# Patient Record
Sex: FEMALE | Race: BLACK OR AFRICAN AMERICAN | NOT HISPANIC OR LATINO | Employment: OTHER | URBAN - METROPOLITAN AREA
[De-identification: names, ages, dates, MRNs, and addresses within clinical notes are randomized per-mention and may not be internally consistent; named-entity substitution may affect disease eponyms.]

---

## 2017-01-20 ENCOUNTER — OFFICE VISIT (OUTPATIENT)
Dept: INTERNAL MEDICINE | Facility: CLINIC | Age: 72
End: 2017-01-20
Payer: MEDICARE

## 2017-01-20 VITALS
HEIGHT: 62 IN | SYSTOLIC BLOOD PRESSURE: 102 MMHG | WEIGHT: 185.63 LBS | RESPIRATION RATE: 16 BRPM | BODY MASS INDEX: 34.16 KG/M2 | DIASTOLIC BLOOD PRESSURE: 78 MMHG | TEMPERATURE: 96 F

## 2017-01-20 DIAGNOSIS — I83.812 VARICOSE VEINS OF LEFT LOWER EXTREMITY WITH PAIN: Primary | ICD-10-CM

## 2017-01-20 PROBLEM — E66.9 OBESITY, CLASS I, BMI 30-34.9: Status: ACTIVE | Noted: 2017-01-20

## 2017-01-20 PROBLEM — E66.811 OBESITY, CLASS I, BMI 30-34.9: Status: ACTIVE | Noted: 2017-01-20

## 2017-01-20 PROCEDURE — 3074F SYST BP LT 130 MM HG: CPT | Mod: S$GLB,,, | Performed by: PHYSICIAN ASSISTANT

## 2017-01-20 PROCEDURE — 1157F ADVNC CARE PLAN IN RCRD: CPT | Mod: S$GLB,,, | Performed by: PHYSICIAN ASSISTANT

## 2017-01-20 PROCEDURE — 1125F AMNT PAIN NOTED PAIN PRSNT: CPT | Mod: S$GLB,,, | Performed by: PHYSICIAN ASSISTANT

## 2017-01-20 PROCEDURE — 99213 OFFICE O/P EST LOW 20 MIN: CPT | Mod: S$GLB,,, | Performed by: PHYSICIAN ASSISTANT

## 2017-01-20 PROCEDURE — 3078F DIAST BP <80 MM HG: CPT | Mod: S$GLB,,, | Performed by: PHYSICIAN ASSISTANT

## 2017-01-20 PROCEDURE — 1160F RVW MEDS BY RX/DR IN RCRD: CPT | Mod: S$GLB,,, | Performed by: PHYSICIAN ASSISTANT

## 2017-01-20 PROCEDURE — 1159F MED LIST DOCD IN RCRD: CPT | Mod: S$GLB,,, | Performed by: PHYSICIAN ASSISTANT

## 2017-01-20 PROCEDURE — 99999 PR PBB SHADOW E&M-EST. PATIENT-LVL III: CPT | Mod: PBBFAC,,, | Performed by: PHYSICIAN ASSISTANT

## 2017-01-20 NOTE — PROGRESS NOTES
Subjective:       Patient ID: Megan Sams is a 72 y.o.B/ female.    Chief Complaint: Leg Pain (L leg x 6 days )    HPI         She comes in today by herself and has the above complaint.  What prompted her visit today was the fact that last night while she was trying to sleep her left posterior lateral leg began to hurt from the knee down to midcalf.  She does have varicose veins present for many years.  Now she has developed a little increase in the size of them just lateral to the popliteal space on her left leg.  She has not had any increased swelling of the calf or ankle and there has been no heat to touch whenever she touches her leg or ankle.  She has no history of DVT.  She also states that sometimes when walking her left knee tends to want to give way.  It doesn't make any noise with flexion-extension or weightbearing.  She's never had problems with her knees before.  She isn't doing anything physically active the could aggravate her legs or joints.  She has not been doing any kneeling to pray, scrub floors, or play with grandchildren.  There has been no swelling of her knees or ankles.        She did try using Aspercreme in the area where she was developing trouble.  It took about an hour and a half before she attained any relief.  Walking around the floor in the middle of the night did not help relieve the discomfort or make it worse.  She did not try any NSAIDs or Tylenol.      Review of Systems    Otherwise negative concerning the ORTHOPEDIC, MUSCULOSKELETAL, RHEUMATOLOGIC, VASCULAR, system review.    Objective:      Physical Exam    STANDING: She does have bulging varicosities present lateral to the left popliteal space.  It kind of looks like a spider a little bigger than quarter-sized.  It does fly well.  There is no inflammatory skin or muscle response.  KNEES: She does have some grating or crepitance more so on the right knee than she does on the left knee.  All signs are negative concerning  patellar, spring, collateral, drawer, and Ingrid's.  She had no effusion of either knee.  CALF: Squeeze was negative for pain and there is no enlargement of either calf.  There is no increased heat.  No cords are palpable.  The BP cuff to 180 mmHg was negative for pain on either side.  Homans sign was also negative bilaterally.    Assessment:       1. Varicose veins of left lower extremity with pain        Plan:     1.  She should try using an ice pack off and on when she has severe discomfort.  At other times she can use either the Aspercreme or low heat from a heating pad.  I asked her to start using extra strength support pantyhose when she is going to be on her feet more than an hour.  She doesn't have to wear them 24/7.  An info sheet concerning varicose veins was given to the patient.  2.  We discussed whether it was needed to do an ultrasound of her veins or to x-ray her knees which I'm sure have arthritis and probable cartilage thinning.  If she does not get better in the next 2 weeks, she is to come back to possibly get those tests run.

## 2017-01-20 NOTE — PATIENT INSTRUCTIONS
Varicose Veins    Varicose veins are swollen, enlarged veins most often found in the legs. They are usually blue or purple in color and may bulge, twist, and stand out under the skin.  Normally, veins return blood from the body to the heart. The leg veins have one-way valves that prevent blood from flowing backward in the vein. When the valves are weak or damaged, blood backs up in the veins. This may cause some of the veins to swell and bulge and become varicose veins.  Symptoms  Varicose veins may or may not cause symptoms. If symptoms do occur, they can include:  · Legs that feel tired, achy, heavy, or itchy  · Leg muscle cramps  · Skin changes, such as discoloration, dryness, redness, or rash (in more severe cases, you may also have sores on the skin called venous leg ulcers)  Risk Factors  There are a number of factors that increase the risk for varicose veins. These can include:  · Being a woman  · Being older  · Sitting or standing for long periods  · Being overweight  · Being pregnant  · Having a family history of varicose veins  Treatment begins with simple self-help measures (see below). If these dont help, there are many procedures that can be done to shrink or remove varicose veins. Your healthcare provider can tell you more about these options, if needed.  Home care  · Support or compression stockings will likely be prescribed. If so, be sure to wear them as directed. They may help improve blood flow.  · Exercising helps strengthen your leg muscles and improve blood flow. To get the most benefit, choose exercises such as walking, swimming, or cycling. Also try to exercise for at least 30 minutes on most days.  · Raising (elevating) your legs lets gravity help blood flow back to the heart. Sit or lie with your feet above heart level a few times throughout the day, or as directed.  · Avoid long periods of sitting or standing. Change positions often. Also, move your ankles, toes and knees often. This  may also help improve blood flow.  · If you are overweight, talk with your healthcare provider about setting up a weight-loss plan. Maintaining a healthy weight can help reduce the strain on your veins. It may also improve symptoms, such as swelling and aching.  · If you have dryness and itching, ask your provider about special lotions that can be applied to the skin to help improve symptoms.  Follow-up care  Follow up with your healthcare provider, or as directed. If imaging tests were done, youll be told the results and if there are any new findings that affect your care.  When to seek medical advice  Call your healthcare provider right away if any of these occur:  · Sudden, severe leg swelling, pain, or redness  · Symptoms worsen, or they dont improve with self-care  · Bleeding from any affected veins  · Ulcers form on the legs, ankles, or feet  · Fever of 100.4°F (38°C) or higher, or as advised by your provider  © 4820-3131 [employer]. [address] [place] [address], [place] [address]. All rights reserved. This information is not intended as a substitute for professional medical care. Always follow your healthcare professional's instructions.

## 2017-01-20 NOTE — MR AVS SNAPSHOT
[facility] - Internal Medicine  [address] [facility] [place] [address]  Phone: [phone]  Fax: [phone]                  Megan Sams   2017 9:40 AM   Office Visit    Description:  Female : 1945   Provider:  Jasson Kelly PA-C   Department:  [facility] - Internal Medicine           Reason for Visit     Leg Pain           Diagnoses this Visit        Comments    Varicose veins of left lower extremity with pain    -  Primary            To Do List           Goals (5 Years of Data)     None      [facility]     [facility] Care [facility] -  Assistance  Registered nurses in the [facility] provide clinical advisement, health education, appointment booking, and other advisory services.  Call for this free service at [phone].             Medications           Message regarding Medications     Verify the changes and/or additions to your medication regime listed below are the same as discussed with your clinician today.  If any of these changes or additions are incorrect, please notify your healthcare provider.             Verify that the below list of medications is an accurate representation of the medications you are currently taking.  If none reported, the list may be blank. If incorrect, please contact your healthcare provider. Carry this list with you in case of emergency.           Current Medications     acetaminophen (TYLENOL EXTRA STRENGTH) 500 MG tablet Take 500 mg by mouth every 6 (six) hours as needed.    allopurinol (ZYLOPRIM) 100 MG tablet Take 2 tablets (200 mg total) by mouth once daily.    aspirin (ECOTRIN) 81 MG EC tablet Take 81 mg by mouth once daily.    candesartan (ATACAND) 32 MG tablet TAKE 1 TABLET BY MOUTH EVERY DAY    carvedilol (COREG) 3.125 MG tablet Take 1 tablet (3.125 mg total) by mouth 2 (two) times daily with meals.    MULTIVIT WITH CALCIUM,IRON,MIN (MULTIPLE VITAMIN, WOMENS ORAL) Take by mouth once daily.    spironolactone (ALDACTONE) 25 MG  "tablet Take 1 tablet (25 mg total) by mouth once daily.           Clinical Reference Information           Vital Signs - Last Recorded  Most recent update: 1/20/2017  9:39 AM by Alyson Yanes LPN    BP Temp Resp    102/78 (BP Location: Right arm, Patient Position: Sitting, BP Method: Manual) 96.3 °F (35.7 °C) (Tympanic) 16    Ht Wt BMI    5' 2" (1.575 m) 84.2 kg (185 lb 10 oz) 33.95 kg/m2      Blood Pressure          Most Recent Value    BP  102/78      Allergies as of 1/20/2017     Gabapentin    Lanoxin [Digoxin]    Thorazine [Chlorpromazine]    Ultracet [Tramadol-acetaminophen]    Penicillins      Immunizations Administered on Date of Encounter - 1/20/2017     None      [facility] Sign-Up     Activating your [facility] account is as easy as 1-2-3!     1) Visit [email or web address], select Sign Up Now, enter this activation code and your date of birth, then select Next.  [email or web address]  Expires: 3/6/2017 10:22 AM      2) Create a username and password to use when you visit [facility] in the future and select a security question in case you lose your password and select Next.    3) Enter your e-mail address and click Sign Up!    Additional Information  If you have questions, please e-mail [email or web address] or call [phone] to talk to our [facility] staff. Remember, [facility] is NOT to be used for urgent needs. For medical emergencies, dial 911.         Instructions      Varicose Veins    Varicose veins are swollen, enlarged veins most often found in the legs. They are usually blue or purple in color and may bulge, twist, and stand out under the skin.  Normally, veins return blood from the body to the heart. The leg veins have one-way valves that prevent blood from flowing backward in the vein. When the valves are weak or damaged, blood backs up in the veins. This may cause some of the veins to swell and bulge and become varicose veins.  Symptoms  Varicose veins may or may not cause symptoms. If symptoms do occur, " they can include:  · Legs that feel tired, achy, heavy, or itchy  · Leg muscle cramps  · Skin changes, such as discoloration, dryness, redness, or rash (in more severe cases, you may also have sores on the skin called venous leg ulcers)  Risk Factors  There are a number of factors that increase the risk for varicose veins. These can include:  · Being a woman  · Being older  · Sitting or standing for long periods  · Being overweight  · Being pregnant  · Having a family history of varicose veins  Treatment begins with simple self-help measures (see below). If these dont help, there are many procedures that can be done to shrink or remove varicose veins. Your healthcare provider can tell you more about these options, if needed.  Home care  · Support or compression stockings will likely be prescribed. If so, be sure to wear them as directed. They may help improve blood flow.  · Exercising helps strengthen your leg muscles and improve blood flow. To get the most benefit, choose exercises such as walking, swimming, or cycling. Also try to exercise for at least 30 minutes on most days.  · Raising (elevating) your legs lets gravity help blood flow back to the heart. Sit or lie with your feet above heart level a few times throughout the day, or as directed.  · Avoid long periods of sitting or standing. Change positions often. Also, move your ankles, toes and knees often. This may also help improve blood flow.  · If you are overweight, talk with your healthcare provider about setting up a weight-loss plan. Maintaining a healthy weight can help reduce the strain on your veins. It may also improve symptoms, such as swelling and aching.  · If you have dryness and itching, ask your provider about special lotions that can be applied to the skin to help improve symptoms.  Follow-up care  Follow up with your healthcare provider, or as directed. If imaging tests were done, youll be told the results and if there are any new findings  that affect your care.  When to seek medical advice  Call your healthcare provider right away if any of these occur:  · Sudden, severe leg swelling, pain, or redness  · Symptoms worsen, or they dont improve with self-care  · Bleeding from any affected veins  · Ulcers form on the legs, ankles, or feet  · Fever of 100.4°F (38°C) or higher, or as advised by your provider  © 6429-5887 [employer]. [address] [place] [address], [place] [id number]. All rights reserved. This information is not intended as a substitute for professional medical care. Always follow your healthcare professional's instructions.

## 2017-02-14 ENCOUNTER — OFFICE VISIT (OUTPATIENT)
Dept: URGENT CARE | Facility: CLINIC | Age: 72
End: 2017-02-14
Payer: MEDICARE

## 2017-02-14 ENCOUNTER — HOSPITAL ENCOUNTER (EMERGENCY)
Facility: HOSPITAL | Age: 72
Discharge: HOME | End: 2017-02-15
Attending: EMERGENCY MEDICINE
Payer: MEDICARE

## 2017-02-14 VITALS
BODY MASS INDEX: 34.16 KG/M2 | WEIGHT: 185.63 LBS | HEART RATE: 74 BPM | DIASTOLIC BLOOD PRESSURE: 100 MMHG | SYSTOLIC BLOOD PRESSURE: 162 MMHG | OXYGEN SATURATION: 99 % | HEIGHT: 62 IN | TEMPERATURE: 99 F

## 2017-02-14 DIAGNOSIS — R07.9 CHEST PAIN, UNSPECIFIED TYPE: Primary | ICD-10-CM

## 2017-02-14 LAB
ALBUMIN SERPL BCP-MCNC: 3.5 G/DL
ALP SERPL-CCNC: 69 U/L
ALT SERPL W/O P-5'-P-CCNC: 20 U/L
ANION GAP SERPL CALC-SCNC: 14 MMOL/L
AST SERPL-CCNC: 35 U/L
BASOPHILS # BLD AUTO: 0.05 K/UL
BASOPHILS NFR BLD: 0.5 %
BILIRUB SERPL-MCNC: 0.6 MG/DL
BNP SERPL-MCNC: 192 PG/ML
BUN SERPL-MCNC: 20 MG/DL
CALCIUM SERPL-MCNC: 10.4 MG/DL
CHLORIDE SERPL-SCNC: 102 MMOL/L
CO2 SERPL-SCNC: 24 MMOL/L
CREAT SERPL-MCNC: 1.1 MG/DL
DIFFERENTIAL METHOD: ABNORMAL
EOSINOPHIL # BLD AUTO: 0.2 K/UL
EOSINOPHIL NFR BLD: 1.9 %
ERYTHROCYTE [DISTWIDTH] IN BLOOD BY AUTOMATED COUNT: 14.7 %
EST. GFR  (AFRICAN AMERICAN): 57 ML/MIN/1.73 M^2
EST. GFR  (NON AFRICAN AMERICAN): 50 ML/MIN/1.73 M^2
GLUCOSE SERPL-MCNC: 131 MG/DL
HCT VFR BLD AUTO: 40.5 %
HGB BLD-MCNC: 13.6 G/DL
INR PPP: 1
LYMPHOCYTES # BLD AUTO: 3.5 K/UL
LYMPHOCYTES NFR BLD: 33.6 %
MCH RBC QN AUTO: 28.4 PG
MCHC RBC AUTO-ENTMCNC: 33.6 %
MCV RBC AUTO: 85 FL
MONOCYTES # BLD AUTO: 1 K/UL
MONOCYTES NFR BLD: 9.4 %
NEUTROPHILS # BLD AUTO: 5.6 K/UL
NEUTROPHILS NFR BLD: 54.6 %
PLATELET # BLD AUTO: 210 K/UL
PMV BLD AUTO: 11.1 FL
POTASSIUM SERPL-SCNC: 3.7 MMOL/L
PROT SERPL-MCNC: 7.8 G/DL
PROTHROMBIN TIME: 10.5 SEC
RBC # BLD AUTO: 4.79 M/UL
SODIUM SERPL-SCNC: 140 MMOL/L
TROPONIN I SERPL DL<=0.01 NG/ML-MCNC: 0.02 NG/ML
WBC # BLD AUTO: 10.33 K/UL

## 2017-02-14 PROCEDURE — 1125F AMNT PAIN NOTED PAIN PRSNT: CPT | Mod: S$GLB,,, | Performed by: PHYSICIAN ASSISTANT

## 2017-02-14 PROCEDURE — 1159F MED LIST DOCD IN RCRD: CPT | Mod: S$GLB,,, | Performed by: PHYSICIAN ASSISTANT

## 2017-02-14 PROCEDURE — 93005 ELECTROCARDIOGRAM TRACING: CPT | Mod: S$GLB,,, | Performed by: FAMILY MEDICINE

## 2017-02-14 PROCEDURE — 99284 EMERGENCY DEPT VISIT MOD MDM: CPT | Mod: 25

## 2017-02-14 PROCEDURE — 80053 COMPREHEN METABOLIC PANEL: CPT

## 2017-02-14 PROCEDURE — 1160F RVW MEDS BY RX/DR IN RCRD: CPT | Mod: S$GLB,,, | Performed by: PHYSICIAN ASSISTANT

## 2017-02-14 PROCEDURE — 85610 PROTHROMBIN TIME: CPT

## 2017-02-14 PROCEDURE — 99213 OFFICE O/P EST LOW 20 MIN: CPT | Mod: S$GLB,,, | Performed by: PHYSICIAN ASSISTANT

## 2017-02-14 PROCEDURE — 84484 ASSAY OF TROPONIN QUANT: CPT

## 2017-02-14 PROCEDURE — 3077F SYST BP >= 140 MM HG: CPT | Mod: S$GLB,,, | Performed by: PHYSICIAN ASSISTANT

## 2017-02-14 PROCEDURE — 3078F DIAST BP <80 MM HG: CPT | Mod: S$GLB,,, | Performed by: PHYSICIAN ASSISTANT

## 2017-02-14 PROCEDURE — 93010 ELECTROCARDIOGRAM REPORT: CPT | Mod: ,,, | Performed by: INTERNAL MEDICINE

## 2017-02-14 PROCEDURE — 83880 ASSAY OF NATRIURETIC PEPTIDE: CPT

## 2017-02-14 PROCEDURE — 1157F ADVNC CARE PLAN IN RCRD: CPT | Mod: S$GLB,,, | Performed by: PHYSICIAN ASSISTANT

## 2017-02-14 PROCEDURE — 99999 PR PBB SHADOW E&M-EST. PATIENT-LVL III: CPT | Mod: PBBFAC,,, | Performed by: PHYSICIAN ASSISTANT

## 2017-02-14 PROCEDURE — 93010 ELECTROCARDIOGRAM REPORT: CPT | Mod: 76,S$GLB,, | Performed by: INTERNAL MEDICINE

## 2017-02-14 PROCEDURE — 85025 COMPLETE CBC W/AUTO DIFF WBC: CPT

## 2017-02-14 PROCEDURE — 93005 ELECTROCARDIOGRAM TRACING: CPT

## 2017-02-14 RX ORDER — NITROGLYCERIN 0.4 MG/1
0.4 TABLET SUBLINGUAL EVERY 5 MIN PRN
Status: DISCONTINUED | OUTPATIENT
Start: 2017-02-14 | End: 2017-02-15 | Stop reason: HOSPADM

## 2017-02-14 RX ORDER — ASPIRIN 325 MG
325 TABLET ORAL
Status: DISCONTINUED | OUTPATIENT
Start: 2017-02-14 | End: 2017-02-15 | Stop reason: HOSPADM

## 2017-02-14 NOTE — ED AVS SNAPSHOT
[facility]  [address] [facility] [address]               Megan ALAMO Sams   2017 10:10 PM   ED    Description:  Female : 1945   Department:  [facility]           Your Care was Coordinated By:     Provider Role From To    Leno Lowe MD Attending Provider 17 [phone] --      Reason for Visit     Chest Pain           Diagnoses this Visit        Comments    Chest pain, unspecified type    -  Primary       ED Disposition     ED Disposition Condition Comment    Discharge             To Do List           Follow-up Information     Follow up with Javad Bowen MD In 2 days.    Specialty:  Internal Medicine    Contact information:    [address]  [address]  [place] [id number] [phone]          Follow up with [facility].    Specialty:  Emergency Medicine    Why:  As needed, If symptoms worsen    Contact information:    [id number] [place] [id number] [phone]      [facility]     [facility] Care [facility] -  Assistance  Registered nurses in the [facility] provide clinical advisement, health education, appointment booking, and other advisory services.  Call for this free service at [phone].             Medications           Message regarding Medications     Verify the changes and/or additions to your medication regime listed below are the same as discussed with your clinician today.  If any of these changes or additions are incorrect, please notify your healthcare provider.        These medications were administered today        Dose Freq    aspirin tablet 325 mg 325 mg ED 1 Time    Sig: Take 1 tablet (325 mg total) by mouth ED 1 Time.    Class: Normal    Route: Oral    nitroGLYCERIN SL tablet 0.4 mg 0.4 mg Every 5 min PRN    Sig: Place 1 tablet (0.4 mg total) under the tongue every 5 (five) minutes as needed for Chest pain.    Class: Normal     "Route: Sublingual           Verify that the below list of medications is an accurate representation of the medications you are currently taking.  If none reported, the list may be blank. If incorrect, please contact your healthcare provider. Carry this list with you in case of emergency.           Current Medications     acetaminophen (TYLENOL EXTRA STRENGTH) 500 MG tablet Take 500 mg by mouth every 6 (six) hours as needed.    allopurinol (ZYLOPRIM) 100 MG tablet Take 2 tablets (200 mg total) by mouth once daily.    aspirin (ECOTRIN) 81 MG EC tablet Take 81 mg by mouth once daily.    aspirin tablet 325 mg Take 1 tablet (325 mg total) by mouth ED 1 Time.    candesartan (ATACAND) 32 MG tablet TAKE 1 TABLET BY MOUTH EVERY DAY    carvedilol (COREG) 3.125 MG tablet Take 1 tablet (3.125 mg total) by mouth 2 (two) times daily with meals.    MULTIVIT WITH CALCIUM,IRON,MIN (MULTIPLE VITAMIN, WOMENS ORAL) Take by mouth once daily.    nitroGLYCERIN SL tablet 0.4 mg Place 1 tablet (0.4 mg total) under the tongue every 5 (five) minutes as needed for Chest pain.    spironolactone (ALDACTONE) 25 MG tablet Take 1 tablet (25 mg total) by mouth once daily.           Clinical Reference Information           Your Vitals Were     BP Pulse Temp Resp Height Weight    180/84 80 98 °F (36.7 °C) (Oral) 20 5' 2" (1.575 m) 83 kg (183 lb)    SpO2 BMI             99% 33.47 kg/m2         Allergies as of 2/15/2017        Reactions    Gabapentin Other (See Comments)    Lowered heart rate    Lanoxin [Digoxin] Other (See Comments)    Too low heart rate    Thorazine [Chlorpromazine] Other (See Comments)    hallucinations    Ultracet [Tramadol-acetaminophen] Other (See Comments)    Elevated BP    Penicillins Nausea Only, Rash      Immunizations Administered on Date of Encounter - 2/15/2017     None      ED Micro, Lab, POCT     Start Ordered       Status Ordering Provider    02/14/17 2238 02/14/17 2237  CBC auto differential  STAT      Final result     " 02/14/17 2238 02/14/17 2237  Comprehensive metabolic panel  STAT      Final result     02/14/17 2238 02/14/17 2237  Protime-INR  STAT      Final result     02/14/17 2238 02/14/17 2237  Troponin I  Now then every 3 hours     Comments:  PLEASE REVIEW ORDER START TIME BEFORE MARKING SPECIMEN COLLECTED.   Start Status   02/14/17 2238 Final result   02/15/17 0138 Final result       Acknowledged     02/14/17 2238 02/14/17 2237  B-Type natriuretic peptide (BNP)  STAT      Final result       ED Imaging Orders     Start Ordered       Status Ordering Provider    02/14/17 2238 02/14/17 2237  X-Ray Chest AP Portable  1 time imaging      Final result         Discharge Instructions         Uncertain Causes of Chest Pain    Chest pain can happen for a number of reasons. Sometimes the cause can't be determined. If your condition does not seem serious, and your pain does not appear to be coming from your heart, your healthcare provider may recommend watching it closely. Sometimes the signs of a serious problem take more time to appear. Many problems not related to your heart can cause chest pain.These include:  · Musculoskeletal. Costochondritis, an inflammation of the tissues around the ribs that can occur from trauma or overuse injuries  · Respiratory. Pneumonia, pneumothorax, or pneumonitis (inflammation of the lining of the chest and lungs)  · Gastrointestinal. Esophageal reflux, heartburn, or gallbladder disease  · Anxiety and panic disorders  · Nerve compression and neuritis  · Miscellaneous problems such as aortic aneurysm or pulmonary embolism (a blood clot in the lungs)  Home care  After your visit, follow these recommendations:  · Rest today and avoid strenuous activity.  · Take any prescribed medicine as directed.  · Be aware of any recurrent chest pain and notice any changes  Follow-up care  Follow up with your healthcare provider if you do not start to feel better within 24 hours, or as advised.  Call 911  Call 911 if  any of these occur:  · A change in the type of pain: if it feels different, becomes more severe, lasts longer, or begins to spread into your shoulder, arm, neck, jaw or back  · Shortness of breath or increased pain with breathing  · Weakness, dizziness, or fainting  · Rapid heart beat  · Crushing sensation in your chest  When to seek medical advice  Call your healthcare provider right away if any of the following occur:  · Cough with dark colored sputum (phlegm) or blood  · Fever of 100.4ºF (38ºC) or higher, or as directed by your healthcare provider  · Swelling, pain or redness in one leg  · Shortness of breath  Date Last Reviewed: 12/30/2015  © 4678-4071 [employer]. [address] [place] [address], [place] [address]. All rights reserved. This information is not intended as a substitute for professional medical care. Always follow your healthcare professional's instructions.          Your Scheduled Appointments     Feb 15, 2017  5:00 PM CST   EKG with EKG, URGENT CARE CLINIC   [facility] ([facility])    [address] [place] [address]  [address] [facility] [address] [phone]              [facility] Sign-Up     Activating your [facility] account is as easy as 1-2-3!     1) Visit [email or web address], select Sign Up Now, enter this activation code and your date of birth, then select Next.  [email or web address]  Expires: 3/6/2017 10:22 AM      2) Create a username and password to use when you visit [facility] in the future and select a security question in case you lose your password and select Next.    3) Enter your e-mail address and click Sign Up!    Additional Information  If you have questions, please e-mail [email or web address] or call [phone] to talk to our [facility] staff. Remember, [facility] is NOT to be used for urgent needs. For medical emergencies, dial 911.          [facility] complies with applicable Federal civil rights laws and does not discriminate on the basis of race, color, national  origin, age, disability, or sex.        Language Assistance Services     ATTENTION: Language assistance services are available, free of charge. Please call [phone].      ATENCIÓN: Si habmalou odonnell, tiene a arshad disposición servicios gratuitos de asistencia lingüística. Llame al [phone].     CHÚ Ý: N?u b?n nói Ti?ng Vi?t, có các d?ch v? h? tr? ngôn ng? mi?n phí dành cho b?n. G?i s? [phone].

## 2017-02-15 VITALS
OXYGEN SATURATION: 99 % | DIASTOLIC BLOOD PRESSURE: 90 MMHG | BODY MASS INDEX: 33.68 KG/M2 | TEMPERATURE: 99 F | HEIGHT: 62 IN | SYSTOLIC BLOOD PRESSURE: 184 MMHG | HEART RATE: 81 BPM | WEIGHT: 183 LBS | RESPIRATION RATE: 18 BRPM

## 2017-02-15 LAB — TROPONIN I SERPL DL<=0.01 NG/ML-MCNC: 0.02 NG/ML

## 2017-02-15 PROCEDURE — 84484 ASSAY OF TROPONIN QUANT: CPT

## 2017-02-15 NOTE — ED NOTES
Pt resting in bed with eyes closed. Pt remains attached to cardiac monitor. AAO x 3. RR e/u, airway open and patent. Bed in locked and low position, side rails up x 2, call light within reach.

## 2017-02-15 NOTE — ED PROVIDER NOTES
SCRIBE #1 NOTE: I, Todd Jeronimo, am scribing for, and in the presence of, Leno Lowe MD. I have scribed the entire note.      History      Chief Complaint   Patient presents with    Chest Pain     pt reports hx of CHF and started having left sided CP radiating to shoulder around 6pm        Review of patient's allergies indicates:   Allergen Reactions    Gabapentin Other (See Comments)     Lowered heart rate    Lanoxin [digoxin] Other (See Comments)     Too low heart rate    Thorazine [chlorpromazine] Other (See Comments)     hallucinations    Ultracet [tramadol-acetaminophen] Other (See Comments)     Elevated BP    Penicillins Nausea Only and Rash        HPI   HPI    2/14/2017, 10:22 PM   History obtained from the patient      History of Present Illness: Megan Sams is a 72 y.o. female patient, with PMHx of CHF, who presents to the Emergency Department for left-sided chest pain which onset suddenly around 430-5 PM today. Symptoms are constant and moderate in severity. Pt thought the pain was from gas so she took a Tums with no relief. Pt went to urgent care in [facility] and got a GI cocktail with no relief. Pt states the pain radiates into her back and left arm. No mitigating or exacerbating factors reported. No associated sx reported. Patient denies any SOB, leg swelling, palpitations, cough, HA, diaphoresis, fever, chills, n/v/d, and all other sxs at this time. No further complaints or concerns at this time.         Arrival mode: Personal vehicle    PCP: Javad Bowen MD       Past Medical History:  Past Medical History   Diagnosis Date    Arthritis     Blood transfusion     CHF (congestive heart failure)     Chronic kidney disease     Depression     Hypertension        Past Surgical History:  Past Surgical History   Procedure Laterality Date    Hysterectomy           Family History:  Family History   Problem Relation Age of Onset    Early death Mother     Heart disease Mother      Hypertension Mother     Kidney disease Father     Heart disease Brother        Social History:  Social History     Social History Main Topics    Smoking status: Never Smoker    Smokeless tobacco: Never Used    Alcohol use No    Drug use: No    Sexual activity: No       ROS   Review of Systems   Constitutional: Negative for chills, diaphoresis and fever.   HENT: Negative for sore throat.    Respiratory: Negative for cough and shortness of breath.    Cardiovascular: Positive for chest pain. Negative for palpitations and leg swelling.   Gastrointestinal: Negative for diarrhea, nausea and vomiting.   Genitourinary: Negative for dysuria.   Musculoskeletal: Negative for back pain.   Skin: Negative for rash.   Neurological: Negative for weakness and headaches.   Hematological: Does not bruise/bleed easily.       Physical Exam    Initial Vitals   BP Pulse Resp Temp SpO2   02/14/17 2200 02/14/17 2200 02/14/17 2200 02/14/17 2200 02/14/17 2200   190/110 72 18 98 °F (36.7 °C) 96 %      Physical Exam  Nursing Notes and Vital Signs Reviewed.  Constitutional: Patient is in no acute distress. Awake and alert. Well-developed and well-nourished.  Head: Atraumatic. Normocephalic.  Eyes: PERRL. EOM intact. Conjunctivae are not pale. No scleral icterus.  ENT: Mucous membranes are moist. Oropharynx is clear and symmetric.    Neck: Supple. Full ROM. No lymphadenopathy.  Cardiovascular: Regular rate. Regular rhythm. No murmurs, rubs, or gallops. Distal pulses are 2+ and symmetric. No bruit.  Pulmonary/Chest: No respiratory distress. Clear to auscultation bilaterally. No wheezing, rales, or rhonchi. Left anterior chest wall tenderness  Abdominal: Soft and non-distended.  There is no tenderness.  No rebound, guarding, or rigidity. Good bowel sounds.  Genitourinary: No CVA tenderness  Musculoskeletal: Moves all extremities. No obvious deformities. No edema. No calf tenderness.  Skin: Warm and dry.  Neurological:  Alert, awake, and  "appropriate.  Normal speech.  No acute focal neurological deficits are appreciated.  Psychiatric: Normal affect. Good eye contact. Appropriate in content.    ED Course    Procedures  ED Vital Signs:  Vitals:    02/14/17 2200 02/14/17 2216 02/14/17 2302 02/15/17 0102   BP: (!) 190/110  (!) 163/87 (!) 180/84   Pulse: 72 75 79 80   Resp: 18  (!) 22 20   Temp: 98 °F (36.7 °C)      TempSrc: Oral      SpO2: 96%  100% 99%   Weight: 83 kg (183 lb)      Height: 5' 2" (1.575 m)       02/15/17 0228   BP: (!) 184/90   Pulse: 81   Resp: 18   Temp: 98.6 °F (37 °C)   TempSrc: Oral   SpO2: 99%   Weight:    Height:        Abnormal Lab Results:  Labs Reviewed   CBC W/ AUTO DIFFERENTIAL - Abnormal; Notable for the following:        Result Value    RDW 14.7 (*)     All other components within normal limits    Narrative:     PLEASE REVIEW ORDER START TIME BEFORE MARKING SPECIMEN  COLLECTED.   COMPREHENSIVE METABOLIC PANEL - Abnormal; Notable for the following:     Glucose 131 (*)     eGFR if  57 (*)     eGFR if non  50 (*)     All other components within normal limits    Narrative:     PLEASE REVIEW ORDER START TIME BEFORE MARKING SPECIMEN  COLLECTED.   B-TYPE NATRIURETIC PEPTIDE - Abnormal; Notable for the following:      (*)     All other components within normal limits    Narrative:     PLEASE REVIEW ORDER START TIME BEFORE MARKING SPECIMEN  COLLECTED.   PROTIME-INR    Narrative:     PLEASE REVIEW ORDER START TIME BEFORE MARKING SPECIMEN  COLLECTED.   TROPONIN I    Narrative:     PLEASE REVIEW ORDER START TIME BEFORE MARKING SPECIMEN  COLLECTED.   TROPONIN I    Narrative:     PLEASE REVIEW ORDER START TIME BEFORE MARKING SPECIMEN  COLLECTED.        All Lab Results:  Results for orders placed or performed during the hospital encounter of 02/14/17   CBC auto differential   Result Value Ref Range    WBC 10.33 3.90 - 12.70 K/uL    RBC 4.79 4.00 - 5.40 M/uL    Hemoglobin 13.6 12.0 - 16.0 g/dL    " Hematocrit 40.5 37.0 - 48.5 %    MCV 85 82 - 98 fL    MCH 28.4 27.0 - 31.0 pg    MCHC 33.6 32.0 - 36.0 %    RDW 14.7 (H) 11.5 - 14.5 %    Platelets 210 150 - 350 K/uL    MPV 11.1 9.2 - 12.9 fL    Gran # 5.6 1.8 - 7.7 K/uL    Lymph # 3.5 1.0 - 4.8 K/uL    Mono # 1.0 0.3 - 1.0 K/uL    Eos # 0.2 0.0 - 0.5 K/uL    Baso # 0.05 0.00 - 0.20 K/uL    Gran% 54.6 38.0 - 73.0 %    Lymph% 33.6 18.0 - 48.0 %    Mono% 9.4 4.0 - 15.0 %    Eosinophil% 1.9 0.0 - 8.0 %    Basophil% 0.5 0.0 - 1.9 %    Differential Method Automated    Comprehensive metabolic panel   Result Value Ref Range    Sodium 140 136 - 145 mmol/L    Potassium 3.7 3.5 - 5.1 mmol/L    Chloride 102 95 - 110 mmol/L    CO2 24 23 - 29 mmol/L    Glucose 131 (H) 70 - 110 mg/dL    BUN, Bld 20 8 - 23 mg/dL    Creatinine 1.1 0.5 - 1.4 mg/dL    Calcium 10.4 8.7 - 10.5 mg/dL    Total Protein 7.8 6.0 - 8.4 g/dL    Albumin 3.5 3.5 - 5.2 g/dL    Total Bilirubin 0.6 0.1 - 1.0 mg/dL    Alkaline Phosphatase 69 55 - 135 U/L    AST 35 10 - 40 U/L    ALT 20 10 - 44 U/L    Anion Gap 14 8 - 16 mmol/L    eGFR if African American 57 (A) >60 mL/min/1.73 m^2    eGFR if non African American 50 (A) >60 mL/min/1.73 m^2   Protime-INR   Result Value Ref Range    Prothrombin Time 10.5 9.0 - 12.5 sec    INR 1.0 0.8 - 1.2   Troponin I   Result Value Ref Range    Troponin I 0.021 0.000 - 0.026 ng/mL   B-Type natriuretic peptide (BNP)   Result Value Ref Range     (H) 0 - 99 pg/mL   Troponin I   Result Value Ref Range    Troponin I 0.018 0.000 - 0.026 ng/mL         Imaging Results:  Imaging Results         X-Ray Chest AP Portable (Final result) Result time:  02/15/17 00:09:04    Final result by LUIS Umana Sr., MD (02/15/17 00:09:04)    Impression:      Normal study.      Electronically signed by: LUIS UMANA MD  Date:     02/15/17  Time:    00:09     Narrative:    One-view chest x-ray    Clinical History: Chest pain    Finding: Comparison was made to a prior examination performed on  5/23/2015.  The size of the heart is normal. The lungs are clear. There is no pneumothorax.  The costophrenic angles are sharp.             The EKG was ordered, reviewed, and independently interpreted by the ED provider.  Interpretation time: 22:17  Rate: 75 BPM  Rhythm: normal sinus rhythm  Interpretation: Biatrial enlargement. Left ventricular hypertrophy. T wave abnormality. No STEMI.           The Emergency Provider reviewed the vital signs and test results, which are outlined above.    ED Discussion     2:11 AM: Reassessed pt at this time.  Pt states her condition has improved at this time. Discussed with pt all pertinent ED information and results. Discussed pt dx and plan of tx. Gave pt all f/u and return to the ED instructions. All questions and concerns were addressed at this time. Pt expresses understanding of information and instructions, and is comfortable with plan to discharge. Pt is stable for discharge.    I have discussed with patient and/or family/caretaker chest pain precautions, specifically to return for worsening chest pain, shortness of breath, fever, or any concern.  I have low suspicion for cardiopulmonary, vascular, infectious, respiratory, or other emergent medical condition based on my evaluation in the ED.    Pre-hypertension/Hypertension: The pt has been informed that they may have pre-hypertension or hypertension based on a blood pressure reading in the ED. I recommend that the pt call the PCP listed on their discharge instructions or a physician of their choice this week to arrange f/u for further evaluation of possible pre-hypertension or hypertension.         ED Medication(s):  Medications - No data to display    Discharge Medication List as of 2/15/2017  2:07 AM          Follow-up Information     Follow up with Javad Bowen MD In 2 days.    Specialty:  Internal Medicine    Contact information:    [address] [facility]  [place] [address] [phone]           Follow up with [facility].    Specialty:  Emergency Medicine    Why:  As needed, If symptoms worsen    Contact information:    [address]  [facility] [address] [phone]            Medical Decision Making    Medical Decision Making:   Clinical Tests:   Lab Tests: Ordered and Reviewed  Radiological Study: Reviewed and Ordered  Medical Tests: Reviewed and Ordered           Scribe Attestation:   Scribe #1: I performed the above scribed service and the documentation accurately describes the services I performed. I attest to the accuracy of the note.    Attending:   Physician Attestation Statement for Scribe #1: I, Leno Lowe MD, personally performed the services described in this documentation, as scribed by Todd Jeronimo, in my presence, and it is both accurate and complete.          Clinical Impression       ICD-10-CM ICD-9-CM   1. Chest pain, unspecified type R07.9 786.50       Disposition:   Disposition: Discharged  Condition: Stable         Leno Lowe MD  02/16/17 [id number]

## 2017-02-15 NOTE — PROGRESS NOTES
"Subjective:      Patient ID: Megan Sams is a 72 y.o. female.    Chief Complaint: Back Pain (radiates to left side chest and arm x 1 day)    HPI Comments: Ms. Sams is a 71yo female that presents to Urgent care for L sided chest, arm and back pain.  Patient is unsure where the pain originates.  She states it started around 2:30 today.  She took Tums which gave her some mild relief but around 6:30 the pain began again.  Patient states she has some relief with passing gas and belching.  Patient denies any significant diet changes or eating thing out of the ordinary today.  She states she did have ranch dressing, which has caused her to have gas in the past but not to this extent.  Patient denies any trauma/fall/injury.      Back Pain   This is a new problem. The current episode started today (~2:30). The problem has been gradually worsening since onset. The pain is at a severity of 10/10. Associated symptoms include chest pain (Chest, L arm and back) and headaches (mild). Pertinent negatives include no abdominal pain, fever, numbness or weakness. Treatments tried: Tums, belching/passing gas gives some relief. The treatment provided mild (pain worsened again around 6:30) relief.     Review of Systems   Constitutional: Negative for chills, diaphoresis and fever.   Respiratory: Negative for cough, chest tightness, shortness of breath and wheezing.    Cardiovascular: Positive for chest pain (Chest, L arm and back). Negative for palpitations and leg swelling.   Gastrointestinal: Negative for abdominal pain, constipation, diarrhea, nausea and vomiting.        Patient feels "gassy" and "bloated"   Musculoskeletal: Positive for back pain.   Neurological: Positive for headaches (mild). Negative for dizziness, weakness, light-headedness and numbness.       Objective:     Visit Vitals    BP (!) 162/100 (BP Location: Right arm, Patient Position: Sitting, BP Method: Manual)    Pulse 74    Temp 98.7 °F (37.1 °C) (Tympanic)    " "Ht 5' 2" (1.575 m)    Wt 84.2 kg (185 lb 10 oz)    SpO2 99%    BMI 33.95 kg/m2     Physical Exam   Constitutional: She appears well-developed and well-nourished. She does not appear ill. No distress.   HENT:   Head: Normocephalic and atraumatic.   Right Ear: Tympanic membrane and ear canal normal. No tenderness. Tympanic membrane is not erythematous.   Left Ear: Tympanic membrane and ear canal normal. No tenderness. Tympanic membrane is not erythematous.   Cardiovascular: Regular rhythm and normal heart sounds.    Pulmonary/Chest: Effort normal and breath sounds normal. No respiratory distress. She has no decreased breath sounds. She has no wheezes. She has no rhonchi. She has no rales.   Abdominal: Soft. Bowel sounds are normal. She exhibits no distension. There is tenderness (with deep palpation) in the suprapubic area and left lower quadrant. There is no rigidity, no rebound and no guarding.   Musculoskeletal:   Tenderness to palpation of L shoulder, upper thoracic area and L chest wall  Patient favors the L side when moving; states movement is uncomfortable    Neurological: She has normal strength. No cranial nerve deficit or sensory deficit.   CN 3, 4, 6, 7, 11 and 12 checked and intact    Skin: Skin is warm and intact. No rash noted.     Assessment:      1. Chest pain, unspecified type       Plan:   Chest pain, unspecified type  -     EKG 12-lead; Future  -     (pyxis) gi cocktail (mylanta 30 mL, lidocaine 2 % viscous 10 mL, dicyclomine 10 mL) 50 mL; Take by mouth one time.    Patient unable to pinpoint exactly where pain is coming from, states she feels like the pain is GI related, however, complains of MSK pain upon examination and points to her chest and L arm.     Reviewed EKG results with patient.  Explained that it was also reviewed by her cardiologist this evening (Santos Lopez) and he found no acute changes in comparison to her previous EKG.  However, explained that EKGs are not always 100% and " patient would need to go to the ER for labs to completely rule out any cardiac origination of pain.     In regards to her elevated blood pressure, Dr. Lopez recommends taking her Coreg twice daily.  Patient should continue to monitor at home, if blood pressure becomes to low, return to once a day.  Instruct patient to monitor BP and pulse and follow up with Cardiology in 2wks.  Last seen in November.     No improvement in chest pain after GI cocktail.    Explained to patient due to persistence and nature of her symptoms, recommend she go to the ER to rule out any cardiac issues.  Discussed this with her son as well.  Patient expressed understanding and agrees to go to ER.  Offered ambulance, son and patient decline, states he will bring her to [facility] [address].

## 2017-02-15 NOTE — ED NOTES
MD Lowe aware of BP. MD states to continue with discharge. NAD noted. AAO x 3. Pt able to ambulate without assistance. Will continue with discharge.

## 2017-02-15 NOTE — DISCHARGE INSTRUCTIONS

## 2017-02-17 ENCOUNTER — OFFICE VISIT (OUTPATIENT)
Dept: INTERNAL MEDICINE | Facility: CLINIC | Age: 72
End: 2017-02-17
Payer: MEDICARE

## 2017-02-17 VITALS
SYSTOLIC BLOOD PRESSURE: 150 MMHG | BODY MASS INDEX: 33.68 KG/M2 | TEMPERATURE: 98 F | DIASTOLIC BLOOD PRESSURE: 90 MMHG | HEIGHT: 62 IN | WEIGHT: 183 LBS | HEART RATE: 72 BPM

## 2017-02-17 DIAGNOSIS — R07.9 CHEST PAIN, UNSPECIFIED TYPE: ICD-10-CM

## 2017-02-17 DIAGNOSIS — R03.0 ELEVATED BLOOD PRESSURE, SITUATIONAL: ICD-10-CM

## 2017-02-17 DIAGNOSIS — S29.012A MUSCLE STRAIN OF LEFT UPPER BACK, INITIAL ENCOUNTER: Primary | ICD-10-CM

## 2017-02-17 PROCEDURE — 1125F AMNT PAIN NOTED PAIN PRSNT: CPT | Mod: S$GLB,,, | Performed by: PHYSICIAN ASSISTANT

## 2017-02-17 PROCEDURE — 3080F DIAST BP >= 90 MM HG: CPT | Mod: S$GLB,,, | Performed by: PHYSICIAN ASSISTANT

## 2017-02-17 PROCEDURE — 96372 THER/PROPH/DIAG INJ SC/IM: CPT | Mod: S$GLB,,, | Performed by: PHYSICIAN ASSISTANT

## 2017-02-17 PROCEDURE — 3077F SYST BP >= 140 MM HG: CPT | Mod: S$GLB,,, | Performed by: PHYSICIAN ASSISTANT

## 2017-02-17 PROCEDURE — 99499 UNLISTED E&M SERVICE: CPT | Mod: S$GLB,,, | Performed by: PHYSICIAN ASSISTANT

## 2017-02-17 PROCEDURE — 99999 PR PBB SHADOW E&M-EST. PATIENT-LVL III: CPT | Mod: PBBFAC,,, | Performed by: PHYSICIAN ASSISTANT

## 2017-02-17 PROCEDURE — 99213 OFFICE O/P EST LOW 20 MIN: CPT | Mod: S$GLB,,, | Performed by: PHYSICIAN ASSISTANT

## 2017-02-17 PROCEDURE — 1160F RVW MEDS BY RX/DR IN RCRD: CPT | Mod: S$GLB,,, | Performed by: PHYSICIAN ASSISTANT

## 2017-02-17 PROCEDURE — 1159F MED LIST DOCD IN RCRD: CPT | Mod: S$GLB,,, | Performed by: PHYSICIAN ASSISTANT

## 2017-02-17 PROCEDURE — 1157F ADVNC CARE PLAN IN RCRD: CPT | Mod: S$GLB,,, | Performed by: PHYSICIAN ASSISTANT

## 2017-02-17 RX ORDER — KETOROLAC TROMETHAMINE 30 MG/ML
15 INJECTION, SOLUTION INTRAMUSCULAR; INTRAVENOUS ONCE
Status: COMPLETED | OUTPATIENT
Start: 2017-02-17 | End: 2017-02-17

## 2017-02-17 RX ORDER — KETOROLAC TROMETHAMINE 30 MG/ML
15 INJECTION, SOLUTION INTRAMUSCULAR; INTRAVENOUS ONCE
Qty: 0.5 ML | Refills: 0 | Status: SHIPPED | OUTPATIENT
Start: 2017-02-17 | End: 2017-02-17

## 2017-02-17 RX ORDER — METHYLPREDNISOLONE 4 MG/1
TABLET ORAL
Qty: 1 PACKAGE | Refills: 0 | Status: SHIPPED | OUTPATIENT
Start: 2017-02-17 | End: 2017-04-13

## 2017-02-17 RX ADMIN — KETOROLAC TROMETHAMINE 15 MG: 30 INJECTION, SOLUTION INTRAMUSCULAR; INTRAVENOUS at 11:02

## 2017-02-17 NOTE — PROGRESS NOTES
Subjective:       Patient ID: Megan Sams is a 72 y.o. female.    Chief Complaint: Hospital Follow Up    HPI  Patient comes in today for ER follow-up.  On Tuesday she was having some chest pain and upper back pain and she went to urgent care.  She describes as radiating to her left arm.  She was given a GI cocktail which did not improve pains urgent care felt it best that she was seen in the ER setting.  She went to the ER and EKG, chest x-ray and labs were done.  She had a troponin level which was normal.  She was discharged.  Her blood pressure is very high but this is been related to pain.  She states she finds it hard to sleep due to the pain in her back.  She denies any injury or repetitive motion that could've caused this.  She had a similar episode in her lower back area in June that was relieved with anti-inflammatories and steroids.  Her blood pressures a little bit high today but she states she is in pain and she is a little bit distressed due to not being able to sleep well.  Upper back is tender to touch.  Her chest pain has resolved.  She's also has a history of costochondritis as well.    Past Medical History   Diagnosis Date    Arthritis     Blood transfusion     CHF (congestive heart failure)     Chronic kidney disease     Depression     Hypertension        Current Outpatient Prescriptions   Medication Sig Dispense Refill    acetaminophen (TYLENOL EXTRA STRENGTH) 500 MG tablet Take 500 mg by mouth every 6 (six) hours as needed.      allopurinol (ZYLOPRIM) 100 MG tablet Take 2 tablets (200 mg total) by mouth once daily. 180 tablet 4    aspirin (ECOTRIN) 81 MG EC tablet Take 81 mg by mouth once daily.      candesartan (ATACAND) 32 MG tablet TAKE 1 TABLET BY MOUTH EVERY DAY 90 tablet 2    carvedilol (COREG) 3.125 MG tablet Take 1 tablet (3.125 mg total) by mouth 2 (two) times daily with meals. 180 tablet 3    MULTIVIT WITH CALCIUM,IRON,MIN (MULTIPLE VITAMIN, WOMENS ORAL) Take by mouth once  "daily.      spironolactone (ALDACTONE) 25 MG tablet Take 1 tablet (25 mg total) by mouth once daily. 90 tablet 3    methylPREDNISolone (MEDROL DOSEPACK) 4 mg tablet use as directed 1 Package 0     No current facility-administered medications for this visit.        Review of Systems   Constitutional: Negative.    HENT: Negative.    Eyes: Negative.    Respiratory: Negative.    Cardiovascular: Positive for chest pain.   Gastrointestinal: Negative.    Endocrine: Negative.    Genitourinary: Negative.    Musculoskeletal: Positive for back pain and neck pain.   Allergic/Immunologic: Negative.    Neurological: Negative.    Hematological: Negative.    Psychiatric/Behavioral: Negative.        Objective:     Visit Vitals    BP (!) 150/90    Pulse 72    Temp 98.4 °F (36.9 °C) (Tympanic)    Ht 5' 2" (1.575 m)    Wt 83 kg (183 lb)    BMI 33.47 kg/m2        Physical Exam   Constitutional: She is oriented to person, place, and time. She appears well-developed and well-nourished. No distress.   HENT:   Head: Normocephalic and atraumatic.   Right Ear: Hearing, tympanic membrane, external ear and ear canal normal.   Left Ear: Hearing, tympanic membrane, external ear and ear canal normal.   Nose: No sinus tenderness. Right sinus exhibits no maxillary sinus tenderness and no frontal sinus tenderness. Left sinus exhibits no maxillary sinus tenderness and no frontal sinus tenderness.   Mouth/Throat: Uvula is midline, oropharynx is clear and moist and mucous membranes are normal. No oropharyngeal exudate, posterior oropharyngeal edema, posterior oropharyngeal erythema or tonsillar abscesses.   Eyes: Conjunctivae are normal. Pupils are equal, round, and reactive to light.   Neck: Normal range of motion. Neck supple.   Cardiovascular: Normal rate, regular rhythm and normal heart sounds.    Pulmonary/Chest: Effort normal and breath sounds normal. No respiratory distress. She exhibits tenderness.   Musculoskeletal:        " Back:    Neurological: She is alert and oriented to person, place, and time.   Skin: Skin is warm.   Psychiatric: She has a normal mood and affect. Her behavior is normal. Judgment and thought content normal.   Vitals reviewed.        Lab Results   Component Value Date    WBC 10.33 02/14/2017    HGB 13.6 02/14/2017    HCT 40.5 02/14/2017     02/14/2017    CHOL 185 04/26/2016    TRIG 106 04/26/2016    HDL 64 04/26/2016    ALT 20 02/14/2017    AST 35 02/14/2017     02/14/2017    K 3.7 02/14/2017     02/14/2017    CREATININE 1.1 02/14/2017    BUN 20 02/14/2017    CO2 24 02/14/2017    TSH 2.376 04/26/2016    INR 1.0 02/14/2017    HGBA1C 5.6 11/08/2016       Assessment:       1. Muscle strain of left upper back, initial encounter    2. Elevated blood pressure, situational        Plan:   Muscle strain of left upper back, initial encounter  -     ketorolac injection 15 mg; Inject 0.5 mLs (15 mg total) into the muscle once.    Elevated blood pressure, situational- due to pain and lack of sleep.  Patient instructed to go home, soak in epsom salt and try to lay down.  Start steroid pack tomorrow. This way, BP will likely be down.  Prior to pain, BP was very well controlled.     Other orders  -     methylPREDNISolone (MEDROL DOSEPACK) 4 mg tablet; use as directed  Dispense: 1 Package; Refill: 0

## 2017-02-17 NOTE — MR AVS SNAPSHOT
[facility]  [address] [place] [address]  Phone: [phone]  Fax: [phone]                  Megan ALAMO Latrell   2017 11:00 AM   Office Visit    Description:  Female : 1945   Provider:  FELIBERTO Medina   [facility]           Reason for Visit     Hospital Follow Up           Diagnoses this Visit        Comments    Muscle strain of left upper back, initial encounter    -  Primary     Elevated blood pressure, situational                To Do List           Goals (5 Years of Data)     None       These Medications        Disp Refills Start End    methylPREDNISolone (MEDROL DOSEPACK) 4 mg tablet 1 Package 0 2017     use as directed    Pharmacy: [facility]/pharmacy #[id number] - [place] - [address] [facility] [address] [facility] #: [phone]         [facility]     [facility] Care [facility] - / Assistance  Registered nurses in the [facility] provide clinical advisement, health education, appointment booking, and other advisory services.  Call for this free service at [phone].             Medications           Message regarding Medications     Verify the changes and/or additions to your medication regime listed below are the same as discussed with your clinician today.  If any of these changes or additions are incorrect, please notify your healthcare provider.        START taking these NEW medications        Refills    methylPREDNISolone (MEDROL DOSEPACK) 4 mg tablet 0    Sig: use as directed    Class: Normal      These medications were administered today        Dose Freq    ketorolac injection 15 mg 15 mg Once    Sig: Inject 0.5 mLs (15 mg total) into the muscle once.    Class: Normal    Route: Intramuscular           Verify that the below list of medications is an accurate representation of the medications you are currently taking.  If none reported, the list may be blank. If incorrect, please  "contact your healthcare provider. Carry this list with you in case of emergency.           Current Medications     acetaminophen (TYLENOL EXTRA STRENGTH) 500 MG tablet Take 500 mg by mouth every 6 (six) hours as needed.    allopurinol (ZYLOPRIM) 100 MG tablet Take 2 tablets (200 mg total) by mouth once daily.    aspirin (ECOTRIN) 81 MG EC tablet Take 81 mg by mouth once daily.    candesartan (ATACAND) 32 MG tablet TAKE 1 TABLET BY MOUTH EVERY DAY    carvedilol (COREG) 3.125 MG tablet Take 1 tablet (3.125 mg total) by mouth 2 (two) times daily with meals.    methylPREDNISolone (MEDROL DOSEPACK) 4 mg tablet use as directed    MULTIVIT WITH CALCIUM,IRON,MIN (MULTIPLE VITAMIN, WOMENS ORAL) Take by mouth once daily.    spironolactone (ALDACTONE) 25 MG tablet Take 1 tablet (25 mg total) by mouth once daily.           Clinical Reference Information           Your Vitals Were     BP Pulse Temp Height Weight BMI    150/90 72 98.4 °F (36.9 °C) (Tympanic) 5' 2" (1.575 m) 83 kg (183 lb) 33.47 kg/m2      Blood Pressure          Most Recent Value    BP  (!)  150/90      Allergies as of 2/17/2017     Gabapentin    Lanoxin [Digoxin]    Thorazine [Chlorpromazine]    Ultracet [Tramadol-acetaminophen]    Penicillins      Immunizations Administered on Date of Encounter - 2/17/2017     None      Administrations This Visit     ketorolac injection 15 mg     Admin Date Action Dose Route Administered By             02/17/2017 Given 15 mg Intramuscular Loly Yanes LPN                      [facility] Sign-Up     Activating your [facility] account is as easy as 1-2-3!     1) Visit [email or web address], select Sign Up Now, enter this activation code and your date of birth, then select Next.  [email or web address]  Expires: 3/6/2017 10:22 AM      2) Create a username and password to use when you visit [facility] in the future and select a security question in case you lose your password and select Next.    3) Enter your e-mail address and click " Sign Up!    Additional Information  If you have questions, please e-mail [email or web address] or call [phone] to talk to our [facility] staff. Remember, [facility] is NOT to be used for urgent needs. For medical emergencies, dial 911.         Language Assistance Services     ATTENTION: Language assistance services are available, free of charge. Please call [phone].      ATENCIÓN: Si habla español, tiene a arshad disposición servicios gratuitos de asistencia lingüística. Llame al [phone].     [facility] Ý: N?u b?n nói Ti?ng Vi?t, có các d?ch v? h? tr? ngôn ng? mi?n phí dành cho b?n. G?i s? [phone].         [facility] complies with applicable Federal civil rights laws and does not discriminate on the basis of race, color, national origin, age, disability, or sex.

## 2017-02-27 ENCOUNTER — OFFICE VISIT (OUTPATIENT)
Dept: URGENT CARE | Facility: CLINIC | Age: 72
End: 2017-02-27
Payer: MEDICARE

## 2017-02-27 VITALS
DIASTOLIC BLOOD PRESSURE: 72 MMHG | HEART RATE: 75 BPM | TEMPERATURE: 97 F | HEIGHT: 62 IN | BODY MASS INDEX: 33.6 KG/M2 | WEIGHT: 182.56 LBS | SYSTOLIC BLOOD PRESSURE: 120 MMHG | OXYGEN SATURATION: 99 %

## 2017-02-27 DIAGNOSIS — R05.9 COUGH: Primary | ICD-10-CM

## 2017-02-27 DIAGNOSIS — J40 BRONCHITIS: ICD-10-CM

## 2017-02-27 DIAGNOSIS — R09.89 CHEST CONGESTION: ICD-10-CM

## 2017-02-27 PROCEDURE — 1157F ADVNC CARE PLAN IN RCRD: CPT | Mod: S$GLB,,, | Performed by: NURSE PRACTITIONER

## 2017-02-27 PROCEDURE — 1125F AMNT PAIN NOTED PAIN PRSNT: CPT | Mod: S$GLB,,, | Performed by: NURSE PRACTITIONER

## 2017-02-27 PROCEDURE — 1160F RVW MEDS BY RX/DR IN RCRD: CPT | Mod: S$GLB,,, | Performed by: NURSE PRACTITIONER

## 2017-02-27 PROCEDURE — 3074F SYST BP LT 130 MM HG: CPT | Mod: S$GLB,,, | Performed by: NURSE PRACTITIONER

## 2017-02-27 PROCEDURE — 3078F DIAST BP <80 MM HG: CPT | Mod: S$GLB,,, | Performed by: NURSE PRACTITIONER

## 2017-02-27 PROCEDURE — 99999 PR PBB SHADOW E&M-EST. PATIENT-LVL IV: CPT | Mod: PBBFAC,,, | Performed by: NURSE PRACTITIONER

## 2017-02-27 PROCEDURE — 99213 OFFICE O/P EST LOW 20 MIN: CPT | Mod: S$GLB,,, | Performed by: NURSE PRACTITIONER

## 2017-02-27 PROCEDURE — 1159F MED LIST DOCD IN RCRD: CPT | Mod: S$GLB,,, | Performed by: NURSE PRACTITIONER

## 2017-02-27 RX ORDER — DOXYCYCLINE 100 MG/1
100 CAPSULE ORAL EVERY 12 HOURS
Qty: 20 CAPSULE | Refills: 0 | Status: SHIPPED | OUTPATIENT
Start: 2017-02-27 | End: 2017-03-09

## 2017-02-27 RX ORDER — GUAIFENESIN 600 MG/1
600 TABLET, EXTENDED RELEASE ORAL 2 TIMES DAILY
Qty: 40 TABLET | Refills: 0 | Status: SHIPPED | OUTPATIENT
Start: 2017-02-27 | End: 2017-03-09

## 2017-02-27 NOTE — PROGRESS NOTES
"Subjective:       Patient ID: Megan Sams is a 72 y.o. female.    Chief Complaint: Cough and Nasal Congestion    HPI Comments: Patient requests antibiotic for cough and chest congestion. She is fearful because she had pneumonia before and does not what this to become pneumonia. No fever. She is not sure what she can take otc due to other health issues.    Cough   This is a new problem. Episode onset: 3 days. The problem has been gradually worsening. The cough is productive of sputum. Associated symptoms include nasal congestion and postnasal drip. Pertinent negatives include no chest pain, chills, ear congestion, ear pain, eye redness, fever, headaches, heartburn, hemoptysis, myalgias, rash, rhinorrhea, sore throat, shortness of breath, sweats, weight loss or wheezing. Nothing aggravates the symptoms. She has tried nothing for the symptoms. The treatment provided no relief. Her past medical history is significant for pneumonia. There is no history of asthma or environmental allergies.       /72 (BP Location: Left arm, Patient Position: Sitting)  Pulse 75  Temp 97.4 °F (36.3 °C) (Tympanic)   Ht 5' 2" (1.575 m)  Wt 82.8 kg (182 lb 8.7 oz)  SpO2 99%  BMI 33.39 kg/m2    Review of Systems   Constitutional: Negative for activity change, appetite change, chills, diaphoresis, fatigue, fever, unexpected weight change and weight loss.   HENT: Positive for congestion, postnasal drip and sneezing. Negative for dental problem, drooling, ear discharge, ear pain, hearing loss, mouth sores, nosebleeds, rhinorrhea, sinus pressure, sore throat, tinnitus, trouble swallowing and voice change.    Eyes: Negative for pain, discharge and redness.   Respiratory: Positive for cough. Negative for apnea, hemoptysis, choking, chest tightness, shortness of breath, wheezing and stridor.    Cardiovascular: Negative for chest pain, palpitations and leg swelling.   Gastrointestinal: Negative for abdominal pain, diarrhea, heartburn, " nausea and vomiting.   Endocrine: Negative for cold intolerance and heat intolerance.   Genitourinary: Negative for dysuria.   Musculoskeletal: Negative for arthralgias, joint swelling, myalgias and neck pain.   Skin: Negative for rash.   Allergic/Immunologic: Negative for environmental allergies, food allergies and immunocompromised state.   Neurological: Negative for syncope, light-headedness and headaches.       Objective:      Physical Exam   Constitutional: She is oriented to person, place, and time. She appears well-developed and well-nourished. No distress.   HENT:   Head: Normocephalic and atraumatic.   Nose: Mucosal edema present. No rhinorrhea. Right sinus exhibits no maxillary sinus tenderness and no frontal sinus tenderness. Left sinus exhibits no maxillary sinus tenderness and no frontal sinus tenderness.   Mouth/Throat: Uvula is midline, oropharynx is clear and moist and mucous membranes are normal. No oropharyngeal exudate, posterior oropharyngeal edema or posterior oropharyngeal erythema.   Eyes: Conjunctivae are normal. Right eye exhibits no discharge. Left eye exhibits no discharge.   Neck: Normal range of motion. No JVD present.   Cardiovascular: Normal rate, regular rhythm and normal heart sounds.    No murmur heard.  Pulmonary/Chest: Effort normal and breath sounds normal. No accessory muscle usage. No respiratory distress. She has no decreased breath sounds. She has no wheezes. She has no rhonchi. She has no rales. She exhibits no tenderness.   Abdominal: Soft. She exhibits no distension.   Musculoskeletal: Normal range of motion.   Neurological: She is alert and oriented to person, place, and time.   Skin: Skin is warm and dry. She is not diaphoretic.   No leg swelling   Psychiatric: She has a normal mood and affect. Her behavior is normal.   Nursing note and vitals reviewed.      Assessment:       1. Cough    2. Chest congestion    3. Bronchitis        Plan:       Megan was seen today for  cough and nasal congestion.    Diagnoses and all orders for this visit:    Cough- worsening, Patient concerned about pneumonia, feels like the last time she had bronchitis  -     doxycycline (VIBRAMYCIN) 100 MG Cap; Take 1 capsule (100 mg total) by mouth every 12 (twelve) hours.  -     guaifenesin (MUCINEX) 600 mg 12 hr tablet; Take 1 tablet (600 mg total) by mouth 2 (two) times daily.  recommends water to help thin mucous, she states that she usually drinks about 3 bottles a day due to kidney issues/hx chf. I told her that I am ok with 3 bottles.    Chest congestion  -     doxycycline (VIBRAMYCIN) 100 MG Cap; Take 1 capsule (100 mg total) by mouth every 12 (twelve) hours.  -     guaifenesin (MUCINEX) 600 mg 12 hr tablet; Take 1 tablet (600 mg total) by mouth 2 (two) times daily.    Bronchitis  -     doxycycline (VIBRAMYCIN) 100 MG Cap; Take 1 capsule (100 mg total) by mouth every 12 (twelve) hours.  -     guaifenesin (MUCINEX) 600 mg 12 hr tablet; Take 1 tablet (600 mg total) by mouth 2 (two) times daily.

## 2017-02-27 NOTE — MR AVS SNAPSHOT
[facility]  [address] [place] [address]  Phone: [phone]  Fax: [phone]                  Megan Sams   2017 12:40 PM   Office Visit    Description:  Female : 1945   Provider:  TERENCE Hernandez   Department:  [facility]           Reason for Visit     Cough     Nasal Congestion           Diagnoses this Visit        Comments    Cough    -  Primary     Chest congestion         Bronchitis                To Do List           Goals (5 Years of Data)     None       These Medications        Disp Refills Start End    doxycycline (VIBRAMYCIN) 100 MG Cap 20 capsule 0 2017 3/9/2017    Take 1 capsule (100 mg total) by mouth every 12 (twelve) hours. - Oral    Pharmacy: [facility]/pharmacy #[id number] - [place] - [address] Ph #: [phone]       guaifenesin (MUCINEX) 600 mg 12 hr tablet 40 tablet 0 2017 3/9/2017    Take 1 tablet (600 mg total) by mouth 2 (two) times daily. - Oral    Pharmacy: [facility]/pharmacy #[id number] - [place] - [address] Ph #: [phone]         [facility]     [facility] -  Assistance  Registered nurses in the [facility] provide clinical advisement, health education, appointment booking, and other advisory services.  Call for this free service at [phone].             Medications           Message regarding Medications     Verify the changes and/or additions to your medication regime listed below are the same as discussed with your clinician today.  If any of these changes or additions are incorrect, please notify your healthcare provider.        START taking these NEW medications        Refills    doxycycline (VIBRAMYCIN) 100 MG Cap 0    Sig: Take 1 capsule (100 mg total) by mouth every 12 (twelve) hours.    Class: Normal    Route: Oral    guaifenesin (MUCINEX) 600 mg 12 hr tablet 0    Sig: Take 1 tablet (600 mg  total) by mouth 2 (two) times daily.    Class: Normal    Route: Oral           Verify that the below list of medications is an accurate representation of the medications you are currently taking.  If none reported, the list may be blank. If incorrect, please contact your healthcare provider. Carry this list with you in case of emergency.           Current Medications     acetaminophen (TYLENOL EXTRA STRENGTH) 500 MG tablet Take 500 mg by mouth every 6 (six) hours as needed.    allopurinol (ZYLOPRIM) 100 MG tablet Take 2 tablets (200 mg total) by mouth once daily.    aspirin (ECOTRIN) 81 MG EC tablet Take 81 mg by mouth once daily.    candesartan (ATACAND) 32 MG tablet TAKE 1 TABLET BY MOUTH EVERY DAY    carvedilol (COREG) 3.125 MG tablet Take 1 tablet (3.125 mg total) by mouth 2 (two) times daily with meals.    MULTIVIT WITH CALCIUM,IRON,MIN (MULTIPLE VITAMIN, WOMENS ORAL) Take by mouth once daily.    spironolactone (ALDACTONE) 25 MG tablet Take 1 tablet (25 mg total) by mouth once daily.    doxycycline (VIBRAMYCIN) 100 MG Cap Take 1 capsule (100 mg total) by mouth every 12 (twelve) hours.    guaifenesin (MUCINEX) 600 mg 12 hr tablet Take 1 tablet (600 mg total) by mouth 2 (two) times daily.    methylPREDNISolone (MEDROL DOSEPACK) 4 mg tablet use as directed           Clinical Reference Information           Your Vitals Were     BP                   120/72 (BP Location: Left arm, Patient Position: Sitting)           Blood Pressure          Most Recent Value    BP  120/72      Allergies as of 2/27/2017     Gabapentin    Lanoxin [Digoxin]    Thorazine [Chlorpromazine]    Ultracet [Tramadol-acetaminophen]    Penicillins      Immunizations Administered on Date of Encounter - 2/27/2017     None      [facility] Sign-Up     Activating your [facility] account is as easy as 1-2-3!     1) Visit [email or web address], select Sign Up Now, enter this activation code and your date of birth, then select Next.  [email or web address]  Expires:  3/6/2017 10:22 AM      2) Create a username and password to use when you visit [facility] in the future and select a security question in case you lose your password and select Next.    3) Enter your e-mail address and click Sign Up!    Additional Information  If you have questions, please e-mail [email or web address] or call [phone] to talk to our [facility] staff. Remember, [facility] is NOT to be used for urgent needs. For medical emergencies, dial 911.         Instructions      Bronchitis, Antibiotic Treatment (Adult)    Bronchitis is an infection of the air passages (bronchial tubes) in your lungs. It often occurs when you have a cold. This illness is contagious during the first few days and is spread through the air by coughing and sneezing, or by direct contact (touching the sick person and then touching your own eyes, nose, or mouth).  Symptoms of bronchitis include cough with mucus (phlegm) and low-grade fever. Bronchitis usually lasts 7 to 14 days. Mild cases can be treated with simple home remedies. More severe infection is treated with an antibiotic.  Home care  Follow these guidelines when caring for yourself at home:  · If your symptoms are severe, rest at home for the first 2 to 3 days. When you go back to your usual activities, don't let yourself get too tired.  · Do not smoke. Also avoid being exposed to secondhand smoke.  · You may use over-the-counter medicines to control fever or pain, unless another medicine was prescribed. (Note: If you have chronic liver or kidney disease or have ever had a stomach ulcer or gastrointestinal bleeding, talk with your healthcare provider before using these medicines. Also talk to your provider if you are taking medicine to prevent blood clots.) Aspirin should never be given to anyone younger than 18 years of age who is ill with a viral infection or fever. It may cause severe liver or brain damage.  · Your appetite may be poor, so a light diet is fine. Avoid  dehydration by drinking 6 to 8 glasses of fluids per day (such as water, soft drinks, sports drinks, juices, tea, or soup). Extra fluids will help loosen secretions in the nose and lungs.  · Over-the-counter cough, cold, and sore-throat medicines will not shorten the length of the illness, but they may be helpful to reduce symptoms. (Note: Do not use decongestants if you have high blood pressure.)  · Finish all antibiotic medicine. Do this even if you are feeling better after only a few days.  Follow-up care  Follow up with your healthcare provider, or as advised. If you had an X-ray or ECG (electrocardiogram), a specialist will review it. You will be notified of any new findings that may affect your care.  Note: If you are age 65 or older, or if you have a chronic lung disease or condition that affects your immune system, or you smoke, talk to your healthcare provider about having pneumococcal vaccinations and a yearly influenza vaccination (flu shot).  When to seek medical advice  Call your healthcare provider right away if any of these occur:  · Fever of 100.4°F (38°C) or higher  · Coughing up increased amounts of colored sputum  · Weakness, drowsiness, headache, facial pain, ear pain, or a stiff neck  Call 911, or get immediate medical care  Contact emergency services right away if any of these occur.  · Coughing up blood  · Worsening weakness, drowsiness, headache, or stiff neck  · Trouble breathing, wheezing, or pain with breathing  Date Last Reviewed: 9/13/2015  © 5799-3417 [employer]. [address] [place] [address], [place] [address]. All rights reserved. This information is not intended as a substitute for professional medical care. Always follow your healthcare professional's instructions.             Language Assistance Services     ATTENTION: Language assistance services are available, free of charge. Please call [phone].      ATENCIÓN: Si kaden chao a arshad disposición servicios  hanselos de asistencia lingüística. Jocelynn akbar [phone].     IKER Ý: N?u b?n nói Ti?ng Vi?t, có các d?ch v? h? tr? ngôn ng? mi?n phí dành cho b?n. G?i s? [phone].         [facility] complies with applicable Federal civil rights laws and does not discriminate on the basis of race, color, national origin, age, disability, or sex.

## 2017-02-27 NOTE — PATIENT INSTRUCTIONS
Bronchitis, Antibiotic Treatment (Adult)    Bronchitis is an infection of the air passages (bronchial tubes) in your lungs. It often occurs when you have a cold. This illness is contagious during the first few days and is spread through the air by coughing and sneezing, or by direct contact (touching the sick person and then touching your own eyes, nose, or mouth).  Symptoms of bronchitis include cough with mucus (phlegm) and low-grade fever. Bronchitis usually lasts 7 to 14 days. Mild cases can be treated with simple home remedies. More severe infection is treated with an antibiotic.  Home care  Follow these guidelines when caring for yourself at home:  · If your symptoms are severe, rest at home for the first 2 to 3 days. When you go back to your usual activities, don't let yourself get too tired.  · Do not smoke. Also avoid being exposed to secondhand smoke.  · You may use over-the-counter medicines to control fever or pain, unless another medicine was prescribed. (Note: If you have chronic liver or kidney disease or have ever had a stomach ulcer or gastrointestinal bleeding, talk with your healthcare provider before using these medicines. Also talk to your provider if you are taking medicine to prevent blood clots.) Aspirin should never be given to anyone younger than 18 years of age who is ill with a viral infection or fever. It may cause severe liver or brain damage.  · Your appetite may be poor, so a light diet is fine. Avoid dehydration by drinking 6 to 8 glasses of fluids per day (such as water, soft drinks, sports drinks, juices, tea, or soup). Extra fluids will help loosen secretions in the nose and lungs.  · Over-the-counter cough, cold, and sore-throat medicines will not shorten the length of the illness, but they may be helpful to reduce symptoms. (Note: Do not use decongestants if you have high blood pressure.)  · Finish all antibiotic medicine. Do this even if you are feeling better after only a  few days.  Follow-up care  Follow up with your healthcare provider, or as advised. If you had an X-ray or ECG (electrocardiogram), a specialist will review it. You will be notified of any new findings that may affect your care.  Note: If you are age 65 or older, or if you have a chronic lung disease or condition that affects your immune system, or you smoke, talk to your healthcare provider about having pneumococcal vaccinations and a yearly influenza vaccination (flu shot).  When to seek medical advice  Call your healthcare provider right away if any of these occur:  · Fever of 100.4°F (38°C) or higher  · Coughing up increased amounts of colored sputum  · Weakness, drowsiness, headache, facial pain, ear pain, or a stiff neck  Call 911, or get immediate medical care  Contact emergency services right away if any of these occur.  · Coughing up blood  · Worsening weakness, drowsiness, headache, or stiff neck  · Trouble breathing, wheezing, or pain with breathing  Date Last Reviewed: 9/13/2015 © 2000-2016 [employer]. [address] [place] [address], [place] [address]. All rights reserved. This information is not intended as a substitute for professional medical care. Always follow your healthcare professional's instructions.

## 2017-04-13 ENCOUNTER — OFFICE VISIT (OUTPATIENT)
Dept: URGENT CARE | Facility: CLINIC | Age: 72
End: 2017-04-13
Payer: MEDICARE

## 2017-04-13 VITALS
BODY MASS INDEX: 34.03 KG/M2 | TEMPERATURE: 98 F | HEART RATE: 62 BPM | SYSTOLIC BLOOD PRESSURE: 160 MMHG | WEIGHT: 184.94 LBS | DIASTOLIC BLOOD PRESSURE: 90 MMHG | HEIGHT: 62 IN

## 2017-04-13 DIAGNOSIS — I10 ESSENTIAL HYPERTENSION: ICD-10-CM

## 2017-04-13 DIAGNOSIS — S16.1XXA NECK STRAIN, INITIAL ENCOUNTER: Primary | ICD-10-CM

## 2017-04-13 DIAGNOSIS — S46.812A TRAPEZIUS STRAIN, LEFT, INITIAL ENCOUNTER: ICD-10-CM

## 2017-04-13 PROCEDURE — 1160F RVW MEDS BY RX/DR IN RCRD: CPT | Mod: S$GLB,,, | Performed by: NURSE PRACTITIONER

## 2017-04-13 PROCEDURE — 1125F AMNT PAIN NOTED PAIN PRSNT: CPT | Mod: S$GLB,,, | Performed by: NURSE PRACTITIONER

## 2017-04-13 PROCEDURE — 1157F ADVNC CARE PLAN IN RCRD: CPT | Mod: S$GLB,,, | Performed by: NURSE PRACTITIONER

## 2017-04-13 PROCEDURE — 3080F DIAST BP >= 90 MM HG: CPT | Mod: S$GLB,,, | Performed by: NURSE PRACTITIONER

## 2017-04-13 PROCEDURE — 99499 UNLISTED E&M SERVICE: CPT | Mod: S$GLB,,, | Performed by: NURSE PRACTITIONER

## 2017-04-13 PROCEDURE — 99214 OFFICE O/P EST MOD 30 MIN: CPT | Mod: S$GLB,,, | Performed by: NURSE PRACTITIONER

## 2017-04-13 PROCEDURE — 3077F SYST BP >= 140 MM HG: CPT | Mod: S$GLB,,, | Performed by: NURSE PRACTITIONER

## 2017-04-13 PROCEDURE — 1159F MED LIST DOCD IN RCRD: CPT | Mod: S$GLB,,, | Performed by: NURSE PRACTITIONER

## 2017-04-13 PROCEDURE — 96372 THER/PROPH/DIAG INJ SC/IM: CPT | Mod: S$GLB,,, | Performed by: NURSE PRACTITIONER

## 2017-04-13 PROCEDURE — 99999 PR PBB SHADOW E&M-EST. PATIENT-LVL III: CPT | Mod: PBBFAC,,, | Performed by: NURSE PRACTITIONER

## 2017-04-13 RX ORDER — TIZANIDINE 4 MG/1
4 TABLET ORAL EVERY 6 HOURS PRN
Qty: 30 TABLET | Refills: 0 | Status: SHIPPED | OUTPATIENT
Start: 2017-04-13 | End: 2017-04-23

## 2017-04-13 RX ORDER — KETOROLAC TROMETHAMINE 30 MG/ML
30 INJECTION, SOLUTION INTRAMUSCULAR; INTRAVENOUS
Status: COMPLETED | OUTPATIENT
Start: 2017-04-13 | End: 2017-04-13

## 2017-04-13 RX ADMIN — KETOROLAC TROMETHAMINE 30 MG: 30 INJECTION, SOLUTION INTRAMUSCULAR; INTRAVENOUS at 01:04

## 2017-04-13 NOTE — MR AVS SNAPSHOT
[facility]  [address] [place] [address]  Phone: [phone]  Fax: [phone]                  Megan ALAMO Sams   2017 1:00 PM   Office Visit    Description:  Female : 1945   Provider:  TERENCE Hernandez   Department:  [facility]           Reason for Visit     Neck Pain           Diagnoses this Visit        Comments    Neck strain, initial encounter    -  Primary     Trapezius strain, left, initial encounter                To Do List           Future Appointments        Provider Department Dept Phone    2017 2:40 PM Kt Lopez MD [facility] [phone]    2017 10:20 AM [facility] [phone]      Goals (5 Years of Data)     None       These Medications        Disp Refills Start End    tizanidine (ZANAFLEX) 4 MG tablet 30 tablet 0 2017    Take 1 tablet (4 mg total) by mouth every 6 (six) hours as needed (spasm). May cause drowsiness use caution - Oral    Pharmacy: [facility]/pharmacy #[id number] - [place] - [address] [facility] #: [phone]         [facility]     [facility] Care [facility] - 24/ Assistance  Unless otherwise directed by your provider, please contact [facility], our nurse care line that is available for / assistance.     Registered nurses in the [facility] provide: appointment scheduling, clinical advisement, health education, and other advisory services.  Call: [phone] (toll free)               Medications           Message regarding Medications     Verify the changes and/or additions to your medication regime listed below are the same as discussed with your clinician today.  If any of these changes or additions are incorrect, please notify your healthcare provider.        START taking these NEW medications        Refills    tizanidine (ZANAFLEX) 4 MG tablet 0    Sig: Take 1 tablet  "(4 mg total) by mouth every 6 (six) hours as needed (spasm). May cause drowsiness use caution    Class: Normal    Route: Oral      These medications were administered today        Dose Freq    ketorolac injection 30 mg 30 mg Clinic/HOD 1 time    Sig: Inject 30 mg into the muscle one time.    Class: Normal    Route: Intramuscular      STOP taking these medications     methylPREDNISolone (MEDROL DOSEPACK) 4 mg tablet use as directed           Verify that the below list of medications is an accurate representation of the medications you are currently taking.  If none reported, the list may be blank. If incorrect, please contact your healthcare provider. Carry this list with you in case of emergency.           Current Medications     acetaminophen (TYLENOL EXTRA STRENGTH) 500 MG tablet Take 500 mg by mouth every 6 (six) hours as needed.    allopurinol (ZYLOPRIM) 100 MG tablet Take 2 tablets (200 mg total) by mouth once daily.    aspirin (ECOTRIN) 81 MG EC tablet Take 81 mg by mouth once daily.    candesartan (ATACAND) 32 MG tablet TAKE 1 TABLET BY MOUTH EVERY DAY    carvedilol (COREG) 3.125 MG tablet Take 1 tablet (3.125 mg total) by mouth 2 (two) times daily with meals.    MULTIVIT WITH CALCIUM,IRON,MIN (MULTIPLE VITAMIN, WOMENS ORAL) Take by mouth once daily.    spironolactone (ALDACTONE) 25 MG tablet Take 1 tablet (25 mg total) by mouth once daily.    tizanidine (ZANAFLEX) 4 MG tablet Take 1 tablet (4 mg total) by mouth every 6 (six) hours as needed (spasm). May cause drowsiness use caution           Clinical Reference Information           Your Vitals Were     BP Pulse Temp Height Weight BMI    160/90 (BP Location: Right arm, Patient Position: Sitting, BP Method: Manual) 62 98.4 °F (36.9 °C) (Oral) 5' 2" (1.575 m) 83.9 kg (184 lb 15.5 oz) 33.83 kg/m2      Blood Pressure          Most Recent Value    BP  (!)  160/90      Allergies as of 4/13/2017     Gabapentin    Lanoxin [Digoxin]    Thorazine [Chlorpromazine]    " Ultracet [Tramadol-acetaminophen]    Penicillins      Immunizations Administered on Date of Encounter - 4/13/2017     None      [facility] Sign-Up     Activating your [facility] account is as easy as 1-2-3!     1) Visit [email or web address], select Sign Up Now, enter this activation code and your date of birth, then select Next.  [email or web address]  Expires: 5/28/2017  1:31 PM      2) Create a username and password to use when you visit [facility] in the future and select a security question in case you lose your password and select Next.    3) Enter your e-mail address and click Sign Up!    Additional Information  If you have questions, please e-mail [email or web address] or call [phone] to talk to our [facility] staff. Remember, [facility] is NOT to be used for urgent needs. For medical emergencies, dial 911.         Instructions    Ice pack for 20 minutes 3 times a day   May alternate with heat  Extra strength tylenol for pain  If symptoms worsen or fail to improve with treatment, see your Primary Care Provider or go to the nearest Emergency Room.         Language Assistance Services     ATTENTION: Language assistance services are available, free of charge. Please call [phone].      ATENCIÓN: Si robson odonnell, tiene a arshad disposición servicios gratuitos de asistencia lingüística. Llame al [phone].     CHÚ Ý: N?u b?n nói Ti?ng Vi?t, có các d?ch v? h? tr? ngôn ng? mi?n phí dành cho b?n. G?i s? [phone].         [facility] complies with applicable Federal civil rights laws and does not discriminate on the basis of race, color, national origin, age, disability, or sex.

## 2017-04-13 NOTE — PROGRESS NOTES
"Subjective:       Patient ID: Megan Sams is a 72 y.o. female.    Chief Complaint: Neck Pain ((R) upper chest area)    Neck Pain    This is a new problem. The current episode started today. The problem occurs constantly. The problem has been unchanged. The pain is associated with an unknown (she reports the pain started at [place] last night when the cold air hit it.) factor. The pain is present in the left side. The quality of the pain is described as aching. The pain is at a severity of 9/10. The symptoms are aggravated by bending and twisting. The pain is same all the time. Pertinent negatives include no chest pain, fever, headaches, leg pain, numbness, pain with swallowing, paresis, photophobia, syncope, tingling, trouble swallowing, visual change, weakness or weight loss. She has tried acetaminophen (topical muscle rub) for the symptoms. The treatment provided mild relief.       BP (!) 160/90 (BP Location: Right arm, Patient Position: Sitting, BP Method: Manual)  Pulse 62  Temp 98.4 °F (36.9 °C) (Oral)   Ht 5' 2" (1.575 m) Comment: Pt stated weight, didn't get measured per choice  Wt 83.9 kg (184 lb 15.5 oz)  BMI 33.83 kg/m2    Review of Systems   Constitutional: Negative for activity change, appetite change, chills, diaphoresis, fatigue, fever, unexpected weight change and weight loss.   HENT: Negative.  Negative for trouble swallowing.    Eyes: Negative for photophobia.   Respiratory: Negative for cough and shortness of breath.    Cardiovascular: Negative for chest pain, palpitations, leg swelling and syncope.   Gastrointestinal: Negative.    Genitourinary: Negative.    Musculoskeletal: Positive for myalgias and neck pain. Negative for arthralgias, back pain, gait problem, joint swelling and neck stiffness.   Skin: Negative for color change, pallor, rash and wound.   Allergic/Immunologic: Negative for immunocompromised state.   Neurological: Negative.  Negative for dizziness, tingling, facial asymmetry, " weakness, numbness and headaches.   Hematological: Negative for adenopathy. Does not bruise/bleed easily.   Psychiatric/Behavioral: Negative for agitation, behavioral problems and confusion.       Objective:      Physical Exam   Constitutional: She appears well-developed and well-nourished. She is cooperative. No distress.   HENT:   Head: Normocephalic and atraumatic.   Neck: Trachea normal. Neck supple. Muscular tenderness present. No spinous process tenderness present. No rigidity. Decreased range of motion present. No edema and no erythema present.       tenderness to palpation to left lateral neck per drawing. Tenderness to left clavicle. No swelling, erythema, or rash.    Cardiovascular: Normal rate.    Pulmonary/Chest: Effort normal.   Neurological: She is alert.   Skin: Skin is warm and dry. No rash noted. She is not diaphoretic.   Psychiatric: She has a normal mood and affect. Her behavior is normal. Judgment and thought content normal.   Nursing note and vitals reviewed.      Assessment:       1. Neck strain, initial encounter    2. Trapezius strain, left, initial encounter    3. Essential hypertension        Plan:       Megan was seen today for neck pain.    Diagnoses and all orders for this visit:    Neck strain, initial encounter  -     ketorolac injection 30 mg; Inject 30 mg into the muscle one time.  -     tizanidine (ZANAFLEX) 4 MG tablet; Take 1 tablet (4 mg total) by mouth every 6 (six) hours as needed (spasm). May cause drowsiness use caution  Rest  Ice  Gentle massage  otc Muscle rub   Extra strength tylenol for pain, no other NSAIDS due to CKD  If symptoms worsen or fail to improve with treatment, see your Primary Care Provider or go to the nearest Emergency Room.    Trapezius strain, left, initial encounter  -     ketorolac injection 30 mg; Inject 30 mg into the muscle one time.  -     tizanidine (ZANAFLEX) 4 MG tablet; Take 1 tablet (4 mg total) by mouth every 6 (six) hours as needed (spasm).  May cause drowsiness use caution    Essential hypertension  In an effort to better manage our patients care and help control their blood pressure, patient was offered a follow up appointment with primary care for recheck of her blood pressure and for further evaluation and management of her elevated blood pressure reading. Our collaborating physician, Dr. Mayen, would like patients with blood pressure over 140/90 in Urgent Care to be scheduled for a follow up with primary care to recheck blood pressure. Patient denies headache or blurred vision today. Patient offered follow up appointment for next week with primary care to recheck blood pressure. She states that she has a cuff at home and will monitor it and follow up if bp continues to run over 140/90. She reports that her BP is likely elevated due to pain.

## 2017-04-13 NOTE — PATIENT INSTRUCTIONS
Ice pack for 20 minutes 3 times a day   May alternate with heat  Extra strength tylenol for pain  If symptoms worsen or fail to improve with treatment, see your Primary Care Provider or go to the nearest Emergency Room.

## 2017-05-04 ENCOUNTER — PATIENT OUTREACH (OUTPATIENT)
Dept: ADMINISTRATIVE | Facility: HOSPITAL | Age: 72
End: 2017-05-04

## 2017-05-04 NOTE — LETTER
May 4, 2017    Megan CALLY Latrell  [address] [address] [facility]  [place] [address]             [facility]  [address]  [facility] [address]  Phone: [phone] Dear Mrs. Sams:        [facility] is committed to your overall health.  To help you get the most out of each of your visits, we will review your information to make sure you are up to date on all of your recommended tests and/or procedures.      Javad Bowen MD has found that you may be due for   Health Maintenance Due   Topic    TETANUS VACCINE     Zoster Vaccine     Lipid Panel     Colonoscopy     Mammogram         If you have had any of the above done at another facility, please bring the records or information with you so that your record at [facility] will be complete.    If you are currently taking medication, please bring it with you to your appointment for review.    We will be happy to assist you with scheduling any necessary appointments or you may contact the [facility] appointment desk at [phone] to schedule at your convenience.     Thank you for choosing [facility] for your healthcare needs,    Tatum ALVAREZ LPN  Care Coordination Department  [facility]  [phone]

## 2017-05-11 ENCOUNTER — OFFICE VISIT (OUTPATIENT)
Dept: CARDIOLOGY | Facility: CLINIC | Age: 72
End: 2017-05-11
Payer: MEDICARE

## 2017-05-11 VITALS
WEIGHT: 184.06 LBS | SYSTOLIC BLOOD PRESSURE: 140 MMHG | HEIGHT: 62 IN | DIASTOLIC BLOOD PRESSURE: 99 MMHG | BODY MASS INDEX: 33.87 KG/M2 | HEART RATE: 60 BPM | OXYGEN SATURATION: 100 %

## 2017-05-11 DIAGNOSIS — E66.9 OBESITY, CLASS I, BMI 30-34.9: ICD-10-CM

## 2017-05-11 DIAGNOSIS — N18.30 CKD (CHRONIC KIDNEY DISEASE) STAGE 3, GFR 30-59 ML/MIN: Chronic | ICD-10-CM

## 2017-05-11 DIAGNOSIS — I50.22 CHRONIC SYSTOLIC CONGESTIVE HEART FAILURE: Primary | ICD-10-CM

## 2017-05-11 DIAGNOSIS — I42.8 NONISCHEMIC CARDIOMYOPATHY: ICD-10-CM

## 2017-05-11 DIAGNOSIS — I10 ESSENTIAL HYPERTENSION: Chronic | ICD-10-CM

## 2017-05-11 PROCEDURE — 99999 PR PBB SHADOW E&M-EST. PATIENT-LVL III: CPT | Mod: PBBFAC,,, | Performed by: INTERNAL MEDICINE

## 2017-05-11 PROCEDURE — 3080F DIAST BP >= 90 MM HG: CPT | Mod: S$GLB,,, | Performed by: INTERNAL MEDICINE

## 2017-05-11 PROCEDURE — 99214 OFFICE O/P EST MOD 30 MIN: CPT | Mod: S$GLB,,, | Performed by: INTERNAL MEDICINE

## 2017-05-11 PROCEDURE — 1159F MED LIST DOCD IN RCRD: CPT | Mod: S$GLB,,, | Performed by: INTERNAL MEDICINE

## 2017-05-11 PROCEDURE — 1160F RVW MEDS BY RX/DR IN RCRD: CPT | Mod: S$GLB,,, | Performed by: INTERNAL MEDICINE

## 2017-05-11 PROCEDURE — 3077F SYST BP >= 140 MM HG: CPT | Mod: S$GLB,,, | Performed by: INTERNAL MEDICINE

## 2017-05-11 PROCEDURE — 99499 UNLISTED E&M SERVICE: CPT | Mod: S$GLB,,, | Performed by: INTERNAL MEDICINE

## 2017-05-11 PROCEDURE — 1126F AMNT PAIN NOTED NONE PRSNT: CPT | Mod: S$GLB,,, | Performed by: INTERNAL MEDICINE

## 2017-05-11 RX ORDER — HYDROCHLOROTHIAZIDE 12.5 MG/1
12.5 TABLET ORAL DAILY
Qty: 30 TABLET | Refills: 1 | Status: SHIPPED | OUTPATIENT
Start: 2017-05-11 | End: 2017-05-17 | Stop reason: SINTOL

## 2017-05-11 NOTE — MR AVS SNAPSHOT
[facility]  [address] [place] [address]  Phone: [phone]  Fax: [phone]                  Megan Sams   2017 2:40 PM   Office Visit    Description:  Female : 1945   Provider:  Kt Lopez MD   Department:  [place] [facility]           Reason for Visit     Follow-up           Diagnoses this Visit        Comments    Chronic systolic congestive heart failure    -  Primary     Nonischemic cardiomyopathy         Essential hypertension         CKD (chronic kidney disease) stage 3, GFR 30-59 ml/min         Obesity, Class I, BMI 30-34.9                To Do List           Future Appointments        Provider Department Dept Phone    2017 10:20 AM [facility] [phone]      Goals (5 Years of Data)     None      Follow-Up and Disposition     Return in about 1 month (around 2017).       These Medications        Disp Refills Start End    hydrochlorothiazide (HYDRODIURIL) 12.5 MG Tab 30 tablet 1 2017    Take 1 tablet (12.5 mg total) by mouth once daily. - Oral    Pharmacy: [facility]/pharmacy #[id number] - [place] - [address] [facility] #: [phone]         [facility]     [facility] Nurse Care Line - 24/ Assistance  Unless otherwise directed by your provider, please contact [facility], our nurse care line that is available for 24/7 assistance.     Registered nurses in the [facility] provide: appointment scheduling, clinical advisement, health education, and other advisory services.  Call: [phone] (toll free)               Medications           Message regarding Medications     Verify the changes and/or additions to your medication regime listed below are the same as discussed with your clinician today.  If any of these changes or additions are incorrect, please notify your healthcare provider.        START taking these NEW medications   "      Refills    hydrochlorothiazide (HYDRODIURIL) 12.5 MG Tab 1    Sig: Take 1 tablet (12.5 mg total) by mouth once daily.    Class: Normal    Route: Oral           Verify that the below list of medications is an accurate representation of the medications you are currently taking.  If none reported, the list may be blank. If incorrect, please contact your healthcare provider. Carry this list with you in case of emergency.           Current Medications     acetaminophen (TYLENOL EXTRA STRENGTH) 500 MG tablet Take 500 mg by mouth every 6 (six) hours as needed.    allopurinol (ZYLOPRIM) 100 MG tablet Take 2 tablets (200 mg total) by mouth once daily.    aspirin (ECOTRIN) 81 MG EC tablet Take 81 mg by mouth once daily.    candesartan (ATACAND) 32 MG tablet TAKE 1 TABLET BY MOUTH EVERY DAY    carvedilol (COREG) 3.125 MG tablet Take 1 tablet (3.125 mg total) by mouth 2 (two) times daily with meals.    MULTIVIT WITH CALCIUM,IRON,MIN (MULTIPLE VITAMIN, WOMENS ORAL) Take by mouth once daily.    spironolactone (ALDACTONE) 25 MG tablet Take 1 tablet (25 mg total) by mouth once daily.    hydrochlorothiazide (HYDRODIURIL) 12.5 MG Tab Take 1 tablet (12.5 mg total) by mouth once daily.           Clinical Reference Information           Your Vitals Were     BP Pulse Height Weight SpO2 BMI    140/99 (BP Location: Left arm, Patient Position: Sitting, BP Method: Manual) 60 5' 2" (1.575 m) 83.5 kg (184 lb 1.4 oz) 100% 33.67 kg/m2      Blood Pressure          Most Recent Value    BP  (!)  140/99      Allergies as of 5/11/2017     Gabapentin    Lanoxin [Digoxin]    Thorazine [Chlorpromazine]    Ultracet [Tramadol-acetaminophen]    Penicillins      Immunizations Administered on Date of Encounter - 5/11/2017     None      Orders Placed During Today's Visit     Future Labs/Procedures Expected by Expires    Basic metabolic panel  5/11/2017 7/10/2018      [facility] Sign-Up     Activating your [facility] account is as easy as 1-2-3!     1) " Visit [email or web address], select Sign Up Now, enter this activation code and your date of birth, then select Next.  [email or web address]  Expires: 5/28/2017  1:31 PM      2) Create a username and password to use when you visit [facility] in the future and select a security question in case you lose your password and select Next.    3) Enter your e-mail address and click Sign Up!    Additional Information  If you have questions, please e-mail [email or web address] [email or web address] or call [phone] to talk to our [email or web address] staff. Remember, [facility] is NOT to be used for urgent needs. For medical emergencies, dial 911.         Language Assistance Services     ATTENTION: Language assistance services are available, free of charge. Please call [phone].      ATENCIÓN: Si habla español, tiene a arshad disposición servicios gratuitos de asistencia lingüística. Llame al [phone].     CHÚ Ý: N?u b?n nói Ti?ng Vi?t, có các d?ch v? h? tr? ngôn ng? mi?n phí dành cho b?n. G?i s? [phone].         [facility] complies with applicable Federal civil rights laws and does not discriminate on the basis of race, color, national origin, age, disability, or sex.

## 2017-05-11 NOTE — PROGRESS NOTES
Subjective:   Patient ID:  Megan Sams is a 72 y.o. female who presents for follow-up of CP and elevated BP , evaluated in ER.  Patient denies CP, angina or anginal equivalent since last visit.BP stable since then.    Hypertension   This is a chronic problem. The current episode started more than 1 year ago. The problem has been gradually improving since onset. The problem is controlled. Pertinent negatives include no chest pain, palpitations or shortness of breath. Past treatments include beta blockers, angiotensin blockers and diuretics. The current treatment provides moderate improvement. Compliance problems include exercise.    Hyperlipidemia   This is a chronic problem. The current episode started more than 1 year ago. The problem is controlled. Recent lipid tests were reviewed and are variable. Pertinent negatives include no chest pain or shortness of breath. Current antihyperlipidemic treatment includes statins. The current treatment provides moderate improvement of lipids. Compliance problems include adherence to exercise.        Review of Systems   Constitution: Negative. Negative for weight gain.   HENT: Negative.    Eyes: Negative.    Cardiovascular: Negative.  Negative for chest pain, leg swelling and palpitations.   Respiratory: Negative.  Negative for shortness of breath.    Endocrine: Negative.    Hematologic/Lymphatic: Negative.    Skin: Negative.    Musculoskeletal: Negative for muscle weakness.   Gastrointestinal: Negative.    Genitourinary: Negative.    Neurological: Negative.  Negative for dizziness.   Psychiatric/Behavioral: Negative.    Allergic/Immunologic: Negative.      Family History   Problem Relation Age of Onset    Early death Mother     Heart disease Mother     Hypertension Mother     Kidney disease Father     Heart disease Brother      Past Medical History:   Diagnosis Date    Arthritis     Blood transfusion     CHF (congestive heart failure)     Chronic kidney disease      Depression     Hypertension      Current Outpatient Prescriptions on File Prior to Visit   Medication Sig Dispense Refill    acetaminophen (TYLENOL EXTRA STRENGTH) 500 MG tablet Take 500 mg by mouth every 6 (six) hours as needed.      allopurinol (ZYLOPRIM) 100 MG tablet Take 2 tablets (200 mg total) by mouth once daily. 180 tablet 4    aspirin (ECOTRIN) 81 MG EC tablet Take 81 mg by mouth once daily.      candesartan (ATACAND) 32 MG tablet TAKE 1 TABLET BY MOUTH EVERY DAY 90 tablet 2    carvedilol (COREG) 3.125 MG tablet Take 1 tablet (3.125 mg total) by mouth 2 (two) times daily with meals. 180 tablet 3    MULTIVIT WITH CALCIUM,IRON,MIN (MULTIPLE VITAMIN, WOMENS ORAL) Take by mouth once daily.      spironolactone (ALDACTONE) 25 MG tablet Take 1 tablet (25 mg total) by mouth once daily. 90 tablet 3     No current facility-administered medications on file prior to visit.      Review of patient's allergies indicates:   Allergen Reactions    Gabapentin Other (See Comments)     Lowered heart rate    Lanoxin [digoxin] Other (See Comments)     Too low heart rate    Thorazine [chlorpromazine] Other (See Comments)     hallucinations    Ultracet [tramadol-acetaminophen] Other (See Comments)     Elevated BP    Penicillins Nausea Only and Rash       Objective:     Physical Exam   Constitutional: She is oriented to person, place, and time. She appears well-developed and well-nourished.   HENT:   Head: Normocephalic and atraumatic.   Eyes: Conjunctivae and EOM are normal. Pupils are equal, round, and reactive to light.   Neck: Normal range of motion. Neck supple.   Cardiovascular: Normal rate, regular rhythm, normal heart sounds and intact distal pulses.    Pulmonary/Chest: Effort normal and breath sounds normal.   Abdominal: Soft. Bowel sounds are normal.   Musculoskeletal: Normal range of motion.   Neurological: She is alert and oriented to person, place, and time.   Skin: Skin is warm and dry.   Psychiatric: She  has a normal mood and affect.   Nursing note and vitals reviewed.      Assessment:     1. Chronic systolic congestive heart failure    2. Nonischemic cardiomyopathy    3. Essential hypertension    4. CKD (chronic kidney disease) stage 3, GFR 30-59 ml/min    5. Obesity, Class I, BMI 30-34.9        Plan:     Chronic systolic congestive heart failure    Nonischemic cardiomyopathy    Essential hypertension    CKD (chronic kidney disease) stage 3, GFR 30-59 ml/min    Obesity, Class I, BMI 30-34.9    add hctz, continue coreg, atacand -htn  Continue statin-hlp    Check bmp 2 weeks

## 2017-05-17 ENCOUNTER — LAB VISIT (OUTPATIENT)
Dept: LAB | Facility: HOSPITAL | Age: 72
End: 2017-05-17
Attending: INTERNAL MEDICINE
Payer: MEDICARE

## 2017-05-17 ENCOUNTER — OFFICE VISIT (OUTPATIENT)
Dept: INTERNAL MEDICINE | Facility: CLINIC | Age: 72
End: 2017-05-17
Payer: MEDICARE

## 2017-05-17 VITALS
WEIGHT: 181 LBS | SYSTOLIC BLOOD PRESSURE: 112 MMHG | BODY MASS INDEX: 33.31 KG/M2 | TEMPERATURE: 98 F | DIASTOLIC BLOOD PRESSURE: 80 MMHG | HEIGHT: 62 IN | HEART RATE: 66 BPM

## 2017-05-17 DIAGNOSIS — I50.22 CHRONIC SYSTOLIC CONGESTIVE HEART FAILURE: ICD-10-CM

## 2017-05-17 DIAGNOSIS — R00.1 BRADYCARDIA: ICD-10-CM

## 2017-05-17 DIAGNOSIS — I50.22 CHRONIC SYSTOLIC CONGESTIVE HEART FAILURE: Primary | ICD-10-CM

## 2017-05-17 DIAGNOSIS — I10 ESSENTIAL HYPERTENSION: Chronic | ICD-10-CM

## 2017-05-17 LAB
ALBUMIN SERPL BCP-MCNC: 3.5 G/DL
ALP SERPL-CCNC: 67 U/L
ALT SERPL W/O P-5'-P-CCNC: 26 U/L
ANION GAP SERPL CALC-SCNC: 10 MMOL/L
AST SERPL-CCNC: 40 U/L
BASOPHILS # BLD AUTO: 0.03 K/UL
BASOPHILS NFR BLD: 0.4 %
BILIRUB SERPL-MCNC: 0.7 MG/DL
BUN SERPL-MCNC: 31 MG/DL
CALCIUM SERPL-MCNC: 10.1 MG/DL
CHLORIDE SERPL-SCNC: 99 MMOL/L
CO2 SERPL-SCNC: 25 MMOL/L
CREAT SERPL-MCNC: 1.5 MG/DL
DIFFERENTIAL METHOD: ABNORMAL
EOSINOPHIL # BLD AUTO: 0.3 K/UL
EOSINOPHIL NFR BLD: 4.7 %
ERYTHROCYTE [DISTWIDTH] IN BLOOD BY AUTOMATED COUNT: 14.3 %
EST. GFR  (AFRICAN AMERICAN): 39.3 ML/MIN/1.73 M^2
EST. GFR  (NON AFRICAN AMERICAN): 34.1 ML/MIN/1.73 M^2
GLUCOSE SERPL-MCNC: 87 MG/DL
HCT VFR BLD AUTO: 41.1 %
HGB BLD-MCNC: 13.3 G/DL
LYMPHOCYTES # BLD AUTO: 3.4 K/UL
LYMPHOCYTES NFR BLD: 50.1 %
MCH RBC QN AUTO: 28.1 PG
MCHC RBC AUTO-ENTMCNC: 32.4 %
MCV RBC AUTO: 87 FL
MONOCYTES # BLD AUTO: 0.7 K/UL
MONOCYTES NFR BLD: 10.8 %
NEUTROPHILS # BLD AUTO: 2.3 K/UL
NEUTROPHILS NFR BLD: 33.9 %
PLATELET # BLD AUTO: 241 K/UL
PMV BLD AUTO: 11.6 FL
POTASSIUM SERPL-SCNC: 4.6 MMOL/L
PROT SERPL-MCNC: 7.7 G/DL
RBC # BLD AUTO: 4.73 M/UL
SODIUM SERPL-SCNC: 134 MMOL/L
TSH SERPL DL<=0.005 MIU/L-ACNC: 1.72 UIU/ML
WBC # BLD AUTO: 6.87 K/UL

## 2017-05-17 PROCEDURE — 85025 COMPLETE CBC W/AUTO DIFF WBC: CPT

## 2017-05-17 PROCEDURE — 99999 PR PBB SHADOW E&M-EST. PATIENT-LVL III: CPT | Mod: PBBFAC,,, | Performed by: INTERNAL MEDICINE

## 2017-05-17 PROCEDURE — 1160F RVW MEDS BY RX/DR IN RCRD: CPT | Mod: S$GLB,,, | Performed by: INTERNAL MEDICINE

## 2017-05-17 PROCEDURE — 1159F MED LIST DOCD IN RCRD: CPT | Mod: S$GLB,,, | Performed by: INTERNAL MEDICINE

## 2017-05-17 PROCEDURE — 1157F ADVNC CARE PLAN IN RCRD: CPT | Mod: 8P,S$GLB,, | Performed by: INTERNAL MEDICINE

## 2017-05-17 PROCEDURE — 3074F SYST BP LT 130 MM HG: CPT | Mod: S$GLB,,, | Performed by: INTERNAL MEDICINE

## 2017-05-17 PROCEDURE — 36415 COLL VENOUS BLD VENIPUNCTURE: CPT | Mod: PO

## 2017-05-17 PROCEDURE — 3079F DIAST BP 80-89 MM HG: CPT | Mod: S$GLB,,, | Performed by: INTERNAL MEDICINE

## 2017-05-17 PROCEDURE — 93005 ELECTROCARDIOGRAM TRACING: CPT | Mod: S$GLB,,, | Performed by: INTERNAL MEDICINE

## 2017-05-17 PROCEDURE — 93010 ELECTROCARDIOGRAM REPORT: CPT | Mod: S$GLB,,, | Performed by: INTERNAL MEDICINE

## 2017-05-17 PROCEDURE — 84443 ASSAY THYROID STIM HORMONE: CPT

## 2017-05-17 PROCEDURE — 1126F AMNT PAIN NOTED NONE PRSNT: CPT | Mod: S$GLB,,, | Performed by: INTERNAL MEDICINE

## 2017-05-17 PROCEDURE — 80053 COMPREHEN METABOLIC PANEL: CPT

## 2017-05-17 PROCEDURE — 99499 UNLISTED E&M SERVICE: CPT | Mod: S$GLB,,, | Performed by: INTERNAL MEDICINE

## 2017-05-17 PROCEDURE — 99214 OFFICE O/P EST MOD 30 MIN: CPT | Mod: S$GLB,,, | Performed by: INTERNAL MEDICINE

## 2017-05-17 RX ORDER — CARVEDILOL 3.12 MG/1
3.12 TABLET ORAL DAILY
Qty: 180 TABLET | Refills: 3
Start: 2017-05-17 | End: 2017-05-17

## 2017-05-17 NOTE — PROGRESS NOTES
Subjective:       Patient ID: Megan Sams is a 72 y.o. female.    Chief Complaint: No chief complaint on file.    HPI patient is a 72-year-old female presenting today for scheduled initially as a wellness visit but there are some intervening issues.  Patient has history of congestive heart failure, hypertension, CK D.  She states that recently she has been having problems with her medications.  She states her last visit with Dr. Lopez he started her on hydrochlorothiazide.  She states she got started taking medications for having significant problems with cramping so she stopped it.  Since stopping hydrochlorthiazide the cramping has improved.    She is also noted that she has been dealing with low heart rate and a feeling of fatigue and weakness.  This is been going on over the last 10 days possibly longer.  She has noted her heart rate getting down to the 50s.  Specifically she notes it down to the low 50s.  She indicates that she feels weak and run down when this occurs.  She had on her own decreased her Coreg to once daily but that does not seem to have made a significant improvement in the symptoms.  She states that yesterday she had a pulse rate in the 50s and was feeling very poorly.  She stopped the second dose of Coreg about a week ago.  Her blood pressure has remained well controlled in spite of these changes.  She is more concerned about her pulse rate is getting slow.  Review of previous EKGs reveals no significant evidence of bradycardia.    As noted the blood pressure seems be doing all right in spite of the changes to the medications.  She does not relate any issues with chest pain or palpitations.    Review of Systems   Constitutional: Positive for fatigue. Negative for chills and fever.   Respiratory: Negative for cough, shortness of breath and wheezing.    Cardiovascular: Negative for chest pain and palpitations.        See history of present illness   Gastrointestinal: Negative for blood in  "stool, constipation, nausea and vomiting.   Genitourinary: Negative for dysuria and hematuria.   Skin: Negative for rash.   Neurological: Positive for weakness.       Objective:   /80  Pulse 66  Temp 97.8 °F (36.6 °C) (Tympanic)   Ht 5' 2" (1.575 m)  Wt 82.1 kg (181 lb)  BMI 33.1 kg/m2     Physical Exam   Constitutional: She appears well-developed and well-nourished.   HENT:   Head: Normocephalic and atraumatic.   Cardiovascular: Normal rate, regular rhythm and intact distal pulses.  Exam reveals no gallop and no friction rub.    No murmur heard.  Pulmonary/Chest: Effort normal and breath sounds normal. She has no wheezes. She has no rales. She exhibits no tenderness.   Abdominal: Soft. Bowel sounds are normal. She exhibits no distension. There is no tenderness.   Lymphadenopathy:     She has no cervical adenopathy.   Skin: No rash noted.   Vitals reviewed.      EKG showed normal sinus rhythm at 66 bpm.  There is some nonspecific T-wave ST segment abnormalities in the inferior leads which does not appear significant changed from prior.    Assessment:       1. Chronic systolic congestive heart failure    2. Bradycardia    3. Essential hypertension        Plan:   CHF (congestive heart failure)  Was unable to tolerate hctz due to cramping.  Stopped the medication several days ago and the cramping subsided.    Essential hypertension  Blood pressure is controlled but symptoms of bradycardia and weakness likely due to coreg.  Patient already decreased carvedilol to once daily.  Will stop carvedilol at this time and reassess in one week.    Diagnoses and all orders for this visit:    Chronic systolic congestive heart failure  -     Discontinue: carvedilol (COREG) 3.125 MG tablet; Take 1 tablet (3.125 mg total) by mouth once daily at 6am.  -     CBC auto differential; Future  -     Comprehensive metabolic panel; Future  -     TSH; Future  -     EKG 12-lead    Bradycardia  Comments:  Low reading down to the lower " 50s.  Felt lightheaded and dizzy.  Did not improve with stopping hctz.    Orders:  -     EKG 12-lead    Essential hypertension  -     Discontinue: carvedilol (COREG) 3.125 MG tablet; Take 1 tablet (3.125 mg total) by mouth once daily at 6am.     we will discontinue her Coreg altogether at this time.  We'll see what happens with her pulse rate and how she feels with that change.  Obviously with her heart failure we will might have a beta blocker on board but since she is struggling with his bradycardia issue we need to come off of it at this point.  We will check a CBC Chem-12 and a thyroid panel to assess for other metabolic issues it may be in play.  She will follow-up in one week for recheck to assess how her symptoms have responded and to see if she develops any problems with coming off these medications.  If she has any issues that develop in the short-term she will return to clinic sooner or notify us by phone to what's going on.    Return in about 1 week (around 5/24/2017) for Follow up hypotension,chf.

## 2017-05-17 NOTE — ASSESSMENT & PLAN NOTE
Blood pressure is controlled but symptoms of bradycardia and weakness likely due to coreg.  Patient already decreased carvedilol to once daily.  Will stop carvedilol at this time and reassess in one week.

## 2017-05-17 NOTE — MR AVS SNAPSHOT
[facility]  [address] [place] [address]  Phone: [phone]  Fax: [phone]                  Megan ALAMO Sams   2017 10:20 AM   Office Visit    Description:  Female : 1945   Provider:  Javad Bowen MD   [facility]  [facility]           Diagnoses this Visit        Comments    Chronic systolic congestive heart failure    -  Primary     Bradycardia     Low reading down to the lower 50s.  Felt lightheaded and dizzy.  Did not improve with stopping hctz.      Essential hypertension                To Do List           Future Appointments        Provider Department Dept Phone    2017 11:40 AM LABORATORY, [place] [facility] [phone]    2017 10:00 AM [facility] [place] [facility] [phone]    2017 11:30 AM [email or web address] [phone]    6/15/2017 1:20 PM Kt Lopez MD [facility] [phone]    2017 11:00 AM Javad Bowen MD [facility] [phone]      Goals (5 Years of Data)     None      Follow-Up and Disposition     Return in about 1 week (around 2017) for Follow up hypotension,chf.      [facility]     [facility] Nurse Care Line -  Assistance  Unless otherwise directed by your provider, please contact [facility], our nurse care line that is available for  assistance.     Registered nurses in the [facility] provide: appointment scheduling, clinical advisement, health education, and other advisory services.  Call: [phone] (toll free)               Medications           Message regarding Medications     Verify the changes and/or additions to your medication regime listed below are the same as discussed with your clinician today.  If any of these changes or additions are incorrect, please notify your healthcare provider.        STOP taking  "these medications     hydrochlorothiazide (HYDRODIURIL) 12.5 MG Tab Take 1 tablet (12.5 mg total) by mouth once daily.    carvedilol (COREG) 3.125 MG tablet Take 1 tablet (3.125 mg total) by mouth 2 (two) times daily with meals.           Verify that the below list of medications is an accurate representation of the medications you are currently taking.  If none reported, the list may be blank. If incorrect, please contact your healthcare provider. Carry this list with you in case of emergency.           Current Medications     acetaminophen (TYLENOL EXTRA STRENGTH) 500 MG tablet Take 500 mg by mouth every 6 (six) hours as needed.    allopurinol (ZYLOPRIM) 100 MG tablet Take 2 tablets (200 mg total) by mouth once daily.    aspirin (ECOTRIN) 81 MG EC tablet Take 81 mg by mouth once daily.    candesartan (ATACAND) 32 MG tablet TAKE 1 TABLET BY MOUTH EVERY DAY    MULTIVIT WITH CALCIUM,IRON,MIN (MULTIPLE VITAMIN, WOMENS ORAL) Take by mouth once daily.    spironolactone (ALDACTONE) 25 MG tablet Take 1 tablet (25 mg total) by mouth once daily.           Clinical Reference Information           Your Vitals Were     BP Pulse Temp Height Weight BMI    112/80 66 97.8 °F (36.6 °C) (Tympanic) 5' 2" (1.575 m) 82.1 kg (181 lb) 33.1 kg/m2      Blood Pressure          Most Recent Value    BP  112/80      Allergies as of 5/17/2017     Gabapentin    Lanoxin [Digoxin]    Thorazine [Chlorpromazine]    Ultracet [Tramadol-acetaminophen]    Penicillins      Immunizations Administered on Date of Encounter - 5/17/2017     None      Orders Placed During Today's Visit      Normal Orders This Visit    EKG 12-lead     Future Labs/Procedures Expected by Expires    CBC auto differential  5/17/2017 5/17/2018    Comprehensive metabolic panel  5/17/2017 5/17/2018    TSH  5/17/2017 5/17/2018      [facility] Sign-Up     Activating your [facility] account is as easy as 1-2-3!     1) Visit [email or web address], select Sign Up Now, enter this activation code and " your date of birth, then select Next.  [email or web address]  Expires: 5/28/2017  1:31 PM      2) Create a username and password to use when you visit [facility] in the future and select a security question in case you lose your password and select Next.    3) Enter your e-mail address and click Sign Up!    Additional Information  If you have questions, please e-mail [email or web address] or call [phone] to talk to our [facility] staff. Remember, [facility] is NOT to be used for urgent needs. For medical emergencies, dial 911.         Language Assistance Services     ATTENTION: Language assistance services are available, free of charge. Please call [phone].      ATENCIÓN: Si habla belle, tiene a arshad disposición servicios gratuitos de asistencia lingüística. Llame al [phone].     CHÚ Ý: N?u b?n nói Ti?ng Vi?t, có các d?ch v? h? tr? ngôn ng? mi?n phí dành cho b?n. G?i s? [phone].         [facility] complies with applicable Federal civil rights laws and does not discriminate on the basis of race, color, national origin, age, disability, or sex.

## 2017-05-17 NOTE — ASSESSMENT & PLAN NOTE
Was unable to tolerate hctz due to cramping.  Stopped the medication several days ago and the cramping subsided.

## 2017-05-18 ENCOUNTER — TELEPHONE (OUTPATIENT)
Dept: INTERNAL MEDICINE | Facility: CLINIC | Age: 72
End: 2017-05-18

## 2017-05-18 NOTE — TELEPHONE ENCOUNTER
Labs look okay overall.  Slight decrease in kidney function since last labs.  Will repeat the kidney functions at her follow up appt.

## 2017-05-25 ENCOUNTER — LAB VISIT (OUTPATIENT)
Dept: LAB | Facility: HOSPITAL | Age: 72
End: 2017-05-25
Attending: PHYSICIAN ASSISTANT
Payer: MEDICARE

## 2017-05-25 ENCOUNTER — OFFICE VISIT (OUTPATIENT)
Dept: INTERNAL MEDICINE | Facility: CLINIC | Age: 72
End: 2017-05-25
Payer: MEDICARE

## 2017-05-25 VITALS — HEIGHT: 62 IN | HEART RATE: 64 BPM | WEIGHT: 178 LBS | TEMPERATURE: 98 F | BODY MASS INDEX: 32.76 KG/M2

## 2017-05-25 DIAGNOSIS — I50.22 CHRONIC SYSTOLIC CONGESTIVE HEART FAILURE: ICD-10-CM

## 2017-05-25 DIAGNOSIS — N18.30 CKD (CHRONIC KIDNEY DISEASE) STAGE 3, GFR 30-59 ML/MIN: Chronic | ICD-10-CM

## 2017-05-25 DIAGNOSIS — I10 ESSENTIAL HYPERTENSION: ICD-10-CM

## 2017-05-25 DIAGNOSIS — I10 ESSENTIAL HYPERTENSION: Primary | ICD-10-CM

## 2017-05-25 LAB
ANION GAP SERPL CALC-SCNC: 12 MMOL/L
ANION GAP SERPL CALC-SCNC: 12 MMOL/L
BUN SERPL-MCNC: 10 MG/DL
BUN SERPL-MCNC: 10 MG/DL
CALCIUM SERPL-MCNC: 10.8 MG/DL
CALCIUM SERPL-MCNC: 10.8 MG/DL
CHLORIDE SERPL-SCNC: 104 MMOL/L
CHLORIDE SERPL-SCNC: 104 MMOL/L
CO2 SERPL-SCNC: 24 MMOL/L
CO2 SERPL-SCNC: 24 MMOL/L
CREAT SERPL-MCNC: 1.1 MG/DL
CREAT SERPL-MCNC: 1.1 MG/DL
EST. GFR  (AFRICAN AMERICAN): 57.2 ML/MIN/1.73 M^2
EST. GFR  (AFRICAN AMERICAN): 57.2 ML/MIN/1.73 M^2
EST. GFR  (NON AFRICAN AMERICAN): 49.6 ML/MIN/1.73 M^2
EST. GFR  (NON AFRICAN AMERICAN): 49.6 ML/MIN/1.73 M^2
GLUCOSE SERPL-MCNC: 92 MG/DL
GLUCOSE SERPL-MCNC: 92 MG/DL
POTASSIUM SERPL-SCNC: 4.6 MMOL/L
POTASSIUM SERPL-SCNC: 4.6 MMOL/L
SODIUM SERPL-SCNC: 140 MMOL/L
SODIUM SERPL-SCNC: 140 MMOL/L

## 2017-05-25 PROCEDURE — 99999 PR PBB SHADOW E&M-EST. PATIENT-LVL III: CPT | Mod: PBBFAC,,, | Performed by: PHYSICIAN ASSISTANT

## 2017-05-25 PROCEDURE — 36415 COLL VENOUS BLD VENIPUNCTURE: CPT | Mod: PO

## 2017-05-25 PROCEDURE — 1126F AMNT PAIN NOTED NONE PRSNT: CPT | Mod: S$GLB,,, | Performed by: PHYSICIAN ASSISTANT

## 2017-05-25 PROCEDURE — 1159F MED LIST DOCD IN RCRD: CPT | Mod: S$GLB,,, | Performed by: PHYSICIAN ASSISTANT

## 2017-05-25 PROCEDURE — 80048 BASIC METABOLIC PNL TOTAL CA: CPT

## 2017-05-25 PROCEDURE — 99499 UNLISTED E&M SERVICE: CPT | Mod: S$GLB,,, | Performed by: PHYSICIAN ASSISTANT

## 2017-05-25 PROCEDURE — 1157F ADVNC CARE PLAN IN RCRD: CPT | Mod: 8P,S$GLB,, | Performed by: PHYSICIAN ASSISTANT

## 2017-05-25 PROCEDURE — 99213 OFFICE O/P EST LOW 20 MIN: CPT | Mod: S$GLB,,, | Performed by: PHYSICIAN ASSISTANT

## 2017-05-25 RX ORDER — METOPROLOL SUCCINATE 25 MG/1
25 TABLET, EXTENDED RELEASE ORAL DAILY
Qty: 30 TABLET | Refills: 1 | Status: SHIPPED | OUTPATIENT
Start: 2017-05-25 | End: 2017-07-11 | Stop reason: SDUPTHER

## 2017-05-25 NOTE — PROGRESS NOTES
Subjective:       Patient ID: Megan Sams is a 72 y.o. female.    Chief Complaint: Follow-up    HPI  Patient comes in today for follow-up lipid pressure.  She was having hypotension and bradycardia even with one dose of carvedilol.  Atenolol was stopped and now she is following up today.  Her blood pressure is not high, I checked it myself and it was 145/100.  Her pulse is normal at 75.  We are need of adding a blood pressure medication that hopefully will avoid the symptoms of hypotension and bradycardia.  She does have CHF, last EF 35%   Not currently having active symptoms.     Past Medical History:   Diagnosis Date    Arthritis     Blood transfusion     CHF (congestive heart failure)     Chronic kidney disease     Depression     Hypertension        Current Outpatient Prescriptions   Medication Sig Dispense Refill    acetaminophen (TYLENOL EXTRA STRENGTH) 500 MG tablet Take 500 mg by mouth every 6 (six) hours as needed.      allopurinol (ZYLOPRIM) 100 MG tablet Take 2 tablets (200 mg total) by mouth once daily. 180 tablet 4    aspirin (ECOTRIN) 81 MG EC tablet Take 81 mg by mouth once daily.      candesartan (ATACAND) 32 MG tablet TAKE 1 TABLET BY MOUTH EVERY DAY 90 tablet 2    MULTIVIT WITH CALCIUM,IRON,MIN (MULTIPLE VITAMIN, WOMENS ORAL) Take by mouth once daily.      spironolactone (ALDACTONE) 25 MG tablet Take 1 tablet (25 mg total) by mouth once daily. 90 tablet 3    metoprolol succinate (TOPROL-XL) 25 MG 24 hr tablet Take 1 tablet (25 mg total) by mouth once daily. 30 tablet 1     No current facility-administered medications for this visit.        Review of Systems   Constitutional: Negative.    HENT: Negative.    Eyes: Negative.    Respiratory: Negative.    Cardiovascular: Negative.    Gastrointestinal: Negative.    Endocrine: Negative.    Genitourinary: Negative.    Musculoskeletal: Negative.    Allergic/Immunologic: Negative.    Neurological: Negative.    Hematological: Negative.   "  Psychiatric/Behavioral: Negative.        Objective:   Pulse 64   Temp 97.5 °F (36.4 °C) (Tympanic)   Ht 5' 2" (1.575 m)   Wt 80.7 kg (178 lb)   BMI 32.56 kg/m²      Physical Exam   Constitutional: She is oriented to person, place, and time. She appears well-developed and well-nourished. No distress.   HENT:   Head: Normocephalic and atraumatic.   Right Ear: External ear normal.   Left Ear: External ear normal.   Mouth/Throat: Oropharynx is clear and moist.   Eyes: EOM are normal. Pupils are equal, round, and reactive to light.   Neck: Normal range of motion. Neck supple.   Cardiovascular: Normal rate, regular rhythm, normal heart sounds and intact distal pulses.    Pulmonary/Chest: Effort normal and breath sounds normal.   Abdominal: Soft.   Neurological: She is alert and oriented to person, place, and time.   Skin: Skin is warm.         Lab Results   Component Value Date    WBC 6.87 05/17/2017    HGB 13.3 05/17/2017    HCT 41.1 05/17/2017     05/17/2017    CHOL 185 04/26/2016    TRIG 106 04/26/2016    HDL 64 04/26/2016    ALT 26 05/17/2017    AST 40 05/17/2017     (L) 05/17/2017    K 4.6 05/17/2017    CL 99 05/17/2017    CREATININE 1.5 (H) 05/17/2017    BUN 31 (H) 05/17/2017    CO2 25 05/17/2017    TSH 1.724 05/17/2017    INR 1.0 02/14/2017    HGBA1C 5.6 11/08/2016       Assessment:     i  1. Essential hypertension    2. CKD (chronic kidney disease) stage 3, GFR 30-59 ml/min    3. Chronic systolic congestive heart failure        Plan:   Essential hypertension  -     Basic metabolic panel; Future; Expected date: 05/25/2017    Megan was seen today for follow-up.    Diagnoses and all orders for this visit:    Essential hypertension  -     Basic metabolic panel; Future    CKD (chronic kidney disease) stage 3, GFR 30-59 ml/min    Chronic systolic congestive heart failure    -     metoprolol succinate (TOPROL-XL) 25 MG 24 hr tablet; Take 1 tablet (25 mg total) by mouth once daily.      Will try LOW dose " of metoprolol and see how patient responds. If getting same signs and symptoms, will go ahead and add CCB

## 2017-05-26 ENCOUNTER — TELEPHONE (OUTPATIENT)
Dept: CARDIOLOGY | Facility: CLINIC | Age: 72
End: 2017-05-26

## 2017-06-01 ENCOUNTER — OFFICE VISIT (OUTPATIENT)
Dept: INTERNAL MEDICINE | Facility: CLINIC | Age: 72
End: 2017-06-01
Payer: MEDICARE

## 2017-06-01 VITALS
TEMPERATURE: 98 F | SYSTOLIC BLOOD PRESSURE: 130 MMHG | DIASTOLIC BLOOD PRESSURE: 80 MMHG | HEIGHT: 62 IN | WEIGHT: 182.56 LBS | HEART RATE: 63 BPM | BODY MASS INDEX: 33.6 KG/M2

## 2017-06-01 DIAGNOSIS — I10 ESSENTIAL HYPERTENSION: Chronic | ICD-10-CM

## 2017-06-01 DIAGNOSIS — I50.22 CHRONIC SYSTOLIC CONGESTIVE HEART FAILURE: Primary | ICD-10-CM

## 2017-06-01 PROCEDURE — 99499 UNLISTED E&M SERVICE: CPT | Mod: S$GLB,,, | Performed by: PHYSICIAN ASSISTANT

## 2017-06-01 PROCEDURE — 1159F MED LIST DOCD IN RCRD: CPT | Mod: S$GLB,,, | Performed by: PHYSICIAN ASSISTANT

## 2017-06-01 PROCEDURE — 1126F AMNT PAIN NOTED NONE PRSNT: CPT | Mod: S$GLB,,, | Performed by: PHYSICIAN ASSISTANT

## 2017-06-01 PROCEDURE — 99999 PR PBB SHADOW E&M-EST. PATIENT-LVL III: CPT | Mod: PBBFAC,,, | Performed by: PHYSICIAN ASSISTANT

## 2017-06-01 PROCEDURE — 99213 OFFICE O/P EST LOW 20 MIN: CPT | Mod: S$GLB,,, | Performed by: PHYSICIAN ASSISTANT

## 2017-06-01 NOTE — PROGRESS NOTES
Subjective:       Patient ID: Megan Sams is a 72 y.o. female.    Chief Complaint:   HPI  Patient comes in today for 1 week follow up concerning her HR, BP and her BP medication   She was on carvedilol but was having weakness and bradycardia. This was stopped and symptoms improved. At our last visit, her BP was elevated so we decided to start a low dose BB   She is doing well with this, has not been checking her HR but has been checking her BP which as been running 130/80 or a little higher. She does check it in the afternoon when she remebers.   She is tying to avoid high salt diet.     No weak or dizzy spells. HR 64 today       Past Medical History:   Diagnosis Date    Arthritis     Blood transfusion     CHF (congestive heart failure)     Chronic kidney disease     Depression     Hypertension        Current Outpatient Prescriptions   Medication Sig Dispense Refill    acetaminophen (TYLENOL EXTRA STRENGTH) 500 MG tablet Take 500 mg by mouth every 6 (six) hours as needed.      allopurinol (ZYLOPRIM) 100 MG tablet Take 2 tablets (200 mg total) by mouth once daily. 180 tablet 4    aspirin (ECOTRIN) 81 MG EC tablet Take 81 mg by mouth once daily.      candesartan (ATACAND) 32 MG tablet TAKE 1 TABLET BY MOUTH EVERY DAY 90 tablet 2    metoprolol succinate (TOPROL-XL) 25 MG 24 hr tablet Take 1 tablet (25 mg total) by mouth once daily. 30 tablet 1    MULTIVIT WITH CALCIUM,IRON,MIN (MULTIPLE VITAMIN, WOMENS ORAL) Take by mouth once daily.      spironolactone (ALDACTONE) 25 MG tablet Take 1 tablet (25 mg total) by mouth once daily. 90 tablet 3     No current facility-administered medications for this visit.        Review of Systems   Constitutional: Negative.    HENT: Negative.    Eyes: Negative.    Respiratory: Negative.    Cardiovascular: Negative.    Gastrointestinal: Negative.    Endocrine: Negative.    Genitourinary: Negative.    Musculoskeletal: Negative.    Allergic/Immunologic: Negative.   "  Neurological: Negative.    Hematological: Negative.    Psychiatric/Behavioral: Negative.        Objective:   /80   Pulse 63   Temp 97.8 °F (36.6 °C) (Tympanic)   Ht 5' 2" (1.575 m)   Wt 82.8 kg (182 lb 8.7 oz)   BMI 33.39 kg/m²      Physical Exam   Constitutional: She is oriented to person, place, and time. She appears well-developed and well-nourished.   HENT:   Head: Normocephalic and atraumatic.   Right Ear: External ear normal.   Left Ear: External ear normal.   Nose: Nose normal.   Mouth/Throat: Oropharynx is clear and moist. No oropharyngeal exudate.   Eyes: EOM are normal. Pupils are equal, round, and reactive to light.   Cardiovascular: Normal rate, regular rhythm and normal heart sounds.    Pulmonary/Chest: Effort normal and breath sounds normal.   Abdominal: Soft.   Neurological: She is alert and oriented to person, place, and time.   Skin: Skin is warm.         Lab Results   Component Value Date    WBC 6.87 05/17/2017    HGB 13.3 05/17/2017    HCT 41.1 05/17/2017     05/17/2017    CHOL 185 04/26/2016    TRIG 106 04/26/2016    HDL 64 04/26/2016    ALT 26 05/17/2017    AST 40 05/17/2017     05/25/2017     05/25/2017    K 4.6 05/25/2017    K 4.6 05/25/2017     05/25/2017     05/25/2017    CREATININE 1.1 05/25/2017    CREATININE 1.1 05/25/2017    BUN 10 05/25/2017    BUN 10 05/25/2017    CO2 24 05/25/2017    CO2 24 05/25/2017    TSH 1.724 05/17/2017    INR 1.0 02/14/2017    HGBA1C 5.6 11/08/2016       Assessment:       1. Chronic systolic congestive heart failure    2. Essential hypertension        Plan:   Chronic systolic congestive heart failure    Essential hypertension    Patient seems to be doing well on25mg dose of metoprolol so will continue this   She does have follow up with cardiology so if she is having any new issues with the medication, suggest to discuss it at that visit as well.   "

## 2017-07-11 ENCOUNTER — LAB VISIT (OUTPATIENT)
Dept: LAB | Facility: HOSPITAL | Age: 72
End: 2017-07-11
Attending: INTERNAL MEDICINE
Payer: MEDICARE

## 2017-07-11 ENCOUNTER — OFFICE VISIT (OUTPATIENT)
Dept: INTERNAL MEDICINE | Facility: CLINIC | Age: 72
End: 2017-07-11
Payer: MEDICARE

## 2017-07-11 VITALS
HEIGHT: 62 IN | TEMPERATURE: 98 F | DIASTOLIC BLOOD PRESSURE: 85 MMHG | HEART RATE: 76 BPM | BODY MASS INDEX: 33.07 KG/M2 | SYSTOLIC BLOOD PRESSURE: 140 MMHG | WEIGHT: 179.69 LBS

## 2017-07-11 DIAGNOSIS — R25.2 CRAMPS, EXTREMITY: ICD-10-CM

## 2017-07-11 DIAGNOSIS — I10 ESSENTIAL HYPERTENSION: Primary | Chronic | ICD-10-CM

## 2017-07-11 DIAGNOSIS — Z12.31 ENCOUNTER FOR SCREENING MAMMOGRAM FOR HIGH-RISK PATIENT: ICD-10-CM

## 2017-07-11 LAB
ANION GAP SERPL CALC-SCNC: 11 MMOL/L
BUN SERPL-MCNC: 16 MG/DL
CALCIUM SERPL-MCNC: 10.2 MG/DL
CALCIUM SERPL-MCNC: 10.2 MG/DL
CHLORIDE SERPL-SCNC: 97 MMOL/L
CO2 SERPL-SCNC: 25 MMOL/L
CREAT SERPL-MCNC: 1.2 MG/DL
EST. GFR  (AFRICAN AMERICAN): 51.4 ML/MIN/1.73 M^2
EST. GFR  (NON AFRICAN AMERICAN): 44.6 ML/MIN/1.73 M^2
GLUCOSE SERPL-MCNC: 99 MG/DL
MAGNESIUM SERPL-MCNC: 1.7 MG/DL
POTASSIUM SERPL-SCNC: 4.8 MMOL/L
SODIUM SERPL-SCNC: 133 MMOL/L

## 2017-07-11 PROCEDURE — 80048 BASIC METABOLIC PNL TOTAL CA: CPT

## 2017-07-11 PROCEDURE — 1125F AMNT PAIN NOTED PAIN PRSNT: CPT | Mod: S$GLB,,, | Performed by: INTERNAL MEDICINE

## 2017-07-11 PROCEDURE — 83735 ASSAY OF MAGNESIUM: CPT

## 2017-07-11 PROCEDURE — 99213 OFFICE O/P EST LOW 20 MIN: CPT | Mod: S$GLB,,, | Performed by: INTERNAL MEDICINE

## 2017-07-11 PROCEDURE — 99999 PR PBB SHADOW E&M-EST. PATIENT-LVL III: CPT | Mod: PBBFAC,,, | Performed by: INTERNAL MEDICINE

## 2017-07-11 PROCEDURE — 1159F MED LIST DOCD IN RCRD: CPT | Mod: S$GLB,,, | Performed by: INTERNAL MEDICINE

## 2017-07-11 PROCEDURE — 36415 COLL VENOUS BLD VENIPUNCTURE: CPT | Mod: PO

## 2017-07-11 RX ORDER — METOPROLOL SUCCINATE 50 MG/1
50 TABLET, EXTENDED RELEASE ORAL DAILY
Qty: 30 TABLET | Refills: 2 | Status: SHIPPED | OUTPATIENT
Start: 2017-07-11 | End: 2017-10-02 | Stop reason: SDUPTHER

## 2017-07-11 NOTE — PROGRESS NOTES
"Subjective:       Patient ID: Megan Sams is a 72 y.o. female.    Chief Complaint: Hypertension    HPI Patient is a 72-year-old female coming in following up on her blood pressure.  Patient had been intolerant of her carvedilol dose having lightheadedness and orthostasis associated with it.  She was taken off carvedilol but her blood pressure started to come back up and was running high.  She was started on low-dose metoprolol 25 mg once daily but her blood pressures at this time are continuing to run a little bit to the high side.  She is consistently seeing numbers at home in the 150s systolic and the upper 80s to low 90s diastolic.    She relates that over the last week or so she's been having a little bit of cramping in her hands.  She doesn't know if this is related to the metoprolol or whether it could be something else.  She describes that she picks up her cup and hold her cup for a while her hands disorder cramp up and she has hard time releasing the cup.    Review of Systems    Objective:   BP (!) 140/85   Pulse 76   Temp 97.7 °F (36.5 °C) (Tympanic)   Ht 5' 2" (1.575 m)   Wt 81.5 kg (179 lb 10.8 oz)   BMI 32.86 kg/m²      Physical Exam   Constitutional: She appears well-developed and well-nourished.   HENT:   Head: Normocephalic and atraumatic.   Cardiovascular: Normal rate, regular rhythm and intact distal pulses.  Exam reveals no gallop and no friction rub.    No murmur heard.  Pulmonary/Chest: Breath sounds normal. She has no wheezes. She has no rales. She exhibits no tenderness.   Abdominal: Soft. Bowel sounds are normal. She exhibits no distension. There is no tenderness.   Musculoskeletal: She exhibits no edema, tenderness or deformity.   Hands appear normal to exam   Lymphadenopathy:     She has no cervical adenopathy.   Skin: No rash noted.   Vitals reviewed.      No visits with results within 2 Week(s) from this visit.   Latest known visit with results is:   Lab Visit on 05/25/2017 "   Component Date Value    Sodium 05/25/2017 140     Potassium 05/25/2017 4.6     Chloride 05/25/2017 104     CO2 05/25/2017 24     Glucose 05/25/2017 92     BUN, Bld 05/25/2017 10     Creatinine 05/25/2017 1.1     Calcium 05/25/2017 10.8*    Anion Gap 05/25/2017 12     eGFR if  05/25/2017 57.2*    eGFR if non  Amer* 05/25/2017 49.6*    Sodium 05/25/2017 140     Potassium 05/25/2017 4.6     Chloride 05/25/2017 104     CO2 05/25/2017 24     Glucose 05/25/2017 92     BUN, Bld 05/25/2017 10     Creatinine 05/25/2017 1.1     Calcium 05/25/2017 10.8*    Anion Gap 05/25/2017 12     eGFR if  05/25/2017 57.2*    eGFR if non  Amer* 05/25/2017 49.6*       Assessment:       1. Essential hypertension    2. Cramps, extremity    3. Encounter for screening mammogram for high-risk patient        Plan:   Essential hypertension  BP still running a bit high.    Increase metoprolol to 50 mg once daily.  Follow up in 4 weeks.      Megan was seen today for hypertension.    Diagnoses and all orders for this visit:    Essential hypertension  Comments:  Increase metoprolol to 50 mg once daily.  Follow up in 4 weeks.      Cramps, extremity  -     Basic metabolic panel; Future  -     Magnesium; Future  -     Calcium; Future    Encounter for screening mammogram for high-risk patient  -     Mammo Digital Screening Bilateral With CAD; Future    Other orders  -     metoprolol succinate (TOPROL-XL) 50 MG 24 hr tablet; Take 1 tablet (50 mg total) by mouth once daily.        Return in about 4 weeks (around 8/8/2017).

## 2017-07-12 ENCOUNTER — TELEPHONE (OUTPATIENT)
Dept: INTERNAL MEDICINE | Facility: CLINIC | Age: 72
End: 2017-07-12

## 2017-07-12 DIAGNOSIS — E87.1 HYPONATREMIA: Primary | ICD-10-CM

## 2017-07-12 NOTE — TELEPHONE ENCOUNTER
Patient was informed of her results via phone.  Patient verbalized understanding.  Appointment mailed for 2 weeks.

## 2017-07-12 NOTE — TELEPHONE ENCOUNTER
Labs look okay but the sodium is a little bit low.  Take in a little extra salt.  Repeat bmp in one 2 weeks.

## 2017-07-18 RX ORDER — CANDESARTAN 32 MG/1
TABLET ORAL
Qty: 90 TABLET | Refills: 2 | Status: SHIPPED | OUTPATIENT
Start: 2017-07-18 | End: 2018-02-20 | Stop reason: SDUPTHER

## 2017-07-20 RX ORDER — METOPROLOL SUCCINATE 25 MG/1
25 TABLET, EXTENDED RELEASE ORAL DAILY
Qty: 30 TABLET | Refills: 1 | OUTPATIENT
Start: 2017-07-20 | End: 2018-07-20

## 2017-07-25 RX ORDER — ALLOPURINOL 100 MG/1
200 TABLET ORAL DAILY
Qty: 180 TABLET | Refills: 4 | Status: SHIPPED | OUTPATIENT
Start: 2017-07-25 | End: 2017-08-08 | Stop reason: SDUPTHER

## 2017-07-26 ENCOUNTER — TELEPHONE (OUTPATIENT)
Dept: INTERNAL MEDICINE | Facility: CLINIC | Age: 72
End: 2017-07-26

## 2017-07-26 ENCOUNTER — LAB VISIT (OUTPATIENT)
Dept: LAB | Facility: HOSPITAL | Age: 72
End: 2017-07-26
Attending: INTERNAL MEDICINE
Payer: MEDICARE

## 2017-07-26 DIAGNOSIS — E87.1 HYPONATREMIA: ICD-10-CM

## 2017-07-26 LAB
ANION GAP SERPL CALC-SCNC: 9 MMOL/L
BUN SERPL-MCNC: 18 MG/DL
CALCIUM SERPL-MCNC: 9.6 MG/DL
CHLORIDE SERPL-SCNC: 106 MMOL/L
CO2 SERPL-SCNC: 24 MMOL/L
CREAT SERPL-MCNC: 1.1 MG/DL
EST. GFR  (AFRICAN AMERICAN): 57.2 ML/MIN/1.73 M^2
EST. GFR  (NON AFRICAN AMERICAN): 49.6 ML/MIN/1.73 M^2
GLUCOSE SERPL-MCNC: 87 MG/DL
POTASSIUM SERPL-SCNC: 4.4 MMOL/L
SODIUM SERPL-SCNC: 139 MMOL/L

## 2017-07-26 PROCEDURE — 36415 COLL VENOUS BLD VENIPUNCTURE: CPT | Mod: PO

## 2017-07-26 PROCEDURE — 80048 BASIC METABOLIC PNL TOTAL CA: CPT

## 2017-07-26 NOTE — TELEPHONE ENCOUNTER
----- Message from Fay Argueta sent at 7/26/2017  8:46 AM CDT -----  Says she received a call yesterday regarding one of her medications needing authorized.[email or web address]

## 2017-08-02 ENCOUNTER — HOSPITAL ENCOUNTER (OUTPATIENT)
Dept: RADIOLOGY | Facility: HOSPITAL | Age: 72
Discharge: HOME | End: 2017-08-02
Attending: INTERNAL MEDICINE
Payer: MEDICARE

## 2017-08-02 VITALS — BODY MASS INDEX: 32.94 KG/M2 | HEIGHT: 62 IN | WEIGHT: 179 LBS

## 2017-08-02 DIAGNOSIS — Z12.31 ENCOUNTER FOR SCREENING MAMMOGRAM FOR HIGH-RISK PATIENT: ICD-10-CM

## 2017-08-02 PROCEDURE — 77067 SCR MAMMO BI INCL CAD: CPT | Mod: 26,,, | Performed by: RADIOLOGY

## 2017-08-02 PROCEDURE — 77067 SCR MAMMO BI INCL CAD: CPT | Mod: TC

## 2017-08-08 RX ORDER — ALLOPURINOL 100 MG/1
200 TABLET ORAL DAILY
Qty: 180 TABLET | Refills: 4 | Status: SHIPPED | OUTPATIENT
Start: 2017-08-08 | End: 2018-02-20 | Stop reason: SDUPTHER

## 2017-08-10 ENCOUNTER — OFFICE VISIT (OUTPATIENT)
Dept: INTERNAL MEDICINE | Facility: CLINIC | Age: 72
End: 2017-08-10
Payer: MEDICARE

## 2017-08-10 VITALS
DIASTOLIC BLOOD PRESSURE: 84 MMHG | TEMPERATURE: 98 F | HEART RATE: 58 BPM | BODY MASS INDEX: 33.13 KG/M2 | SYSTOLIC BLOOD PRESSURE: 138 MMHG | WEIGHT: 180 LBS | HEIGHT: 62 IN

## 2017-08-10 DIAGNOSIS — I10 ESSENTIAL HYPERTENSION: Chronic | ICD-10-CM

## 2017-08-10 DIAGNOSIS — I50.9 CONGESTIVE HEART FAILURE, UNSPECIFIED CONGESTIVE HEART FAILURE CHRONICITY, UNSPECIFIED CONGESTIVE HEART FAILURE TYPE: ICD-10-CM

## 2017-08-10 PROCEDURE — 99499 UNLISTED E&M SERVICE: CPT | Mod: S$GLB,,, | Performed by: INTERNAL MEDICINE

## 2017-08-10 PROCEDURE — 3075F SYST BP GE 130 - 139MM HG: CPT | Mod: S$GLB,,, | Performed by: INTERNAL MEDICINE

## 2017-08-10 PROCEDURE — 1126F AMNT PAIN NOTED NONE PRSNT: CPT | Mod: S$GLB,,, | Performed by: INTERNAL MEDICINE

## 2017-08-10 PROCEDURE — 99999 PR PBB SHADOW E&M-EST. PATIENT-LVL III: CPT | Mod: PBBFAC,,, | Performed by: INTERNAL MEDICINE

## 2017-08-10 PROCEDURE — 3079F DIAST BP 80-89 MM HG: CPT | Mod: S$GLB,,, | Performed by: INTERNAL MEDICINE

## 2017-08-10 PROCEDURE — 1159F MED LIST DOCD IN RCRD: CPT | Mod: S$GLB,,, | Performed by: INTERNAL MEDICINE

## 2017-08-10 PROCEDURE — 3008F BODY MASS INDEX DOCD: CPT | Mod: S$GLB,,, | Performed by: INTERNAL MEDICINE

## 2017-08-10 PROCEDURE — 99213 OFFICE O/P EST LOW 20 MIN: CPT | Mod: S$GLB,,, | Performed by: INTERNAL MEDICINE

## 2017-08-10 RX ORDER — SPIRONOLACTONE 50 MG/1
50 TABLET, FILM COATED ORAL DAILY
Qty: 90 TABLET | Refills: 3 | Status: SHIPPED | OUTPATIENT
Start: 2017-08-10 | End: 2018-02-20 | Stop reason: SDUPTHER

## 2017-08-10 NOTE — PROGRESS NOTES
"Subjective:       Patient ID: Megan Sams is a 72 y.o. female.    Chief Complaint: No chief complaint on file.    HPI  Patient is a 72-year-old female presenting today following up on her blood pressure.  She indicates she's been doing all right.  We had added back the Toprol 50 mg once daily and she states that she still seeing fluctuation in her numbers.  She will see some highs and some lows.  They have been more normal but every once while she still does get up in the 160s systolic.  She is tolerating her current regimen.  We discussed options today.  She is having no chest pain or palpitations.      Review of Systems    Objective:   /84   Pulse (!) 58   Temp 97.8 °F (36.6 °C) (Tympanic)   Ht 5' 2" (1.575 m)   Wt 81.6 kg (180 lb)   BMI 32.92 kg/m²      Physical Exam   Constitutional: She appears well-developed and well-nourished.   HENT:   Head: Normocephalic and atraumatic.   Cardiovascular: Normal rate, regular rhythm and intact distal pulses.  Exam reveals no gallop and no friction rub.    No murmur heard.  Pulmonary/Chest: Breath sounds normal. She has no wheezes. She has no rales. She exhibits no tenderness.   Abdominal: Soft. Bowel sounds are normal. She exhibits no distension. There is no tenderness.   Lymphadenopathy:     She has no cervical adenopathy.   Skin: No rash noted.   Vitals reviewed.      No visits with results within 2 Week(s) from this visit.   Latest known visit with results is:   Lab Visit on 07/26/2017   Component Date Value    Sodium 07/26/2017 139     Potassium 07/26/2017 4.4     Chloride 07/26/2017 106     CO2 07/26/2017 24     Glucose 07/26/2017 87     BUN, Bld 07/26/2017 18     Creatinine 07/26/2017 1.1     Calcium 07/26/2017 9.6     Anion Gap 07/26/2017 9     eGFR if  07/26/2017 57.2*    eGFR if non  Amer* 07/26/2017 49.6*       Assessment:       1. Essential hypertension    2. Congestive heart failure, unspecified congestive heart " failure chronicity, unspecified congestive heart failure type        Plan:   Essential hypertension  Continues to see some fluctuation in the numbers.  Can get systolics over 160.    Increase spironolactone to 50 mg once daily.  Follow up in one month      Diagnoses and all orders for this visit:    Essential hypertension    Congestive heart failure, unspecified congestive heart failure chronicity, unspecified congestive heart failure type    Other orders  -     spironolactone (ALDACTONE) 50 MG tablet; Take 1 tablet (50 mg total) by mouth once daily.        Return in about 4 weeks (around 9/7/2017).

## 2017-08-10 NOTE — ASSESSMENT & PLAN NOTE
Continues to see some fluctuation in the numbers.  Can get systolics over 160.    Increase spironolactone to 50 mg once daily.  Follow up in one month

## 2017-08-24 ENCOUNTER — OFFICE VISIT (OUTPATIENT)
Dept: CARDIOLOGY | Facility: CLINIC | Age: 72
End: 2017-08-24
Payer: MEDICARE

## 2017-08-24 ENCOUNTER — TELEPHONE (OUTPATIENT)
Dept: INTERNAL MEDICINE | Facility: CLINIC | Age: 72
End: 2017-08-24

## 2017-08-24 VITALS
HEART RATE: 60 BPM | BODY MASS INDEX: 33.23 KG/M2 | DIASTOLIC BLOOD PRESSURE: 68 MMHG | SYSTOLIC BLOOD PRESSURE: 122 MMHG | WEIGHT: 180.56 LBS | HEIGHT: 62 IN

## 2017-08-24 DIAGNOSIS — I10 ESSENTIAL HYPERTENSION: Chronic | ICD-10-CM

## 2017-08-24 DIAGNOSIS — E66.9 OBESITY, CLASS I, BMI 30-34.9: ICD-10-CM

## 2017-08-24 DIAGNOSIS — I50.22 CHRONIC SYSTOLIC CONGESTIVE HEART FAILURE: Primary | ICD-10-CM

## 2017-08-24 DIAGNOSIS — I42.8 NONISCHEMIC CARDIOMYOPATHY: ICD-10-CM

## 2017-08-24 DIAGNOSIS — N18.30 CKD (CHRONIC KIDNEY DISEASE) STAGE 3, GFR 30-59 ML/MIN: Chronic | ICD-10-CM

## 2017-08-24 PROCEDURE — 1159F MED LIST DOCD IN RCRD: CPT | Mod: S$GLB,,, | Performed by: INTERNAL MEDICINE

## 2017-08-24 PROCEDURE — 99999 PR PBB SHADOW E&M-EST. PATIENT-LVL III: CPT | Mod: PBBFAC,,, | Performed by: INTERNAL MEDICINE

## 2017-08-24 PROCEDURE — 99499 UNLISTED E&M SERVICE: CPT | Mod: S$GLB,,, | Performed by: INTERNAL MEDICINE

## 2017-08-24 PROCEDURE — 99214 OFFICE O/P EST MOD 30 MIN: CPT | Mod: S$GLB,,, | Performed by: INTERNAL MEDICINE

## 2017-08-24 PROCEDURE — 3074F SYST BP LT 130 MM HG: CPT | Mod: S$GLB,,, | Performed by: INTERNAL MEDICINE

## 2017-08-24 PROCEDURE — 3078F DIAST BP <80 MM HG: CPT | Mod: S$GLB,,, | Performed by: INTERNAL MEDICINE

## 2017-08-24 PROCEDURE — 1126F AMNT PAIN NOTED NONE PRSNT: CPT | Mod: S$GLB,,, | Performed by: INTERNAL MEDICINE

## 2017-08-24 PROCEDURE — 3008F BODY MASS INDEX DOCD: CPT | Mod: S$GLB,,, | Performed by: INTERNAL MEDICINE

## 2017-08-24 RX ORDER — BENZONATATE 200 MG/1
200 CAPSULE ORAL 3 TIMES DAILY PRN
Qty: 30 CAPSULE | Refills: 0 | Status: SHIPPED | OUTPATIENT
Start: 2017-08-24 | End: 2017-09-03

## 2017-08-24 RX ORDER — DOXYCYCLINE 100 MG/1
100 TABLET ORAL 2 TIMES DAILY
Qty: 20 TABLET | Refills: 0 | Status: SHIPPED | OUTPATIENT
Start: 2017-08-24 | End: 2017-09-03

## 2017-08-24 NOTE — PROGRESS NOTES
"Subjective:   Patient ID:  Megan Sams is a 72 y.o. female who presents for follow-up of Congestive Heart Failure (she feels congested today)  BP stable on metoprolol,atacand, aldactone.Patient denies CP, angina or anginal equivalent.Echo- EF=35%.  Pt states compliant with diet. ? Cath 2007-"no blockages"    Hypertension   This is a chronic problem. The current episode started more than 1 year ago. The problem has been gradually improving since onset. The problem is controlled. Pertinent negatives include no chest pain, palpitations or shortness of breath. Risk factors for coronary artery disease include sedentary lifestyle. Past treatments include beta blockers and diuretics. The current treatment provides moderate improvement. Compliance problems include exercise.  Hypertensive end-organ damage includes heart failure.   Congestive Heart Failure   Presents for follow-up visit. Pertinent negatives include no abdominal pain, chest pain, chest pressure, claudication, edema, fatigue, muscle weakness, near-syncope, nocturia, orthopnea, palpitations, shortness of breath or unexpected weight change. The symptoms have been stable. Compliance with total regimen is %. Compliance with diet is %. Compliance with exercise is %. Compliance with medications is %.       Review of Systems   Constitution: Negative. Negative for fatigue, unexpected weight change and weight gain.   HENT: Negative.    Eyes: Negative.    Cardiovascular: Negative.  Negative for chest pain, claudication, leg swelling, near-syncope and palpitations.   Respiratory: Negative.  Negative for shortness of breath.    Endocrine: Negative.    Hematologic/Lymphatic: Negative.    Skin: Negative.    Musculoskeletal: Negative for muscle weakness.   Gastrointestinal: Negative.  Negative for abdominal pain.   Genitourinary: Negative.  Negative for nocturia.   Neurological: Negative.  Negative for dizziness.   Psychiatric/Behavioral: Negative.  "   Allergic/Immunologic: Negative.      Family History   Problem Relation Age of Onset    Early death Mother     Heart disease Mother     Hypertension Mother     Kidney disease Father     Heart disease Brother      Past Medical History:   Diagnosis Date    Arthritis     Blood transfusion     CHF (congestive heart failure)     Chronic kidney disease     Depression     Hypertension      Current Outpatient Prescriptions on File Prior to Visit   Medication Sig Dispense Refill    acetaminophen (TYLENOL EXTRA STRENGTH) 500 MG tablet Take 500 mg by mouth every 6 (six) hours as needed.      allopurinol (ZYLOPRIM) 100 MG tablet Take 2 tablets (200 mg total) by mouth once daily. 180 tablet 4    aspirin (ECOTRIN) 81 MG EC tablet Take 81 mg by mouth once daily.      candesartan (ATACAND) 32 MG tablet TAKE 1 TABLET BY MOUTH EVERY DAY 90 tablet 2    metoprolol succinate (TOPROL-XL) 50 MG 24 hr tablet Take 1 tablet (50 mg total) by mouth once daily. 30 tablet 2    MULTIVIT WITH CALCIUM,IRON,MIN (MULTIPLE VITAMIN, WOMENS ORAL) Take by mouth once daily.      spironolactone (ALDACTONE) 50 MG tablet Take 1 tablet (50 mg total) by mouth once daily. 90 tablet 3     No current facility-administered medications on file prior to visit.      Review of patient's allergies indicates:   Allergen Reactions    Gabapentin Other (See Comments)     Lowered heart rate    Lanoxin [digoxin] Other (See Comments)     Too low heart rate    Thorazine [chlorpromazine] Other (See Comments)     hallucinations    Ultracet [tramadol-acetaminophen] Other (See Comments)     Elevated BP    Penicillins Nausea Only and Rash       Objective:     Physical Exam   Constitutional: She is oriented to person, place, and time. She appears well-developed and well-nourished.   HENT:   Head: Normocephalic and atraumatic.   Eyes: Conjunctivae and EOM are normal. Pupils are equal, round, and reactive to light.   Neck: Normal range of motion. Neck supple.    Cardiovascular: Normal rate, regular rhythm, normal heart sounds and intact distal pulses.    Pulmonary/Chest: Effort normal and breath sounds normal.   Abdominal: Soft. Bowel sounds are normal.   Musculoskeletal: Normal range of motion.   Neurological: She is alert and oriented to person, place, and time.   Skin: Skin is warm and dry.   Psychiatric: She has a normal mood and affect.   Nursing note and vitals reviewed.      Assessment:     1. Chronic systolic congestive heart failure    2. Nonischemic cardiomyopathy    3. Obesity, Class I, BMI 30-34.9    4. Essential hypertension    5. CKD (chronic kidney disease) stage 3, GFR 30-59 ml/min        Plan:     Chronic systolic congestive heart failure    Nonischemic cardiomyopathy    Obesity, Class I, BMI 30-34.9    Essential hypertension    CKD (chronic kidney disease) stage 3, GFR 30-59 ml/min      Continue metoprolol, atacand, aldactone-htn/chf

## 2017-08-24 NOTE — TELEPHONE ENCOUNTER
Patient come in with URI problems.  Patient is complaining of a productive cough that has not gotten better with coriciden or robitussin.  Patient has been having for about 5 days and is not getting any better with face and chest congestion.  No fever chills.  Please advise.

## 2017-10-02 RX ORDER — METOPROLOL SUCCINATE 50 MG/1
50 TABLET, EXTENDED RELEASE ORAL DAILY
Qty: 30 TABLET | Refills: 11 | Status: SHIPPED | OUTPATIENT
Start: 2017-10-02 | End: 2017-10-31 | Stop reason: SDUPTHER

## 2017-10-05 ENCOUNTER — LAB VISIT (OUTPATIENT)
Dept: LAB | Facility: HOSPITAL | Age: 72
End: 2017-10-05
Payer: MEDICARE

## 2017-10-05 ENCOUNTER — OFFICE VISIT (OUTPATIENT)
Dept: INTERNAL MEDICINE | Facility: CLINIC | Age: 72
End: 2017-10-05
Payer: MEDICARE

## 2017-10-05 VITALS
TEMPERATURE: 98 F | DIASTOLIC BLOOD PRESSURE: 64 MMHG | HEART RATE: 60 BPM | WEIGHT: 182.31 LBS | BODY MASS INDEX: 33.55 KG/M2 | SYSTOLIC BLOOD PRESSURE: 120 MMHG | HEIGHT: 62 IN

## 2017-10-05 DIAGNOSIS — R25.2 CRAMPS, EXTREMITY: ICD-10-CM

## 2017-10-05 DIAGNOSIS — I10 ESSENTIAL HYPERTENSION: Primary | ICD-10-CM

## 2017-10-05 LAB
ALBUMIN SERPL BCP-MCNC: 3.2 G/DL
ALP SERPL-CCNC: 77 U/L
ALT SERPL W/O P-5'-P-CCNC: 35 U/L
ANION GAP SERPL CALC-SCNC: 8 MMOL/L
AST SERPL-CCNC: 45 U/L
BILIRUB SERPL-MCNC: 0.6 MG/DL
BUN SERPL-MCNC: 14 MG/DL
CALCIUM SERPL-MCNC: 10 MG/DL
CHLORIDE SERPL-SCNC: 104 MMOL/L
CO2 SERPL-SCNC: 29 MMOL/L
CREAT SERPL-MCNC: 1 MG/DL
EST. GFR  (AFRICAN AMERICAN): >60 ML/MIN/1.73 M^2
EST. GFR  (NON AFRICAN AMERICAN): 55.6 ML/MIN/1.73 M^2
GLUCOSE SERPL-MCNC: 83 MG/DL
MAGNESIUM SERPL-MCNC: 1.7 MG/DL
POTASSIUM SERPL-SCNC: 4.6 MMOL/L
PROT SERPL-MCNC: 7.5 G/DL
SODIUM SERPL-SCNC: 141 MMOL/L

## 2017-10-05 PROCEDURE — 99999 PR PBB SHADOW E&M-EST. PATIENT-LVL III: CPT | Mod: PBBFAC,,, | Performed by: INTERNAL MEDICINE

## 2017-10-05 PROCEDURE — 36415 COLL VENOUS BLD VENIPUNCTURE: CPT | Mod: PO

## 2017-10-05 PROCEDURE — 99214 OFFICE O/P EST MOD 30 MIN: CPT | Mod: S$GLB,,, | Performed by: INTERNAL MEDICINE

## 2017-10-05 PROCEDURE — 99499 UNLISTED E&M SERVICE: CPT | Mod: S$GLB,,, | Performed by: INTERNAL MEDICINE

## 2017-10-05 PROCEDURE — 83735 ASSAY OF MAGNESIUM: CPT

## 2017-10-05 PROCEDURE — 80053 COMPREHEN METABOLIC PANEL: CPT

## 2017-10-05 NOTE — PROGRESS NOTES
"Subjective:       Patient ID: Megan Sams is a 72 y.o. female.    Chief Complaint: Follow-up    HPI  patient is a 72-year-old female presenting today following up on her hypertension.  She indicates she's been doing pretty well since her last visit.  She's been checking her blood pressures at home.  For the most part she states she is seeing good numbers.  She says maybe once or twice a week she'll see a number over 140 systolic.  About the same frequency she'll see a diastolic to get a bit above 80.  Otherwise she states the numbers have been very good.  She is tolerating the medications well.    She states over the last few weeks she's been having some issues with intermittent cramping in her hands.  She states might happen once or twice a week and she just notes the muscles in her hands tend to cramp up.  She had an issue with this previously and we have recommended some tonic water which she tried and seemed to help and she says admits that she is not been drinking the tonic water regularly at this point.    Review of Systems   Constitutional: Negative for chills and fever.   Respiratory: Negative for cough, shortness of breath and wheezing.    Cardiovascular: Negative for chest pain and palpitations.   Gastrointestinal: Negative for blood in stool, constipation, nausea and vomiting.   Genitourinary: Negative for dysuria and hematuria.   Skin: Negative for rash.       Objective:   /64   Pulse 60   Temp 98.3 °F (36.8 °C) (Tympanic)   Ht 5' 2" (1.575 m)   Wt 82.7 kg (182 lb 5.1 oz)   BMI 33.35 kg/m²      Physical Exam   Constitutional: She is oriented to person, place, and time. She appears well-developed and well-nourished.   HENT:   Head: Normocephalic and atraumatic.   Cardiovascular: Normal rate, regular rhythm and intact distal pulses.  Exam reveals no gallop and no friction rub.    No murmur heard.  Pulmonary/Chest: Breath sounds normal. She has no wheezes. She has no rales. She exhibits no " tenderness.   Abdominal: Soft. Bowel sounds are normal. She exhibits no distension. There is no tenderness.   Musculoskeletal:   Examination the hands reveals no abnormalities.   Lymphadenopathy:     She has no cervical adenopathy.   Neurological: She is alert and oriented to person, place, and time.   Skin: No rash noted.   Vitals reviewed.      No visits with results within 2 Week(s) from this visit.   Latest known visit with results is:   Lab Visit on 07/26/2017   Component Date Value    Sodium 07/26/2017 139     Potassium 07/26/2017 4.4     Chloride 07/26/2017 106     CO2 07/26/2017 24     Glucose 07/26/2017 87     BUN, Bld 07/26/2017 18     Creatinine 07/26/2017 1.1     Calcium 07/26/2017 9.6     Anion Gap 07/26/2017 9     eGFR if  07/26/2017 57.2*    eGFR if non  Amer* 07/26/2017 49.6*       Assessment:       1. Essential hypertension    2. Cramps, extremity        Plan:   Essential hypertension  Blood pressure is under good control.  We will continue the current regimen.  Will work on regular aerobic exercise and a low salt diet.        Megan was seen today for follow-up.    Diagnoses and all orders for this visit:    Essential hypertension    Cramps, extremity  Comments:  Check for electrolyte abnormalities.  Try some tonic water once daily  Orders:  -     Comprehensive metabolic panel; Future  -     Magnesium; Future     we'll continue the blood pressure maintenance at this time as she seems be getting good numbers overall.  She seems be tolerating the regimen.  Since we did make an adjustment to her spironolactone at her last visit and she is having these muscle cramps we'll check some electrolytes just be on the safe side.  We'll have her go ahead and continue to get back on the thymic water and see if that doesn't help as well.  If she does not see improvement in the cramps she will let us know.    Return in about 5 months (around 3/5/2018).

## 2017-10-10 ENCOUNTER — IMMUNIZATION (OUTPATIENT)
Dept: INTERNAL MEDICINE | Facility: CLINIC | Age: 72
End: 2017-10-10
Payer: MEDICARE

## 2017-10-10 PROCEDURE — G0008 ADMIN INFLUENZA VIRUS VAC: HCPCS | Mod: S$GLB,,, | Performed by: INTERNAL MEDICINE

## 2017-10-10 PROCEDURE — 90662 IIV NO PRSV INCREASED AG IM: CPT | Mod: S$GLB,,, | Performed by: INTERNAL MEDICINE

## 2017-10-31 RX ORDER — METOPROLOL SUCCINATE 50 MG/1
50 TABLET, EXTENDED RELEASE ORAL DAILY
Qty: 90 TABLET | Refills: 4 | Status: SHIPPED | OUTPATIENT
Start: 2017-10-31 | End: 2018-02-20 | Stop reason: SDUPTHER

## 2018-02-20 RX ORDER — CANDESARTAN 32 MG/1
32 TABLET ORAL DAILY
Qty: 90 TABLET | Refills: 4 | Status: SHIPPED | OUTPATIENT
Start: 2018-02-20 | End: 2019-03-23 | Stop reason: SDUPTHER

## 2018-02-20 RX ORDER — ALLOPURINOL 100 MG/1
200 TABLET ORAL DAILY
Qty: 180 TABLET | Refills: 4 | Status: SHIPPED | OUTPATIENT
Start: 2018-02-20 | End: 2019-09-03 | Stop reason: SDUPTHER

## 2018-02-20 RX ORDER — SPIRONOLACTONE 50 MG/1
50 TABLET, FILM COATED ORAL DAILY
Qty: 90 TABLET | Refills: 4 | Status: SHIPPED | OUTPATIENT
Start: 2018-02-20 | End: 2019-04-09 | Stop reason: SDUPTHER

## 2018-02-20 RX ORDER — METOPROLOL SUCCINATE 50 MG/1
50 TABLET, EXTENDED RELEASE ORAL DAILY
Qty: 90 TABLET | Refills: 4 | Status: SHIPPED | OUTPATIENT
Start: 2018-02-20 | End: 2018-11-29

## 2018-02-20 NOTE — TELEPHONE ENCOUNTER
----- Message from Keena Hansen sent at 2/19/2018  1:43 PM CST -----  Contact: [phone]   Pt would like  Refills called in on Rx medication,pt would like to consult with the nurse ,please call back at [phone].  Thx_AH

## 2018-03-20 ENCOUNTER — OFFICE VISIT (OUTPATIENT)
Dept: INTERNAL MEDICINE | Facility: CLINIC | Age: 73
End: 2018-03-20
Payer: MEDICARE

## 2018-03-20 ENCOUNTER — LAB VISIT (OUTPATIENT)
Dept: LAB | Facility: HOSPITAL | Age: 73
End: 2018-03-20
Payer: MEDICARE

## 2018-03-20 VITALS
BODY MASS INDEX: 34 KG/M2 | HEART RATE: 70 BPM | WEIGHT: 184.75 LBS | TEMPERATURE: 98 F | HEIGHT: 62 IN | SYSTOLIC BLOOD PRESSURE: 120 MMHG | DIASTOLIC BLOOD PRESSURE: 68 MMHG

## 2018-03-20 DIAGNOSIS — I10 ESSENTIAL HYPERTENSION: Chronic | ICD-10-CM

## 2018-03-20 DIAGNOSIS — I10 ESSENTIAL HYPERTENSION: Primary | Chronic | ICD-10-CM

## 2018-03-20 DIAGNOSIS — N18.30 CKD (CHRONIC KIDNEY DISEASE) STAGE 3, GFR 30-59 ML/MIN: Chronic | ICD-10-CM

## 2018-03-20 DIAGNOSIS — I50.22 CHRONIC SYSTOLIC CONGESTIVE HEART FAILURE: ICD-10-CM

## 2018-03-20 DIAGNOSIS — R35.0 URINARY FREQUENCY: ICD-10-CM

## 2018-03-20 LAB
ALBUMIN SERPL BCP-MCNC: 3.5 G/DL
ALP SERPL-CCNC: 72 U/L
ALT SERPL W/O P-5'-P-CCNC: 23 U/L
ANION GAP SERPL CALC-SCNC: 7 MMOL/L
AST SERPL-CCNC: 36 U/L
BASOPHILS # BLD AUTO: 0.07 K/UL
BASOPHILS NFR BLD: 1.3 %
BILIRUB SERPL-MCNC: 0.6 MG/DL
BILIRUB UR QL STRIP: NEGATIVE
BUN SERPL-MCNC: 18 MG/DL
CALCIUM SERPL-MCNC: 9.9 MG/DL
CHLORIDE SERPL-SCNC: 105 MMOL/L
CLARITY UR REFRACT.AUTO: CLEAR
CO2 SERPL-SCNC: 27 MMOL/L
COLOR UR AUTO: YELLOW
CREAT SERPL-MCNC: 1.1 MG/DL
DIFFERENTIAL METHOD: ABNORMAL
EOSINOPHIL # BLD AUTO: 0.3 K/UL
EOSINOPHIL NFR BLD: 4.5 %
ERYTHROCYTE [DISTWIDTH] IN BLOOD BY AUTOMATED COUNT: 14.2 %
EST. GFR  (AFRICAN AMERICAN): 56.8 ML/MIN/1.73 M^2
EST. GFR  (NON AFRICAN AMERICAN): 49.2 ML/MIN/1.73 M^2
ESTIMATED AVG GLUCOSE: 108 MG/DL
GLUCOSE SERPL-MCNC: 69 MG/DL
GLUCOSE UR QL STRIP: NEGATIVE
HBA1C MFR BLD HPLC: 5.4 %
HCT VFR BLD AUTO: 42.3 %
HGB BLD-MCNC: 13.2 G/DL
HGB UR QL STRIP: NEGATIVE
IMM GRANULOCYTES # BLD AUTO: 0 K/UL
IMM GRANULOCYTES NFR BLD AUTO: 0 %
KETONES UR QL STRIP: NEGATIVE
LEUKOCYTE ESTERASE UR QL STRIP: NEGATIVE
LYMPHOCYTES # BLD AUTO: 2.5 K/UL
LYMPHOCYTES NFR BLD: 44.8 %
MCH RBC QN AUTO: 27.6 PG
MCHC RBC AUTO-ENTMCNC: 31.2 G/DL
MCV RBC AUTO: 89 FL
MONOCYTES # BLD AUTO: 0.6 K/UL
MONOCYTES NFR BLD: 11.4 %
NEUTROPHILS # BLD AUTO: 2.1 K/UL
NEUTROPHILS NFR BLD: 38 %
NITRITE UR QL STRIP: NEGATIVE
NRBC BLD-RTO: 0 /100 WBC
PH UR STRIP: 7 [PH] (ref 5–8)
PLATELET # BLD AUTO: 222 K/UL
PMV BLD AUTO: 11.8 FL
POTASSIUM SERPL-SCNC: 4.8 MMOL/L
PROT SERPL-MCNC: 7.6 G/DL
PROT UR QL STRIP: NEGATIVE
RBC # BLD AUTO: 4.78 M/UL
SODIUM SERPL-SCNC: 139 MMOL/L
SP GR UR STRIP: 1 (ref 1–1.03)
URN SPEC COLLECT METH UR: NORMAL
UROBILINOGEN UR STRIP-ACNC: 2 EU/DL
WBC # BLD AUTO: 5.51 K/UL

## 2018-03-20 PROCEDURE — 3074F SYST BP LT 130 MM HG: CPT | Mod: CPTII,S$GLB,, | Performed by: INTERNAL MEDICINE

## 2018-03-20 PROCEDURE — 85025 COMPLETE CBC W/AUTO DIFF WBC: CPT

## 2018-03-20 PROCEDURE — 99499 UNLISTED E&M SERVICE: CPT | Mod: S$GLB,,, | Performed by: INTERNAL MEDICINE

## 2018-03-20 PROCEDURE — 99499 UNLISTED E&M SERVICE: CPT | Mod: ,,, | Performed by: INTERNAL MEDICINE

## 2018-03-20 PROCEDURE — 83036 HEMOGLOBIN GLYCOSYLATED A1C: CPT

## 2018-03-20 PROCEDURE — 36415 COLL VENOUS BLD VENIPUNCTURE: CPT | Mod: PO

## 2018-03-20 PROCEDURE — 3078F DIAST BP <80 MM HG: CPT | Mod: CPTII,S$GLB,, | Performed by: INTERNAL MEDICINE

## 2018-03-20 PROCEDURE — 81003 URINALYSIS AUTO W/O SCOPE: CPT

## 2018-03-20 PROCEDURE — 99214 OFFICE O/P EST MOD 30 MIN: CPT | Mod: S$GLB,,, | Performed by: INTERNAL MEDICINE

## 2018-03-20 PROCEDURE — 99999 PR PBB SHADOW E&M-EST. PATIENT-LVL III: CPT | Mod: PBBFAC,,, | Performed by: INTERNAL MEDICINE

## 2018-03-20 PROCEDURE — 80053 COMPREHEN METABOLIC PANEL: CPT

## 2018-03-20 NOTE — PROGRESS NOTES
"Subjective:       Patient ID: Megan Sams is a 73 y.o. female.    Chief Complaint: Follow-up    HPI Patient is a 73-year-old female presenting today following up on her hypertension, CHF, CK D.  She indicates she is doing well.  Her blood pressure is remaining under good control this time.  She is tolerating her medications well.  Her heart failure as well seems to be stable at this point.  She is not having any orthopnea.  She is not having significant fluid collection in the legs.  She continues take her medications as prescribed.  She's had mild CK D in the past.  This is been stable over time.  Last labs showed no significant issues.  We will continue to monitor that.  She continues to avoid anti-inflammatories.    Patient relates some issues with urinary frequency over the last 3 or 4 days.  She is having to urinate more frequently and she is having small volumes of urination.  She has some dysuria associated with it.  No fever or chills.    Review of Systems   Constitutional: Negative for chills and fever.   Respiratory: Negative for cough, shortness of breath and wheezing.    Cardiovascular: Negative for chest pain and palpitations.   Gastrointestinal: Negative for blood in stool, constipation, nausea and vomiting.   Genitourinary: Positive for dysuria and urgency. Negative for hematuria.   Skin: Negative for rash.       Objective:   /68   Pulse 70   Temp 98.2 °F (36.8 °C) (Tympanic)   Ht 5' 2" (1.575 m)   Wt 83.8 kg (184 lb 11.9 oz)   BMI 33.79 kg/m²      Physical Exam   Constitutional: She appears well-developed and well-nourished.   HENT:   Head: Normocephalic and atraumatic.   Cardiovascular: Normal rate, regular rhythm and intact distal pulses.  Exam reveals no gallop and no friction rub.    No murmur heard.  Pulmonary/Chest: Breath sounds normal. She has no wheezes. She has no rales. She exhibits no tenderness.   Abdominal: Soft. Bowel sounds are normal. She exhibits no distension. There is " no tenderness.   Mild suprapubic tenderness   Lymphadenopathy:     She has no cervical adenopathy.   Skin: No rash noted.   Vitals reviewed.          Assessment:       1. Essential hypertension    2. Chronic systolic congestive heart failure    3. CKD (chronic kidney disease) stage 3, GFR 30-59 ml/min    4. Urinary frequency        Plan:   Essential hypertension  Blood pressure is under good control.  We will continue the current regimen.  Will work on regular aerobic exercise and a low salt diet.        CHF (congestive heart failure)  Stable. No orthopnea.  No significant swelling.  No changes today    CKD (chronic kidney disease) stage 3, GFR 30-59 ml/min  Stable.  No changes today, update labs.    Megan was seen today for follow-up.    Diagnoses and all orders for this visit:    Essential hypertension  -     CBC auto differential; Future  -     Hemoglobin A1c; Future    Chronic systolic congestive heart failure  -     Comprehensive metabolic panel; Future    CKD (chronic kidney disease) stage 3, GFR 30-59 ml/min  -     Comprehensive metabolic panel; Future    Urinary frequency  -     Urinalysis  -     Urine culture; Future        Follow-up in about 4 months (around 7/20/2018).

## 2018-04-30 ENCOUNTER — TELEPHONE (OUTPATIENT)
Dept: INTERNAL MEDICINE | Facility: CLINIC | Age: 73
End: 2018-04-30

## 2018-04-30 NOTE — TELEPHONE ENCOUNTER
Patient stated she is going to have a tooth being pulled and needs to know if she needs to come off of any of her medications prior. Please advise.

## 2018-04-30 NOTE — TELEPHONE ENCOUNTER
----- Message from Jose M Avilez sent at 4/30/2018  9:37 AM CDT -----  Contact: pt  She's calling in regards to her Rx medication, pt states she has to have a tooth pulled, [phone] (home)

## 2018-05-03 ENCOUNTER — OFFICE VISIT (OUTPATIENT)
Dept: CARDIOLOGY | Facility: CLINIC | Age: 73
End: 2018-05-03
Payer: MEDICARE

## 2018-05-03 VITALS
HEART RATE: 62 BPM | SYSTOLIC BLOOD PRESSURE: 150 MMHG | BODY MASS INDEX: 34.03 KG/M2 | DIASTOLIC BLOOD PRESSURE: 78 MMHG | WEIGHT: 186.06 LBS

## 2018-05-03 DIAGNOSIS — N18.30 CKD (CHRONIC KIDNEY DISEASE) STAGE 3, GFR 30-59 ML/MIN: Chronic | ICD-10-CM

## 2018-05-03 DIAGNOSIS — I42.8 NONISCHEMIC CARDIOMYOPATHY: Primary | ICD-10-CM

## 2018-05-03 DIAGNOSIS — I10 ESSENTIAL HYPERTENSION: Chronic | ICD-10-CM

## 2018-05-03 PROCEDURE — 99214 OFFICE O/P EST MOD 30 MIN: CPT | Mod: S$GLB,,, | Performed by: INTERNAL MEDICINE

## 2018-05-03 PROCEDURE — 3078F DIAST BP <80 MM HG: CPT | Mod: CPTII,S$GLB,, | Performed by: INTERNAL MEDICINE

## 2018-05-03 PROCEDURE — 3077F SYST BP >= 140 MM HG: CPT | Mod: CPTII,S$GLB,, | Performed by: INTERNAL MEDICINE

## 2018-05-03 PROCEDURE — 99999 PR PBB SHADOW E&M-EST. PATIENT-LVL II: CPT | Mod: PBBFAC,,, | Performed by: INTERNAL MEDICINE

## 2018-05-03 NOTE — PROGRESS NOTES
Subjective:   Patient ID:  Megan Sams is a 73 y.o. female who presents for follow-up of Follow-up (need clearance to get teeth pulled)  Patient denies CP, angina or anginal equivalent.Pt planned for dental procedure- teeth pulled.  Echo-2016  CONCLUSIONS     1 - Concentric hypertrophy.     2 - Moderately depressed left ventricular systolic function (EF 35-40%).     3 - Normal right ventricular systolic function .     4 - Mild mitral regurgitation.     Hypertension   This is a chronic problem. The current episode started more than 1 year ago. The problem has been gradually improving since onset. The problem is controlled. Pertinent negatives include no chest pain, palpitations or shortness of breath. Past treatments include beta blockers, angiotensin blockers and diuretics. The current treatment provides moderate improvement. There are no compliance problems.    Congestive Heart Failure   Presents for follow-up visit. Pertinent negatives include no abdominal pain, chest pain, chest pressure, claudication, edema, fatigue, muscle weakness, near-syncope, palpitations, paroxysmal nocturnal dyspnea or shortness of breath. The symptoms have been stable. Compliance with total regimen is %. Compliance with diet is %. Compliance with exercise is %. Compliance with medications is %.       Review of Systems   Constitution: Negative. Negative for fatigue and weight gain.   HENT: Negative.    Eyes: Negative.    Cardiovascular: Negative.  Negative for chest pain, claudication, leg swelling, near-syncope and palpitations.   Respiratory: Negative.  Negative for shortness of breath.    Endocrine: Negative.    Hematologic/Lymphatic: Negative.    Skin: Negative.    Musculoskeletal: Negative for muscle weakness.   Gastrointestinal: Negative.  Negative for abdominal pain.   Genitourinary: Negative.    Neurological: Negative.  Negative for dizziness.   Psychiatric/Behavioral: Negative.    Allergic/Immunologic:  Negative.      Family History   Problem Relation Age of Onset    Early death Mother     Heart disease Mother     Hypertension Mother     Kidney disease Father     Heart disease Brother      Past Medical History:   Diagnosis Date    Arthritis     Blood transfusion     CHF (congestive heart failure)     Chronic kidney disease     Depression     Hypertension      Current Outpatient Prescriptions on File Prior to Visit   Medication Sig Dispense Refill    acetaminophen (TYLENOL EXTRA STRENGTH) 500 MG tablet Take 500 mg by mouth every 6 (six) hours as needed.      allopurinol (ZYLOPRIM) 100 MG tablet Take 2 tablets (200 mg total) by mouth once daily. 180 tablet 4    aspirin (ECOTRIN) 81 MG EC tablet Take 81 mg by mouth once daily.      candesartan (ATACAND) 32 MG tablet Take 1 tablet (32 mg total) by mouth once daily. 90 tablet 4    metoprolol succinate (TOPROL-XL) 50 MG 24 hr tablet Take 1 tablet (50 mg total) by mouth once daily. 90 tablet 4    MULTIVIT WITH CALCIUM,IRON,MIN (MULTIPLE VITAMIN, WOMENS ORAL) Take by mouth once daily.      spironolactone (ALDACTONE) 50 MG tablet Take 1 tablet (50 mg total) by mouth once daily. 90 tablet 4     No current facility-administered medications on file prior to visit.      Review of patient's allergies indicates:   Allergen Reactions    Gabapentin Other (See Comments)     Lowered heart rate    Lanoxin [digoxin] Other (See Comments)     Too low heart rate    Thorazine [chlorpromazine] Other (See Comments)     hallucinations    Ultracet [tramadol-acetaminophen] Other (See Comments)     Elevated BP    Penicillins Nausea Only and Rash       Objective:     Physical Exam   Constitutional: She is oriented to person, place, and time. She appears well-developed and well-nourished.   HENT:   Head: Normocephalic and atraumatic.   Eyes: Conjunctivae and EOM are normal. Pupils are equal, round, and reactive to light.   Neck: Normal range of motion. Neck supple.    Cardiovascular: Normal rate, regular rhythm, normal heart sounds and intact distal pulses.    Pulmonary/Chest: Effort normal and breath sounds normal.   Abdominal: Soft. Bowel sounds are normal.   Musculoskeletal: Normal range of motion.   Neurological: She is alert and oriented to person, place, and time.   Skin: Skin is warm and dry.   Psychiatric: She has a normal mood and affect.   Nursing note and vitals reviewed.      Assessment:     1. Nonischemic cardiomyopathy    2. Essential hypertension    3. CKD (chronic kidney disease) stage 3, GFR 30-59 ml/min        Plan:     Nonischemic cardiomyopathy    Essential hypertension    CKD (chronic kidney disease) stage 3, GFR 30-59 ml/min      Pt cleared for dental procedure at low risk  Continue metoprolol, atacand, aldactone-htn/chf

## 2018-07-23 ENCOUNTER — TELEPHONE (OUTPATIENT)
Dept: INTERNAL MEDICINE | Facility: CLINIC | Age: 73
End: 2018-07-23

## 2018-07-23 DIAGNOSIS — Z12.39 BREAST CANCER SCREENING: Primary | ICD-10-CM

## 2018-07-23 NOTE — TELEPHONE ENCOUNTER
----- Message from Viola Lucia sent at 7/23/2018 11:01 AM CDT -----  Contact: Pt   Pt request call back to get a Mammogram Order, and to schedule appointment. .[phone] (home)

## 2018-07-25 ENCOUNTER — LAB VISIT (OUTPATIENT)
Dept: LAB | Facility: HOSPITAL | Age: 73
End: 2018-07-25
Payer: MEDICARE

## 2018-07-25 ENCOUNTER — OFFICE VISIT (OUTPATIENT)
Dept: INTERNAL MEDICINE | Facility: CLINIC | Age: 73
End: 2018-07-25
Payer: MEDICARE

## 2018-07-25 VITALS
HEIGHT: 62 IN | SYSTOLIC BLOOD PRESSURE: 110 MMHG | DIASTOLIC BLOOD PRESSURE: 70 MMHG | BODY MASS INDEX: 33.87 KG/M2 | WEIGHT: 184.06 LBS | TEMPERATURE: 97 F | HEART RATE: 70 BPM

## 2018-07-25 DIAGNOSIS — R35.0 URINARY FREQUENCY: ICD-10-CM

## 2018-07-25 DIAGNOSIS — R35.0 URINARY FREQUENCY: Primary | ICD-10-CM

## 2018-07-25 DIAGNOSIS — N18.30 CKD (CHRONIC KIDNEY DISEASE) STAGE 3, GFR 30-59 ML/MIN: Chronic | ICD-10-CM

## 2018-07-25 LAB
ANION GAP SERPL CALC-SCNC: 12 MMOL/L
BILIRUB SERPL-MCNC: NORMAL MG/DL
BILIRUB UR QL STRIP: NEGATIVE
BLOOD URINE, POC: NORMAL
BUN SERPL-MCNC: 16 MG/DL
CALCIUM SERPL-MCNC: 10 MG/DL
CHLORIDE SERPL-SCNC: 107 MMOL/L
CLARITY UR REFRACT.AUTO: CLEAR
CO2 SERPL-SCNC: 23 MMOL/L
COLOR UR AUTO: YELLOW
COLOR, POC UA: YELLOW
CREAT SERPL-MCNC: 1 MG/DL
EST. GFR  (AFRICAN AMERICAN): >60 ML/MIN/1.73 M^2
EST. GFR  (NON AFRICAN AMERICAN): 55.2 ML/MIN/1.73 M^2
GLUCOSE SERPL-MCNC: 83 MG/DL
GLUCOSE UR QL STRIP: NEGATIVE
GLUCOSE UR QL STRIP: NORMAL
HGB UR QL STRIP: NEGATIVE
KETONES UR QL STRIP: NEGATIVE
KETONES UR QL STRIP: NORMAL
LEUKOCYTE ESTERASE UR QL STRIP: NEGATIVE
LEUKOCYTE ESTERASE URINE, POC: NORMAL
NITRITE UR QL STRIP: NEGATIVE
NITRITE, POC UA: NORMAL
PH UR STRIP: 7 [PH] (ref 5–8)
PH, POC UA: 7
POTASSIUM SERPL-SCNC: 4.5 MMOL/L
PROT UR QL STRIP: NEGATIVE
PROTEIN, POC: NORMAL
SODIUM SERPL-SCNC: 142 MMOL/L
SP GR UR STRIP: 1.01 (ref 1–1.03)
SPECIFIC GRAVITY, POC UA: 1.01
URN SPEC COLLECT METH UR: NORMAL
UROBILINOGEN UR STRIP-ACNC: 2 EU/DL
UROBILINOGEN, POC UA: NORMAL

## 2018-07-25 PROCEDURE — 99213 OFFICE O/P EST LOW 20 MIN: CPT | Mod: 25,S$GLB,, | Performed by: PHYSICIAN ASSISTANT

## 2018-07-25 PROCEDURE — 36415 COLL VENOUS BLD VENIPUNCTURE: CPT | Mod: PO

## 2018-07-25 PROCEDURE — 99499 UNLISTED E&M SERVICE: CPT | Mod: S$GLB,,, | Performed by: PHYSICIAN ASSISTANT

## 2018-07-25 PROCEDURE — 99999 PR PBB SHADOW E&M-EST. PATIENT-LVL III: CPT | Mod: PBBFAC,,, | Performed by: PHYSICIAN ASSISTANT

## 2018-07-25 PROCEDURE — 81003 URINALYSIS AUTO W/O SCOPE: CPT

## 2018-07-25 PROCEDURE — 81002 URINALYSIS NONAUTO W/O SCOPE: CPT | Mod: S$GLB,,, | Performed by: PHYSICIAN ASSISTANT

## 2018-07-25 PROCEDURE — 80048 BASIC METABOLIC PNL TOTAL CA: CPT

## 2018-07-25 PROCEDURE — 3074F SYST BP LT 130 MM HG: CPT | Mod: CPTII,S$GLB,, | Performed by: PHYSICIAN ASSISTANT

## 2018-07-25 PROCEDURE — 3078F DIAST BP <80 MM HG: CPT | Mod: CPTII,S$GLB,, | Performed by: PHYSICIAN ASSISTANT

## 2018-07-25 PROCEDURE — 87086 URINE CULTURE/COLONY COUNT: CPT

## 2018-07-25 NOTE — PROGRESS NOTES
Subjective:       Patient ID: Megan Sams is a 73 y.o. female.    Chief Complaint: Urinary Tract Infection    Urinary Tract Infection    This is a new problem. The problem occurs intermittently. The problem has been unchanged. The pain is at a severity of 2/10. The pain is mild. There has been no fever. She is not sexually active. There is no history of pyelonephritis. Associated symptoms include frequency. Pertinent negatives include no behavior changes, chills, discharge, flank pain, hematuria, hesitancy, nausea, possible pregnancy, sweats, urgency, vomiting, weight loss, bubble bath use, constipation, rash or withholding. She has tried increased fluids for the symptoms.       Health Maintenance Due   Topic Date Due    TETANUS VACCINE  01/16/1961    Zoster Vaccine  01/16/2003    Lipid Panel  04/26/2017    Colonoscopy  05/02/2017    Mammogram  08/02/2018       Past Medical History:   Diagnosis Date    Arthritis     Blood transfusion     CHF (congestive heart failure)     Chronic kidney disease     Depression     Hypertension        Current Outpatient Prescriptions   Medication Sig Dispense Refill    acetaminophen (TYLENOL EXTRA STRENGTH) 500 MG tablet Take 500 mg by mouth every 6 (six) hours as needed.      allopurinol (ZYLOPRIM) 100 MG tablet Take 2 tablets (200 mg total) by mouth once daily. 180 tablet 4    aspirin (ECOTRIN) 81 MG EC tablet Take 81 mg by mouth once daily.      candesartan (ATACAND) 32 MG tablet Take 1 tablet (32 mg total) by mouth once daily. 90 tablet 4    metoprolol succinate (TOPROL-XL) 50 MG 24 hr tablet Take 1 tablet (50 mg total) by mouth once daily. 90 tablet 4    MULTIVIT WITH CALCIUM,IRON,MIN (MULTIPLE VITAMIN, WOMENS ORAL) Take by mouth once daily.      spironolactone (ALDACTONE) 50 MG tablet Take 1 tablet (50 mg total) by mouth once daily. 90 tablet 4    sulfamethoxazole-trimethoprim 800-160mg (BACTRIM DS) 800-160 mg Tab Take 1 tablet by mouth 2 (two) times daily.  "for 5 days 10 tablet 0     No current facility-administered medications for this visit.        Review of Systems   Constitutional: Negative for chills and weight loss.   Gastrointestinal: Negative for constipation, nausea and vomiting.   Genitourinary: Positive for frequency. Negative for flank pain, hematuria, hesitancy and urgency.   Skin: Negative for rash.       Objective:   /70   Pulse 70   Temp 97 °F (36.1 °C) (Tympanic)   Ht 5' 2" (1.575 m)   Wt 83.5 kg (184 lb 1.4 oz)   BMI 33.67 kg/m²      Physical Exam      Lab Results   Component Value Date    WBC 5.51 03/20/2018    HGB 13.2 03/20/2018    HCT 42.3 03/20/2018     03/20/2018    CHOL 185 04/26/2016    TRIG 106 04/26/2016    HDL 64 04/26/2016    ALT 23 03/20/2018    AST 36 03/20/2018     07/25/2018    K 4.5 07/25/2018     07/25/2018    CREATININE 1.0 07/25/2018    BUN 16 07/25/2018    CO2 23 07/25/2018    TSH 1.724 05/17/2017    INR 1.0 02/14/2017    HGBA1C 5.4 03/20/2018       Assessment:       1. Urinary frequency    2. CKD (chronic kidney disease) stage 3, GFR 30-59 ml/min        Plan:   Urinary frequency  -     POCT URINE DIPSTICK WITHOUT MICROSCOPE  -     Urinalysis  -     Urine culture; Future; Expected date: 07/25/2018  -     Basic metabolic panel; Future; Expected date: 07/25/2018    CKD (chronic kidney disease) stage 3, GFR 30-59 ml/min    Other orders  -     sulfamethoxazole-trimethoprim 800-160mg (BACTRIM DS) 800-160 mg Tab; Take 1 tablet by mouth 2 (two) times daily. for 5 days  Dispense: 10 tablet; Refill: 0      Culture shows multiple organisms   Patient still having symptoms so will treat   And follow up if no improvement   No Follow-up on file.  "

## 2018-07-27 LAB
BACTERIA UR CULT: NORMAL
BACTERIA UR CULT: NORMAL

## 2018-07-27 RX ORDER — SULFAMETHOXAZOLE AND TRIMETHOPRIM 800; 160 MG/1; MG/1
1 TABLET ORAL 2 TIMES DAILY
Qty: 10 TABLET | Refills: 0 | Status: SHIPPED | OUTPATIENT
Start: 2018-07-27 | End: 2018-08-01

## 2018-08-03 ENCOUNTER — HOSPITAL ENCOUNTER (OUTPATIENT)
Dept: RADIOLOGY | Facility: HOSPITAL | Age: 73
Discharge: HOME | End: 2018-08-03
Attending: INTERNAL MEDICINE
Payer: MEDICARE

## 2018-08-03 VITALS — WEIGHT: 184 LBS | BODY MASS INDEX: 33.86 KG/M2 | HEIGHT: 62 IN

## 2018-08-03 DIAGNOSIS — Z12.39 BREAST CANCER SCREENING: ICD-10-CM

## 2018-08-03 PROCEDURE — 77067 SCR MAMMO BI INCL CAD: CPT | Mod: 26,,, | Performed by: RADIOLOGY

## 2018-08-03 PROCEDURE — 77067 SCR MAMMO BI INCL CAD: CPT | Mod: TC,PO

## 2018-08-31 ENCOUNTER — TELEPHONE (OUTPATIENT)
Dept: INTERNAL MEDICINE | Facility: CLINIC | Age: 73
End: 2018-08-31

## 2018-08-31 ENCOUNTER — OFFICE VISIT (OUTPATIENT)
Dept: INTERNAL MEDICINE | Facility: CLINIC | Age: 73
End: 2018-08-31
Payer: MEDICARE

## 2018-08-31 VITALS
OXYGEN SATURATION: 98 % | WEIGHT: 185.44 LBS | SYSTOLIC BLOOD PRESSURE: 134 MMHG | HEART RATE: 61 BPM | BODY MASS INDEX: 34.12 KG/M2 | TEMPERATURE: 97 F | DIASTOLIC BLOOD PRESSURE: 96 MMHG | HEIGHT: 62 IN

## 2018-08-31 DIAGNOSIS — L30.4 INTERTRIGO: ICD-10-CM

## 2018-08-31 PROCEDURE — 99214 OFFICE O/P EST MOD 30 MIN: CPT | Mod: PBBFAC,PO | Performed by: FAMILY MEDICINE

## 2018-08-31 PROCEDURE — 3080F DIAST BP >= 90 MM HG: CPT | Mod: CPTII,,, | Performed by: FAMILY MEDICINE

## 2018-08-31 PROCEDURE — 3075F SYST BP GE 130 - 139MM HG: CPT | Mod: CPTII,,, | Performed by: FAMILY MEDICINE

## 2018-08-31 PROCEDURE — 99999 PR PBB SHADOW E&M-EST. PATIENT-LVL IV: CPT | Mod: PBBFAC,,, | Performed by: FAMILY MEDICINE

## 2018-08-31 PROCEDURE — 1101F PT FALLS ASSESS-DOCD LE1/YR: CPT | Mod: CPTII,,, | Performed by: FAMILY MEDICINE

## 2018-08-31 PROCEDURE — 99213 OFFICE O/P EST LOW 20 MIN: CPT | Mod: S$PBB,,, | Performed by: FAMILY MEDICINE

## 2018-08-31 RX ORDER — ECONAZOLE NITRATE 10 MG/G
CREAM TOPICAL
Qty: 15 G | Refills: 0 | Status: SHIPPED | OUTPATIENT
Start: 2018-08-31 | End: 2020-05-07

## 2018-08-31 NOTE — PROGRESS NOTES
"CHIEF COMPLAINT  Follow-up      HISTORY OF PRESENT ILLNESS    PROBLEM/CONDITION: She reports one week history of MILDLY pruritic moist dermatitis of umbilicus without pain or fever. Exam reveals superficial excoriation with minimal maceration, but no tenderness, no abnormal mass, note induration or evidence of soft tissue infection. We discussed differential diagnosis. I educated her on proper hygiene measures to help prevent this. She said she would let me know if prescribed treatment failed to resolve the problem. No other complaints or concerns reported.    Problem List Items Addressed This Visit        Derm    Intertrigo    Relevant Medications    econazole nitrate 1 % cream          PAST MEDICAL HISTORY, FAMILY HISTORY and SOCIAL HISTORY, CURRENT MEDICATION LIST, and ALLERGY LIST reviewed by me (LISA Campos MD) and are updated consistent with the patient's report.    REVIEW OF SYSTEMS  CONSTITUTIONAL: No fever reported.  CARDIOVASCULAR: No chest pain reported.  PULMONARY: No trouble breathing reported.     PHYSICAL EXAM  Vitals:    08/31/18 0953   BP: (!) 134/96   BP Location: Right arm   Patient Position: Sitting   BP Method: Large (Manual)   Pulse: 61   Temp: 96.9 °F (36.1 °C)   TempSrc: Tympanic   SpO2: 98%   Weight: 84.1 kg (185 lb 6.5 oz)   Height: 5' 2" (1.575 m)     CONSTITUTIONAL: Vital signs noted. No apparent distress. Does not appear acutely ill or septic. Appears adequately hydrated.  PULM: Breathing unlabored.  HEART: Regular.  DERM: Skin normothermic with normal turgor. Description of relevant exam of this system is documented above in HPI.   NEURO: There are no gross focal motor deficits or gross deficits of cranial nerves III-XII.  PSYCHIATRIC: Alert and oriented x 3. Mood is grossly neutral. Affect appropriate. Judgment and insight not grossly compromised.     ASSESSMENT and PLAN  Intertrigo  -     econazole nitrate 1 % cream; Cleanse and dry navel as directed, then apply small amount of " "cream in and around navel once daily for 14 days.  Dispense: 15 g; Refill: 0        PRESCRIPTION MEDICATION MANAGEMENT  Except as noted below, CURRENT MEDICATIONS are to remain unchanged from that listed above.  Medications Ordered This Encounter   Medications    econazole nitrate 1 % cream     Sig: Cleanse and dry navel as directed, then apply small amount of cream in and around navel once daily for 14 days.     Dispense:  15 g     Refill:  0     There are no discontinued medications.    Follow-up if symptoms worsen or fail to improve.    There are no Patient Instructions on file for this visit.    ABOUT THIS DOCUMENTATION:  · The order of the conditions listed in the HPI is one of convenience and does not necessarily reflect the chronology of the appointment, nor the relative importance of a condition.  · Documentation entered by me for this encounter was done in part using speech-recognition technology. Although I have made an effort to ensure accuracy, "sound like" errors may exist and should be interpreted in context.                        -LISA Campos MD       "

## 2018-08-31 NOTE — TELEPHONE ENCOUNTER
----- Message from Jennifer Ramirez sent at 8/31/2018  3:33 PM CDT -----  Contact: pt   Pt is requesting The nurse call back from the nurse in regards to her med not being coved by her insurance so pt is requesting a new med that will be covered by her insurance.  [phone] (home)

## 2018-09-04 ENCOUNTER — TELEPHONE (OUTPATIENT)
Dept: INTERNAL MEDICINE | Facility: CLINIC | Age: 73
End: 2018-09-04

## 2018-09-04 DIAGNOSIS — L30.4 INTERTRIGO: Primary | ICD-10-CM

## 2018-09-04 NOTE — TELEPHONE ENCOUNTER
Spoke with pt, she states that her insurance does not cover Econazole cream that was prescribed on 8/31/18. Pt is requesting an alternative.   Please advise?

## 2018-09-06 ENCOUNTER — TELEPHONE (OUTPATIENT)
Dept: INTERNAL MEDICINE | Facility: CLINIC | Age: 73
End: 2018-09-06

## 2018-09-06 RX ORDER — KETOCONAZOLE 20 MG/G
CREAM TOPICAL DAILY
Qty: 15 G | Refills: 1 | Status: SHIPPED | OUTPATIENT
Start: 2018-09-06 | End: 2020-05-07 | Stop reason: SDUPTHER

## 2018-09-06 NOTE — TELEPHONE ENCOUNTER
Spoke with patient. Informed patient that provider has called in new Rx for cream to [facility]. Pt verbalized understanding.//ddw

## 2018-09-18 ENCOUNTER — TELEPHONE (OUTPATIENT)
Dept: INTERNAL MEDICINE | Facility: CLINIC | Age: 73
End: 2018-09-18

## 2018-09-18 NOTE — TELEPHONE ENCOUNTER
----- Message from LISA Campos MD sent at 9/18/2018  8:53 AM CDT -----  Contact: Pt  I think this has already been handled.    TASK: Please check the status of this and let me know what action is needed from me at this point. If no further action is needed, you can close this encounter.    Thanks.    LM   ----- Message -----  From: Zara Black MA  Sent: 8/31/2018   4:18 PM  To: LISA Campos MD        ----- Message -----  From: Volodymyr Echeverria  Sent: 8/31/2018   2:50 PM  To: [facility] Staff    Please give pt a call at ..[phone] (home) regarding another cream being called in due to insurance not covering it.

## 2018-10-17 ENCOUNTER — IMMUNIZATION (OUTPATIENT)
Dept: INTERNAL MEDICINE | Facility: CLINIC | Age: 73
End: 2018-10-17
Payer: MEDICARE

## 2018-10-17 PROCEDURE — 90662 IIV NO PRSV INCREASED AG IM: CPT | Mod: PBBFAC,PO

## 2018-10-31 ENCOUNTER — TELEPHONE (OUTPATIENT)
Dept: INTERNAL MEDICINE | Facility: CLINIC | Age: 73
End: 2018-10-31

## 2018-10-31 DIAGNOSIS — Z12.11 COLON CANCER SCREENING: ICD-10-CM

## 2018-10-31 NOTE — TELEPHONE ENCOUNTER
----- Message from Sarahi Kendrick sent at 10/31/2018 12:00 PM CDT -----  Contact: pt  Pt stated she calling for orders for a colonoscopy, she can be reached at [phone] Thanks

## 2018-11-06 ENCOUNTER — TELEPHONE (OUTPATIENT)
Dept: ENDOSCOPY | Facility: HOSPITAL | Age: 73
End: 2018-11-06

## 2018-11-06 NOTE — TELEPHONE ENCOUNTER
Endoscopy Scheduling Questionnaire:    Call Type:incoming    1. Have you been admitted overnight to the hospital in the past 3 months? no  2. Do you get CP and SOB while walking up a flight of stairs? no  3. Have you had a stent placed in the past 12 months? no  4. Have you had a stroke or heart attack in the past 6 months? no  5. Have you had any chest pain in the past 3 months? no      If so, have you been evaluated by your PCP or Cardiologist? no  6. Do you take weight loss medications? no  7. Have you been diagnosed with Diverticulitis within the past 3 months? no  8. Are you having any GI symptoms that you feel need to be evaluated prior to your procedure? no  9. Are you on dialysis? no  10. Are you diabetic? no  11. Do you have any other health issues that you feel might limit your ability to safely have the procedure and/or sedation? no  12. Is the patient over 79 yo? no        If so, has the patient been seen by their PCP or GI in the last 3 months? N/A       -I have reviewed the last colonoscopy for recommendations regarding surveillance and bowel prep.   -I have reviewed the patient's medications and allergies. She is not on high risk medications and will not require cardiac clearance.  -I have verified the pharmacy information. The prep being used is Suprep. The patient's prep instructions were sent via mail..    Date Endoscopy Scheduled: (01/11/2019)

## 2018-11-07 RX ORDER — SODIUM, POTASSIUM,MAG SULFATES 17.5-3.13G
SOLUTION, RECONSTITUTED, ORAL ORAL
Qty: 354 ML | Refills: 0 | Status: SHIPPED | OUTPATIENT
Start: 2018-11-07 | End: 2019-04-24 | Stop reason: ALTCHOICE

## 2018-11-21 DIAGNOSIS — I10 ESSENTIAL HYPERTENSION: Primary | ICD-10-CM

## 2018-11-29 ENCOUNTER — OFFICE VISIT (OUTPATIENT)
Dept: CARDIOLOGY | Facility: CLINIC | Age: 73
End: 2018-11-29
Payer: MEDICARE

## 2018-11-29 ENCOUNTER — CLINICAL SUPPORT (OUTPATIENT)
Dept: CARDIOLOGY | Facility: CLINIC | Age: 73
End: 2018-11-29
Payer: MEDICARE

## 2018-11-29 VITALS
HEART RATE: 63 BPM | DIASTOLIC BLOOD PRESSURE: 86 MMHG | WEIGHT: 183 LBS | SYSTOLIC BLOOD PRESSURE: 132 MMHG | BODY MASS INDEX: 33.68 KG/M2 | HEIGHT: 62 IN

## 2018-11-29 DIAGNOSIS — I10 ESSENTIAL HYPERTENSION: ICD-10-CM

## 2018-11-29 DIAGNOSIS — I10 ESSENTIAL HYPERTENSION: Chronic | ICD-10-CM

## 2018-11-29 DIAGNOSIS — N18.30 CKD (CHRONIC KIDNEY DISEASE) STAGE 3, GFR 30-59 ML/MIN: Chronic | ICD-10-CM

## 2018-11-29 DIAGNOSIS — I42.8 NONISCHEMIC CARDIOMYOPATHY: Primary | ICD-10-CM

## 2018-11-29 PROCEDURE — 1101F PT FALLS ASSESS-DOCD LE1/YR: CPT | Mod: CPTII,S$GLB,, | Performed by: INTERNAL MEDICINE

## 2018-11-29 PROCEDURE — 99999 PR PBB SHADOW E&M-EST. PATIENT-LVL III: CPT | Mod: PBBFAC,,, | Performed by: INTERNAL MEDICINE

## 2018-11-29 PROCEDURE — 93005 ELECTROCARDIOGRAM TRACING: CPT | Mod: S$GLB,,, | Performed by: INTERNAL MEDICINE

## 2018-11-29 PROCEDURE — 3075F SYST BP GE 130 - 139MM HG: CPT | Mod: CPTII,S$GLB,, | Performed by: INTERNAL MEDICINE

## 2018-11-29 PROCEDURE — 93010 ELECTROCARDIOGRAM REPORT: CPT | Mod: S$GLB,,, | Performed by: INTERNAL MEDICINE

## 2018-11-29 PROCEDURE — 99214 OFFICE O/P EST MOD 30 MIN: CPT | Mod: S$GLB,,, | Performed by: INTERNAL MEDICINE

## 2018-11-29 PROCEDURE — 3079F DIAST BP 80-89 MM HG: CPT | Mod: CPTII,S$GLB,, | Performed by: INTERNAL MEDICINE

## 2018-11-29 RX ORDER — METOPROLOL SUCCINATE 50 MG/1
50 TABLET, EXTENDED RELEASE ORAL 2 TIMES DAILY
Qty: 60 TABLET | Refills: 11 | Status: SHIPPED | OUTPATIENT
Start: 2018-11-29 | End: 2019-04-24

## 2018-11-29 NOTE — PROGRESS NOTES
Subjective:   Patient ID:  Megan Sams is a 73 y.o. female who presents for follow-up of Hypertension and Congestive Heart Failure  Patient denies CP, angina or anginal equivalent.BP ok at home stated pt.    Hypertension   This is a chronic problem. The current episode started more than 1 year ago. The problem has been waxing and waning since onset. Pertinent negatives include no chest pain, palpitations or shortness of breath. Past treatments include angiotensin blockers, beta blockers and diuretics. The current treatment provides moderate improvement. Compliance problems include exercise.    Congestive Heart Failure   Presents for follow-up visit. Pertinent negatives include no abdominal pain, chest pain, chest pressure, claudication, edema, fatigue, muscle weakness, near-syncope, nocturia, orthopnea, palpitations, paroxysmal nocturnal dyspnea, shortness of breath or unexpected weight change. The symptoms have been stable. Compliance with total regimen is %. Compliance with diet is %. Compliance with exercise is %.       Review of Systems   Constitution: Negative. Negative for fatigue, unexpected weight change and weight gain.   HENT: Negative.    Eyes: Negative.    Cardiovascular: Negative.  Negative for chest pain, claudication, leg swelling, near-syncope and palpitations.   Respiratory: Negative.  Negative for shortness of breath.    Endocrine: Negative.    Hematologic/Lymphatic: Negative.    Skin: Negative.    Musculoskeletal: Negative for muscle weakness.   Gastrointestinal: Negative.  Negative for abdominal pain.   Genitourinary: Negative.  Negative for nocturia.   Neurological: Negative.  Negative for dizziness.   Psychiatric/Behavioral: Negative.    Allergic/Immunologic: Negative.      Family History   Problem Relation Age of Onset    Early death Mother     Heart disease Mother     Hypertension Mother     Kidney disease Father     Heart disease Brother      Past Medical History:    Diagnosis Date    Arthritis     Blood transfusion     CHF (congestive heart failure)     Chronic kidney disease     Depression     Hypertension      Current Outpatient Medications on File Prior to Visit   Medication Sig Dispense Refill    acetaminophen (TYLENOL EXTRA STRENGTH) 500 MG tablet Take 500 mg by mouth every 6 (six) hours as needed.      allopurinol (ZYLOPRIM) 100 MG tablet Take 2 tablets (200 mg total) by mouth once daily. 180 tablet 4    aspirin (ECOTRIN) 81 MG EC tablet Take 81 mg by mouth once daily.      candesartan (ATACAND) 32 MG tablet Take 1 tablet (32 mg total) by mouth once daily. 90 tablet 4    econazole nitrate 1 % cream Cleanse and dry navel as directed, then apply small amount of cream in and around navel once daily for 14 days. 15 g 0    metoprolol succinate (TOPROL-XL) 50 MG 24 hr tablet Take 1 tablet (50 mg total) by mouth once daily. 90 tablet 4    MULTIVIT WITH CALCIUM,IRON,MIN (MULTIPLE VITAMIN, WOMENS ORAL) Take by mouth once daily.      spironolactone (ALDACTONE) 50 MG tablet Take 1 tablet (50 mg total) by mouth once daily. 90 tablet 4    ketoconazole (NIZORAL) 2 % cream Apply topically once daily. Cleanse and dry navel as directed, then apply small amount of cream in and around naval once daily for 14 days 15 g 1    sodium,potassium,mag sulfates (SUPREP BOWEL PREP KIT) 17.5-3.13-1.6 gram SolR As directed 354 mL 0     No current facility-administered medications on file prior to visit.      Review of patient's allergies indicates:   Allergen Reactions    Doxycycline monohydrate Nausea Only     Elevated heart rate    Gabapentin Other (See Comments)     Lowered heart rate    Lanoxin [digoxin] Other (See Comments)     Too low heart rate    Thorazine [chlorpromazine] Other (See Comments)     hallucinations    Ultracet [tramadol-acetaminophen] Other (See Comments)     Elevated BP    Penicillins Nausea Only and Rash       Objective:     Physical Exam    Constitutional: She is oriented to person, place, and time. She appears well-developed and well-nourished.   HENT:   Head: Normocephalic and atraumatic.   Eyes: Conjunctivae and EOM are normal. Pupils are equal, round, and reactive to light.   Neck: Normal range of motion. Neck supple.   Cardiovascular: Normal rate, regular rhythm, normal heart sounds and intact distal pulses.   Pulmonary/Chest: Effort normal and breath sounds normal.   Abdominal: Soft. Bowel sounds are normal.   Musculoskeletal: Normal range of motion.   Neurological: She is alert and oriented to person, place, and time.   Skin: Skin is warm and dry.   Psychiatric: She has a normal mood and affect.   Nursing note and vitals reviewed.      Assessment:     1. Nonischemic cardiomyopathy    2. Essential hypertension    3. CKD (chronic kidney disease) stage 3, GFR 30-59 ml/min        Plan:     Nonischemic cardiomyopathy    Essential hypertension    CKD (chronic kidney disease) stage 3, GFR 30-59 ml/min      Continue metoprolol ( INCREASED DOSE) , atacand, aldactone-htn/chf  echo

## 2018-12-05 ENCOUNTER — TELEPHONE (OUTPATIENT)
Dept: CARDIOLOGY | Facility: CLINIC | Age: 73
End: 2018-12-05

## 2018-12-05 NOTE — TELEPHONE ENCOUNTER
I was able to reach patient.She had some questions about the increase in metoprolol.Her pulse was in the 53-58 range and she was concerned if this was normal.I reviewed the metoprolol uses and side effects with the patient and precautions she was to be observant.She verbalized understanding.She will do a daily BP and pulse log and contact our office in a week of results or sooner if needed.

## 2018-12-05 NOTE — TELEPHONE ENCOUNTER
----- Message from Luana Arriaza sent at 12/5/2018  8:18 AM CST -----  Contact: pt  States she's schedule for an ECHO on Friday and her medicine was just changed on Thursday last week and she's having some problems with it. Please call pt at [phone]. Thank you

## 2018-12-18 ENCOUNTER — CLINICAL SUPPORT (OUTPATIENT)
Dept: CARDIOLOGY | Facility: CLINIC | Age: 73
End: 2018-12-18
Payer: MEDICARE

## 2018-12-18 ENCOUNTER — OFFICE VISIT (OUTPATIENT)
Dept: INTERNAL MEDICINE | Facility: CLINIC | Age: 73
End: 2018-12-18
Payer: MEDICARE

## 2018-12-18 ENCOUNTER — HOSPITAL ENCOUNTER (OUTPATIENT)
Dept: RADIOLOGY | Facility: HOSPITAL | Age: 73
Discharge: HOME | End: 2018-12-18
Attending: PHYSICIAN ASSISTANT
Payer: MEDICARE

## 2018-12-18 VITALS
SYSTOLIC BLOOD PRESSURE: 166 MMHG | TEMPERATURE: 98 F | HEART RATE: 66 BPM | RESPIRATION RATE: 16 BRPM | HEIGHT: 62 IN | BODY MASS INDEX: 33.76 KG/M2 | DIASTOLIC BLOOD PRESSURE: 84 MMHG | WEIGHT: 183.44 LBS

## 2018-12-18 DIAGNOSIS — R10.9 ABDOMINAL PAIN, UNSPECIFIED ABDOMINAL LOCATION: ICD-10-CM

## 2018-12-18 DIAGNOSIS — I10 ESSENTIAL HYPERTENSION: Primary | ICD-10-CM

## 2018-12-18 DIAGNOSIS — I10 ESSENTIAL HYPERTENSION: ICD-10-CM

## 2018-12-18 DIAGNOSIS — K59.00 CONSTIPATION, UNSPECIFIED CONSTIPATION TYPE: ICD-10-CM

## 2018-12-18 PROCEDURE — 99999 PR PBB SHADOW E&M-EST. PATIENT-LVL IV: CPT | Mod: PBBFAC,,, | Performed by: PHYSICIAN ASSISTANT

## 2018-12-18 PROCEDURE — 74019 RADEX ABDOMEN 2 VIEWS: CPT | Mod: 26,,, | Performed by: RADIOLOGY

## 2018-12-18 PROCEDURE — 74019 RADEX ABDOMEN 2 VIEWS: CPT | Mod: TC,FY,PO

## 2018-12-18 PROCEDURE — 93010 ELECTROCARDIOGRAM REPORT: CPT | Mod: S$GLB,,, | Performed by: INTERNAL MEDICINE

## 2018-12-18 PROCEDURE — 99214 OFFICE O/P EST MOD 30 MIN: CPT | Mod: S$GLB,,, | Performed by: PHYSICIAN ASSISTANT

## 2018-12-18 PROCEDURE — 3079F DIAST BP 80-89 MM HG: CPT | Mod: CPTII,S$GLB,, | Performed by: PHYSICIAN ASSISTANT

## 2018-12-18 PROCEDURE — 93005 ELECTROCARDIOGRAM TRACING: CPT | Mod: S$GLB,,, | Performed by: PHYSICIAN ASSISTANT

## 2018-12-18 PROCEDURE — 3077F SYST BP >= 140 MM HG: CPT | Mod: CPTII,S$GLB,, | Performed by: PHYSICIAN ASSISTANT

## 2018-12-18 PROCEDURE — 1101F PT FALLS ASSESS-DOCD LE1/YR: CPT | Mod: CPTII,S$GLB,, | Performed by: PHYSICIAN ASSISTANT

## 2018-12-18 NOTE — PROGRESS NOTES
Subjective:       Patient ID: Megan Sams is a 73 y.o. female.    Chief Complaint: Emesis    HPI     Patient comes in today for abdominal discomfort     Nausea yesterday, constipation, and indigestion this has been ongoing for the last couple of days.  Also blood pressure is elevated.  She is taking her blood pressure medicine although yesterday she felt too sick to take it.  She states she takes MiraLax every day but has not been helping bowel movements being more bloated by the day.    Health Maintenance Due   Topic Date Due    TETANUS VACCINE  01/16/1961    Zoster Vaccine  01/16/2003    Lipid Panel  04/26/2017    Colonoscopy  05/02/2017    DEXA SCAN  10/24/2018       Past Medical History:   Diagnosis Date    Arthritis     Blood transfusion     CHF (congestive heart failure)     Chronic kidney disease     Depression     Hypertension        Current Outpatient Medications   Medication Sig Dispense Refill    acetaminophen (TYLENOL EXTRA STRENGTH) 500 MG tablet Take 500 mg by mouth every 6 (six) hours as needed.      allopurinol (ZYLOPRIM) 100 MG tablet Take 2 tablets (200 mg total) by mouth once daily. 180 tablet 4    aspirin (ECOTRIN) 81 MG EC tablet Take 81 mg by mouth once daily.      candesartan (ATACAND) 32 MG tablet Take 1 tablet (32 mg total) by mouth once daily. 90 tablet 4    econazole nitrate 1 % cream Cleanse and dry navel as directed, then apply small amount of cream in and around navel once daily for 14 days. 15 g 0    ketoconazole (NIZORAL) 2 % cream Apply topically once daily. Cleanse and dry navel as directed, then apply small amount of cream in and around naval once daily for 14 days 15 g 1    metoprolol succinate (TOPROL-XL) 50 MG 24 hr tablet Take 1 tablet (50 mg total) by mouth 2 (two) times daily. 60 tablet 11    MULTIVIT WITH CALCIUM,IRON,MIN (MULTIPLE VITAMIN, WOMENS ORAL) Take by mouth once daily.      spironolactone (ALDACTONE) 50 MG tablet Take 1 tablet (50 mg total) by  "mouth once daily. 90 tablet 4    sodium,potassium,mag sulfates (SUPREP BOWEL PREP KIT) 17.5-3.13-1.6 gram SolR As directed 354 mL 0     No current facility-administered medications for this visit.        Review of Systems   Constitutional: Negative for fatigue, fever and unexpected weight change.   HENT: Negative for sore throat and trouble swallowing.    Eyes: Negative for photophobia, pain and redness.   Respiratory: Negative for cough and shortness of breath.    Cardiovascular: Negative for chest pain.   Gastrointestinal: Positive for abdominal pain.   Hematological: Negative for adenopathy. Does not bruise/bleed easily.       Objective:   BP (!) 166/84 (BP Location: Right arm, Patient Position: Sitting, BP Method: Medium (Automatic))   Pulse 66   Temp 97.8 °F (36.6 °C) (Tympanic)   Resp 16   Ht 5' 2" (1.575 m)   Wt 83.2 kg (183 lb 6.8 oz)   BMI 33.55 kg/m²      Physical Exam   Constitutional: She is oriented to person, place, and time. She appears well-developed and well-nourished. No distress.   HENT:   Head: Normocephalic and atraumatic.   Right Ear: External ear normal.   Left Ear: External ear normal.   Nose: Nose normal.   Mouth/Throat: Oropharynx is clear and moist. No oropharyngeal exudate.   Eyes: Conjunctivae and EOM are normal. Pupils are equal, round, and reactive to light.   Cardiovascular: Normal rate, regular rhythm, normal heart sounds and intact distal pulses.   Pulmonary/Chest: Effort normal and breath sounds normal.   Abdominal: Soft. Bowel sounds are normal. There is tenderness (R mid quadrant ).   Neurological: She is alert and oriented to person, place, and time.   Skin: Skin is warm. Capillary refill takes less than 2 seconds.         Lab Results   Component Value Date    WBC 5.51 03/20/2018    HGB 13.2 03/20/2018    HCT 42.3 03/20/2018     03/20/2018    CHOL 185 04/26/2016    TRIG 106 04/26/2016    HDL 64 04/26/2016    ALT 23 03/20/2018    AST 36 03/20/2018     " 07/25/2018    K 4.5 07/25/2018     07/25/2018    CREATININE 1.0 07/25/2018    BUN 16 07/25/2018    CO2 23 07/25/2018    TSH 1.724 05/17/2017    INR 1.0 02/14/2017    HGBA1C 5.4 03/20/2018       Assessment:       1. Essential hypertension    2. Abdominal pain, unspecified abdominal location    3. Constipation, unspecified constipation type        Plan:   Essential hypertension  -     EKG 12-lead; Future    Abdominal pain, unspecified abdominal location  -     X-Ray Abdomen Flat And Erect; Future; Expected date: 12/18/2018    Constipation, unspecified constipation type  -     X-Ray Abdomen Flat And Erect; Future; Expected date: 12/18/2018     X-ray reveals significant constipation, EKG is no changes but we do need to follow-up on this blood pressure.  She is getting an echocardiogram next week, she has reschedule so will follow up on this once that testing is done.  For the constipation she is to do a Fleet enema today and follow up if it does not help.  X-ray reveals no obstructive pattern.

## 2018-12-24 ENCOUNTER — TELEPHONE (OUTPATIENT)
Dept: ENDOSCOPY | Facility: HOSPITAL | Age: 73
End: 2018-12-24

## 2019-01-02 LAB
DIASTOLIC DYSFUNCTION: YES
ESTIMATED PA SYSTOLIC PRESSURE: 28
MITRAL VALVE REGURGITATION: ABNORMAL
RETIRED EF AND QEF - SEE NOTES: 40 (ref 55–65)

## 2019-01-04 ENCOUNTER — TELEPHONE (OUTPATIENT)
Dept: CARDIOLOGY | Facility: CLINIC | Age: 74
End: 2019-01-04

## 2019-01-04 NOTE — TELEPHONE ENCOUNTER
01/04 Pt notified and to follow up as scheduled. Cm    ----- Message from Kt Lopez MD sent at 1/3/2019 10:14 AM CST -----  Please tell pt echo with mild improvement in EF, continue current meds

## 2019-02-19 ENCOUNTER — TELEPHONE (OUTPATIENT)
Dept: ENDOSCOPY | Facility: HOSPITAL | Age: 74
End: 2019-02-19

## 2019-02-19 NOTE — TELEPHONE ENCOUNTER
Endoscopy Scheduling Questionnaire:    Call Type:Incoming call    1. Have you been admitted overnight to the hospital in the past 3 months? no  2. Do you get CP and SOB while walking up a flight of stairs? no  3. Have you had a stent placed in the past 12 months? no  4. Have you had a stroke or heart attack in the past 6 months? no  5. Have you had any chest pain in the past 3 months? no      If so, have you been evaluated by your PCP or Cardiologist? no  6. Do you take weight loss medications? no  7. Have you been diagnosed with Diverticulitis within the past 3 months? no  8. Are you having any GI symptoms that you feel need to be evaluated prior to your procedure? no  9. Are you on dialysis? no  10. Are you diabetic? no  11. Do you have any other health issues that you feel might limit your ability to safely have the procedure and/or sedation? no  12. Is the patient over 79 yo? no        If so, has the patient been seen by their PCP or GI in the last 3 months? N/A       -I have reviewed the last colonoscopy for recommendations regarding surveillance and bowel prep  is NA  -I have reviewed the patient's medications and allergies. She is not on high risk medications and will require cardiac clearance. A clearance request NA  -I have verified the pharmacy information. The prep being used is Suprep. The patient's prep instructions were sent via mail..    Date Endoscopy Scheduled: Date: 4/5/19  Or  Date Gastro office visit Scheduled: NA

## 2019-03-23 RX ORDER — CANDESARTAN 32 MG/1
32 TABLET ORAL DAILY
Qty: 90 TABLET | Refills: 3 | Status: SHIPPED | OUTPATIENT
Start: 2019-03-23 | End: 2020-03-15

## 2019-04-01 ENCOUNTER — OFFICE VISIT (OUTPATIENT)
Dept: INTERNAL MEDICINE | Facility: CLINIC | Age: 74
End: 2019-04-01
Payer: MEDICARE

## 2019-04-01 VITALS
HEIGHT: 62 IN | WEIGHT: 180.75 LBS | HEART RATE: 52 BPM | SYSTOLIC BLOOD PRESSURE: 130 MMHG | TEMPERATURE: 98 F | BODY MASS INDEX: 33.26 KG/M2 | DIASTOLIC BLOOD PRESSURE: 86 MMHG

## 2019-04-01 DIAGNOSIS — I10 ESSENTIAL HYPERTENSION: Chronic | ICD-10-CM

## 2019-04-01 DIAGNOSIS — I10 ESSENTIAL (PRIMARY) HYPERTENSION: ICD-10-CM

## 2019-04-01 DIAGNOSIS — N18.30 STAGE 3 CHRONIC KIDNEY DISEASE: ICD-10-CM

## 2019-04-01 DIAGNOSIS — I42.8 NONISCHEMIC CARDIOMYOPATHY: ICD-10-CM

## 2019-04-01 DIAGNOSIS — Z00.00 ROUTINE GENERAL MEDICAL EXAMINATION AT A HEALTH CARE FACILITY: Primary | ICD-10-CM

## 2019-04-01 DIAGNOSIS — N18.30 CKD (CHRONIC KIDNEY DISEASE) STAGE 3, GFR 30-59 ML/MIN: Chronic | ICD-10-CM

## 2019-04-01 DIAGNOSIS — R00.1 BRADYCARDIA: ICD-10-CM

## 2019-04-01 PROCEDURE — 3075F SYST BP GE 130 - 139MM HG: CPT | Mod: CPTII,S$GLB,, | Performed by: PHYSICIAN ASSISTANT

## 2019-04-01 PROCEDURE — 3079F PR MOST RECENT DIASTOLIC BLOOD PRESSURE 80-89 MM HG: ICD-10-PCS | Mod: CPTII,S$GLB,, | Performed by: PHYSICIAN ASSISTANT

## 2019-04-01 PROCEDURE — 99213 OFFICE O/P EST LOW 20 MIN: CPT | Mod: S$GLB,,, | Performed by: PHYSICIAN ASSISTANT

## 2019-04-01 PROCEDURE — 99999 PR PBB SHADOW E&M-EST. PATIENT-LVL IV: CPT | Mod: PBBFAC,,, | Performed by: PHYSICIAN ASSISTANT

## 2019-04-01 PROCEDURE — 3079F DIAST BP 80-89 MM HG: CPT | Mod: CPTII,S$GLB,, | Performed by: PHYSICIAN ASSISTANT

## 2019-04-01 PROCEDURE — 99999 PR PBB SHADOW E&M-EST. PATIENT-LVL IV: ICD-10-PCS | Mod: PBBFAC,,, | Performed by: PHYSICIAN ASSISTANT

## 2019-04-01 PROCEDURE — 3075F PR MOST RECENT SYSTOLIC BLOOD PRESS GE 130-139MM HG: ICD-10-PCS | Mod: CPTII,S$GLB,, | Performed by: PHYSICIAN ASSISTANT

## 2019-04-01 PROCEDURE — 99213 PR OFFICE/OUTPT VISIT, EST, LEVL III, 20-29 MIN: ICD-10-PCS | Mod: S$GLB,,, | Performed by: PHYSICIAN ASSISTANT

## 2019-04-01 NOTE — PROGRESS NOTES
Subjective:       Patient ID: Megan Sams is a 74 y.o. female.    Chief Complaint: Headache    HPI  Patient comes in today for updated labs     She had a frontal sinus HA over the last couple of days, but that has resolved     She is due to check some labs.  Has CKD, hypertension, on ARB  BP well controlled     See cards, has CHF, no wt gain   No shortness of breath, no cp     Getting colonoscopy next week     Health Maintenance Due   Topic Date Due    TETANUS VACCINE  01/16/1963    Zoster Vaccine  01/16/2003    Lipid Panel  04/26/2017    DEXA SCAN  10/24/2018       Past Medical History:   Diagnosis Date    Arthritis     Blood transfusion     CHF (congestive heart failure)     Chronic kidney disease     Depression     Hypertension        Current Outpatient Medications   Medication Sig Dispense Refill    acetaminophen (TYLENOL EXTRA STRENGTH) 500 MG tablet Take 500 mg by mouth every 6 (six) hours as needed.      allopurinol (ZYLOPRIM) 100 MG tablet Take 2 tablets (200 mg total) by mouth once daily. 180 tablet 4    aspirin (ECOTRIN) 81 MG EC tablet Take 81 mg by mouth once daily.      candesartan (ATACAND) 32 MG tablet TAKE 1 TABLET (32 MG TOTAL) BY MOUTH ONCE DAILY. 90 tablet 3    econazole nitrate 1 % cream Cleanse and dry navel as directed, then apply small amount of cream in and around navel once daily for 14 days. 15 g 0    ketoconazole (NIZORAL) 2 % cream Apply topically once daily. Cleanse and dry navel as directed, then apply small amount of cream in and around naval once daily for 14 days 15 g 1    metoprolol succinate (TOPROL-XL) 50 MG 24 hr tablet Take 1 tablet (50 mg total) by mouth 2 (two) times daily. 60 tablet 11    MULTIVIT WITH CALCIUM,IRON,MIN (MULTIPLE VITAMIN, WOMENS ORAL) Take by mouth once daily.      sodium,potassium,mag sulfates (SUPREP BOWEL PREP KIT) 17.5-3.13-1.6 gram SolR As directed 354 mL 0    spironolactone (ALDACTONE) 50 MG tablet Take 1 tablet (50 mg total) by  "mouth once daily. 90 tablet 4     No current facility-administered medications for this visit.        Review of Systems    Objective:   /86   Pulse (!) 52   Temp 97.8 °F (36.6 °C) (Oral)   Ht 5' 2" (1.575 m)   Wt 82 kg (180 lb 12.4 oz)   BMI 33.06 kg/m²      Physical Exam   Constitutional: She is oriented to person, place, and time. She appears well-developed and well-nourished. No distress.   HENT:   Head: Normocephalic and atraumatic.   Right Ear: Hearing, tympanic membrane, external ear and ear canal normal.   Left Ear: Hearing, tympanic membrane, external ear and ear canal normal.   Nose: Nose normal.   Mouth/Throat: Oropharynx is clear and moist. No oropharyngeal exudate.   Eyes: Pupils are equal, round, and reactive to light. Conjunctivae and EOM are normal.   Neck: Normal range of motion. No thyromegaly present.   Cardiovascular: Normal rate, regular rhythm, normal heart sounds and intact distal pulses.   Pulmonary/Chest: Effort normal and breath sounds normal.   Abdominal: Soft. Bowel sounds are normal.   Musculoskeletal: Normal range of motion.   Neurological: She is alert and oriented to person, place, and time. She has normal reflexes.   Skin: Skin is warm.   Psychiatric: She has a normal mood and affect. Her behavior is normal. Judgment and thought content normal.   Vitals reviewed.        Lab Results   Component Value Date    WBC 5.51 03/20/2018    HGB 13.2 03/20/2018    HCT 42.3 03/20/2018     03/20/2018    CHOL 185 04/26/2016    TRIG 106 04/26/2016    HDL 64 04/26/2016    ALT 23 03/20/2018    AST 36 03/20/2018     07/25/2018    K 4.5 07/25/2018     07/25/2018    CREATININE 1.0 07/25/2018    BUN 16 07/25/2018    CO2 23 07/25/2018    TSH 1.724 05/17/2017    INR 1.0 02/14/2017    HGBA1C 5.4 03/20/2018       Assessment:       1. Routine general medical examination at a health care facility    2. Essential (primary) hypertension     3. Bradycardia    4. Stage 3 chronic kidney " disease    5. Essential hypertension    6. CKD (chronic kidney disease) stage 3, GFR 30-59 ml/min    7. Nonischemic cardiomyopathy        Plan:   Routine general medical examination at a health care facility  -     Comprehensive metabolic panel; Future; Expected date: 04/01/2019  -     CBC auto differential; Future; Expected date: 04/01/2019  -     Lipid panel; Future; Expected date: 04/01/2019  -     Hemoglobin A1c; Future; Expected date: 04/01/2019    Essential (primary) hypertension   -     Lipid panel; Future; Expected date: 04/01/2019  -     Hemoglobin A1c; Future; Expected date: 04/01/2019    Bradycardia  Currently on BB    Stage 3 chronic kidney disease  CMP   Avoid NSAIDs and high salt diet   Nonischemic cardiomyopathy  Last echo showed improvement of EF  Followed by cards

## 2019-04-02 ENCOUNTER — LAB VISIT (OUTPATIENT)
Dept: LAB | Facility: HOSPITAL | Age: 74
End: 2019-04-02
Attending: PHYSICIAN ASSISTANT
Payer: MEDICARE

## 2019-04-02 DIAGNOSIS — R78.79 FINDING OF ABNORMAL LEVEL OF HEAVY METALS IN BLOOD: ICD-10-CM

## 2019-04-02 DIAGNOSIS — I10 ESSENTIAL (PRIMARY) HYPERTENSION: ICD-10-CM

## 2019-04-02 DIAGNOSIS — Z00.00 ROUTINE GENERAL MEDICAL EXAMINATION AT A HEALTH CARE FACILITY: ICD-10-CM

## 2019-04-02 LAB
ALBUMIN SERPL BCP-MCNC: 3.4 G/DL (ref 3.5–5.2)
ALP SERPL-CCNC: 64 U/L (ref 55–135)
ALT SERPL W/O P-5'-P-CCNC: 25 U/L (ref 10–44)
ANION GAP SERPL CALC-SCNC: 8 MMOL/L (ref 8–16)
AST SERPL-CCNC: 36 U/L (ref 10–40)
BASOPHILS # BLD AUTO: 0.08 K/UL (ref 0–0.2)
BASOPHILS NFR BLD: 1.3 % (ref 0–1.9)
BILIRUB SERPL-MCNC: 0.7 MG/DL (ref 0.1–1)
BUN SERPL-MCNC: 17 MG/DL (ref 8–23)
CALCIUM SERPL-MCNC: 9.7 MG/DL (ref 8.7–10.5)
CHLORIDE SERPL-SCNC: 105 MMOL/L (ref 95–110)
CHOLEST SERPL-MCNC: 183 MG/DL (ref 120–199)
CHOLEST/HDLC SERPL: 3.3 {RATIO} (ref 2–5)
CO2 SERPL-SCNC: 27 MMOL/L (ref 23–29)
CREAT SERPL-MCNC: 1.1 MG/DL (ref 0.5–1.4)
DIFFERENTIAL METHOD: ABNORMAL
EOSINOPHIL # BLD AUTO: 0.3 K/UL (ref 0–0.5)
EOSINOPHIL NFR BLD: 4.5 % (ref 0–8)
ERYTHROCYTE [DISTWIDTH] IN BLOOD BY AUTOMATED COUNT: 14.5 % (ref 11.5–14.5)
EST. GFR  (AFRICAN AMERICAN): 56.4 ML/MIN/1.73 M^2
EST. GFR  (NON AFRICAN AMERICAN): 48.9 ML/MIN/1.73 M^2
ESTIMATED AVG GLUCOSE: 114 MG/DL (ref 68–131)
GLUCOSE SERPL-MCNC: 89 MG/DL (ref 70–110)
HBA1C MFR BLD HPLC: 5.6 % (ref 4–5.6)
HCT VFR BLD AUTO: 42.2 % (ref 37–48.5)
HDLC SERPL-MCNC: 55 MG/DL (ref 40–75)
HDLC SERPL: 30.1 % (ref 20–50)
HGB BLD-MCNC: 13.4 G/DL (ref 12–16)
IMM GRANULOCYTES # BLD AUTO: 0.01 K/UL (ref 0–0.04)
IMM GRANULOCYTES NFR BLD AUTO: 0.2 % (ref 0–0.5)
LDLC SERPL CALC-MCNC: 109 MG/DL (ref 63–159)
LYMPHOCYTES # BLD AUTO: 3.2 K/UL (ref 1–4.8)
LYMPHOCYTES NFR BLD: 53.3 % (ref 18–48)
MCH RBC QN AUTO: 27.9 PG (ref 27–31)
MCHC RBC AUTO-ENTMCNC: 31.8 G/DL (ref 32–36)
MCV RBC AUTO: 88 FL (ref 82–98)
MONOCYTES # BLD AUTO: 0.6 K/UL (ref 0.3–1)
MONOCYTES NFR BLD: 10.2 % (ref 4–15)
NEUTROPHILS # BLD AUTO: 1.8 K/UL (ref 1.8–7.7)
NEUTROPHILS NFR BLD: 30.5 % (ref 38–73)
NONHDLC SERPL-MCNC: 128 MG/DL
NRBC BLD-RTO: 0 /100 WBC
PLATELET # BLD AUTO: 199 K/UL (ref 150–350)
PMV BLD AUTO: 11.8 FL (ref 9.2–12.9)
POTASSIUM SERPL-SCNC: 4.3 MMOL/L (ref 3.5–5.1)
PROT SERPL-MCNC: 7.4 G/DL (ref 6–8.4)
RBC # BLD AUTO: 4.81 M/UL (ref 4–5.4)
SODIUM SERPL-SCNC: 140 MMOL/L (ref 136–145)
TRIGL SERPL-MCNC: 95 MG/DL (ref 30–150)
WBC # BLD AUTO: 5.97 K/UL (ref 3.9–12.7)

## 2019-04-02 PROCEDURE — 80061 LIPID PANEL: CPT

## 2019-04-02 PROCEDURE — 83036 HEMOGLOBIN GLYCOSYLATED A1C: CPT

## 2019-04-02 PROCEDURE — 80053 COMPREHEN METABOLIC PANEL: CPT

## 2019-04-02 PROCEDURE — 85025 COMPLETE CBC W/AUTO DIFF WBC: CPT

## 2019-04-02 PROCEDURE — 36415 COLL VENOUS BLD VENIPUNCTURE: CPT | Mod: PO

## 2019-04-09 RX ORDER — SPIRONOLACTONE 50 MG/1
50 TABLET, FILM COATED ORAL DAILY
Qty: 90 TABLET | Refills: 1 | Status: SHIPPED | OUTPATIENT
Start: 2019-04-09 | End: 2020-02-02

## 2019-04-24 ENCOUNTER — OFFICE VISIT (OUTPATIENT)
Dept: INTERNAL MEDICINE | Facility: CLINIC | Age: 74
End: 2019-04-24
Payer: MEDICARE

## 2019-04-24 VITALS
HEART RATE: 72 BPM | SYSTOLIC BLOOD PRESSURE: 124 MMHG | WEIGHT: 181 LBS | TEMPERATURE: 98 F | DIASTOLIC BLOOD PRESSURE: 82 MMHG | BODY MASS INDEX: 33.31 KG/M2 | HEIGHT: 62 IN

## 2019-04-24 DIAGNOSIS — Z12.11 COLON CANCER SCREENING: ICD-10-CM

## 2019-04-24 DIAGNOSIS — I50.42 CHRONIC COMBINED SYSTOLIC AND DIASTOLIC CONGESTIVE HEART FAILURE: Primary | ICD-10-CM

## 2019-04-24 DIAGNOSIS — N18.30 CKD (CHRONIC KIDNEY DISEASE) STAGE 3, GFR 30-59 ML/MIN: Chronic | ICD-10-CM

## 2019-04-24 DIAGNOSIS — I10 ESSENTIAL HYPERTENSION: Chronic | ICD-10-CM

## 2019-04-24 PROCEDURE — 99499 UNLISTED E&M SERVICE: CPT | Mod: S$GLB,,, | Performed by: INTERNAL MEDICINE

## 2019-04-24 PROCEDURE — 99999 PR PBB SHADOW E&M-EST. PATIENT-LVL III: ICD-10-PCS | Mod: PBBFAC,,, | Performed by: INTERNAL MEDICINE

## 2019-04-24 PROCEDURE — 3074F PR MOST RECENT SYSTOLIC BLOOD PRESSURE < 130 MM HG: ICD-10-PCS | Mod: CPTII,S$GLB,, | Performed by: INTERNAL MEDICINE

## 2019-04-24 PROCEDURE — 3079F DIAST BP 80-89 MM HG: CPT | Mod: CPTII,S$GLB,, | Performed by: INTERNAL MEDICINE

## 2019-04-24 PROCEDURE — 1101F PR PT FALLS ASSESS DOC 0-1 FALLS W/OUT INJ PAST YR: ICD-10-PCS | Mod: CPTII,S$GLB,, | Performed by: INTERNAL MEDICINE

## 2019-04-24 PROCEDURE — 99999 PR PBB SHADOW E&M-EST. PATIENT-LVL III: CPT | Mod: PBBFAC,,, | Performed by: INTERNAL MEDICINE

## 2019-04-24 PROCEDURE — 99214 OFFICE O/P EST MOD 30 MIN: CPT | Mod: S$GLB,,, | Performed by: INTERNAL MEDICINE

## 2019-04-24 PROCEDURE — 99214 PR OFFICE/OUTPT VISIT, EST, LEVL IV, 30-39 MIN: ICD-10-PCS | Mod: S$GLB,,, | Performed by: INTERNAL MEDICINE

## 2019-04-24 PROCEDURE — 99499 RISK ADDL DX/OHS AUDIT: ICD-10-PCS | Mod: S$GLB,,, | Performed by: INTERNAL MEDICINE

## 2019-04-24 PROCEDURE — 1101F PT FALLS ASSESS-DOCD LE1/YR: CPT | Mod: CPTII,S$GLB,, | Performed by: INTERNAL MEDICINE

## 2019-04-24 PROCEDURE — 3079F PR MOST RECENT DIASTOLIC BLOOD PRESSURE 80-89 MM HG: ICD-10-PCS | Mod: CPTII,S$GLB,, | Performed by: INTERNAL MEDICINE

## 2019-04-24 PROCEDURE — 3074F SYST BP LT 130 MM HG: CPT | Mod: CPTII,S$GLB,, | Performed by: INTERNAL MEDICINE

## 2019-04-24 RX ORDER — METOPROLOL SUCCINATE 50 MG/1
50 TABLET, EXTENDED RELEASE ORAL DAILY
Qty: 60 TABLET | Refills: 11
Start: 2019-04-24 | End: 2019-05-28 | Stop reason: SDUPTHER

## 2019-04-24 NOTE — PROGRESS NOTES
"Subjective:       Patient ID: Megan Sams is a 74 y.o. female.    Chief Complaint: No chief complaint on file.    HPI  Patient is a 74-year-old female with history of congestive heart failure both systolic and diastolic, hypertension, CKD stage 3.  She indicates that she has been doing well.  She is following up on these issues.  She relates that her cardiologist recently had increased her metoprolol as well as her spironolactone.  She did not tolerate this increase well at all.  She felt very bad when she was on the higher dose of metoprolol so she dropped back to the previous dose.  She subsequently also decreased back to her previous states spironolactone because she was having a lot of muscle cramps.  Her most recent echocardiogram which was done on her current medication regimen showed slight improvement in her overall cardiac function.  She denies any issues with fluid retention.  She denies orthopnea.    Blood pressure remains under control at this time.  She is taking medication as prescribed.  No adverse effects are noted.    She has stage 3 kidney disease which has been stable over time as well.  She has been cautioned against the use of anti-inflammatories.    She is due for colon cancer screening.  She had a colonoscopy 10 years ago.  She relates that it was negative for any issues.  She did not have any polyps and she denies any history of polyps.  She was wondering if she could do an alternative test rather than colonoscopy.  We discussed fit kit testing.    Review of Systems   Constitutional: Negative for chills and fever.   Respiratory: Negative for cough, shortness of breath and wheezing.    Cardiovascular: Negative for chest pain and palpitations.   Gastrointestinal: Negative for blood in stool, constipation, nausea and vomiting.   Genitourinary: Negative for dysuria and hematuria.   Skin: Negative for rash.       Objective:   /82   Pulse 72   Temp 97.7 °F (36.5 °C) (Tympanic)   Ht 5' 2" " (1.575 m)   Wt 82.1 kg (181 lb)   BMI 33.10 kg/m²      Physical Exam   Constitutional: She appears well-developed and well-nourished.   HENT:   Head: Normocephalic and atraumatic.   Cardiovascular: Normal rate, regular rhythm and intact distal pulses. Exam reveals no gallop and no friction rub.   No murmur heard.  Pulmonary/Chest: Effort normal and breath sounds normal. She has no wheezes. She has no rales. She exhibits no tenderness.   Abdominal: Soft. Bowel sounds are normal. She exhibits no distension. There is no tenderness.   Musculoskeletal: She exhibits no edema.   Lymphadenopathy:     She has no cervical adenopathy.   Skin: Skin is warm and dry. No rash noted.   Vitals reviewed.      No visits with results within 2 Week(s) from this visit.   Latest known visit with results is:   Lab Visit on 04/02/2019   Component Date Value    Sodium 04/02/2019 140     Potassium 04/02/2019 4.3     Chloride 04/02/2019 105     CO2 04/02/2019 27     Glucose 04/02/2019 89     BUN, Bld 04/02/2019 17     Creatinine 04/02/2019 1.1     Calcium 04/02/2019 9.7     Total Protein 04/02/2019 7.4     Albumin 04/02/2019 3.4*    Total Bilirubin 04/02/2019 0.7     Alkaline Phosphatase 04/02/2019 64     AST 04/02/2019 36     ALT 04/02/2019 25     Anion Gap 04/02/2019 8     eGFR if  04/02/2019 56.4*    eGFR if non African Amer* 04/02/2019 48.9*    WBC 04/02/2019 5.97     RBC 04/02/2019 4.81     Hemoglobin 04/02/2019 13.4     Hematocrit 04/02/2019 42.2     MCV 04/02/2019 88     MCH 04/02/2019 27.9     MCHC 04/02/2019 31.8*    RDW 04/02/2019 14.5     Platelets 04/02/2019 199     MPV 04/02/2019 11.8     Immature Granulocytes 04/02/2019 0.2     Gran # (ANC) 04/02/2019 1.8     Immature Grans (Abs) 04/02/2019 0.01     Lymph # 04/02/2019 3.2     Mono # 04/02/2019 0.6     Eos # 04/02/2019 0.3     Baso # 04/02/2019 0.08     nRBC 04/02/2019 0     Gran% 04/02/2019 30.5*    Lymph% 04/02/2019 53.3*     Mono% 04/02/2019 10.2     Eosinophil% 04/02/2019 4.5     Basophil% 04/02/2019 1.3     Differential Method 04/02/2019 Automated     Cholesterol 04/02/2019 183     Triglycerides 04/02/2019 95     HDL 04/02/2019 55     LDL Cholesterol 04/02/2019 109.0     HDL/Chol Ratio 04/02/2019 30.1     Total Cholesterol/HDL Ra* 04/02/2019 3.3     Non-HDL Cholesterol 04/02/2019 128     Hemoglobin A1C 04/02/2019 5.6     Estimated Avg Glucose 04/02/2019 114        Assessment:       1. Chronic combined systolic and diastolic congestive heart failure    2. Essential hypertension    3. Colon cancer screening    4. CKD (chronic kidney disease) stage 3, GFR 30-59 ml/min        Plan:   Essential hypertension  Blood pressure is under good control.  We will continue the current regimen.  Will work on regular aerobic exercise and a low salt diet.        Chronic combined systolic and diastolic congestive heart failure  Comments:  Did not tolerate higher dose of metorprolol.  Resumed 50 mg once daily and is doing better.  using aldactone 25mg or 50 mg depending on fluid status.    Essential hypertension    Colon cancer screening  Comments:  [facility] today  Orders:  -     Fecal Immunochemical Test (iFOBT); Future; Expected date: 04/24/2019    CKD (chronic kidney disease) stage 3, GFR 30-59 ml/min  Comments:  Avoid nsaids.  Kidney function is stable.    Other orders  -     metoprolol succinate (TOPROL-XL) 50 MG 24 hr tablet; Take 1 tablet (50 mg total) by mouth once daily.  Dispense: 60 tablet; Refill: 11          Follow up in about 3 months (around 7/24/2019).

## 2019-04-26 ENCOUNTER — APPOINTMENT (OUTPATIENT)
Dept: LAB | Facility: HOSPITAL | Age: 74
End: 2019-04-26
Attending: INTERNAL MEDICINE
Payer: MEDICARE

## 2019-05-17 ENCOUNTER — OFFICE VISIT (OUTPATIENT)
Dept: URGENT CARE | Facility: CLINIC | Age: 74
End: 2019-05-17
Payer: MEDICARE

## 2019-05-17 ENCOUNTER — HOSPITAL ENCOUNTER (OUTPATIENT)
Dept: RADIOLOGY | Facility: HOSPITAL | Age: 74
Discharge: HOME | End: 2019-05-17
Attending: NURSE PRACTITIONER
Payer: MEDICARE

## 2019-05-17 VITALS
DIASTOLIC BLOOD PRESSURE: 90 MMHG | SYSTOLIC BLOOD PRESSURE: 146 MMHG | TEMPERATURE: 97 F | HEART RATE: 62 BPM | WEIGHT: 181.69 LBS | RESPIRATION RATE: 18 BRPM | BODY MASS INDEX: 33.43 KG/M2 | HEIGHT: 62 IN

## 2019-05-17 DIAGNOSIS — L98.9 SKIN LESION OF FOOT: ICD-10-CM

## 2019-05-17 DIAGNOSIS — M79.671 RIGHT FOOT PAIN: Primary | ICD-10-CM

## 2019-05-17 DIAGNOSIS — M79.671 RIGHT FOOT PAIN: ICD-10-CM

## 2019-05-17 PROCEDURE — 99214 OFFICE O/P EST MOD 30 MIN: CPT | Mod: S$GLB,,, | Performed by: NURSE PRACTITIONER

## 2019-05-17 PROCEDURE — 3077F PR MOST RECENT SYSTOLIC BLOOD PRESSURE >= 140 MM HG: ICD-10-PCS | Mod: CPTII,S$GLB,, | Performed by: NURSE PRACTITIONER

## 2019-05-17 PROCEDURE — 73630 X-RAY EXAM OF FOOT: CPT | Mod: 26,RT,, | Performed by: RADIOLOGY

## 2019-05-17 PROCEDURE — 1101F PT FALLS ASSESS-DOCD LE1/YR: CPT | Mod: CPTII,S$GLB,, | Performed by: NURSE PRACTITIONER

## 2019-05-17 PROCEDURE — 3080F PR MOST RECENT DIASTOLIC BLOOD PRESSURE >= 90 MM HG: ICD-10-PCS | Mod: CPTII,S$GLB,, | Performed by: NURSE PRACTITIONER

## 2019-05-17 PROCEDURE — 73630 XR FOOT COMPLETE 3 VIEW RIGHT: ICD-10-PCS | Mod: 26,RT,, | Performed by: RADIOLOGY

## 2019-05-17 PROCEDURE — 3080F DIAST BP >= 90 MM HG: CPT | Mod: CPTII,S$GLB,, | Performed by: NURSE PRACTITIONER

## 2019-05-17 PROCEDURE — 3077F SYST BP >= 140 MM HG: CPT | Mod: CPTII,S$GLB,, | Performed by: NURSE PRACTITIONER

## 2019-05-17 PROCEDURE — 99999 PR PBB SHADOW E&M-EST. PATIENT-LVL V: CPT | Mod: PBBFAC,,, | Performed by: NURSE PRACTITIONER

## 2019-05-17 PROCEDURE — 73630 X-RAY EXAM OF FOOT: CPT | Mod: TC,FY,PO,RT

## 2019-05-17 PROCEDURE — 99214 PR OFFICE/OUTPT VISIT, EST, LEVL IV, 30-39 MIN: ICD-10-PCS | Mod: S$GLB,,, | Performed by: NURSE PRACTITIONER

## 2019-05-17 PROCEDURE — 99999 PR PBB SHADOW E&M-EST. PATIENT-LVL V: ICD-10-PCS | Mod: PBBFAC,,, | Performed by: NURSE PRACTITIONER

## 2019-05-17 PROCEDURE — 1101F PR PT FALLS ASSESS DOC 0-1 FALLS W/OUT INJ PAST YR: ICD-10-PCS | Mod: CPTII,S$GLB,, | Performed by: NURSE PRACTITIONER

## 2019-05-17 NOTE — PATIENT INSTRUCTIONS
Apply the prescribed patch to the affected area of your foot (avoid placing the patch on areas of the skin that are not affected - may need to trim down the patch if necessary). Replace the patch every 2-3 days.   You may use a pumice stone gently over the affected skin between replacing patches to remove some of the buildup.   Follow up with podiatry.   Ensure that footwear is not putting pressure on this site.

## 2019-05-17 NOTE — PROGRESS NOTES
"Subjective:      Patient ID: Megan Sams is a 74 y.o. female.    Chief Complaint: Foot Pain    Ms. Sams presents to Urgent Care today with complaints of sore spot on right heel x 2 weeks. She denies any trauma but is concerned about possible foreign body as the area of concern feels hard in the center. Has soreness if she presses directly over the spot, but doesn't have any discomfort with walking. Has been wearing backless shoes to avoid pressure on the area. She is not a diabetic. No redness or swelling around the affected area. No drainage. No treatment PTA.    Review of Systems   Constitutional: Negative for chills and fever.   Musculoskeletal:        See HPI   Skin:        See HPI   Neurological: Negative.        Objective:   BP (!) 146/90 (BP Location: Left arm, Patient Position: Sitting, BP Method: Medium (Automatic))   Pulse 62   Temp 97.2 °F (36.2 °C) (Tympanic)   Resp 18   Ht 5' 2" (1.575 m)   Wt 82.4 kg (181 lb 10.5 oz)   BMI 33.23 kg/m²   Physical Exam   Constitutional: She is oriented to person, place, and time. She appears well-developed and well-nourished. No distress.   Neck: Normal range of motion. Neck supple.   Cardiovascular: Normal rate.   Pulses:       Dorsalis pedis pulses are 2+ on the right side, and 2+ on the left side.        Posterior tibial pulses are 2+ on the right side, and 2+ on the left side.   Pulmonary/Chest: Effort normal. No respiratory distress.   Feet:   Right Foot:   Skin Integrity: Positive for callus (hyperkeratotic lesion (~1 cm in diameter) with a central core to posterolateral aspect of the right heel just proximal to the plantar surface of the foot; no erythema; mild tenderness to palpation).   Neurological: She is alert and oriented to person, place, and time.   Skin: Skin is warm and dry. She is not diaphoretic.   Nursing note and vitals reviewed.    Assessment:      1. Right foot pain    2. Skin lesion of foot       Plan:   Right foot pain  -     X-Ray Foot " Complete Right; Future; Expected date: 05/17/2019  -     Ambulatory referral to Podiatry    Skin lesion of foot  -     salicylic acid 40 % Plst; Apply 1 patch topically every other day.  Dispense: 18 each; Refill: 0  -     Ambulatory referral to Podiatry    Apply the prescribed patch to the affected area of your foot (avoid placing the patch on areas of the skin that are not affected - may need to trim down the patch if necessary). Replace the patch every 2-3 days.   You may use a pumice stone gently over the affected skin between replacing patches to remove some of the buildup.   Follow up with podiatry.   Ensure that footwear is not putting pressure on this site.

## 2019-05-28 RX ORDER — METOPROLOL SUCCINATE 50 MG/1
50 TABLET, EXTENDED RELEASE ORAL DAILY
Qty: 60 TABLET | Refills: 11 | Status: SHIPPED | OUTPATIENT
Start: 2019-05-28 | End: 2020-05-07 | Stop reason: SDUPTHER

## 2019-05-28 NOTE — TELEPHONE ENCOUNTER
----- Message from Valdez Sams sent at 5/28/2019  9:14 AM CDT -----  Contact: self- [phone]  Would like a  Nurse to contact her regarding her medications states [facility] needs the correct dosage on the script, please contact her @ [phone]. Thanks

## 2019-06-07 ENCOUNTER — OFFICE VISIT (OUTPATIENT)
Dept: PODIATRY | Facility: CLINIC | Age: 74
End: 2019-06-07
Payer: MEDICARE

## 2019-06-07 VITALS
BODY MASS INDEX: 33.18 KG/M2 | HEIGHT: 62 IN | WEIGHT: 180.31 LBS | DIASTOLIC BLOOD PRESSURE: 78 MMHG | SYSTOLIC BLOOD PRESSURE: 118 MMHG

## 2019-06-07 DIAGNOSIS — M79.671 PAIN IN RIGHT FOOT: ICD-10-CM

## 2019-06-07 DIAGNOSIS — L84 CALLUS: Primary | ICD-10-CM

## 2019-06-07 DIAGNOSIS — B35.1 ONYCHOMYCOSIS: ICD-10-CM

## 2019-06-07 PROCEDURE — 3078F DIAST BP <80 MM HG: CPT | Mod: CPTII,S$GLB,, | Performed by: PODIATRIST

## 2019-06-07 PROCEDURE — 99203 OFFICE O/P NEW LOW 30 MIN: CPT | Mod: S$GLB,,, | Performed by: PODIATRIST

## 2019-06-07 PROCEDURE — 1101F PR PT FALLS ASSESS DOC 0-1 FALLS W/OUT INJ PAST YR: ICD-10-PCS | Mod: CPTII,S$GLB,, | Performed by: PODIATRIST

## 2019-06-07 PROCEDURE — 3074F SYST BP LT 130 MM HG: CPT | Mod: CPTII,S$GLB,, | Performed by: PODIATRIST

## 2019-06-07 PROCEDURE — 3074F PR MOST RECENT SYSTOLIC BLOOD PRESSURE < 130 MM HG: ICD-10-PCS | Mod: CPTII,S$GLB,, | Performed by: PODIATRIST

## 2019-06-07 PROCEDURE — 99203 PR OFFICE/OUTPT VISIT, NEW, LEVL III, 30-44 MIN: ICD-10-PCS | Mod: S$GLB,,, | Performed by: PODIATRIST

## 2019-06-07 PROCEDURE — 1101F PT FALLS ASSESS-DOCD LE1/YR: CPT | Mod: CPTII,S$GLB,, | Performed by: PODIATRIST

## 2019-06-07 PROCEDURE — 99999 PR PBB SHADOW E&M-EST. PATIENT-LVL II: ICD-10-PCS | Mod: PBBFAC,,, | Performed by: PODIATRIST

## 2019-06-07 PROCEDURE — 3078F PR MOST RECENT DIASTOLIC BLOOD PRESSURE < 80 MM HG: ICD-10-PCS | Mod: CPTII,S$GLB,, | Performed by: PODIATRIST

## 2019-06-07 PROCEDURE — 99999 PR PBB SHADOW E&M-EST. PATIENT-LVL II: CPT | Mod: PBBFAC,,, | Performed by: PODIATRIST

## 2019-06-07 NOTE — PROGRESS NOTES
Subjective:       Patient ID: Megan Sams is a 74 y.o. female.    Chief Complaint: Heel Pain (right heel spur; pt reports pain at 5/10.)    HPI: Megan Sams presents to the clinic with the chief complaint of painful, thickened and hypertrophic skin formation of the right foot. This patient is not a diabetic nor did she have peripheral vascular disease or peripheral neuropathy. Patient states that she was seen in urgent care several days ago for the aforementioned was dispensed/prescribed Salicylic Acid. Patient's PMD is Javad Bowen MD. Patient denies local or systemic signs of infection. Patient states pains are 5/10.     Hemoglobin A1C   Date Value Ref Range Status   04/02/2019 5.6 4.0 - 5.6 % Final     Comment:     ADA Screening Guidelines:  5.7-6.4%  Consistent with prediabetes  >or=6.5%  Consistent with diabetes  High levels of fetal hemoglobin interfere with the HbA1C  assay. Heterozygous hemoglobin variants (HbS, HgC, etc)do  not significantly interfere with this assay.   However, presence of multiple variants may affect accuracy.     03/20/2018 5.4 4.0 - 5.6 % Final     Comment:     According to ADA guidelines, hemoglobin A1c <7.0% represents  optimal control in non-pregnant diabetic patients. Different  metrics may apply to specific patient populations.   Standards of Medical Care in Diabetes-2016.  For the purpose of screening for the presence of diabetes:  <5.7%     Consistent with the absence of diabetes  5.7-6.4%  Consistent with increasing risk for diabetes   (prediabetes)  >or=6.5%  Consistent with diabetes  Currently, no consensus exists for use of hemoglobin A1c  for diagnosis of diabetes for children.  This Hemoglobin A1c assay has significant interference with fetal   hemoglobin   (HbF). The results are invalid for patients with abnormal amounts of   HbF,   including those with known Hereditary Persistence   of Fetal Hemoglobin. Heterozygous hemoglobin variants (HbAS, HbAC,   HbAD, HbAE,  HbA2) do not significantly interfere with this assay;   however, presence of multiple variants in a sample may impact the %   interference.     11/08/2016 5.6 4.5 - 6.2 % Final     Comment:     According to ADA guidelines, hemoglobin A1C <7.0% represents  optimal control in non-pregnant diabetic patients.  Different  metrics may apply to specific populations.   Standards of Medical Care in Diabetes - 2016.  For the purpose of screening for the presence of diabetes:  <5.7%     Consistent with the absence of diabetes  5.7-6.4%  Consistent with increasing risk for diabetes   (prediabetes)  >or=6.5%  Consistent with diabetes  Currently no consensus exists for use of hemoglobin A1C  for diagnosis of diabetes for children.     .    Review of patient's allergies indicates:   Allergen Reactions    Codeine Other (See Comments)     Hallucinations    Doxycycline monohydrate Nausea Only     Elevated heart rate    Gabapentin Other (See Comments)     Lowered heart rate    Lanoxin [digoxin] Other (See Comments)     Too low heart rate    Thorazine [chlorpromazine] Other (See Comments)     hallucinations    Ultracet [tramadol-acetaminophen] Other (See Comments)     Elevated BP    Penicillins Nausea Only and Rash       Past Medical History:   Diagnosis Date    Arthritis     Blood transfusion     CHF (congestive heart failure)     Chronic kidney disease     Depression     Hypertension        Family History   Problem Relation Age of Onset    Early death Mother     Heart disease Mother     Hypertension Mother     Kidney disease Father     Heart disease Brother        Social History     Socioeconomic History    Marital status:      Spouse name: Not on file    Number of children: Not on file    Years of education: Not on file    Highest education level: Not on file   Occupational History    Not on file   Social Needs    Financial resource strain: Not on file    Food insecurity:     Worry: Not on file      "Inability: Not on file    Transportation needs:     Medical: Not on file     Non-medical: Not on file   Tobacco Use    Smoking status: Never Smoker    Smokeless tobacco: Never Used   Substance and Sexual Activity    Alcohol use: No     Alcohol/week: 0.0 oz    Drug use: No    Sexual activity: Never   Lifestyle    Physical activity:     Days per week: Not on file     Minutes per session: Not on file    Stress: Not on file   Relationships    Social connections:     Talks on phone: Not on file     Gets together: Not on file     Attends Sikh service: Not on file     Active member of club or organization: Not on file     Attends meetings of clubs or organizations: Not on file     Relationship status: Not on file   Other Topics Concern    Not on file   Social History Narrative    Not on file       Past Surgical History:   Procedure Laterality Date    HYSTERECTOMY         Review of Systems   Constitutional: Negative for chills, fatigue and fever.   HENT: Negative for hearing loss.    Eyes: Negative for photophobia and visual disturbance.   Respiratory: Negative for cough, chest tightness, shortness of breath and wheezing.    Cardiovascular: Negative for chest pain and palpitations.   Gastrointestinal: Negative for constipation, diarrhea, nausea and vomiting.   Endocrine: Negative for cold intolerance and heat intolerance.   Genitourinary: Negative for flank pain.   Musculoskeletal: Positive for gait problem. Negative for neck pain and neck stiffness.   Skin: Negative for wound.   Neurological: Negative for light-headedness and headaches.   Psychiatric/Behavioral: Negative for sleep disturbance.         Objective:   /78 (BP Location: Right arm, Patient Position: Sitting)   Ht 5' 2" (1.575 m)   Wt 81.8 kg (180 lb 5.4 oz)   BMI 32.98 kg/m²     LOWER EXTREMITY PHYSICAL EXAMINATION    NEUROLOGY: Sensation to light touch is intact. Proprioception is intact, bilateral. Sensation to pin prick is reduced to " absent. Vibratory sensation is diminished to the left and right lower extremity. Examination with 5.07 [place] monofilament reveals that protective sensation is not intact to the left and right plantar surfaces of the foot and digits, as the patient has no sensation/detection at greater than 4 distinct points of contact.     ORTHOPEDIC: Manual Muscle Testing is 5/5 in all planes on the right, without pains, with and without resistance. No pains to palpation of the medial or lateral ankle ligaments. No discomfort to palpation of the posterior tibial tendon, peroneal tendon, Achilles tendon or the anterior ankle tendons. Gait pattern is non-antalgic.    VASCULAR: Hair growth is sparse on the left and right dorsal foot and at the digits. The left dorsalis pedis pulse is 1/4 and on the right is 1/4, and the left posterior tibial pulse is 1/4 and is 1/4 on the right. Varicosities are absent. Spider veins are noted to the bilateral lower extremity. Warm to warm, proximal to distal. Capillary refill time is within normal limits and less  than 3 seconds.     DERMATOLOGY: Callus formation to the plantar lateral aspect of the right heel bone. Skin is supple, dry and intact. No ecchymosis is noted. No hypertrophic skin formation. No erythema or cellulitis is noted. On the left foot and the right foot, nails 1-5 are suggestive of onychomycotic changes. These nail plates are painful with shoe gear and ambulation, as per patient. These nail plates are painful to palpation and manipulation, are thickened, are dystrophic, chaulky in appearance and malodorous with substantial subungual debris. These nail plates are yellow to brown in appearance.     Assessment:     1. Callus    2. Pain in right foot    3. Onychomycosis        Plan:     Callus  Pain in right foot  Hypertrophic skin formation, as outlined within the examination portion of this note, is/are trimmed/pared surgically debrided with sharp #10/#15 blade, to  alleviate discomfort with weight bearing and ambulation, and to lessen the possibility of skin complications, e.g., ulceration due to pressure. No ulceration(s) is are noted with/post debridement.     Patient does not meet the qualifications for, or have the class findings necessary for routine foot care. There is no lack of or diminished sensation and he/she has no significant findings of PVD to the B/L LE. At any follow-up appointment concerning routine foot care, patient will be PROC-B, and will be required to pay for services.  This fact is explained to the patient and/or any family who is present at today's appt.    Onychomycosis  Discussed the various treatment options concerning onychomycosis, these being over-the-counter topicals (efficacy is low in regards to cure), prescription strength topicals (better efficacy as compared to OTC variants, but only slightly so, and potentially costly), laser therapy (which is not covered by insurance companies), oral medications (patient is advised that there is a potential, though less than 5%, for the potential of adverse health and side effects; namely liver pathology) and doing nothing (frequent debridements) as onychomycosis is a cosmetic ailment, and has no potential for long-term deleterious effects on the health.          Future Appointments   Date Time Provider Department Center   7/24/2019  2:20 PM [facility]

## 2019-06-07 NOTE — LETTER
June 7, 2019      [place]  [id number] [facility]  [place] [address]           [facility]  [address] [facility] [place] [address]  Phone: [phone]          Patient: Megan Sams   MR Number: [id number]   YOB: 1945   Date of Visit: 6/7/2019       Dear Yael Olson:    Thank you for referring Megan Sams to me for evaluation. Attached you will find relevant portions of my assessment and plan of care.    If you have questions, please do not hesitate to call me. I look forward to following Megan Sams along with you.    Sincerely,    Dimitris Mckay, KALINA    Enclosure  CC:  No Recipients    If you would like to receive this communication electronically, please contact [email or web address] or [phone] to request more information on [employer] Link access.    For providers and/or their staff who would like to refer a patient to [facility], please contact us through our one-stop-shop provider referral line, Rommel Tran, at [phone].    If you feel you have received this communication in error or would no longer like to receive these types of communications, please e-mail [email or web address]

## 2019-08-21 ENCOUNTER — OFFICE VISIT (OUTPATIENT)
Dept: INTERNAL MEDICINE | Facility: CLINIC | Age: 74
End: 2019-08-21
Payer: MEDICARE

## 2019-08-21 VITALS
HEIGHT: 62 IN | BODY MASS INDEX: 33.47 KG/M2 | HEART RATE: 70 BPM | DIASTOLIC BLOOD PRESSURE: 82 MMHG | SYSTOLIC BLOOD PRESSURE: 116 MMHG | WEIGHT: 181.88 LBS | TEMPERATURE: 98 F

## 2019-08-21 DIAGNOSIS — I50.42 CHRONIC COMBINED SYSTOLIC AND DIASTOLIC CONGESTIVE HEART FAILURE: Primary | ICD-10-CM

## 2019-08-21 DIAGNOSIS — I10 ESSENTIAL HYPERTENSION: Chronic | ICD-10-CM

## 2019-08-21 PROCEDURE — 3079F DIAST BP 80-89 MM HG: CPT | Mod: CPTII,S$GLB,, | Performed by: INTERNAL MEDICINE

## 2019-08-21 PROCEDURE — 99499 RISK ADDL DX/OHS AUDIT: ICD-10-PCS | Mod: S$GLB,,, | Performed by: INTERNAL MEDICINE

## 2019-08-21 PROCEDURE — 3074F SYST BP LT 130 MM HG: CPT | Mod: CPTII,S$GLB,, | Performed by: INTERNAL MEDICINE

## 2019-08-21 PROCEDURE — 3079F PR MOST RECENT DIASTOLIC BLOOD PRESSURE 80-89 MM HG: ICD-10-PCS | Mod: CPTII,S$GLB,, | Performed by: INTERNAL MEDICINE

## 2019-08-21 PROCEDURE — 1101F PT FALLS ASSESS-DOCD LE1/YR: CPT | Mod: CPTII,S$GLB,, | Performed by: INTERNAL MEDICINE

## 2019-08-21 PROCEDURE — 99999 PR PBB SHADOW E&M-EST. PATIENT-LVL III: CPT | Mod: PBBFAC,,, | Performed by: INTERNAL MEDICINE

## 2019-08-21 PROCEDURE — 99213 PR OFFICE/OUTPT VISIT, EST, LEVL III, 20-29 MIN: ICD-10-PCS | Mod: S$GLB,,, | Performed by: INTERNAL MEDICINE

## 2019-08-21 PROCEDURE — 99999 PR PBB SHADOW E&M-EST. PATIENT-LVL III: ICD-10-PCS | Mod: PBBFAC,,, | Performed by: INTERNAL MEDICINE

## 2019-08-21 PROCEDURE — 1101F PR PT FALLS ASSESS DOC 0-1 FALLS W/OUT INJ PAST YR: ICD-10-PCS | Mod: CPTII,S$GLB,, | Performed by: INTERNAL MEDICINE

## 2019-08-21 PROCEDURE — 3074F PR MOST RECENT SYSTOLIC BLOOD PRESSURE < 130 MM HG: ICD-10-PCS | Mod: CPTII,S$GLB,, | Performed by: INTERNAL MEDICINE

## 2019-08-21 PROCEDURE — 99499 UNLISTED E&M SERVICE: CPT | Mod: S$GLB,,, | Performed by: INTERNAL MEDICINE

## 2019-08-21 PROCEDURE — 99213 OFFICE O/P EST LOW 20 MIN: CPT | Mod: S$GLB,,, | Performed by: INTERNAL MEDICINE

## 2019-08-21 NOTE — PROGRESS NOTES
"Subjective:       Patient ID: Megan Sams is a 74 y.o. female.    Chief Complaint: No chief complaint on file.    HPI   Patient is a 74-year-old female presenting today following up on her heart failure and hypertension.  She indicates she has been doing very well since we last saw her.  She has tolerated her adjustments to her medications quite well.  She is not retaining any fluid.  She is not having shortness of breath.  Overall she feels very good about her general status at this time.  It has been little while since she has had follow-up with Cardiology.  We will look to set that up.    Review of Systems    Objective:   /82   Pulse 70   Temp 97.5 °F (36.4 °C) (Tympanic)   Ht 5' 2" (1.575 m)   Wt 82.5 kg (181 lb 14.1 oz)   BMI 33.27 kg/m²      Physical Exam   Constitutional: She appears well-developed and well-nourished.   HENT:   Head: Normocephalic and atraumatic.   Cardiovascular: Normal rate, regular rhythm and intact distal pulses. Exam reveals no gallop and no friction rub.   No murmur heard.  Pulmonary/Chest: Breath sounds normal. She has no wheezes. She has no rales. She exhibits no tenderness.   Abdominal: Soft. Bowel sounds are normal. She exhibits no distension. There is no tenderness.   Lymphadenopathy:     She has no cervical adenopathy.   Skin: No rash noted.   No edema in the ankles   Vitals reviewed.      No visits with results within 2 Week(s) from this visit.   Latest known visit with results is:   Lab Visit on 04/26/2019   Component Date Value    Fecal Immunochemical Aura* 04/26/2019 Negative        Assessment:       1. Chronic combined systolic and diastolic congestive heart failure    2. Essential hypertension        Plan:   No problem-specific Assessment & Plan notes found for this encounter.    Chronic combined systolic and diastolic congestive heart failure  Comments:  continue meds, stable over time.  make follow up with Dr. John Murray " hypertension  Comments:  continue meds, stable.          Follow up in about 6 months (around 2/21/2020).

## 2019-08-27 ENCOUNTER — TELEPHONE (OUTPATIENT)
Dept: INTERNAL MEDICINE | Facility: CLINIC | Age: 74
End: 2019-08-27

## 2019-08-27 DIAGNOSIS — Z12.31 ENCOUNTER FOR SCREENING MAMMOGRAM FOR HIGH-RISK PATIENT: Primary | ICD-10-CM

## 2019-08-27 NOTE — TELEPHONE ENCOUNTER
----- Message from Nadege Kaufman sent at 8/27/2019 12:59 PM CDT -----  Contact: [phone]  Patient requesting orders for a mammogram. Please advise.

## 2019-09-03 RX ORDER — ALLOPURINOL 100 MG/1
200 TABLET ORAL DAILY
Qty: 180 TABLET | Refills: 4 | Status: SHIPPED | OUTPATIENT
Start: 2019-09-03 | End: 2021-07-05

## 2019-09-20 ENCOUNTER — HOSPITAL ENCOUNTER (OUTPATIENT)
Dept: RADIOLOGY | Facility: HOSPITAL | Age: 74
Discharge: HOME | End: 2019-09-20
Attending: INTERNAL MEDICINE
Payer: MEDICARE

## 2019-09-20 VITALS — BODY MASS INDEX: 33.31 KG/M2 | WEIGHT: 181 LBS | HEIGHT: 62 IN

## 2019-09-20 DIAGNOSIS — Z12.31 ENCOUNTER FOR SCREENING MAMMOGRAM FOR HIGH-RISK PATIENT: ICD-10-CM

## 2019-09-20 PROCEDURE — 77063 BREAST TOMOSYNTHESIS BI: CPT | Mod: 26,,, | Performed by: RADIOLOGY

## 2019-09-20 PROCEDURE — 77067 SCR MAMMO BI INCL CAD: CPT | Mod: TC

## 2019-09-20 PROCEDURE — 77067 MAMMO DIGITAL SCREENING BILAT WITH TOMOSYNTHESIS_CAD: ICD-10-PCS | Mod: 26,,, | Performed by: RADIOLOGY

## 2019-09-20 PROCEDURE — 77067 SCR MAMMO BI INCL CAD: CPT | Mod: 26,,, | Performed by: RADIOLOGY

## 2019-09-20 PROCEDURE — 77063 MAMMO DIGITAL SCREENING BILAT WITH TOMOSYNTHESIS_CAD: ICD-10-PCS | Mod: 26,,, | Performed by: RADIOLOGY

## 2019-09-23 ENCOUNTER — TELEPHONE (OUTPATIENT)
Dept: INTERNAL MEDICINE | Facility: CLINIC | Age: 74
End: 2019-09-23

## 2019-09-23 NOTE — TELEPHONE ENCOUNTER
----- Message from Alexandra Rios sent at 9/23/2019  4:05 PM CDT -----  Contact: pt  .Type:  Patient Returning Call    Who Called: pt  Who Left Message for Patient:   Does the patient know what this is regarding?: Mammo  Would the patient rather a call back or a response via [facility]?  Call back   Best Call Back Number: [phone] (home)   Additional Information:

## 2019-11-08 ENCOUNTER — TELEPHONE (OUTPATIENT)
Dept: INTERNAL MEDICINE | Facility: CLINIC | Age: 74
End: 2019-11-08

## 2019-11-08 NOTE — TELEPHONE ENCOUNTER
Patient was informed that she did not get an injection on that day.  Patient insisted that she did but I do not see anything documented.  Patient was worried it was her flu shot.  Informed patient we did not have flu injections then.  Patient verbalized understanding.

## 2019-11-08 NOTE — TELEPHONE ENCOUNTER
----- Message from Modesta Myrick sent at 11/8/2019  1:09 PM CST -----  Contact: self  Patient requesting a call back to know if she was given a flu shot or pneumonia shot on 08/21/19. Please call patient back at [phone]

## 2019-11-20 ENCOUNTER — IMMUNIZATION (OUTPATIENT)
Dept: INTERNAL MEDICINE | Facility: CLINIC | Age: 74
End: 2019-11-20
Payer: MEDICARE

## 2019-11-20 PROCEDURE — G0008 FLU VACCINE - HIGH DOSE (65+) PRESERVATIVE FREE IM: ICD-10-PCS | Mod: S$GLB,,, | Performed by: INTERNAL MEDICINE

## 2019-11-20 PROCEDURE — 90662 FLU VACCINE - HIGH DOSE (65+) PRESERVATIVE FREE IM: ICD-10-PCS | Mod: S$GLB,,, | Performed by: INTERNAL MEDICINE

## 2019-11-20 PROCEDURE — 90662 IIV NO PRSV INCREASED AG IM: CPT | Mod: S$GLB,,, | Performed by: INTERNAL MEDICINE

## 2019-11-20 PROCEDURE — G0008 ADMIN INFLUENZA VIRUS VAC: HCPCS | Mod: S$GLB,,, | Performed by: INTERNAL MEDICINE

## 2020-01-06 DIAGNOSIS — I50.42 CHRONIC COMBINED SYSTOLIC AND DIASTOLIC CONGESTIVE HEART FAILURE: Primary | ICD-10-CM

## 2020-01-10 ENCOUNTER — OFFICE VISIT (OUTPATIENT)
Dept: CARDIOLOGY | Facility: CLINIC | Age: 75
End: 2020-01-10
Payer: MEDICARE

## 2020-01-10 ENCOUNTER — CLINICAL SUPPORT (OUTPATIENT)
Dept: CARDIOLOGY | Facility: CLINIC | Age: 75
End: 2020-01-10
Payer: MEDICARE

## 2020-01-10 VITALS
SYSTOLIC BLOOD PRESSURE: 138 MMHG | WEIGHT: 183 LBS | HEIGHT: 62 IN | OXYGEN SATURATION: 99 % | BODY MASS INDEX: 33.68 KG/M2 | DIASTOLIC BLOOD PRESSURE: 82 MMHG | HEART RATE: 54 BPM

## 2020-01-10 DIAGNOSIS — E66.9 OBESITY, CLASS I, BMI 30-34.9: ICD-10-CM

## 2020-01-10 DIAGNOSIS — I50.42 CHRONIC COMBINED SYSTOLIC AND DIASTOLIC CONGESTIVE HEART FAILURE: ICD-10-CM

## 2020-01-10 DIAGNOSIS — I10 ESSENTIAL HYPERTENSION: Chronic | ICD-10-CM

## 2020-01-10 DIAGNOSIS — I42.8 NONISCHEMIC CARDIOMYOPATHY: Primary | ICD-10-CM

## 2020-01-10 DIAGNOSIS — N18.30 CKD (CHRONIC KIDNEY DISEASE) STAGE 3, GFR 30-59 ML/MIN: Chronic | ICD-10-CM

## 2020-01-10 PROCEDURE — 1101F PR PT FALLS ASSESS DOC 0-1 FALLS W/OUT INJ PAST YR: ICD-10-PCS | Mod: CPTII,S$GLB,, | Performed by: INTERNAL MEDICINE

## 2020-01-10 PROCEDURE — 99999 PR PBB SHADOW E&M-EST. PATIENT-LVL III: ICD-10-PCS | Mod: PBBFAC,,, | Performed by: INTERNAL MEDICINE

## 2020-01-10 PROCEDURE — 93010 ELECTROCARDIOGRAM REPORT: CPT | Mod: S$GLB,,, | Performed by: INTERNAL MEDICINE

## 2020-01-10 PROCEDURE — 1159F PR MEDICATION LIST DOCUMENTED IN MEDICAL RECORD: ICD-10-PCS | Mod: S$GLB,,, | Performed by: INTERNAL MEDICINE

## 2020-01-10 PROCEDURE — 3075F SYST BP GE 130 - 139MM HG: CPT | Mod: CPTII,S$GLB,, | Performed by: INTERNAL MEDICINE

## 2020-01-10 PROCEDURE — 3079F DIAST BP 80-89 MM HG: CPT | Mod: CPTII,S$GLB,, | Performed by: INTERNAL MEDICINE

## 2020-01-10 PROCEDURE — 1126F PR PAIN SEVERITY QUANTIFIED, NO PAIN PRESENT: ICD-10-PCS | Mod: S$GLB,,, | Performed by: INTERNAL MEDICINE

## 2020-01-10 PROCEDURE — 3079F PR MOST RECENT DIASTOLIC BLOOD PRESSURE 80-89 MM HG: ICD-10-PCS | Mod: CPTII,S$GLB,, | Performed by: INTERNAL MEDICINE

## 2020-01-10 PROCEDURE — 99214 OFFICE O/P EST MOD 30 MIN: CPT | Mod: S$GLB,,, | Performed by: INTERNAL MEDICINE

## 2020-01-10 PROCEDURE — 99499 UNLISTED E&M SERVICE: CPT | Mod: S$GLB,,, | Performed by: INTERNAL MEDICINE

## 2020-01-10 PROCEDURE — 99499 RISK ADDL DX/OHS AUDIT: ICD-10-PCS | Mod: S$GLB,,, | Performed by: INTERNAL MEDICINE

## 2020-01-10 PROCEDURE — 1126F AMNT PAIN NOTED NONE PRSNT: CPT | Mod: S$GLB,,, | Performed by: INTERNAL MEDICINE

## 2020-01-10 PROCEDURE — 99999 PR PBB SHADOW E&M-EST. PATIENT-LVL III: CPT | Mod: PBBFAC,,, | Performed by: INTERNAL MEDICINE

## 2020-01-10 PROCEDURE — 93005 ELECTROCARDIOGRAM TRACING: CPT | Mod: S$GLB,,, | Performed by: INTERNAL MEDICINE

## 2020-01-10 PROCEDURE — 93005 EKG 12-LEAD: ICD-10-PCS | Mod: S$GLB,,, | Performed by: INTERNAL MEDICINE

## 2020-01-10 PROCEDURE — 1101F PT FALLS ASSESS-DOCD LE1/YR: CPT | Mod: CPTII,S$GLB,, | Performed by: INTERNAL MEDICINE

## 2020-01-10 PROCEDURE — 99214 PR OFFICE/OUTPT VISIT, EST, LEVL IV, 30-39 MIN: ICD-10-PCS | Mod: S$GLB,,, | Performed by: INTERNAL MEDICINE

## 2020-01-10 PROCEDURE — 93010 EKG 12-LEAD: ICD-10-PCS | Mod: S$GLB,,, | Performed by: INTERNAL MEDICINE

## 2020-01-10 PROCEDURE — 1159F MED LIST DOCD IN RCRD: CPT | Mod: S$GLB,,, | Performed by: INTERNAL MEDICINE

## 2020-01-10 PROCEDURE — 3075F PR MOST RECENT SYSTOLIC BLOOD PRESS GE 130-139MM HG: ICD-10-PCS | Mod: CPTII,S$GLB,, | Performed by: INTERNAL MEDICINE

## 2020-01-10 NOTE — PROGRESS NOTES
Subjective:   Patient ID:  Megan Sams is a 74 y.o. female who presents for follow up of Consult      HPI  A 75 yo female with h/o nicm chf htn is here for f/u she is very active physically no chest pain or shortness of breath no orthopnea no pnd no syncope near syncope . Has no issues clinically with chf angina no leg swelling  ekg non specific changes. Takes meds regularily tries to be compliant with salt.  Past Medical History:   Diagnosis Date    Arthritis     Blood transfusion     CHF (congestive heart failure)     Chronic kidney disease     Depression     Hypertension        Past Surgical History:   Procedure Laterality Date    HYSTERECTOMY         Social History     Tobacco Use    Smoking status: Never Smoker    Smokeless tobacco: Never Used   Substance Use Topics    Alcohol use: No     Alcohol/week: 0.0 standard drinks    Drug use: No       Family History   Problem Relation Age of Onset    Early death Mother     Heart disease Mother     Hypertension Mother     Kidney disease Father     Heart disease Brother        Current Outpatient Medications   Medication Sig    acetaminophen (TYLENOL EXTRA STRENGTH) 500 MG tablet Take 500 mg by mouth every 6 (six) hours as needed.    allopurinol (ZYLOPRIM) 100 MG tablet TAKE 2 TABLETS (200 MG TOTAL) BY MOUTH ONCE DAILY.    aspirin (ECOTRIN) 81 MG EC tablet Take 81 mg by mouth once daily.    candesartan (ATACAND) 32 MG tablet TAKE 1 TABLET (32 MG TOTAL) BY MOUTH ONCE DAILY.    econazole nitrate 1 % cream Cleanse and dry navel as directed, then apply small amount of cream in and around navel once daily for 14 days.    ketoconazole (NIZORAL) 2 % cream Apply topically once daily. Cleanse and dry navel as directed, then apply small amount of cream in and around naval once daily for 14 days    metoprolol succinate (TOPROL-XL) 50 MG 24 hr tablet Take 1 tablet (50 mg total) by mouth once daily.    MULTIVIT WITH CALCIUM,IRON,MIN (MULTIPLE VITAMIN, WOMENS  ORAL) Take by mouth once daily.    salicylic acid 40 % Plst Apply 1 patch topically every other day.    spironolactone (ALDACTONE) 50 MG tablet TAKE 1 TABLET (50 MG TOTAL) BY MOUTH ONCE DAILY.     No current facility-administered medications for this visit.      Current Outpatient Medications on File Prior to Visit   Medication Sig    acetaminophen (TYLENOL EXTRA STRENGTH) 500 MG tablet Take 500 mg by mouth every 6 (six) hours as needed.    allopurinol (ZYLOPRIM) 100 MG tablet TAKE 2 TABLETS (200 MG TOTAL) BY MOUTH ONCE DAILY.    aspirin (ECOTRIN) 81 MG EC tablet Take 81 mg by mouth once daily.    candesartan (ATACAND) 32 MG tablet TAKE 1 TABLET (32 MG TOTAL) BY MOUTH ONCE DAILY.    econazole nitrate 1 % cream Cleanse and dry navel as directed, then apply small amount of cream in and around navel once daily for 14 days.    ketoconazole (NIZORAL) 2 % cream Apply topically once daily. Cleanse and dry navel as directed, then apply small amount of cream in and around naval once daily for 14 days    metoprolol succinate (TOPROL-XL) 50 MG 24 hr tablet Take 1 tablet (50 mg total) by mouth once daily.    MULTIVIT WITH CALCIUM,IRON,MIN (MULTIPLE VITAMIN, WOMENS ORAL) Take by mouth once daily.    salicylic acid 40 % Plst Apply 1 patch topically every other day.    spironolactone (ALDACTONE) 50 MG tablet TAKE 1 TABLET (50 MG TOTAL) BY MOUTH ONCE DAILY.     No current facility-administered medications on file prior to visit.      Review of patient's allergies indicates:   Allergen Reactions    Codeine Other (See Comments)     Hallucinations    Doxycycline monohydrate Nausea Only     Elevated heart rate    Gabapentin Other (See Comments)     Lowered heart rate    Lanoxin [digoxin] Other (See Comments)     Too low heart rate    Thorazine [chlorpromazine] Other (See Comments)     hallucinations    Ultracet [tramadol-acetaminophen] Other (See Comments)     Elevated BP    Penicillins Nausea Only and Rash      Review of Systems   Constitution: Negative for diaphoresis, malaise/fatigue and weight gain.   HENT: Negative for hoarse voice.    Eyes: Negative for double vision and visual disturbance.   Cardiovascular: Negative for chest pain, claudication, cyanosis, dyspnea on exertion, irregular heartbeat, leg swelling, near-syncope, orthopnea, palpitations, paroxysmal nocturnal dyspnea and syncope.   Respiratory: Negative for cough, hemoptysis, shortness of breath and snoring.    Hematologic/Lymphatic: Negative for bleeding problem. Does not bruise/bleed easily.   Skin: Negative for color change and poor wound healing.   Musculoskeletal: Negative for muscle cramps, muscle weakness and myalgias.   Gastrointestinal: Negative for bloating, abdominal pain, change in bowel habit, diarrhea, heartburn, hematemesis, hematochezia, melena and nausea.   Neurological: Negative for excessive daytime sleepiness, dizziness, headaches, light-headedness, loss of balance, numbness and weakness.   Psychiatric/Behavioral: Negative for memory loss. The patient does not have insomnia.    Allergic/Immunologic: Negative for hives.       Objective:   Physical Exam   Constitutional: She is oriented to person, place, and time. She appears well-developed and well-nourished. She does not appear ill. No distress.   HENT:   Head: Normocephalic and atraumatic.   Eyes: Pupils are equal, round, and reactive to light. EOM are normal. No scleral icterus.   Neck: Normal range of motion. Neck supple. Normal carotid pulses, no hepatojugular reflux and no JVD present. Carotid bruit is not present. No tracheal deviation present. No thyromegaly present.   Cardiovascular: Normal rate, regular rhythm, normal heart sounds, intact distal pulses and normal pulses. Exam reveals no gallop and no friction rub.   No murmur heard.  Pulses:       Carotid pulses are 2+ on the right side, and 2+ on the left side.       Radial pulses are 2+ on the right side, and 2+ on the  "left side.        Femoral pulses are 2+ on the right side, and 2+ on the left side.       Popliteal pulses are 2+ on the right side, and 2+ on the left side.        Dorsalis pedis pulses are 2+ on the right side, and 2+ on the left side.        Posterior tibial pulses are 2+ on the right side, and 2+ on the left side.   Pulmonary/Chest: Effort normal and breath sounds normal. No respiratory distress. She has no wheezes. She has no rhonchi. She has no rales. She exhibits no tenderness.   Abdominal: Soft. Normal appearance, normal aorta and bowel sounds are normal. She exhibits no distension, no abdominal bruit, no ascites and no pulsatile midline mass. There is no hepatomegaly. There is no tenderness.   Obese abdomen   Musculoskeletal: She exhibits no edema.        Right shoulder: She exhibits no deformity.   Neurological: She is alert and oriented to person, place, and time. She has normal strength. No cranial nerve deficit. Coordination normal.   Skin: Skin is warm and dry. No rash noted. She is not diaphoretic. No cyanosis or erythema. Nails show no clubbing.   Psychiatric: She has a normal mood and affect. Her speech is normal and behavior is normal.   Nursing note and vitals reviewed.    Vitals:    01/10/20 1537   BP: 138/82   BP Location: Left arm   Pulse: (!) 54   SpO2: 99%   Weight: 83 kg (183 lb)   Height: 5' 2" (1.575 m)     Lab Results   Component Value Date    CHOL 183 04/02/2019    CHOL 185 04/26/2016    CHOL 192 05/08/2015     Lab Results   Component Value Date    HDL 55 04/02/2019    HDL 64 04/26/2016    HDL 66 05/08/2015     Lab Results   Component Value Date    LDLCALC 109.0 04/02/2019    LDLCALC 99.8 04/26/2016    LDLCALC 107.6 05/08/2015     Lab Results   Component Value Date    TRIG 95 04/02/2019    TRIG 106 04/26/2016    TRIG 92 05/08/2015     Lab Results   Component Value Date    CHOLHDL 30.1 04/02/2019    CHOLHDL 34.6 04/26/2016    CHOLHDL 34.4 05/08/2015       Chemistry        Component Value " Date/Time     04/02/2019 0827    K 4.3 04/02/2019 0827     04/02/2019 0827    CO2 27 04/02/2019 0827    BUN 17 04/02/2019 0827    CREATININE 1.1 04/02/2019 0827    GLU 89 04/02/2019 0827        Component Value Date/Time    CALCIUM 9.7 04/02/2019 0827    ALKPHOS 64 04/02/2019 0827    AST 36 04/02/2019 0827    ALT 25 04/02/2019 0827    BILITOT 0.7 04/02/2019 0827    ESTGFRAFRICA 56.4 (A) 04/02/2019 0827    EGFRNONAA 48.9 (A) 04/02/2019 0827          Lab Results   Component Value Date    TSH 1.724 05/17/2017     Lab Results   Component Value Date    INR 1.0 02/14/2017    INR 1.0 03/02/2014     Lab Results   Component Value Date    WBC 5.97 04/02/2019    HGB 13.4 04/02/2019    HCT 42.2 04/02/2019    MCV 88 04/02/2019     04/02/2019     BMP  Sodium   Date Value Ref Range Status   04/02/2019 140 136 - 145 mmol/L Final     Potassium   Date Value Ref Range Status   04/02/2019 4.3 3.5 - 5.1 mmol/L Final     Chloride   Date Value Ref Range Status   04/02/2019 105 95 - 110 mmol/L Final     CO2   Date Value Ref Range Status   04/02/2019 27 23 - 29 mmol/L Final     BUN, Bld   Date Value Ref Range Status   04/02/2019 17 8 - 23 mg/dL Final     Creatinine   Date Value Ref Range Status   04/02/2019 1.1 0.5 - 1.4 mg/dL Final     Calcium   Date Value Ref Range Status   04/02/2019 9.7 8.7 - 10.5 mg/dL Final     Anion Gap   Date Value Ref Range Status   04/02/2019 8 8 - 16 mmol/L Final     eGFR if    Date Value Ref Range Status   04/02/2019 56.4 (A) >60 mL/min/1.73 m^2 Final     eGFR if non    Date Value Ref Range Status   04/02/2019 48.9 (A) >60 mL/min/1.73 m^2 Final     Comment:     Calculation used to obtain the estimated glomerular filtration  rate (eGFR) is the CKD-EPI equation.        CrCl cannot be calculated (Patient's most recent lab result is older than the maximum 7 days allowed.).    Assessment:     1. Nonischemic cardiomyopathy    2. Essential hypertension    3. CKD  (chronic kidney disease) stage 3, GFR 30-59 ml/min    4. Chronic combined systolic and diastolic congestive heart failure    5. Obesity, Class I, BMI 30-34.9      Asymptomatic clinically. counseled about low salt diet exercise and weight loss.  Plan:   Continue current therapy  Cardiac low salt diet.  Risk factor modification and excercise program.  F/u in 6 months with lipid cmp

## 2020-02-02 RX ORDER — SPIRONOLACTONE 50 MG/1
TABLET, FILM COATED ORAL
Qty: 90 TABLET | Refills: 3 | Status: SHIPPED | OUTPATIENT
Start: 2020-02-02 | End: 2021-07-08

## 2020-02-20 ENCOUNTER — PATIENT OUTREACH (OUTPATIENT)
Dept: ADMINISTRATIVE | Facility: HOSPITAL | Age: 75
End: 2020-02-20

## 2020-03-13 ENCOUNTER — PATIENT OUTREACH (OUTPATIENT)
Dept: ADMINISTRATIVE | Facility: HOSPITAL | Age: 75
End: 2020-03-13

## 2020-03-15 RX ORDER — CANDESARTAN 32 MG/1
32 TABLET ORAL DAILY
Qty: 90 TABLET | Refills: 3 | Status: SHIPPED | OUTPATIENT
Start: 2020-03-15 | End: 2021-04-07

## 2020-03-30 ENCOUNTER — TELEPHONE (OUTPATIENT)
Dept: INTERNAL MEDICINE | Facility: CLINIC | Age: 75
End: 2020-03-30

## 2020-03-30 NOTE — TELEPHONE ENCOUNTER
----- Message from Muna Kaufman sent at 3/30/2020 12:24 PM CDT -----  Contact: [phone]  Would like to consult with the nurse, patient has some question concerning her Appt, Please call back at [phone], thanks sj

## 2020-05-04 ENCOUNTER — TELEPHONE (OUTPATIENT)
Dept: INTERNAL MEDICINE | Facility: CLINIC | Age: 75
End: 2020-05-04

## 2020-05-04 DIAGNOSIS — I10 ESSENTIAL HYPERTENSION: Primary | ICD-10-CM

## 2020-05-04 DIAGNOSIS — I42.8 NONISCHEMIC CARDIOMYOPATHY: ICD-10-CM

## 2020-05-04 DIAGNOSIS — N18.30 STAGE 3 CHRONIC KIDNEY DISEASE: ICD-10-CM

## 2020-05-04 NOTE — TELEPHONE ENCOUNTER
----- Message from Deidre Reinoso sent at 5/4/2020  2:01 PM CDT -----  Contact: Patient  Patient has a question about her upcoming appt, please call her back at [phone]. Thank you

## 2020-05-04 NOTE — TELEPHONE ENCOUNTER
Pt is requesting to have updated labs drawn. Pt has appt Thursday and would like to discuss results.     Previously drawn labs pended.     Thanks!

## 2020-05-05 ENCOUNTER — LAB VISIT (OUTPATIENT)
Dept: LAB | Facility: HOSPITAL | Age: 75
End: 2020-05-05
Attending: INTERNAL MEDICINE
Payer: MEDICARE

## 2020-05-05 DIAGNOSIS — N18.30 STAGE 3 CHRONIC KIDNEY DISEASE: ICD-10-CM

## 2020-05-05 DIAGNOSIS — I10 ESSENTIAL HYPERTENSION: ICD-10-CM

## 2020-05-05 DIAGNOSIS — I42.8 NONISCHEMIC CARDIOMYOPATHY: ICD-10-CM

## 2020-05-05 LAB
ALBUMIN SERPL BCP-MCNC: 3.6 G/DL (ref 3.5–5.2)
ALP SERPL-CCNC: 69 U/L (ref 55–135)
ALT SERPL W/O P-5'-P-CCNC: 22 U/L (ref 10–44)
ANION GAP SERPL CALC-SCNC: 9 MMOL/L (ref 8–16)
AST SERPL-CCNC: 32 U/L (ref 10–40)
BASOPHILS # BLD AUTO: 0.09 K/UL (ref 0–0.2)
BASOPHILS NFR BLD: 1.3 % (ref 0–1.9)
BILIRUB SERPL-MCNC: 0.5 MG/DL (ref 0.1–1)
BUN SERPL-MCNC: 23 MG/DL (ref 8–23)
CALCIUM SERPL-MCNC: 9.8 MG/DL (ref 8.7–10.5)
CHLORIDE SERPL-SCNC: 103 MMOL/L (ref 95–110)
CHOLEST SERPL-MCNC: 188 MG/DL (ref 120–199)
CHOLEST/HDLC SERPL: 3.1 {RATIO} (ref 2–5)
CO2 SERPL-SCNC: 27 MMOL/L (ref 23–29)
CREAT SERPL-MCNC: 1.4 MG/DL (ref 0.5–1.4)
DIFFERENTIAL METHOD: ABNORMAL
EOSINOPHIL # BLD AUTO: 0.3 K/UL (ref 0–0.5)
EOSINOPHIL NFR BLD: 3.9 % (ref 0–8)
ERYTHROCYTE [DISTWIDTH] IN BLOOD BY AUTOMATED COUNT: 14.7 % (ref 11.5–14.5)
EST. GFR  (AFRICAN AMERICAN): 41.8 ML/MIN/1.73 M^2
EST. GFR  (NON AFRICAN AMERICAN): 36.3 ML/MIN/1.73 M^2
GLUCOSE SERPL-MCNC: 93 MG/DL (ref 70–110)
HCT VFR BLD AUTO: 43.2 % (ref 37–48.5)
HDLC SERPL-MCNC: 60 MG/DL (ref 40–75)
HDLC SERPL: 31.9 % (ref 20–50)
HGB BLD-MCNC: 13 G/DL (ref 12–16)
IMM GRANULOCYTES # BLD AUTO: 0.01 K/UL (ref 0–0.04)
IMM GRANULOCYTES NFR BLD AUTO: 0.1 % (ref 0–0.5)
LDLC SERPL CALC-MCNC: 106.4 MG/DL (ref 63–159)
LYMPHOCYTES # BLD AUTO: 3.8 K/UL (ref 1–4.8)
LYMPHOCYTES NFR BLD: 55.5 % (ref 18–48)
MCH RBC QN AUTO: 27.4 PG (ref 27–31)
MCHC RBC AUTO-ENTMCNC: 30.1 G/DL (ref 32–36)
MCV RBC AUTO: 91 FL (ref 82–98)
MONOCYTES # BLD AUTO: 0.8 K/UL (ref 0.3–1)
MONOCYTES NFR BLD: 11 % (ref 4–15)
NEUTROPHILS # BLD AUTO: 1.9 K/UL (ref 1.8–7.7)
NEUTROPHILS NFR BLD: 28.2 % (ref 38–73)
NONHDLC SERPL-MCNC: 128 MG/DL
NRBC BLD-RTO: 0 /100 WBC
PLATELET # BLD AUTO: 196 K/UL (ref 150–350)
PMV BLD AUTO: 11.7 FL (ref 9.2–12.9)
POTASSIUM SERPL-SCNC: 4.6 MMOL/L (ref 3.5–5.1)
PROT SERPL-MCNC: 7.8 G/DL (ref 6–8.4)
RBC # BLD AUTO: 4.74 M/UL (ref 4–5.4)
SODIUM SERPL-SCNC: 139 MMOL/L (ref 136–145)
TRIGL SERPL-MCNC: 108 MG/DL (ref 30–150)
WBC # BLD AUTO: 6.9 K/UL (ref 3.9–12.7)

## 2020-05-05 PROCEDURE — 85025 COMPLETE CBC W/AUTO DIFF WBC: CPT

## 2020-05-05 PROCEDURE — 80053 COMPREHEN METABOLIC PANEL: CPT

## 2020-05-05 PROCEDURE — 80061 LIPID PANEL: CPT

## 2020-05-05 PROCEDURE — 36415 COLL VENOUS BLD VENIPUNCTURE: CPT | Mod: PO

## 2020-05-07 ENCOUNTER — OFFICE VISIT (OUTPATIENT)
Dept: INTERNAL MEDICINE | Facility: CLINIC | Age: 75
End: 2020-05-07
Payer: MEDICARE

## 2020-05-07 VITALS
TEMPERATURE: 97 F | DIASTOLIC BLOOD PRESSURE: 80 MMHG | HEART RATE: 76 BPM | WEIGHT: 182.56 LBS | BODY MASS INDEX: 33.6 KG/M2 | SYSTOLIC BLOOD PRESSURE: 128 MMHG | HEIGHT: 62 IN

## 2020-05-07 DIAGNOSIS — N18.30 CKD (CHRONIC KIDNEY DISEASE) STAGE 3, GFR 30-59 ML/MIN: Chronic | ICD-10-CM

## 2020-05-07 DIAGNOSIS — I42.8 NONISCHEMIC CARDIOMYOPATHY: ICD-10-CM

## 2020-05-07 DIAGNOSIS — I10 ESSENTIAL HYPERTENSION: Primary | Chronic | ICD-10-CM

## 2020-05-07 DIAGNOSIS — Z12.11 COLON CANCER SCREENING: ICD-10-CM

## 2020-05-07 PROCEDURE — 3079F DIAST BP 80-89 MM HG: CPT | Mod: CPTII,S$GLB,, | Performed by: INTERNAL MEDICINE

## 2020-05-07 PROCEDURE — 99214 PR OFFICE/OUTPT VISIT, EST, LEVL IV, 30-39 MIN: ICD-10-PCS | Mod: S$GLB,,, | Performed by: INTERNAL MEDICINE

## 2020-05-07 PROCEDURE — 1126F PR PAIN SEVERITY QUANTIFIED, NO PAIN PRESENT: ICD-10-PCS | Mod: S$GLB,,, | Performed by: INTERNAL MEDICINE

## 2020-05-07 PROCEDURE — 3074F PR MOST RECENT SYSTOLIC BLOOD PRESSURE < 130 MM HG: ICD-10-PCS | Mod: CPTII,S$GLB,, | Performed by: INTERNAL MEDICINE

## 2020-05-07 PROCEDURE — 1159F MED LIST DOCD IN RCRD: CPT | Mod: S$GLB,,, | Performed by: INTERNAL MEDICINE

## 2020-05-07 PROCEDURE — 99999 PR PBB SHADOW E&M-EST. PATIENT-LVL III: ICD-10-PCS | Mod: PBBFAC,,, | Performed by: INTERNAL MEDICINE

## 2020-05-07 PROCEDURE — 3079F PR MOST RECENT DIASTOLIC BLOOD PRESSURE 80-89 MM HG: ICD-10-PCS | Mod: CPTII,S$GLB,, | Performed by: INTERNAL MEDICINE

## 2020-05-07 PROCEDURE — 1126F AMNT PAIN NOTED NONE PRSNT: CPT | Mod: S$GLB,,, | Performed by: INTERNAL MEDICINE

## 2020-05-07 PROCEDURE — 1101F PR PT FALLS ASSESS DOC 0-1 FALLS W/OUT INJ PAST YR: ICD-10-PCS | Mod: CPTII,S$GLB,, | Performed by: INTERNAL MEDICINE

## 2020-05-07 PROCEDURE — 1159F PR MEDICATION LIST DOCUMENTED IN MEDICAL RECORD: ICD-10-PCS | Mod: S$GLB,,, | Performed by: INTERNAL MEDICINE

## 2020-05-07 PROCEDURE — 99999 PR PBB SHADOW E&M-EST. PATIENT-LVL III: CPT | Mod: PBBFAC,,, | Performed by: INTERNAL MEDICINE

## 2020-05-07 PROCEDURE — 1101F PT FALLS ASSESS-DOCD LE1/YR: CPT | Mod: CPTII,S$GLB,, | Performed by: INTERNAL MEDICINE

## 2020-05-07 PROCEDURE — 99214 OFFICE O/P EST MOD 30 MIN: CPT | Mod: S$GLB,,, | Performed by: INTERNAL MEDICINE

## 2020-05-07 PROCEDURE — 3074F SYST BP LT 130 MM HG: CPT | Mod: CPTII,S$GLB,, | Performed by: INTERNAL MEDICINE

## 2020-05-07 RX ORDER — METOPROLOL SUCCINATE 50 MG/1
50 TABLET, EXTENDED RELEASE ORAL DAILY
Qty: 180 TABLET | Refills: 3 | Status: SHIPPED | OUTPATIENT
Start: 2020-05-07 | End: 2021-08-01

## 2020-05-07 RX ORDER — KETOCONAZOLE 20 MG/G
CREAM TOPICAL DAILY
Qty: 15 G | Refills: 1 | Status: SHIPPED | OUTPATIENT
Start: 2020-05-07 | End: 2021-05-13

## 2020-05-07 NOTE — PROGRESS NOTES
"Subjective:       Patient ID: Megan Sams is a 75 y.o. female.    Chief Complaint: Follow-up    HPI The patient is a 75-year-old female presenting today for follow-up on chronic health issues.  She has a history of hypertension, CKD, nonischemic cardiomyopathy.  She indicates she has been doing pretty well.  O blood pressure standpoint her numbers continue to look good at this time she is taking her medications as prescribed without adverse effects.    She has history of CKD.  Her GFR is slightly down from prior.  She does not take anti-inflammatories.  She has limited at times as to her fluid intake due to her cardiomyopathy but she does try to drink plenty of fluids.  She has not been followed by Nephrology in the past.  We will look at in getting her set up to establish with Nephrology.    Her cardiomyopathy remained stable she has occasional rare issues with some swelling in her feet but it tends to be short lived and resolved on its own.  She continues to follow with Cardiology on a regular basis.  She notes no issues with shortness of breath orthopnea.    Review of Systems   Constitutional: Negative for chills and fever.   HENT: Negative for hearing loss.    Eyes: Negative for photophobia and visual disturbance.   Respiratory: Negative for cough, shortness of breath and wheezing.    Cardiovascular: Negative for chest pain and palpitations.   Gastrointestinal: Negative for blood in stool, constipation, nausea and vomiting.   Genitourinary: Negative for dysuria and hematuria.   Musculoskeletal: Positive for arthralgias. Negative for neck pain and neck stiffness.   Skin: Negative for rash.   Neurological: Negative for syncope, weakness, light-headedness and headaches.   Hematological: Negative for adenopathy.   Psychiatric/Behavioral: Negative for dysphoric mood. The patient is not nervous/anxious.        Objective:   /80   Pulse 76   Temp 97 °F (36.1 °C)   Ht 5' 2" (1.575 m)   Wt 82.8 kg (182 lb 8.7 " oz)   BMI 33.39 kg/m²      Physical Exam   Constitutional: She is oriented to person, place, and time. She appears well-developed and well-nourished.   HENT:   Head: Normocephalic and atraumatic.   Eyes: Pupils are equal, round, and reactive to light. EOM are normal.   Neck: Normal range of motion. Neck supple. No thyromegaly present.   Cardiovascular: Normal rate, regular rhythm and intact distal pulses. Exam reveals no gallop and no friction rub.   No murmur heard.  Pulmonary/Chest: Breath sounds normal. She has no wheezes. She has no rales. She exhibits no tenderness.   Abdominal: Soft. Bowel sounds are normal. She exhibits no distension and no mass. There is no tenderness. There is no rebound and no guarding.   Musculoskeletal: She exhibits no edema.   Lymphadenopathy:     She has no cervical adenopathy.   Neurological: She is alert and oriented to person, place, and time. She has normal reflexes. She displays normal reflexes. No cranial nerve deficit.   Skin: Skin is warm and dry. No rash noted.   Psychiatric: She has a normal mood and affect. Her behavior is normal. Judgment normal.   Vitals reviewed.      Lab Visit on 05/05/2020   Component Date Value    Cholesterol 05/05/2020 188     Triglycerides 05/05/2020 108     HDL 05/05/2020 60     LDL Cholesterol 05/05/2020 106.4     Hdl/Cholesterol Ratio 05/05/2020 31.9     Total Cholesterol/HDL Ra* 05/05/2020 3.1     Non-HDL Cholesterol 05/05/2020 128     WBC 05/05/2020 6.90     RBC 05/05/2020 4.74     Hemoglobin 05/05/2020 13.0     Hematocrit 05/05/2020 43.2     Mean Corpuscular Volume 05/05/2020 91     Mean Corpuscular Hemoglo* 05/05/2020 27.4     Mean Corpuscular Hemoglo* 05/05/2020 30.1*    RDW 05/05/2020 14.7*    Platelets 05/05/2020 196     MPV 05/05/2020 11.7     Immature Granulocytes 05/05/2020 0.1     Gran # (ANC) 05/05/2020 1.9     Immature Grans (Abs) 05/05/2020 0.01     Lymph # 05/05/2020 3.8     Mono # 05/05/2020 0.8     Eos #  05/05/2020 0.3     Baso # 05/05/2020 0.09     nRBC 05/05/2020 0     Gran% 05/05/2020 28.2*    Lymph% 05/05/2020 55.5*    Mono% 05/05/2020 11.0     Eosinophil% 05/05/2020 3.9     Basophil% 05/05/2020 1.3     Differential Method 05/05/2020 Automated     Sodium 05/05/2020 139     Potassium 05/05/2020 4.6     Chloride 05/05/2020 103     CO2 05/05/2020 27     Glucose 05/05/2020 93     BUN, Bld 05/05/2020 23     Creatinine 05/05/2020 1.4     Calcium 05/05/2020 9.8     Total Protein 05/05/2020 7.8     Albumin 05/05/2020 3.6     Total Bilirubin 05/05/2020 0.5     Alkaline Phosphatase 05/05/2020 69     AST 05/05/2020 32     ALT 05/05/2020 22     Anion Gap 05/05/2020 9     eGFR if  05/05/2020 41.8*    eGFR if non African Amer* 05/05/2020 36.3*       Assessment:       1. Essential hypertension    2. CKD (chronic kidney disease) stage 3, GFR 30-59 ml/min    3. Nonischemic cardiomyopathy    4. Colon cancer screening        Plan:   Essential hypertension  Blood pressure is under good control.  We will continue the current regimen.  Will work on regular aerobic exercise and a low salt diet.        CKD (chronic kidney disease) stage 3, GFR 30-59 ml/min  Avoid nsaids (ibuprofen, motrin, aleve,etc.), drink plenty of water.      Essential hypertension    CKD (chronic kidney disease) stage 3, GFR 30-59 ml/min  -     Ambulatory referral/consult to Nephrology; Future; Expected date: 05/14/2020    Nonischemic cardiomyopathy  Comments:  Stable over time.Continue current meds. Follow up with cardiology regularly.    Colon cancer screening  -     Fecal Immunochemical Test (iFOBT); Future; Expected date: 05/07/2020    Other orders  -     metoprolol succinate (TOPROL-XL) 50 MG 24 hr tablet; Take 1 tablet (50 mg total) by mouth once daily.  Dispense: 180 tablet; Refill: 3  -     ketoconazole (NIZORAL) 2 % cream; Apply topically once daily. Cleanse and dry navel as directed, then apply small amount of  cream in and around naval once daily for 14 days  Dispense: 15 g; Refill: 1          Follow up in about 6 months (around 11/7/2020).

## 2020-05-19 ENCOUNTER — LAB VISIT (OUTPATIENT)
Dept: LAB | Facility: HOSPITAL | Age: 75
End: 2020-05-19
Attending: INTERNAL MEDICINE
Payer: MEDICARE

## 2020-05-19 DIAGNOSIS — Z12.11 COLON CANCER SCREENING: ICD-10-CM

## 2020-05-19 PROCEDURE — 82274 ASSAY TEST FOR BLOOD FECAL: CPT

## 2020-05-28 LAB — HEMOCCULT STL QL IA: NEGATIVE

## 2020-09-08 ENCOUNTER — PATIENT OUTREACH (OUTPATIENT)
Dept: ADMINISTRATIVE | Facility: OTHER | Age: 75
End: 2020-09-08

## 2020-09-09 ENCOUNTER — LAB VISIT (OUTPATIENT)
Dept: LAB | Facility: HOSPITAL | Age: 75
End: 2020-09-09
Attending: INTERNAL MEDICINE
Payer: MEDICARE

## 2020-09-09 ENCOUNTER — OFFICE VISIT (OUTPATIENT)
Dept: CARDIOLOGY | Facility: CLINIC | Age: 75
End: 2020-09-09
Payer: MEDICARE

## 2020-09-09 VITALS
SYSTOLIC BLOOD PRESSURE: 124 MMHG | OXYGEN SATURATION: 97 % | BODY MASS INDEX: 33.51 KG/M2 | HEART RATE: 66 BPM | DIASTOLIC BLOOD PRESSURE: 72 MMHG | WEIGHT: 183.19 LBS

## 2020-09-09 DIAGNOSIS — I42.8 NONISCHEMIC CARDIOMYOPATHY: Primary | ICD-10-CM

## 2020-09-09 DIAGNOSIS — L30.4 INTERTRIGO: ICD-10-CM

## 2020-09-09 DIAGNOSIS — E66.9 OBESITY, CLASS I, BMI 30-34.9: ICD-10-CM

## 2020-09-09 DIAGNOSIS — I42.8 NONISCHEMIC CARDIOMYOPATHY: ICD-10-CM

## 2020-09-09 DIAGNOSIS — M19.90 ARTHRITIS: ICD-10-CM

## 2020-09-09 DIAGNOSIS — I50.42 CHRONIC COMBINED SYSTOLIC AND DIASTOLIC CONGESTIVE HEART FAILURE: ICD-10-CM

## 2020-09-09 DIAGNOSIS — N18.30 CKD (CHRONIC KIDNEY DISEASE) STAGE 3, GFR 30-59 ML/MIN: Chronic | ICD-10-CM

## 2020-09-09 DIAGNOSIS — I10 ESSENTIAL HYPERTENSION: Chronic | ICD-10-CM

## 2020-09-09 PROCEDURE — 99999 PR PBB SHADOW E&M-EST. PATIENT-LVL III: CPT | Mod: PBBFAC,,, | Performed by: INTERNAL MEDICINE

## 2020-09-09 PROCEDURE — 99214 OFFICE O/P EST MOD 30 MIN: CPT | Mod: S$GLB,,, | Performed by: INTERNAL MEDICINE

## 2020-09-09 PROCEDURE — 82306 VITAMIN D 25 HYDROXY: CPT

## 2020-09-09 PROCEDURE — 1159F PR MEDICATION LIST DOCUMENTED IN MEDICAL RECORD: ICD-10-PCS | Mod: S$GLB,,, | Performed by: INTERNAL MEDICINE

## 2020-09-09 PROCEDURE — 3074F SYST BP LT 130 MM HG: CPT | Mod: CPTII,S$GLB,, | Performed by: INTERNAL MEDICINE

## 2020-09-09 PROCEDURE — 99499 UNLISTED E&M SERVICE: CPT | Mod: S$GLB,,, | Performed by: INTERNAL MEDICINE

## 2020-09-09 PROCEDURE — 1101F PT FALLS ASSESS-DOCD LE1/YR: CPT | Mod: CPTII,S$GLB,, | Performed by: INTERNAL MEDICINE

## 2020-09-09 PROCEDURE — 99214 PR OFFICE/OUTPT VISIT, EST, LEVL IV, 30-39 MIN: ICD-10-PCS | Mod: S$GLB,,, | Performed by: INTERNAL MEDICINE

## 2020-09-09 PROCEDURE — 84443 ASSAY THYROID STIM HORMONE: CPT

## 2020-09-09 PROCEDURE — 3078F PR MOST RECENT DIASTOLIC BLOOD PRESSURE < 80 MM HG: ICD-10-PCS | Mod: CPTII,S$GLB,, | Performed by: INTERNAL MEDICINE

## 2020-09-09 PROCEDURE — 1101F PR PT FALLS ASSESS DOC 0-1 FALLS W/OUT INJ PAST YR: ICD-10-PCS | Mod: CPTII,S$GLB,, | Performed by: INTERNAL MEDICINE

## 2020-09-09 PROCEDURE — 99499 RISK ADDL DX/OHS AUDIT: ICD-10-PCS | Mod: S$GLB,,, | Performed by: INTERNAL MEDICINE

## 2020-09-09 PROCEDURE — 3078F DIAST BP <80 MM HG: CPT | Mod: CPTII,S$GLB,, | Performed by: INTERNAL MEDICINE

## 2020-09-09 PROCEDURE — 3074F PR MOST RECENT SYSTOLIC BLOOD PRESSURE < 130 MM HG: ICD-10-PCS | Mod: CPTII,S$GLB,, | Performed by: INTERNAL MEDICINE

## 2020-09-09 PROCEDURE — 36415 COLL VENOUS BLD VENIPUNCTURE: CPT

## 2020-09-09 PROCEDURE — 1159F MED LIST DOCD IN RCRD: CPT | Mod: S$GLB,,, | Performed by: INTERNAL MEDICINE

## 2020-09-09 PROCEDURE — 99999 PR PBB SHADOW E&M-EST. PATIENT-LVL III: ICD-10-PCS | Mod: PBBFAC,,, | Performed by: INTERNAL MEDICINE

## 2020-09-09 NOTE — PROGRESS NOTES
Subjective:   Patient ID:  Megan Sams is a 75 y.o. female who presents for follow up of Follow-up      HPI  A 76 yo female with dilated cardiomyopathy htn ckd is here frof /u she ahs been compliant with diet and meds. Has no palpitation no chf has good exercise tolerance has no angina minimal leg swelling at the end of tehd ay none in the morning. Has no headaches blurred vision. No syncope near syncope no tia  Her bop is well controlled by review   Hr is the 50s 60s. Feels tired at times. Has good exercise tolerance goes to [employer] walks leaning on a cart. her weight is unchanged.   Past Medical History:   Diagnosis Date    Arthritis     Blood transfusion     CHF (congestive heart failure)     Chronic kidney disease     Depression     Hypertension        Past Surgical History:   Procedure Laterality Date    HYSTERECTOMY         Social History     Tobacco Use    Smoking status: Never Smoker    Smokeless tobacco: Never Used   Substance Use Topics    Alcohol use: No     Alcohol/week: 0.0 standard drinks    Drug use: No       Family History   Problem Relation Age of Onset    Early death Mother     Heart disease Mother     Hypertension Mother     Kidney disease Father     Heart disease Brother        Current Outpatient Medications   Medication Sig    acetaminophen (TYLENOL EXTRA STRENGTH) 500 MG tablet Take 500 mg by mouth every 6 (six) hours as needed.    allopurinol (ZYLOPRIM) 100 MG tablet TAKE 2 TABLETS (200 MG TOTAL) BY MOUTH ONCE DAILY.    aspirin (ECOTRIN) 81 MG EC tablet Take 81 mg by mouth once daily.    candesartan (ATACAND) 32 MG tablet TAKE 1 TABLET (32 MG TOTAL) BY MOUTH ONCE DAILY.    ketoconazole (NIZORAL) 2 % cream Apply topically once daily. Cleanse and dry navel as directed, then apply small amount of cream in and around naval once daily for 14 days    metoprolol succinate (TOPROL-XL) 50 MG 24 hr tablet Take 1 tablet (50 mg total) by mouth once daily.    MULTIVIT WITH  CALCIUM,IRON,MIN (MULTIPLE VITAMIN, WOMENS ORAL) Take by mouth once daily.    salicylic acid 40 % Plst Apply 1 patch topically every other day.    spironolactone (ALDACTONE) 50 MG tablet TAKE 1 TABLET BY MOUTH EVERY DAY     No current facility-administered medications for this visit.      Current Outpatient Medications on File Prior to Visit   Medication Sig    acetaminophen (TYLENOL EXTRA STRENGTH) 500 MG tablet Take 500 mg by mouth every 6 (six) hours as needed.    allopurinol (ZYLOPRIM) 100 MG tablet TAKE 2 TABLETS (200 MG TOTAL) BY MOUTH ONCE DAILY.    aspirin (ECOTRIN) 81 MG EC tablet Take 81 mg by mouth once daily.    candesartan (ATACAND) 32 MG tablet TAKE 1 TABLET (32 MG TOTAL) BY MOUTH ONCE DAILY.    ketoconazole (NIZORAL) 2 % cream Apply topically once daily. Cleanse and dry navel as directed, then apply small amount of cream in and around naval once daily for 14 days    metoprolol succinate (TOPROL-XL) 50 MG 24 hr tablet Take 1 tablet (50 mg total) by mouth once daily.    MULTIVIT WITH CALCIUM,IRON,MIN (MULTIPLE VITAMIN, WOMENS ORAL) Take by mouth once daily.    salicylic acid 40 % Plst Apply 1 patch topically every other day.    spironolactone (ALDACTONE) 50 MG tablet TAKE 1 TABLET BY MOUTH EVERY DAY     No current facility-administered medications on file prior to visit.      Review of patient's allergies indicates:   Allergen Reactions    Codeine Other (See Comments)     Hallucinations    Doxycycline monohydrate Nausea Only     Elevated heart rate    Gabapentin Other (See Comments)     Lowered heart rate    Lanoxin [digoxin] Other (See Comments)     Too low heart rate    Thorazine [chlorpromazine] Other (See Comments)     hallucinations    Ultracet [tramadol-acetaminophen] Other (See Comments)     Elevated BP    Penicillins Nausea Only and Rash     Review of Systems   Constitution: Positive for malaise/fatigue. Negative for diaphoresis and weight gain.   HENT: Negative for hoarse  voice.    Eyes: Negative for double vision and visual disturbance.   Cardiovascular: Negative for chest pain, claudication, cyanosis, dyspnea on exertion, irregular heartbeat, leg swelling, near-syncope, orthopnea, palpitations, paroxysmal nocturnal dyspnea and syncope.   Respiratory: Negative for cough, hemoptysis, shortness of breath and snoring.    Hematologic/Lymphatic: Negative for bleeding problem. Does not bruise/bleed easily.   Skin: Negative for color change and poor wound healing.   Musculoskeletal: Negative for muscle cramps, muscle weakness and myalgias.   Gastrointestinal: Negative for bloating, abdominal pain, change in bowel habit, diarrhea, heartburn, hematemesis, hematochezia, melena and nausea.   Neurological: Negative for excessive daytime sleepiness, dizziness, headaches, light-headedness, loss of balance, numbness and weakness.   Psychiatric/Behavioral: Negative for memory loss. The patient does not have insomnia.    Allergic/Immunologic: Negative for hives.       Objective:   Physical Exam   Constitutional: She is oriented to person, place, and time. She appears well-developed and well-nourished. She does not appear ill. No distress.   HENT:   Head: Normocephalic and atraumatic.   Eyes: Pupils are equal, round, and reactive to light. EOM are normal. No scleral icterus.   Neck: Normal range of motion. Neck supple. Normal carotid pulses, no hepatojugular reflux and no JVD present. Carotid bruit is not present. No tracheal deviation present. No thyromegaly present.   Cardiovascular: Normal rate, regular rhythm, normal heart sounds, intact distal pulses and normal pulses. Exam reveals no gallop and no friction rub.   No murmur heard.  Pulmonary/Chest: Effort normal and breath sounds normal. No respiratory distress. She has no wheezes. She has no rhonchi. She has no rales. She exhibits no tenderness.   Abdominal: Soft. Normal appearance, normal aorta and bowel sounds are normal. She exhibits no  distension, no abdominal bruit, no ascites and no pulsatile midline mass. There is no hepatomegaly. There is no abdominal tenderness.   Obese    Musculoskeletal:         General: No edema.      Right shoulder: She exhibits no deformity.   Neurological: She is alert and oriented to person, place, and time. She has normal strength. No cranial nerve deficit. Coordination normal.   Skin: Skin is warm and dry. No rash noted. She is not diaphoretic. No cyanosis or erythema. Nails show no clubbing.   Psychiatric: She has a normal mood and affect. Her speech is normal and behavior is normal.   Nursing note and vitals reviewed.    Vitals:    09/09/20 1256 09/09/20 1257   BP: 120/82 124/72   BP Location: Left arm Right arm   Patient Position: Sitting Sitting   BP Method: Medium (Manual) Medium (Manual)   Pulse: 66    SpO2: 97%    Weight: 83.1 kg (183 lb 3.2 oz)      Lab Results   Component Value Date    CHOL 188 05/05/2020    CHOL 183 04/02/2019    CHOL 185 04/26/2016     Lab Results   Component Value Date    HDL 60 05/05/2020    HDL 55 04/02/2019    HDL 64 04/26/2016     Lab Results   Component Value Date    LDLCALC 106.4 05/05/2020    LDLCALC 109.0 04/02/2019    LDLCALC 99.8 04/26/2016     Lab Results   Component Value Date    TRIG 108 05/05/2020    TRIG 95 04/02/2019    TRIG 106 04/26/2016     Lab Results   Component Value Date    CHOLHDL 31.9 05/05/2020    CHOLHDL 30.1 04/02/2019    CHOLHDL 34.6 04/26/2016       Chemistry        Component Value Date/Time     05/05/2020 0808    K 4.6 05/05/2020 0808     05/05/2020 0808    CO2 27 05/05/2020 0808    BUN 23 05/05/2020 0808    CREATININE 1.4 05/05/2020 0808    GLU 93 05/05/2020 0808        Component Value Date/Time    CALCIUM 9.8 05/05/2020 0808    ALKPHOS 69 05/05/2020 0808    AST 32 05/05/2020 0808    ALT 22 05/05/2020 0808    BILITOT 0.5 05/05/2020 0808    ESTGFRAFRICA 41.8 (A) 05/05/2020 0808    EGFRNONAA 36.3 (A) 05/05/2020 0808          Lab Results    Component Value Date    TSH 1.724 05/17/2017     Lab Results   Component Value Date    INR 1.0 02/14/2017    INR 1.0 03/02/2014     Lab Results   Component Value Date    WBC 6.90 05/05/2020    HGB 13.0 05/05/2020    HCT 43.2 05/05/2020    MCV 91 05/05/2020     05/05/2020     BMP  Sodium   Date Value Ref Range Status   05/05/2020 139 136 - 145 mmol/L Final     Potassium   Date Value Ref Range Status   05/05/2020 4.6 3.5 - 5.1 mmol/L Final     Chloride   Date Value Ref Range Status   05/05/2020 103 95 - 110 mmol/L Final     CO2   Date Value Ref Range Status   05/05/2020 27 23 - 29 mmol/L Final     BUN, Bld   Date Value Ref Range Status   05/05/2020 23 8 - 23 mg/dL Final     Creatinine   Date Value Ref Range Status   05/05/2020 1.4 0.5 - 1.4 mg/dL Final     Calcium   Date Value Ref Range Status   05/05/2020 9.8 8.7 - 10.5 mg/dL Final     Anion Gap   Date Value Ref Range Status   05/05/2020 9 8 - 16 mmol/L Final     eGFR if    Date Value Ref Range Status   05/05/2020 41.8 (A) >60 mL/min/1.73 m^2 Final     eGFR if non    Date Value Ref Range Status   05/05/2020 36.3 (A) >60 mL/min/1.73 m^2 Final     Comment:     Calculation used to obtain the estimated glomerular filtration  rate (eGFR) is the CKD-EPI equation.        CrCl cannot be calculated (Patient's most recent lab result is older than the maximum 7 days allowed.).    Assessment:     1. Nonischemic cardiomyopathy    2. Essential hypertension    3. CKD (chronic kidney disease) stage 3, GFR 30-59 ml/min    4. Arthritis    5. Chronic combined systolic and diastolic congestive heart failure    6. Obesity, Class I, BMI 30-34.9    7. Intertrigo      Has malaise fatigue needs thyroid and vit d checked.needs holter to r/o bradycardia or high grade block.  nicm with good functional capacity no limitation  htn good control   Plan:     Vit d tsh holter echo   Continue current therapy  Cardiac low salt diet.  Risk factor modification and  excercise program.  F/u in 6 months

## 2020-09-10 ENCOUNTER — TELEPHONE (OUTPATIENT)
Dept: CARDIOLOGY | Facility: CLINIC | Age: 75
End: 2020-09-10

## 2020-09-10 LAB
25(OH)D3+25(OH)D2 SERPL-MCNC: 38 NG/ML (ref 30–96)
TSH SERPL DL<=0.005 MIU/L-ACNC: 1.92 UIU/ML (ref 0.4–4)

## 2020-09-10 NOTE — TELEPHONE ENCOUNTER
Pt notified of her tsh and vit d results no questions at this time pb    ----- Message from Alyssa Gilliland MD sent at 9/10/2020 11:21 AM CDT -----  Normal vit d

## 2020-10-02 ENCOUNTER — HOSPITAL ENCOUNTER (OUTPATIENT)
Dept: CARDIOLOGY | Facility: HOSPITAL | Age: 75
Discharge: HOME | End: 2020-10-02
Attending: INTERNAL MEDICINE
Payer: MEDICARE

## 2020-10-02 VITALS — WEIGHT: 183 LBS | HEIGHT: 62 IN | BODY MASS INDEX: 33.68 KG/M2

## 2020-10-02 DIAGNOSIS — I42.8 NONISCHEMIC CARDIOMYOPATHY: ICD-10-CM

## 2020-10-02 DIAGNOSIS — I50.42 CHRONIC COMBINED SYSTOLIC AND DIASTOLIC CONGESTIVE HEART FAILURE: ICD-10-CM

## 2020-10-02 LAB
AORTIC ROOT ANNULUS: 2.57 CM
AV INDEX (PROSTH): 0.62
AV MEAN GRADIENT: 3 MMHG
AV PEAK GRADIENT: 5 MMHG
AV VALVE AREA: 1.93 CM2
AV VELOCITY RATIO: 0.64
BSA FOR ECHO PROCEDURE: 1.91 M2
CV ECHO LV RWT: 0.57 CM
DOP CALC AO PEAK VEL: 1.15 M/S
DOP CALC AO VTI: 30.38 CM
DOP CALC LVOT AREA: 3.1 CM2
DOP CALC LVOT DIAMETER: 2 CM
DOP CALC LVOT PEAK VEL: 0.74 M/S
DOP CALC LVOT STROKE VOLUME: 58.69 CM3
DOP CALC RVOT PEAK VEL: 0.5 M/S
DOP CALC RVOT VTI: 14.44 CM
DOP CALCLVOT PEAK VEL VTI: 18.69 CM
E WAVE DECELERATION TIME: 482 MSEC
E/A RATIO: 0.86
E/E' RATIO: 7.86 M/S
ECHO LV POSTERIOR WALL: 1.34 CM (ref 0.6–1.1)
FRACTIONAL SHORTENING: 34 % (ref 28–44)
INTERVENTRICULAR SEPTUM: 1.34 CM (ref 0.6–1.1)
IVRT: 79.92 MSEC
LA MAJOR: 3.88 CM
LA MINOR: 4.11 CM
LA WIDTH: 3.62 CM
LEFT ATRIUM SIZE: 3.47 CM
LEFT ATRIUM VOLUME INDEX: 23.2 ML/M2
LEFT ATRIUM VOLUME: 42.62 CM3
LEFT INTERNAL DIMENSION IN SYSTOLE: 3.09 CM (ref 2.1–4)
LEFT VENTRICLE DIASTOLIC VOLUME INDEX: 55.85 ML/M2
LEFT VENTRICLE DIASTOLIC VOLUME: 102.8 ML
LEFT VENTRICLE MASS INDEX: 136 G/M2
LEFT VENTRICLE SYSTOLIC VOLUME INDEX: 20.4 ML/M2
LEFT VENTRICLE SYSTOLIC VOLUME: 37.6 ML
LEFT VENTRICULAR INTERNAL DIMENSION IN DIASTOLE: 4.71 CM (ref 3.5–6)
LEFT VENTRICULAR MASS: 249.45 G
LV LATERAL E/E' RATIO: 7.86 M/S
LV SEPTAL E/E' RATIO: 7.86 M/S
MV PEAK A VEL: 0.64 M/S
MV PEAK E VEL: 0.55 M/S
PISA TR MAX VEL: 2.87 M/S
PULM VEIN S/D RATIO: 1.45
PV MEAN GRADIENT: 0.64 MMHG
PV PEAK D VEL: 0.38 M/S
PV PEAK S VEL: 0.55 M/S
PV PEAK VELOCITY: 0.79 CM/S
RA MAJOR: 3.53 CM
RA PRESSURE: 3 MMHG
RA WIDTH: 3.25 CM
RIGHT VENTRICULAR END-DIASTOLIC DIMENSION: 2.51 CM
SINUS: 2.26 CM
STJ: 2.05 CM
TDI LATERAL: 0.07 M/S
TDI SEPTAL: 0.07 M/S
TDI: 0.07 M/S
TR MAX PG: 33 MMHG
TRICUSPID ANNULAR PLANE SYSTOLIC EXCURSION: 1.96 CM
TV REST PULMONARY ARTERY PRESSURE: 36 MMHG

## 2020-10-02 PROCEDURE — 93227 XTRNL ECG REC<48 HR R&I: CPT | Mod: ,,, | Performed by: INTERNAL MEDICINE

## 2020-10-02 PROCEDURE — 93226 XTRNL ECG REC<48 HR SCAN A/R: CPT

## 2020-10-02 PROCEDURE — 93306 TTE W/DOPPLER COMPLETE: CPT

## 2020-10-02 PROCEDURE — 93227 HOLTER MONITOR - 48 HOUR (CUPID ONLY): ICD-10-PCS | Mod: ,,, | Performed by: INTERNAL MEDICINE

## 2020-10-02 PROCEDURE — 93306 ECHO (CUPID ONLY): ICD-10-PCS | Mod: 26,,, | Performed by: INTERNAL MEDICINE

## 2020-10-02 PROCEDURE — 93306 TTE W/DOPPLER COMPLETE: CPT | Mod: 26,,, | Performed by: INTERNAL MEDICINE

## 2020-10-05 ENCOUNTER — TELEPHONE (OUTPATIENT)
Dept: CARDIOLOGY | Facility: CLINIC | Age: 75
End: 2020-10-05

## 2020-10-05 LAB
OHS CV EVENT MONITOR DAY: 0
OHS CV HOLTER LENGTH DECIMAL HOURS: 48
OHS CV HOLTER LENGTH HOURS: 48
OHS CV HOLTER LENGTH MINUTES: 0

## 2020-10-05 NOTE — TELEPHONE ENCOUNTER
Patient was notified of results. All questions were answered. Pt verbalized understanding. Pt will call back with any other questions or concerns.  Appt scheduled for 10/14/20 at [facility] to see Dr. Gilliland.    ----- Message from Alyssa Gilliland MD sent at 10/5/2020  8:46 AM CDT -----  TSH VIT D IS OK   HEART FUNCTION  IS 80% OF NORMAL.   SHE NEEDS TO HAVE A F/U APPOINTMENT AFTER THE HOLTER.

## 2020-10-07 ENCOUNTER — OFFICE VISIT (OUTPATIENT)
Dept: INTERNAL MEDICINE | Facility: CLINIC | Age: 75
End: 2020-10-07
Payer: MEDICARE

## 2020-10-07 VITALS
HEIGHT: 62 IN | BODY MASS INDEX: 34.12 KG/M2 | SYSTOLIC BLOOD PRESSURE: 130 MMHG | HEART RATE: 64 BPM | WEIGHT: 185.44 LBS | TEMPERATURE: 98 F | DIASTOLIC BLOOD PRESSURE: 82 MMHG

## 2020-10-07 DIAGNOSIS — B35.1 ONYCHOMYCOSIS: Primary | ICD-10-CM

## 2020-10-07 PROCEDURE — 90694 FLU VACCINE - QUADRIVALENT - ADJUVANTED: ICD-10-PCS | Mod: S$GLB,,, | Performed by: PHYSICIAN ASSISTANT

## 2020-10-07 PROCEDURE — 1101F PR PT FALLS ASSESS DOC 0-1 FALLS W/OUT INJ PAST YR: ICD-10-PCS | Mod: CPTII,S$GLB,, | Performed by: PHYSICIAN ASSISTANT

## 2020-10-07 PROCEDURE — 99212 PR OFFICE/OUTPT VISIT, EST, LEVL II, 10-19 MIN: ICD-10-PCS | Mod: 25,S$GLB,, | Performed by: PHYSICIAN ASSISTANT

## 2020-10-07 PROCEDURE — 3079F PR MOST RECENT DIASTOLIC BLOOD PRESSURE 80-89 MM HG: ICD-10-PCS | Mod: CPTII,S$GLB,, | Performed by: PHYSICIAN ASSISTANT

## 2020-10-07 PROCEDURE — 99212 OFFICE O/P EST SF 10 MIN: CPT | Mod: 25,S$GLB,, | Performed by: PHYSICIAN ASSISTANT

## 2020-10-07 PROCEDURE — 1126F PR PAIN SEVERITY QUANTIFIED, NO PAIN PRESENT: ICD-10-PCS | Mod: S$GLB,,, | Performed by: PHYSICIAN ASSISTANT

## 2020-10-07 PROCEDURE — 3075F PR MOST RECENT SYSTOLIC BLOOD PRESS GE 130-139MM HG: ICD-10-PCS | Mod: CPTII,S$GLB,, | Performed by: PHYSICIAN ASSISTANT

## 2020-10-07 PROCEDURE — 1159F MED LIST DOCD IN RCRD: CPT | Mod: S$GLB,,, | Performed by: PHYSICIAN ASSISTANT

## 2020-10-07 PROCEDURE — 99999 PR PBB SHADOW E&M-EST. PATIENT-LVL IV: CPT | Mod: PBBFAC,,, | Performed by: PHYSICIAN ASSISTANT

## 2020-10-07 PROCEDURE — 1126F AMNT PAIN NOTED NONE PRSNT: CPT | Mod: S$GLB,,, | Performed by: PHYSICIAN ASSISTANT

## 2020-10-07 PROCEDURE — 3079F DIAST BP 80-89 MM HG: CPT | Mod: CPTII,S$GLB,, | Performed by: PHYSICIAN ASSISTANT

## 2020-10-07 PROCEDURE — 3075F SYST BP GE 130 - 139MM HG: CPT | Mod: CPTII,S$GLB,, | Performed by: PHYSICIAN ASSISTANT

## 2020-10-07 PROCEDURE — 1101F PT FALLS ASSESS-DOCD LE1/YR: CPT | Mod: CPTII,S$GLB,, | Performed by: PHYSICIAN ASSISTANT

## 2020-10-07 PROCEDURE — 1159F PR MEDICATION LIST DOCUMENTED IN MEDICAL RECORD: ICD-10-PCS | Mod: S$GLB,,, | Performed by: PHYSICIAN ASSISTANT

## 2020-10-07 PROCEDURE — G0008 FLU VACCINE - QUADRIVALENT - ADJUVANTED: ICD-10-PCS | Mod: S$GLB,,, | Performed by: PHYSICIAN ASSISTANT

## 2020-10-07 PROCEDURE — G0008 ADMIN INFLUENZA VIRUS VAC: HCPCS | Mod: S$GLB,,, | Performed by: PHYSICIAN ASSISTANT

## 2020-10-07 PROCEDURE — 99999 PR PBB SHADOW E&M-EST. PATIENT-LVL IV: ICD-10-PCS | Mod: PBBFAC,,, | Performed by: PHYSICIAN ASSISTANT

## 2020-10-07 PROCEDURE — 90694 VACC AIIV4 NO PRSRV 0.5ML IM: CPT | Mod: S$GLB,,, | Performed by: PHYSICIAN ASSISTANT

## 2020-10-07 NOTE — PROGRESS NOTES
Subjective:       Patient ID: Megan Sams is a 75 y.o. female.    Chief Complaint: Nail Problem    HPI  Patient comes in today for flu shot and nail issue    Has this ongoing toenail issue   Nails are hard and cracking   This is chronic but getting worse and causing pain when she has her feet in her shoes.  She is not diabetic    She does have CHF and hypertension   Seeing Darshana next week   Hoping we can get an appt with podiatry same day she is seeing cards to make it easier on her  Health Maintenance Due   Topic Date Due    Hepatitis C Screening  1945    Shingles Vaccine (1 of 2) 01/16/1993    DEXA SCAN  10/24/2018    Influenza Vaccine (1) 08/01/2020       Past Medical History:   Diagnosis Date    Arthritis     Blood transfusion     CHF (congestive heart failure)     Chronic kidney disease     Depression     Hypertension        Current Outpatient Medications   Medication Sig Dispense Refill    acetaminophen (TYLENOL EXTRA STRENGTH) 500 MG tablet Take 500 mg by mouth every 6 (six) hours as needed.      allopurinol (ZYLOPRIM) 100 MG tablet TAKE 2 TABLETS (200 MG TOTAL) BY MOUTH ONCE DAILY. 180 tablet 4    aspirin (ECOTRIN) 81 MG EC tablet Take 81 mg by mouth once daily.      candesartan (ATACAND) 32 MG tablet TAKE 1 TABLET (32 MG TOTAL) BY MOUTH ONCE DAILY. 90 tablet 3    ketoconazole (NIZORAL) 2 % cream Apply topically once daily. Cleanse and dry navel as directed, then apply small amount of cream in and around naval once daily for 14 days 15 g 1    metoprolol succinate (TOPROL-XL) 50 MG 24 hr tablet Take 1 tablet (50 mg total) by mouth once daily. 180 tablet 3    MULTIVIT WITH CALCIUM,IRON,MIN (MULTIPLE VITAMIN, WOMENS ORAL) Take by mouth once daily.      salicylic acid 40 % Plst Apply 1 patch topically every other day. 18 each 0    spironolactone (ALDACTONE) 50 MG tablet TAKE 1 TABLET BY MOUTH EVERY DAY 90 tablet 3     No current facility-administered medications for this visit.   "      Review of Systems   Constitutional: Negative for fatigue, fever and unexpected weight change.   HENT: Negative for sore throat and trouble swallowing.    Respiratory: Negative for cough and shortness of breath.    Cardiovascular: Negative for chest pain.   Gastrointestinal: Negative for abdominal pain.   Skin: Negative for color change, pallor, rash and wound.        Nail issue    Hematological: Negative for adenopathy. Does not bruise/bleed easily.   All other systems reviewed and are negative.      Objective:   /82   Pulse 64   Temp 98.1 °F (36.7 °C) (Temporal)   Ht 5' 2" (1.575 m)   Wt 84.1 kg (185 lb 6.5 oz)   BMI 33.91 kg/m²      Physical Exam  Constitutional:       Appearance: Normal appearance. She is normal weight.   Pulmonary:      Effort: Pulmonary effort is normal.   Feet:      Right foot:      Toenail Condition: Right toenails are abnormally thick and long. Fungal disease present.     Left foot:      Toenail Condition: Left toenails are abnormally thick and long. Fungal disease present.  Neurological:      General: No focal deficit present.      Mental Status: She is alert and oriented to person, place, and time.           Lab Results   Component Value Date    WBC 6.90 05/05/2020    HGB 13.0 05/05/2020    HCT 43.2 05/05/2020     05/05/2020    CHOL 188 05/05/2020    TRIG 108 05/05/2020    HDL 60 05/05/2020    ALT 22 05/05/2020    AST 32 05/05/2020     05/05/2020    K 4.6 05/05/2020     05/05/2020    CREATININE 1.4 05/05/2020    BUN 23 05/05/2020    CO2 27 05/05/2020    TSH 1.922 09/09/2020    INR 1.0 02/14/2017    HGBA1C 5.6 04/02/2019       Assessment:       1. Onychomycosis        Plan:   Onychomycosis  -     Ambulatory referral/consult to Podiatry; Future; Expected date: 10/14/2020    Other orders  -     Influenza - Quadrivalent (Adjuvanted)        No follow-ups on file.        "

## 2020-10-12 ENCOUNTER — TELEPHONE (OUTPATIENT)
Dept: CARDIOLOGY | Facility: CLINIC | Age: 75
End: 2020-10-12

## 2020-10-12 NOTE — TELEPHONE ENCOUNTER
MD STAR Horn Staff             Has a lot of skipped beats but will not make any change in therapy      Patient was notified of results. All questions were answered. Pt verbalized understanding. Pt will call back with any other questions or concerns.

## 2020-10-16 ENCOUNTER — OFFICE VISIT (OUTPATIENT)
Dept: CARDIOLOGY | Facility: CLINIC | Age: 75
End: 2020-10-16
Payer: MEDICARE

## 2020-10-16 ENCOUNTER — OFFICE VISIT (OUTPATIENT)
Dept: PODIATRY | Facility: CLINIC | Age: 75
End: 2020-10-16
Payer: MEDICARE

## 2020-10-16 VITALS
BODY MASS INDEX: 34.12 KG/M2 | WEIGHT: 185.44 LBS | HEART RATE: 61 BPM | HEIGHT: 62 IN | SYSTOLIC BLOOD PRESSURE: 152 MMHG | DIASTOLIC BLOOD PRESSURE: 80 MMHG

## 2020-10-16 VITALS
HEART RATE: 58 BPM | DIASTOLIC BLOOD PRESSURE: 70 MMHG | BODY MASS INDEX: 33.91 KG/M2 | SYSTOLIC BLOOD PRESSURE: 138 MMHG | WEIGHT: 185.44 LBS

## 2020-10-16 DIAGNOSIS — I50.42 CHRONIC COMBINED SYSTOLIC AND DIASTOLIC CONGESTIVE HEART FAILURE: ICD-10-CM

## 2020-10-16 DIAGNOSIS — M79.675 TOE PAIN, BILATERAL: ICD-10-CM

## 2020-10-16 DIAGNOSIS — B35.1 DERMATOPHYTOSIS OF NAIL: Primary | ICD-10-CM

## 2020-10-16 DIAGNOSIS — I42.8 NONISCHEMIC CARDIOMYOPATHY: Primary | ICD-10-CM

## 2020-10-16 DIAGNOSIS — E66.9 OBESITY, CLASS I, BMI 30-34.9: ICD-10-CM

## 2020-10-16 DIAGNOSIS — I10 ESSENTIAL HYPERTENSION: Chronic | ICD-10-CM

## 2020-10-16 DIAGNOSIS — L30.4 INTERTRIGO: ICD-10-CM

## 2020-10-16 DIAGNOSIS — M79.674 TOE PAIN, BILATERAL: ICD-10-CM

## 2020-10-16 DIAGNOSIS — N18.30 STAGE 3 CHRONIC KIDNEY DISEASE, UNSPECIFIED WHETHER STAGE 3A OR 3B CKD: Chronic | ICD-10-CM

## 2020-10-16 PROCEDURE — 99999 PR PBB SHADOW E&M-EST. PATIENT-LVL III: ICD-10-PCS | Mod: PBBFAC,,, | Performed by: INTERNAL MEDICINE

## 2020-10-16 PROCEDURE — 3074F PR MOST RECENT SYSTOLIC BLOOD PRESSURE < 130 MM HG: ICD-10-PCS | Mod: CPTII,S$GLB,, | Performed by: PODIATRIST

## 2020-10-16 PROCEDURE — 99999 PR PBB SHADOW E&M-EST. PATIENT-LVL III: CPT | Mod: PBBFAC,,, | Performed by: INTERNAL MEDICINE

## 2020-10-16 PROCEDURE — 3074F SYST BP LT 130 MM HG: CPT | Mod: CPTII,S$GLB,, | Performed by: PODIATRIST

## 2020-10-16 PROCEDURE — 11721 DEBRIDE NAIL 6 OR MORE: CPT | Mod: S$GLB,,, | Performed by: PODIATRIST

## 2020-10-16 PROCEDURE — 99999 PR PBB SHADOW E&M-EST. PATIENT-LVL III: ICD-10-PCS | Mod: PBBFAC,,, | Performed by: PODIATRIST

## 2020-10-16 PROCEDURE — 1101F PR PT FALLS ASSESS DOC 0-1 FALLS W/OUT INJ PAST YR: ICD-10-PCS | Mod: CPTII,S$GLB,, | Performed by: PODIATRIST

## 2020-10-16 PROCEDURE — 99214 PR OFFICE/OUTPT VISIT, EST, LEVL IV, 30-39 MIN: ICD-10-PCS | Mod: S$GLB,,, | Performed by: INTERNAL MEDICINE

## 2020-10-16 PROCEDURE — 99999 PR PBB SHADOW E&M-EST. PATIENT-LVL III: CPT | Mod: PBBFAC,,, | Performed by: PODIATRIST

## 2020-10-16 PROCEDURE — 3079F DIAST BP 80-89 MM HG: CPT | Mod: CPTII,S$GLB,, | Performed by: PODIATRIST

## 2020-10-16 PROCEDURE — 3078F PR MOST RECENT DIASTOLIC BLOOD PRESSURE < 80 MM HG: ICD-10-PCS | Mod: CPTII,S$GLB,, | Performed by: INTERNAL MEDICINE

## 2020-10-16 PROCEDURE — 1159F PR MEDICATION LIST DOCUMENTED IN MEDICAL RECORD: ICD-10-PCS | Mod: S$GLB,,, | Performed by: INTERNAL MEDICINE

## 2020-10-16 PROCEDURE — 1101F PT FALLS ASSESS-DOCD LE1/YR: CPT | Mod: CPTII,S$GLB,, | Performed by: INTERNAL MEDICINE

## 2020-10-16 PROCEDURE — 3075F SYST BP GE 130 - 139MM HG: CPT | Mod: CPTII,S$GLB,, | Performed by: INTERNAL MEDICINE

## 2020-10-16 PROCEDURE — 1159F MED LIST DOCD IN RCRD: CPT | Mod: S$GLB,,, | Performed by: INTERNAL MEDICINE

## 2020-10-16 PROCEDURE — 3075F PR MOST RECENT SYSTOLIC BLOOD PRESS GE 130-139MM HG: ICD-10-PCS | Mod: CPTII,S$GLB,, | Performed by: INTERNAL MEDICINE

## 2020-10-16 PROCEDURE — 1101F PT FALLS ASSESS-DOCD LE1/YR: CPT | Mod: CPTII,S$GLB,, | Performed by: PODIATRIST

## 2020-10-16 PROCEDURE — 1159F MED LIST DOCD IN RCRD: CPT | Mod: S$GLB,,, | Performed by: PODIATRIST

## 2020-10-16 PROCEDURE — 1159F PR MEDICATION LIST DOCUMENTED IN MEDICAL RECORD: ICD-10-PCS | Mod: S$GLB,,, | Performed by: PODIATRIST

## 2020-10-16 PROCEDURE — 99214 OFFICE O/P EST MOD 30 MIN: CPT | Mod: S$GLB,,, | Performed by: INTERNAL MEDICINE

## 2020-10-16 PROCEDURE — 3078F DIAST BP <80 MM HG: CPT | Mod: CPTII,S$GLB,, | Performed by: INTERNAL MEDICINE

## 2020-10-16 PROCEDURE — 11721 PR DEBRIDEMENT OF NAILS, 6 OR MORE: ICD-10-PCS | Mod: S$GLB,,, | Performed by: PODIATRIST

## 2020-10-16 PROCEDURE — 99499 UNLISTED E&M SERVICE: CPT | Mod: S$GLB,,, | Performed by: INTERNAL MEDICINE

## 2020-10-16 PROCEDURE — 99204 OFFICE O/P NEW MOD 45 MIN: CPT | Mod: 25,S$GLB,, | Performed by: PODIATRIST

## 2020-10-16 PROCEDURE — 99499 RISK ADDL DX/OHS AUDIT: ICD-10-PCS | Mod: S$GLB,,, | Performed by: INTERNAL MEDICINE

## 2020-10-16 PROCEDURE — 99204 PR OFFICE/OUTPT VISIT, NEW, LEVL IV, 45-59 MIN: ICD-10-PCS | Mod: 25,S$GLB,, | Performed by: PODIATRIST

## 2020-10-16 PROCEDURE — 1101F PR PT FALLS ASSESS DOC 0-1 FALLS W/OUT INJ PAST YR: ICD-10-PCS | Mod: CPTII,S$GLB,, | Performed by: INTERNAL MEDICINE

## 2020-10-16 PROCEDURE — 3079F PR MOST RECENT DIASTOLIC BLOOD PRESSURE 80-89 MM HG: ICD-10-PCS | Mod: CPTII,S$GLB,, | Performed by: PODIATRIST

## 2020-10-16 RX ORDER — MUPIROCIN 20 MG/G
OINTMENT TOPICAL DAILY
Qty: 1 TUBE | Refills: 0 | Status: SHIPPED | OUTPATIENT
Start: 2020-10-16 | End: 2020-10-16

## 2020-10-16 RX ORDER — CICLOPIROX 7.7 MG/G
GEL TOPICAL 2 TIMES DAILY
Qty: 60 TUBE | Refills: 1 | Status: SHIPPED | OUTPATIENT
Start: 2020-10-16 | End: 2020-10-16

## 2020-10-16 RX ORDER — CICLOPIROX 80 MG/ML
SOLUTION TOPICAL
Qty: 1 BOTTLE | Refills: 10 | Status: SHIPPED | OUTPATIENT
Start: 2020-10-16 | End: 2022-02-23

## 2020-10-16 NOTE — PROGRESS NOTES
Subjective:   Patient ID:  Megan Sams is a 75 y.o. female who presents for follow up of Follow-up      HPI   9/9/2020   74 yo female with dilated cardiomyopathy htn ckd is here frof /u she ahs been compliant with diet and meds. Has no palpitation no chf has good exercise tolerance has no angina minimal leg swelling at the end of tehd ay none in the morning. Has no headaches blurred vision. No syncope near syncope no tia  Her bop is well controlled by review   Hr is the 50s 60s. Feels tired at times. Has good exercise tolerance goes to [employer] walks leaning on a cart. her weight is unchanged.     10/16/2020  Here for f/u test result no change in status no cardiac symptoms. Tolerating meds ok   Her ef is unchanged   Her holter showed pac pvc nsvt no adjustments in therapy needed discussed unofficially with ep service. She ahs no chf angina syncope near  syncope compliant   She did not sleep well overnite.  Past Medical History:   Diagnosis Date    Arthritis     Blood transfusion     CHF (congestive heart failure)     Chronic kidney disease     Depression     Hypertension        Past Surgical History:   Procedure Laterality Date    HYSTERECTOMY         Social History     Tobacco Use    Smoking status: Never Smoker    Smokeless tobacco: Never Used   Substance Use Topics    Alcohol use: No     Alcohol/week: 0.0 standard drinks    Drug use: No       Family History   Problem Relation Age of Onset    Early death Mother     Heart disease Mother     Hypertension Mother     Kidney disease Father     Heart disease Brother        Current Outpatient Medications   Medication Sig    acetaminophen (TYLENOL EXTRA STRENGTH) 500 MG tablet Take 500 mg by mouth every 6 (six) hours as needed.    allopurinol (ZYLOPRIM) 100 MG tablet TAKE 2 TABLETS (200 MG TOTAL) BY MOUTH ONCE DAILY.    aspirin (ECOTRIN) 81 MG EC tablet Take 81 mg by mouth once daily.    candesartan (ATACAND) 32 MG tablet TAKE 1 TABLET (32 MG TOTAL) BY  MOUTH ONCE DAILY.    ketoconazole (NIZORAL) 2 % cream Apply topically once daily. Cleanse and dry navel as directed, then apply small amount of cream in and around naval once daily for 14 days    metoprolol succinate (TOPROL-XL) 50 MG 24 hr tablet Take 1 tablet (50 mg total) by mouth once daily.    MULTIVIT WITH CALCIUM,IRON,MIN (MULTIPLE VITAMIN, WOMENS ORAL) Take by mouth once daily.    salicylic acid 40 % Plst Apply 1 patch topically every other day.    spironolactone (ALDACTONE) 50 MG tablet TAKE 1 TABLET BY MOUTH EVERY DAY     No current facility-administered medications for this visit.      Current Outpatient Medications on File Prior to Visit   Medication Sig    acetaminophen (TYLENOL EXTRA STRENGTH) 500 MG tablet Take 500 mg by mouth every 6 (six) hours as needed.    allopurinol (ZYLOPRIM) 100 MG tablet TAKE 2 TABLETS (200 MG TOTAL) BY MOUTH ONCE DAILY.    aspirin (ECOTRIN) 81 MG EC tablet Take 81 mg by mouth once daily.    candesartan (ATACAND) 32 MG tablet TAKE 1 TABLET (32 MG TOTAL) BY MOUTH ONCE DAILY.    ketoconazole (NIZORAL) 2 % cream Apply topically once daily. Cleanse and dry navel as directed, then apply small amount of cream in and around naval once daily for 14 days    metoprolol succinate (TOPROL-XL) 50 MG 24 hr tablet Take 1 tablet (50 mg total) by mouth once daily.    MULTIVIT WITH CALCIUM,IRON,MIN (MULTIPLE VITAMIN, WOMENS ORAL) Take by mouth once daily.    salicylic acid 40 % Plst Apply 1 patch topically every other day.    spironolactone (ALDACTONE) 50 MG tablet TAKE 1 TABLET BY MOUTH EVERY DAY     No current facility-administered medications on file prior to visit.      Review of patient's allergies indicates:   Allergen Reactions    Adhesive Blisters    Codeine Other (See Comments)     Hallucinations    Doxycycline monohydrate Nausea Only     Elevated heart rate    Gabapentin Other (See Comments)     Lowered heart rate    Lanoxin [digoxin] Other (See Comments)     Too  low heart rate    Thorazine [chlorpromazine] Other (See Comments)     hallucinations    Ultracet [tramadol-acetaminophen] Other (See Comments)     Elevated BP    Penicillins Nausea Only and Rash     Review of Systems   Constitution: Positive for malaise/fatigue. Negative for diaphoresis and weight gain.   HENT: Negative for hoarse voice.    Eyes: Negative for double vision and visual disturbance.   Cardiovascular: Negative for chest pain, claudication, cyanosis, dyspnea on exertion, irregular heartbeat, leg swelling, near-syncope, orthopnea, palpitations, paroxysmal nocturnal dyspnea and syncope.   Respiratory: Negative for cough, hemoptysis, shortness of breath and snoring.    Hematologic/Lymphatic: Negative for bleeding problem. Does not bruise/bleed easily.   Skin: Negative for color change and poor wound healing.   Musculoskeletal: Negative for muscle cramps, muscle weakness and myalgias.   Gastrointestinal: Negative for bloating, abdominal pain, change in bowel habit, diarrhea, heartburn, hematemesis, hematochezia, melena and nausea.   Neurological: Negative for excessive daytime sleepiness, dizziness, headaches, light-headedness, loss of balance, numbness and weakness.   Psychiatric/Behavioral: Negative for memory loss. The patient does not have insomnia.    Allergic/Immunologic: Negative for hives.       Objective:   Physical Exam   Constitutional: She is oriented to person, place, and time. She appears well-developed and well-nourished. She does not appear ill. No distress.   HENT:   Head: Normocephalic and atraumatic.   Eyes: Pupils are equal, round, and reactive to light. EOM are normal. No scleral icterus.   Neck: Normal range of motion. Neck supple. Normal carotid pulses, no hepatojugular reflux and no JVD present. Carotid bruit is not present. No tracheal deviation present. No thyromegaly present.   Cardiovascular: Normal rate, regular rhythm, normal heart sounds, intact distal pulses and normal  pulses. Exam reveals no gallop and no friction rub.   No murmur heard.  Pulmonary/Chest: Effort normal and breath sounds normal. No respiratory distress. She has no wheezes. She has no rhonchi. She has no rales. She exhibits no tenderness.   Abdominal: Soft. Normal appearance, normal aorta and bowel sounds are normal. She exhibits no distension, no abdominal bruit, no ascites and no pulsatile midline mass. There is no hepatomegaly. There is no abdominal tenderness.   obese   Musculoskeletal:         General: No edema.      Right shoulder: She exhibits no deformity.   Neurological: She is alert and oriented to person, place, and time. She has normal strength. No cranial nerve deficit. Coordination normal.   Skin: Skin is warm and dry. No rash noted. She is not diaphoretic. No cyanosis or erythema. Nails show no clubbing.   Psychiatric: She has a normal mood and affect. Her speech is normal and behavior is normal.   Nursing note and vitals reviewed.    Vitals:    10/16/20 1212 10/16/20 1213 10/16/20 1248   BP: (!) 147/87 (!) 142/70 138/70   BP Location: Left arm Right arm Right arm   Patient Position: Sitting Sitting    BP Method: Medium (Manual) Medium (Manual)    Pulse: (!) 58     Weight: 84.1 kg (185 lb 6.5 oz)       Lab Results   Component Value Date    CHOL 188 05/05/2020    CHOL 183 04/02/2019    CHOL 185 04/26/2016     Lab Results   Component Value Date    HDL 60 05/05/2020    HDL 55 04/02/2019    HDL 64 04/26/2016     Lab Results   Component Value Date    LDLCALC 106.4 05/05/2020    LDLCALC 109.0 04/02/2019    LDLCALC 99.8 04/26/2016     Lab Results   Component Value Date    TRIG 108 05/05/2020    TRIG 95 04/02/2019    TRIG 106 04/26/2016     Lab Results   Component Value Date    CHOLHDL 31.9 05/05/2020    CHOLHDL 30.1 04/02/2019    CHOLHDL 34.6 04/26/2016       Chemistry        Component Value Date/Time     05/05/2020 0808    K 4.6 05/05/2020 0808     05/05/2020 0808    CO2 27 05/05/2020 0808     BUN 23 05/05/2020 0808    CREATININE 1.4 05/05/2020 0808    GLU 93 05/05/2020 0808        Component Value Date/Time    CALCIUM 9.8 05/05/2020 0808    ALKPHOS 69 05/05/2020 0808    AST 32 05/05/2020 0808    ALT 22 05/05/2020 0808    BILITOT 0.5 05/05/2020 0808    ESTGFRAFRICA 41.8 (A) 05/05/2020 0808    EGFRNONAA 36.3 (A) 05/05/2020 0808          Lab Results   Component Value Date    TSH 1.922 09/09/2020     Lab Results   Component Value Date    INR 1.0 02/14/2017    INR 1.0 03/02/2014     Lab Results   Component Value Date    WBC 6.90 05/05/2020    HGB 13.0 05/05/2020    HCT 43.2 05/05/2020    MCV 91 05/05/2020     05/05/2020     BMP  Sodium   Date Value Ref Range Status   05/05/2020 139 136 - 145 mmol/L Final     Potassium   Date Value Ref Range Status   05/05/2020 4.6 3.5 - 5.1 mmol/L Final     Chloride   Date Value Ref Range Status   05/05/2020 103 95 - 110 mmol/L Final     CO2   Date Value Ref Range Status   05/05/2020 27 23 - 29 mmol/L Final     BUN, Bld   Date Value Ref Range Status   05/05/2020 23 8 - 23 mg/dL Final     Creatinine   Date Value Ref Range Status   05/05/2020 1.4 0.5 - 1.4 mg/dL Final     Calcium   Date Value Ref Range Status   05/05/2020 9.8 8.7 - 10.5 mg/dL Final     Anion Gap   Date Value Ref Range Status   05/05/2020 9 8 - 16 mmol/L Final     eGFR if    Date Value Ref Range Status   05/05/2020 41.8 (A) >60 mL/min/1.73 m^2 Final     eGFR if non    Date Value Ref Range Status   05/05/2020 36.3 (A) >60 mL/min/1.73 m^2 Final     Comment:     Calculation used to obtain the estimated glomerular filtration  rate (eGFR) is the CKD-EPI equation.        CrCl cannot be calculated (Patient's most recent lab result is older than the maximum 7 days allowed.).  · There is left ventricular concentric hypertrophy.  · With mildly decreased systolic function. The estimated ejection fraction is 40%.  · Grade I diastolic dysfunction.  · There is mild left ventricular global  hypokinesis.  · Normal right ventricular systolic function.  · Normal central venous pressure (3 mmHg).  · The estimated PA systolic pressure is 36 mmHg.  · Mild mitral regurgitation.     · Predominant Rhythm Sinus bradycardia with heart rates varying between 44 and 83 bpm with an average of 55 bpm.  · There were frequent PVCs totalling 1844 and averaging 38.42 per hour. There were 56 bigeminal cycles. There were 2 triplets.  · There were 2 runs of VT. The longest was for 14 beats of NSVT , monomorphic at a rate of 109 bpm  · There were rare PACs totalling 250 and averaging 5.21 per hour.  · The longest RR interval was 1900 msec.  Assessment:     1. Nonischemic cardiomyopathy    2. Essential hypertension    3. Stage 3 chronic kidney disease, unspecified whether stage 3a or 3b CKD    4. Chronic combined systolic and diastolic congestive heart failure    5. Obesity, Class I, BMI 30-34.9    6. Intertrigo      Has nsvt on holter asymptomatic short lived ef 40% unchanged cvardiolite negative willc ontinue current tehrapy .  Low salt diet   Weight loss exercise.  Plan:   Continue current therapy  Cardiac low salt diet.  Risk factor modification and excercise program.  F/u in 6 weeks with mid level.

## 2020-10-16 NOTE — LETTER
October 16, 2020      FELIBERTO Medina  [address] [facility]  [place] [address]           [facility]  [address] [facility]  [place] [address]  Phone: [phone]  Fax: [phone]          Patient: Megan Sams   MR Number: [id number]   YOB: 1945   Date of Visit: 10/16/2020       Dear Carolyn Kwon:    Thank you for referring Megan Sams to me for evaluation. Attached you will find relevant portions of my assessment and plan of care.    If you have questions, please do not hesitate to call me. I look forward to following Megan Sams along with you.    Sincerely,    Larissa Mckoy, KALINA    Enclosure  CC:  No Recipients    If you would like to receive this communication electronically, please contact [email or web address] or [phone] to request more information on [employer] [facility] access.    For providers and/or their staff who would like to refer a patient to [facility], please contact us through our one-stop-shop provider referral line, [facility] [place], operated [facility], at [phone].    If you feel you have received this communication in error or would no longer like to receive these types of communications, please e-mail [email or web address]

## 2020-10-16 NOTE — PROGRESS NOTES
[facility]  PODIATRIC MEDICINE AND SURGERY      CHIEF COMPLAINT   Chief Complaint   Patient presents with    Nail Problem     Pt c/o of discoloration on toe nails         HPI:    Megan Sams is a 75 y.o. female presenting to podiatry clinic with complaint of fungal infection on toenails. Nails are thickened and painful in enclosed shoe wear. She is unable to trim due to thickness and length. The patient has tried over the counter medications with some success, but the problem is recurrent and aggravating. Patient inquires about available treatment options. No further pedal complaints.      PMH  Past Medical History:   Diagnosis Date    Arthritis     Blood transfusion     CHF (congestive heart failure)     Chronic kidney disease     Depression     Hypertension        PROBLEM LIST  Patient Active Problem List    Diagnosis Date Noted    Intertrigo 08/31/2018    Obesity, Class I, BMI 30-34.9 01/20/2017    Nonischemic cardiomyopathy 12/17/2013    Arthritis     CHF (congestive heart failure)     Essential hypertension     CKD (chronic kidney disease) stage 3, GFR 30-59 ml/min        MEDS  Current Outpatient Medications on File Prior to Visit   Medication Sig Dispense Refill    acetaminophen (TYLENOL EXTRA STRENGTH) 500 MG tablet Take 500 mg by mouth every 6 (six) hours as needed.      allopurinol (ZYLOPRIM) 100 MG tablet TAKE 2 TABLETS (200 MG TOTAL) BY MOUTH ONCE DAILY. 180 tablet 4    aspirin (ECOTRIN) 81 MG EC tablet Take 81 mg by mouth once daily.      candesartan (ATACAND) 32 MG tablet TAKE 1 TABLET (32 MG TOTAL) BY MOUTH ONCE DAILY. 90 tablet 3    ketoconazole (NIZORAL) 2 % cream Apply topically once daily. Cleanse and dry navel as directed, then apply small amount of cream in and around naval once daily for 14 days 15 g 1    metoprolol succinate (TOPROL-XL) 50 MG 24 hr tablet Take 1 tablet (50 mg total) by mouth once daily. 180 tablet 3    MULTIVIT WITH CALCIUM,IRON,MIN  "(MULTIPLE VITAMIN, WOMENS ORAL) Take by mouth once daily.      salicylic acid 40 % Plst Apply 1 patch topically every other day. 18 each 0    spironolactone (ALDACTONE) 50 MG tablet TAKE 1 TABLET BY MOUTH EVERY DAY 90 tablet 3     No current facility-administered medications on file prior to visit.        PSH     Past Surgical History:   Procedure Laterality Date    HYSTERECTOMY          ALL  Review of patient's allergies indicates:   Allergen Reactions    Adhesive Blisters    Codeine Other (See Comments)     Hallucinations    Doxycycline monohydrate Nausea Only     Elevated heart rate    Gabapentin Other (See Comments)     Lowered heart rate    Lanoxin [digoxin] Other (See Comments)     Too low heart rate    Thorazine [chlorpromazine] Other (See Comments)     hallucinations    Ultracet [tramadol-acetaminophen] Other (See Comments)     Elevated BP    Penicillins Nausea Only and Rash       SOC     Social History     Tobacco Use    Smoking status: Never Smoker    Smokeless tobacco: Never Used   Substance Use Topics    Alcohol use: No     Alcohol/week: 0.0 standard drinks    Drug use: No         Family HX    Family History   Problem Relation Age of Onset    Early death Mother     Heart disease Mother     Hypertension Mother     Kidney disease Father     Heart disease Brother             REVIEW OF SYSTEMS  General: Denies any fever or chills  Chest: Denies shortness of breath, wheezing, coughing, or sputum production  Heart: Denies chest pain, cold extremities, orthopenia, or reduced exercise tolerance  As noted above and per history of current illness above, otherwise negative in the remainder of the 14 systems.      PHYSICAL EXAM:      Vitals:    10/16/20 1339   BP: (!) 152/80   Pulse: 61   Weight: 84.1 kg (185 lb 6.5 oz)   Height: 5' 2" (1.575 m)       General: This patient is well-developed, well-nourished and appears stated age, well-oriented to person, place and time, and cooperative and " pleasant on today's visit    LOWER EXTREMITY PHYSICAL EXAM  VASCULAR  Dorsalis pedis and posterior tibial pulses palpable 2/4 bilaterally.   Capillary refill time immediate to the toes.   Feet are warm to the touch. Skin temperature warm to warm from proximally to distally   There are no varicosities, telangiectasias noted to bilateral foot and ankle regions.   There are bno ecchymoses noted to bilateral foot and ankle regions.   There is no gross lower extremity edema.    DERMATOLOGIC  There are thickened discolored, elongated mycotic nails suggestive of onychomycosis  X 8   Skin moist with healthy texture and turgor.  There are no open ulcerations, lacerations, or fissures to bilateral foot and ankle regions. There are no signs of infection as there is no erythema, no proximal-extending lymphangiitis, no fluctuance, or crepitus noted on palpation to bilateral foot and ankle regions.   There is no interdigital maceration.   There are hyperkeratotic lesions noted to feet.     NEUROLOGIC  Epicritic sensation is intact as the patient is able to sense light touch to bilateral foot and ankle regions.   Achilles and patellar deep tendon reflexes intact  Babinski reflex absent    ORTHOPEDIC/BIOMECHANICAL  No symptomatic structural abnormalities noted   HAV deformities noted b/l   Muscle strength AT/EHL/EDL/PT: 5/5; Achilles/Gastroc/Soleus: 5/5; PB/PL: 5/5 Muscle tone is normal.  Ankle joint ROM INTACT DF/PF, non-crepitus      ASSESSMENT   Dermatophytosis of nail  -     Ambulatory referral/consult to Podiatry    Toe pain, bilateral    Other orders  -     Discontinue: ciclopirox 0.77 % Gel; Apply topically 2 (two) times daily. To toe webspace on LEFT foot  Dispense: 60 Tube; Refill: 1  -     Discontinue: mupirocin (BACTROBAN) 2 % ointment; Apply topically once daily. To blister site on top of left foot for 5 days  Dispense: 1 Tube; Refill: 0  -     ciclopirox (PENLAC) 8 % Soln; Apply to affected toenails at night time  DAILY. On 7th day, file nails down, clean all nails with alcohol and restart application process.  Dispense: 1 Bottle; Refill: 10        PLAN    1. Patient was educated about clinical and imaging findings, and verbalizes understanding of above.  2. I Discussed treatment options for nail fungus.   -I explained that fungus lives in a warm dark moist environment and therefore patient should make every attempt to keep feet clean and dry.  We discussed drying feet thoroughly after shower particularly between the toes and then applying powder between the toes and in the shoes. I also discussed to disinfect shower tub, discard old shoes, and disinfect current shoes with antiseptic  -For fungal toenails I prescribed PENLAC nail lacquer to be used daily for up to a year.  I have provided the patient written instructions on topical solution application   -With patient's permission, the elongated onychomycotic toenails, as outlined in the physical examination, were sharply debrided with a double action nail nipper to their soft tissue attachment. If indicated, the nails were then smoothed down in thickness with a mechanical rotary janee and/or jez board to facilitate in further debridement removing all offending nail and subungual debris.    3. RTC  for follow up/evaluation as scheduled      Report Electronically Signed By:     Larissa Mckoy DPM   Podiatry  [facility]  10/16/2020

## 2020-11-02 ENCOUNTER — TELEPHONE (OUTPATIENT)
Dept: INTERNAL MEDICINE | Facility: CLINIC | Age: 75
End: 2020-11-02

## 2020-11-02 DIAGNOSIS — Z12.31 BREAST CANCER SCREENING BY MAMMOGRAM: Primary | ICD-10-CM

## 2020-11-02 NOTE — TELEPHONE ENCOUNTER
----- Message from Latricia Bustamante sent at 11/2/2020  2:04 PM CST -----  Please call pt @ [phone] regarding an order to get a mammograph

## 2020-11-05 ENCOUNTER — HOSPITAL ENCOUNTER (OUTPATIENT)
Dept: RADIOLOGY | Facility: HOSPITAL | Age: 75
Discharge: HOME | End: 2020-11-05
Attending: INTERNAL MEDICINE
Payer: MEDICARE

## 2020-11-05 DIAGNOSIS — Z12.31 BREAST CANCER SCREENING BY MAMMOGRAM: ICD-10-CM

## 2020-11-05 PROCEDURE — 77067 SCR MAMMO BI INCL CAD: CPT | Mod: 26,,, | Performed by: RADIOLOGY

## 2020-11-05 PROCEDURE — 77067 SCR MAMMO BI INCL CAD: CPT | Mod: TC

## 2020-11-05 PROCEDURE — 77063 MAMMO DIGITAL SCREENING BILAT WITH TOMO: ICD-10-PCS | Mod: 26,,, | Performed by: RADIOLOGY

## 2020-11-05 PROCEDURE — 77067 MAMMO DIGITAL SCREENING BILAT WITH TOMO: ICD-10-PCS | Mod: 26,,, | Performed by: RADIOLOGY

## 2020-11-05 PROCEDURE — 77063 BREAST TOMOSYNTHESIS BI: CPT | Mod: 26,,, | Performed by: RADIOLOGY

## 2020-12-02 PROCEDURE — 93010 ELECTROCARDIOGRAM REPORT: CPT | Mod: ,,, | Performed by: INTERNAL MEDICINE

## 2020-12-02 PROCEDURE — 93010 EKG 12-LEAD: ICD-10-PCS | Mod: ,,, | Performed by: INTERNAL MEDICINE

## 2020-12-03 ENCOUNTER — HOSPITAL ENCOUNTER (OUTPATIENT)
Dept: CARDIOLOGY | Facility: HOSPITAL | Age: 75
Discharge: HOME | End: 2020-12-03
Payer: MEDICARE

## 2020-12-03 ENCOUNTER — TELEPHONE (OUTPATIENT)
Dept: CARDIOLOGY | Facility: CLINIC | Age: 75
End: 2020-12-03

## 2020-12-03 ENCOUNTER — OFFICE VISIT (OUTPATIENT)
Dept: CARDIOLOGY | Facility: CLINIC | Age: 75
End: 2020-12-03
Payer: MEDICARE

## 2020-12-03 VITALS
RESPIRATION RATE: 16 BRPM | HEART RATE: 55 BPM | WEIGHT: 185 LBS | BODY MASS INDEX: 33.84 KG/M2 | DIASTOLIC BLOOD PRESSURE: 78 MMHG | SYSTOLIC BLOOD PRESSURE: 136 MMHG | OXYGEN SATURATION: 100 %

## 2020-12-03 DIAGNOSIS — I10 ESSENTIAL HYPERTENSION: Primary | ICD-10-CM

## 2020-12-03 DIAGNOSIS — I42.8 NONISCHEMIC CARDIOMYOPATHY: ICD-10-CM

## 2020-12-03 DIAGNOSIS — I50.42 CHRONIC COMBINED SYSTOLIC AND DIASTOLIC CONGESTIVE HEART FAILURE: Primary | ICD-10-CM

## 2020-12-03 DIAGNOSIS — I10 ESSENTIAL HYPERTENSION: ICD-10-CM

## 2020-12-03 DIAGNOSIS — E66.9 OBESITY, CLASS I, BMI 30-34.9: ICD-10-CM

## 2020-12-03 DIAGNOSIS — N18.30 STAGE 3 CHRONIC KIDNEY DISEASE, UNSPECIFIED WHETHER STAGE 3A OR 3B CKD: Chronic | ICD-10-CM

## 2020-12-03 DIAGNOSIS — I10 ESSENTIAL HYPERTENSION: Chronic | ICD-10-CM

## 2020-12-03 PROCEDURE — 3075F PR MOST RECENT SYSTOLIC BLOOD PRESS GE 130-139MM HG: ICD-10-PCS | Mod: CPTII,S$GLB,, | Performed by: PHYSICIAN ASSISTANT

## 2020-12-03 PROCEDURE — 3078F PR MOST RECENT DIASTOLIC BLOOD PRESSURE < 80 MM HG: ICD-10-PCS | Mod: CPTII,S$GLB,, | Performed by: PHYSICIAN ASSISTANT

## 2020-12-03 PROCEDURE — 1159F PR MEDICATION LIST DOCUMENTED IN MEDICAL RECORD: ICD-10-PCS | Mod: S$GLB,,, | Performed by: PHYSICIAN ASSISTANT

## 2020-12-03 PROCEDURE — 3075F SYST BP GE 130 - 139MM HG: CPT | Mod: CPTII,S$GLB,, | Performed by: PHYSICIAN ASSISTANT

## 2020-12-03 PROCEDURE — 3078F DIAST BP <80 MM HG: CPT | Mod: CPTII,S$GLB,, | Performed by: PHYSICIAN ASSISTANT

## 2020-12-03 PROCEDURE — 1159F MED LIST DOCD IN RCRD: CPT | Mod: S$GLB,,, | Performed by: PHYSICIAN ASSISTANT

## 2020-12-03 PROCEDURE — 99499 RISK ADDL DX/OHS AUDIT: ICD-10-PCS | Mod: S$GLB,,, | Performed by: PHYSICIAN ASSISTANT

## 2020-12-03 PROCEDURE — 99499 UNLISTED E&M SERVICE: CPT | Mod: S$GLB,,, | Performed by: PHYSICIAN ASSISTANT

## 2020-12-03 PROCEDURE — 99214 OFFICE O/P EST MOD 30 MIN: CPT | Mod: S$GLB,,, | Performed by: PHYSICIAN ASSISTANT

## 2020-12-03 PROCEDURE — 99999 PR PBB SHADOW E&M-EST. PATIENT-LVL IV: ICD-10-PCS | Mod: PBBFAC,,, | Performed by: PHYSICIAN ASSISTANT

## 2020-12-03 PROCEDURE — 93005 ELECTROCARDIOGRAM TRACING: CPT

## 2020-12-03 PROCEDURE — 99999 PR PBB SHADOW E&M-EST. PATIENT-LVL IV: CPT | Mod: PBBFAC,,, | Performed by: PHYSICIAN ASSISTANT

## 2020-12-03 PROCEDURE — 99214 PR OFFICE/OUTPT VISIT, EST, LEVL IV, 30-39 MIN: ICD-10-PCS | Mod: S$GLB,,, | Performed by: PHYSICIAN ASSISTANT

## 2020-12-03 NOTE — TELEPHONE ENCOUNTER
Please phone patient. Labs reviewed. Creatinine normal at 1.0. K 4.7. Still awaiting Mg results    Thanks

## 2020-12-03 NOTE — PROGRESS NOTES
Subjective:    Patient ID:  Megan Sams is a 75 y.o. female who presents for follow-up of CHF/fatigue      HPI   Ms. Sams is a 75 year old female patient whose current medical conditions include combined CHF, NICM, hyperlipidemia, and obesity who presents today for follow-up. Patient previously seen by Dr. Gilliland and complained of fatigue. Holter monitor and echo were ordered and showed no concerning/stable findings. Brief short-lived run of NSVT, recommendations made to continue same med mgmt. She returns today and states she feels well overall. Fatigue has improved, feels back to her baseline. Only complaint is a cramping sensation in her hands and arms, concerned her K or Mg level may be low. Denies any chest pain, heaviness, or tightness. No SOB/PAGE. No PND, orthopnea, or excessive weight gain. Occasional leg swelling, worse at end of the day. No near syncope, syncope, or falls. BP slightly elevated with initial check, improved upon recheck. Patient is compliant with her medications. Mindful of her salt intake. EKG today shows no acute ischemic changes, SB, rate 55 BPM.    Will check baseline labs today, CBC, CMP, Mg level.     Review of Systems   Constitution: Negative for chills, decreased appetite, fever and malaise/fatigue.   HENT: Negative for congestion, hoarse voice and sore throat.    Eyes: Negative for blurred vision and discharge.   Cardiovascular: Negative for chest pain, claudication, cyanosis, dyspnea on exertion, irregular heartbeat, leg swelling, near-syncope, orthopnea, palpitations and paroxysmal nocturnal dyspnea.   Respiratory: Negative for cough, hemoptysis, shortness of breath, snoring, sputum production and wheezing.    Endocrine: Negative for cold intolerance and heat intolerance.   Hematologic/Lymphatic: Negative for bleeding problem. Does not bruise/bleed easily.   Skin: Negative for rash.   Musculoskeletal: Negative for arthritis, back pain, joint pain, joint swelling, muscle cramps,  muscle weakness and myalgias.   Gastrointestinal: Negative for abdominal pain, constipation, diarrhea, heartburn, melena and nausea.   Genitourinary: Negative for hematuria.   Neurological: Negative for dizziness, focal weakness, headaches, light-headedness, loss of balance, numbness, paresthesias, seizures and weakness.   Psychiatric/Behavioral: Negative for memory loss. The patient does not have insomnia.    Allergic/Immunologic: Negative for hives.     BP (!) 140/86 (BP Location: Right arm, Patient Position: Sitting, BP Method: Medium (Manual))   Pulse (!) 49   Wt 83.9 kg (185 lb)   SpO2 100%   BMI 33.84 kg/m²     Objective:    Physical Exam   Constitutional: She is oriented to person, place, and time. She appears well-developed and well-nourished. No distress.   HENT:   Head: Normocephalic and atraumatic.   Eyes: Pupils are equal, round, and reactive to light. Right eye exhibits no discharge. Left eye exhibits no discharge.   Neck: Neck supple. No JVD present. No tracheal deviation present. No thyromegaly present.   Cardiovascular: Normal rate, regular rhythm, normal heart sounds and intact distal pulses. PMI is not displaced. Exam reveals no gallop, no S3, no S4 and no friction rub.   No murmur heard.  Pulmonary/Chest: Effort normal and breath sounds normal. No respiratory distress. She has no wheezes. She has no rales.   Abdominal: She exhibits no distension. There is no abdominal tenderness. There is no rebound.   Musculoskeletal:         General: No edema.   Neurological: She is alert and oriented to person, place, and time.   Skin: Skin is warm and dry. She is not diaphoretic. No erythema.   Psychiatric: She has a normal mood and affect. Her behavior is normal.   Nursing note and vitals reviewed.    Echo Results  · There is left ventricular concentric hypertrophy.  · With mildly decreased systolic function. The estimated ejection fraction is 40%.  · Grade I diastolic dysfunction.  · There is mild left  ventricular global hypokinesis.  · Normal right ventricular systolic function.  · Normal central venous pressure (3 mmHg).  · The estimated PA systolic pressure is 36 mmHg.  · Mild mitral regurgitation.       Assessment:       1. Chronic combined systolic and diastolic congestive heart failure    2. Nonischemic cardiomyopathy    3. Essential hypertension    4. Stage 3 chronic kidney disease, unspecified whether stage 3a or 3b CKD    5. Obesity, Class I, BMI 30-34.9      Presents for f/u. Doing clinically well. Fatigue improved. No chest pain/angina. No s/s suggestive of CHF. Does complain of cramping sensation in hands-check CMP, Mg, CBC level today. Continue same CV meds.   Plan:   -CBC, CMP, mg level today with phone review  -Continue same CV meds  -Cardiac low salt diet  -Keep scheduled f/u with Dr. Gilliland in March, lipid, cmp one week prior to appt

## 2020-12-03 NOTE — TELEPHONE ENCOUNTER
Spoke with patient to advise her that Labs reviewed. Creatinine normal at 1.0. K 4.7. Still awaiting Mg results.  Denies questions/concerns.

## 2020-12-09 ENCOUNTER — OFFICE VISIT (OUTPATIENT)
Dept: INTERNAL MEDICINE | Facility: CLINIC | Age: 75
End: 2020-12-09
Payer: MEDICARE

## 2020-12-09 VITALS
HEIGHT: 62 IN | BODY MASS INDEX: 34.2 KG/M2 | TEMPERATURE: 98 F | HEART RATE: 52 BPM | WEIGHT: 185.88 LBS | DIASTOLIC BLOOD PRESSURE: 80 MMHG | SYSTOLIC BLOOD PRESSURE: 104 MMHG

## 2020-12-09 DIAGNOSIS — R73.03 PRE-DIABETES: Primary | ICD-10-CM

## 2020-12-09 DIAGNOSIS — I10 ESSENTIAL HYPERTENSION: ICD-10-CM

## 2020-12-09 DIAGNOSIS — N18.31 STAGE 3A CHRONIC KIDNEY DISEASE: ICD-10-CM

## 2020-12-09 PROCEDURE — 3074F SYST BP LT 130 MM HG: CPT | Mod: CPTII,S$GLB,, | Performed by: PHYSICIAN ASSISTANT

## 2020-12-09 PROCEDURE — 1126F AMNT PAIN NOTED NONE PRSNT: CPT | Mod: S$GLB,,, | Performed by: PHYSICIAN ASSISTANT

## 2020-12-09 PROCEDURE — 1101F PR PT FALLS ASSESS DOC 0-1 FALLS W/OUT INJ PAST YR: ICD-10-PCS | Mod: CPTII,S$GLB,, | Performed by: PHYSICIAN ASSISTANT

## 2020-12-09 PROCEDURE — 1159F MED LIST DOCD IN RCRD: CPT | Mod: S$GLB,,, | Performed by: PHYSICIAN ASSISTANT

## 2020-12-09 PROCEDURE — 1126F PR PAIN SEVERITY QUANTIFIED, NO PAIN PRESENT: ICD-10-PCS | Mod: S$GLB,,, | Performed by: PHYSICIAN ASSISTANT

## 2020-12-09 PROCEDURE — 99214 OFFICE O/P EST MOD 30 MIN: CPT | Mod: S$GLB,,, | Performed by: PHYSICIAN ASSISTANT

## 2020-12-09 PROCEDURE — 3288F FALL RISK ASSESSMENT DOCD: CPT | Mod: CPTII,S$GLB,, | Performed by: PHYSICIAN ASSISTANT

## 2020-12-09 PROCEDURE — 1159F PR MEDICATION LIST DOCUMENTED IN MEDICAL RECORD: ICD-10-PCS | Mod: S$GLB,,, | Performed by: PHYSICIAN ASSISTANT

## 2020-12-09 PROCEDURE — 3288F PR FALLS RISK ASSESSMENT DOCUMENTED: ICD-10-PCS | Mod: CPTII,S$GLB,, | Performed by: PHYSICIAN ASSISTANT

## 2020-12-09 PROCEDURE — 99214 PR OFFICE/OUTPT VISIT, EST, LEVL IV, 30-39 MIN: ICD-10-PCS | Mod: S$GLB,,, | Performed by: PHYSICIAN ASSISTANT

## 2020-12-09 PROCEDURE — 3079F DIAST BP 80-89 MM HG: CPT | Mod: CPTII,S$GLB,, | Performed by: PHYSICIAN ASSISTANT

## 2020-12-09 PROCEDURE — 99999 PR PBB SHADOW E&M-EST. PATIENT-LVL IV: ICD-10-PCS | Mod: PBBFAC,,, | Performed by: PHYSICIAN ASSISTANT

## 2020-12-09 PROCEDURE — 3079F PR MOST RECENT DIASTOLIC BLOOD PRESSURE 80-89 MM HG: ICD-10-PCS | Mod: CPTII,S$GLB,, | Performed by: PHYSICIAN ASSISTANT

## 2020-12-09 PROCEDURE — 99999 PR PBB SHADOW E&M-EST. PATIENT-LVL IV: CPT | Mod: PBBFAC,,, | Performed by: PHYSICIAN ASSISTANT

## 2020-12-09 PROCEDURE — 1101F PT FALLS ASSESS-DOCD LE1/YR: CPT | Mod: CPTII,S$GLB,, | Performed by: PHYSICIAN ASSISTANT

## 2020-12-09 PROCEDURE — 3074F PR MOST RECENT SYSTOLIC BLOOD PRESSURE < 130 MM HG: ICD-10-PCS | Mod: CPTII,S$GLB,, | Performed by: PHYSICIAN ASSISTANT

## 2020-12-10 NOTE — PROGRESS NOTES
Subjective:       Patient ID: Megan Sams is a 75 y.o. female.    Chief Complaint: Follow-up    HPI  Patient comes in today for follow up   Health Maintenance Due   Topic Date Due    Hepatitis C Screening  1945    Shingles Vaccine (1 of 2) 01/16/1993    DEXA SCAN  10/24/2018       Past Medical History:   Diagnosis Date    Arthritis     Blood transfusion     CHF (congestive heart failure)     Chronic kidney disease     Depression     Hypertension        Current Outpatient Medications   Medication Sig Dispense Refill    acetaminophen (TYLENOL EXTRA STRENGTH) 500 MG tablet Take 500 mg by mouth every 6 (six) hours as needed.      allopurinol (ZYLOPRIM) 100 MG tablet TAKE 2 TABLETS (200 MG TOTAL) BY MOUTH ONCE DAILY. 180 tablet 4    aspirin (ECOTRIN) 81 MG EC tablet Take 81 mg by mouth once daily.      candesartan (ATACAND) 32 MG tablet TAKE 1 TABLET (32 MG TOTAL) BY MOUTH ONCE DAILY. 90 tablet 3    ciclopirox (PENLAC) 8 % Soln Apply to affected toenails at night time DAILY. On 7th day, file nails down, clean all nails with alcohol and restart application process. 1 Bottle 10    ketoconazole (NIZORAL) 2 % cream Apply topically once daily. Cleanse and dry navel as directed, then apply small amount of cream in and around naval once daily for 14 days 15 g 1    metoprolol succinate (TOPROL-XL) 50 MG 24 hr tablet Take 1 tablet (50 mg total) by mouth once daily. 180 tablet 3    MULTIVIT WITH CALCIUM,IRON,MIN (MULTIPLE VITAMIN, WOMENS ORAL) Take by mouth once daily.      salicylic acid 40 % Plst Apply 1 patch topically every other day. 18 each 0    spironolactone (ALDACTONE) 50 MG tablet TAKE 1 TABLET BY MOUTH EVERY DAY 90 tablet 3     No current facility-administered medications for this visit.        Review of Systems   Constitutional: Negative for fatigue, fever and unexpected weight change.   HENT: Negative for sore throat and trouble swallowing.    Respiratory: Negative for cough and shortness of  "breath.    Cardiovascular: Negative for chest pain.   Gastrointestinal: Negative for abdominal pain.   Skin: Negative for wound.   Hematological: Negative for adenopathy. Does not bruise/bleed easily.   All other systems reviewed and are negative.      Objective:   /80   Pulse (!) 52   Temp 97.5 °F (36.4 °C) (Temporal)   Ht 5' 2" (1.575 m)   Wt 84.3 kg (185 lb 13.6 oz)   BMI 33.99 kg/m²      Physical Exam  Constitutional:       General: She is not in acute distress.     Appearance: Normal appearance. She is well-developed and normal weight.   HENT:      Head: Normocephalic and atraumatic.      Nose: Nose normal.      Mouth/Throat:      Mouth: Mucous membranes are moist.   Eyes:      Pupils: Pupils are equal, round, and reactive to light.   Neck:      Musculoskeletal: Normal range of motion and neck supple.   Cardiovascular:      Rate and Rhythm: Normal rate and regular rhythm.      Pulses: Normal pulses.      Heart sounds: Normal heart sounds.   Pulmonary:      Effort: Pulmonary effort is normal.      Breath sounds: Normal breath sounds.   Abdominal:      Palpations: Abdomen is soft.   Skin:     Capillary Refill: Capillary refill takes less than 2 seconds.   Neurological:      General: No focal deficit present.      Mental Status: She is alert and oriented to person, place, and time.   Psychiatric:         Mood and Affect: Mood normal.         Behavior: Behavior normal.         Thought Content: Thought content normal.         Judgment: Judgment normal.           Lab Results   Component Value Date    WBC 5.28 12/03/2020    HGB 13.3 12/03/2020    HCT 41.2 12/03/2020     12/03/2020    CHOL 188 05/05/2020    TRIG 108 05/05/2020    HDL 60 05/05/2020    ALT 22 12/03/2020    AST 31 12/03/2020     12/03/2020    K 4.7 12/03/2020     12/03/2020    CREATININE 1.0 12/03/2020    BUN 13 12/03/2020    CO2 22 (L) 12/03/2020    TSH 1.922 09/09/2020    INR 1.0 02/14/2017    HGBA1C 5.6 04/02/2019 "       Assessment:       1. Pre-diabetes    2. Essential hypertension    3. Stage 3a chronic kidney disease        Plan:   Pre-diabetes  -     Hemoglobin A1C; Future; Expected date: 03/09/2021    Essential hypertension    Stage 3a chronic kidney disease    BP, renal function stable     a1c 3 months   Discussed pre diabetes diet

## 2020-12-14 ENCOUNTER — TELEPHONE (OUTPATIENT)
Dept: INTERNAL MEDICINE | Facility: CLINIC | Age: 75
End: 2020-12-14

## 2020-12-14 NOTE — TELEPHONE ENCOUNTER
----- Message from Bibiana Garcia sent at 12/14/2020  3:42 PM CST -----  Regarding: MAMMO RESULTS  Contact: PATIENT  Type:  Test Results    Who Called: PATIENT  Name of Test (Lab/Mammo/Etc): MAMMO  Date of Test: 11/05/2020  Ordering Provider: ROSALINO  Where the test was performed: [facility]  Would the patient rather a call back or a response via [facility]? CALL  Best Call Back Number: [phone]  Additional Information:  #PLEASE CALL PATIENT ASAP

## 2021-01-26 ENCOUNTER — TELEPHONE (OUTPATIENT)
Dept: INTERNAL MEDICINE | Facility: CLINIC | Age: 76
End: 2021-01-26

## 2021-02-18 ENCOUNTER — TELEPHONE (OUTPATIENT)
Dept: INTERNAL MEDICINE | Facility: CLINIC | Age: 76
End: 2021-02-18

## 2021-02-18 RX ORDER — TRAZODONE HYDROCHLORIDE 50 MG/1
50 TABLET ORAL NIGHTLY
Qty: 30 TABLET | Refills: 11 | Status: SHIPPED | OUTPATIENT
Start: 2021-02-18 | End: 2021-12-06 | Stop reason: ALTCHOICE

## 2021-02-26 ENCOUNTER — IMMUNIZATION (OUTPATIENT)
Dept: INTERNAL MEDICINE | Facility: CLINIC | Age: 76
End: 2021-02-26
Payer: MEDICARE

## 2021-02-26 DIAGNOSIS — Z23 NEED FOR VACCINATION: Primary | ICD-10-CM

## 2021-02-26 PROCEDURE — 91300 COVID-19, MRNA, LNP-S, PF, 30 MCG/0.3 ML DOSE VACCINE: CPT | Mod: PBBFAC | Performed by: FAMILY MEDICINE

## 2021-03-10 ENCOUNTER — OFFICE VISIT (OUTPATIENT)
Dept: PODIATRY | Facility: CLINIC | Age: 76
End: 2021-03-10
Payer: MEDICARE

## 2021-03-10 VITALS — HEIGHT: 62 IN | BODY MASS INDEX: 34.08 KG/M2 | WEIGHT: 185.19 LBS

## 2021-03-10 DIAGNOSIS — M79.674 TOE PAIN, BILATERAL: ICD-10-CM

## 2021-03-10 DIAGNOSIS — M79.675 TOE PAIN, BILATERAL: ICD-10-CM

## 2021-03-10 DIAGNOSIS — L60.0 INGROWN TOENAIL OF LEFT FOOT: Primary | ICD-10-CM

## 2021-03-10 DIAGNOSIS — B35.1 DERMATOPHYTOSIS OF NAIL: ICD-10-CM

## 2021-03-10 PROCEDURE — 11721 DEBRIDE NAIL 6 OR MORE: CPT | Mod: S$GLB,,, | Performed by: PODIATRIST

## 2021-03-10 PROCEDURE — 3288F FALL RISK ASSESSMENT DOCD: CPT | Mod: CPTII,S$GLB,, | Performed by: PODIATRIST

## 2021-03-10 PROCEDURE — 1159F MED LIST DOCD IN RCRD: CPT | Mod: S$GLB,,, | Performed by: PODIATRIST

## 2021-03-10 PROCEDURE — 3288F PR FALLS RISK ASSESSMENT DOCUMENTED: ICD-10-PCS | Mod: CPTII,S$GLB,, | Performed by: PODIATRIST

## 2021-03-10 PROCEDURE — 99999 PR PBB SHADOW E&M-EST. PATIENT-LVL III: CPT | Mod: PBBFAC,,, | Performed by: PODIATRIST

## 2021-03-10 PROCEDURE — 1159F PR MEDICATION LIST DOCUMENTED IN MEDICAL RECORD: ICD-10-PCS | Mod: S$GLB,,, | Performed by: PODIATRIST

## 2021-03-10 PROCEDURE — 11721 PR DEBRIDEMENT OF NAILS, 6 OR MORE: ICD-10-PCS | Mod: S$GLB,,, | Performed by: PODIATRIST

## 2021-03-10 PROCEDURE — 99999 PR PBB SHADOW E&M-EST. PATIENT-LVL III: ICD-10-PCS | Mod: PBBFAC,,, | Performed by: PODIATRIST

## 2021-03-10 PROCEDURE — 1101F PT FALLS ASSESS-DOCD LE1/YR: CPT | Mod: CPTII,S$GLB,, | Performed by: PODIATRIST

## 2021-03-10 PROCEDURE — 1101F PR PT FALLS ASSESS DOC 0-1 FALLS W/OUT INJ PAST YR: ICD-10-PCS | Mod: CPTII,S$GLB,, | Performed by: PODIATRIST

## 2021-03-10 PROCEDURE — 99213 PR OFFICE/OUTPT VISIT, EST, LEVL III, 20-29 MIN: ICD-10-PCS | Mod: 25,S$GLB,, | Performed by: PODIATRIST

## 2021-03-10 PROCEDURE — 99213 OFFICE O/P EST LOW 20 MIN: CPT | Mod: 25,S$GLB,, | Performed by: PODIATRIST

## 2021-03-19 ENCOUNTER — IMMUNIZATION (OUTPATIENT)
Dept: INTERNAL MEDICINE | Facility: CLINIC | Age: 76
End: 2021-03-19
Payer: MEDICARE

## 2021-03-19 DIAGNOSIS — Z23 NEED FOR VACCINATION: Primary | ICD-10-CM

## 2021-03-19 PROCEDURE — 0002A COVID-19, MRNA, LNP-S, PF, 30 MCG/0.3 ML DOSE VACCINE: CPT | Mod: PBBFAC | Performed by: FAMILY MEDICINE

## 2021-03-19 PROCEDURE — 91300 COVID-19, MRNA, LNP-S, PF, 30 MCG/0.3 ML DOSE VACCINE: CPT | Mod: PBBFAC | Performed by: FAMILY MEDICINE

## 2021-03-23 ENCOUNTER — LAB VISIT (OUTPATIENT)
Dept: LAB | Facility: HOSPITAL | Age: 76
End: 2021-03-23
Attending: PHYSICIAN ASSISTANT
Payer: MEDICARE

## 2021-03-23 DIAGNOSIS — I10 ESSENTIAL HYPERTENSION: Chronic | ICD-10-CM

## 2021-03-23 DIAGNOSIS — R73.03 PRE-DIABETES: ICD-10-CM

## 2021-03-23 DIAGNOSIS — I42.8 NONISCHEMIC CARDIOMYOPATHY: ICD-10-CM

## 2021-03-23 LAB
ALBUMIN SERPL BCP-MCNC: 3.4 G/DL (ref 3.5–5.2)
ALP SERPL-CCNC: 77 U/L (ref 55–135)
ALT SERPL W/O P-5'-P-CCNC: 24 U/L (ref 10–44)
ANION GAP SERPL CALC-SCNC: 9 MMOL/L (ref 8–16)
AST SERPL-CCNC: 34 U/L (ref 10–40)
BILIRUB SERPL-MCNC: 0.5 MG/DL (ref 0.1–1)
BUN SERPL-MCNC: 18 MG/DL (ref 8–23)
CALCIUM SERPL-MCNC: 9.2 MG/DL (ref 8.7–10.5)
CHLORIDE SERPL-SCNC: 105 MMOL/L (ref 95–110)
CHOLEST SERPL-MCNC: 189 MG/DL (ref 120–199)
CHOLEST/HDLC SERPL: 3.7 {RATIO} (ref 2–5)
CO2 SERPL-SCNC: 26 MMOL/L (ref 23–29)
CREAT SERPL-MCNC: 1.1 MG/DL (ref 0.5–1.4)
EST. GFR  (AFRICAN AMERICAN): 55.6 ML/MIN/1.73 M^2
EST. GFR  (NON AFRICAN AMERICAN): 48.2 ML/MIN/1.73 M^2
ESTIMATED AVG GLUCOSE: 111 MG/DL (ref 68–131)
GLUCOSE SERPL-MCNC: 104 MG/DL (ref 70–110)
HBA1C MFR BLD: 5.5 % (ref 4–5.6)
HDLC SERPL-MCNC: 51 MG/DL (ref 40–75)
HDLC SERPL: 27 % (ref 20–50)
LDLC SERPL CALC-MCNC: 119 MG/DL (ref 63–159)
NONHDLC SERPL-MCNC: 138 MG/DL
POTASSIUM SERPL-SCNC: 4.2 MMOL/L (ref 3.5–5.1)
PROT SERPL-MCNC: 7.5 G/DL (ref 6–8.4)
SODIUM SERPL-SCNC: 140 MMOL/L (ref 136–145)
TRIGL SERPL-MCNC: 95 MG/DL (ref 30–150)

## 2021-03-23 PROCEDURE — 80053 COMPREHEN METABOLIC PANEL: CPT | Performed by: PHYSICIAN ASSISTANT

## 2021-03-23 PROCEDURE — 83036 HEMOGLOBIN GLYCOSYLATED A1C: CPT | Performed by: PHYSICIAN ASSISTANT

## 2021-03-23 PROCEDURE — 36415 COLL VENOUS BLD VENIPUNCTURE: CPT | Mod: PO | Performed by: PHYSICIAN ASSISTANT

## 2021-03-23 PROCEDURE — 80061 LIPID PANEL: CPT | Performed by: PHYSICIAN ASSISTANT

## 2021-03-26 ENCOUNTER — TELEPHONE (OUTPATIENT)
Dept: CARDIOLOGY | Facility: CLINIC | Age: 76
End: 2021-03-26

## 2021-03-31 ENCOUNTER — OFFICE VISIT (OUTPATIENT)
Dept: CARDIOLOGY | Facility: CLINIC | Age: 76
End: 2021-03-31
Payer: MEDICARE

## 2021-03-31 VITALS
HEART RATE: 66 BPM | SYSTOLIC BLOOD PRESSURE: 102 MMHG | HEIGHT: 62 IN | BODY MASS INDEX: 34.37 KG/M2 | OXYGEN SATURATION: 100 % | DIASTOLIC BLOOD PRESSURE: 74 MMHG | WEIGHT: 186.75 LBS

## 2021-03-31 DIAGNOSIS — I49.3 PVC (PREMATURE VENTRICULAR CONTRACTION): ICD-10-CM

## 2021-03-31 DIAGNOSIS — I42.8 NONISCHEMIC CARDIOMYOPATHY: ICD-10-CM

## 2021-03-31 DIAGNOSIS — I50.42 CHRONIC COMBINED SYSTOLIC AND DIASTOLIC CONGESTIVE HEART FAILURE: ICD-10-CM

## 2021-03-31 DIAGNOSIS — E66.9 OBESITY, CLASS I, BMI 30-34.9: ICD-10-CM

## 2021-03-31 DIAGNOSIS — N18.31 STAGE 3A CHRONIC KIDNEY DISEASE: Chronic | ICD-10-CM

## 2021-03-31 DIAGNOSIS — I10 ESSENTIAL HYPERTENSION: Primary | Chronic | ICD-10-CM

## 2021-03-31 PROCEDURE — 1159F PR MEDICATION LIST DOCUMENTED IN MEDICAL RECORD: ICD-10-PCS | Mod: S$GLB,,, | Performed by: PHYSICIAN ASSISTANT

## 2021-03-31 PROCEDURE — 99999 PR PBB SHADOW E&M-EST. PATIENT-LVL IV: CPT | Mod: PBBFAC,,, | Performed by: PHYSICIAN ASSISTANT

## 2021-03-31 PROCEDURE — 1159F MED LIST DOCD IN RCRD: CPT | Mod: S$GLB,,, | Performed by: PHYSICIAN ASSISTANT

## 2021-03-31 PROCEDURE — 1101F PT FALLS ASSESS-DOCD LE1/YR: CPT | Mod: CPTII,S$GLB,, | Performed by: PHYSICIAN ASSISTANT

## 2021-03-31 PROCEDURE — 3074F SYST BP LT 130 MM HG: CPT | Mod: CPTII,S$GLB,, | Performed by: PHYSICIAN ASSISTANT

## 2021-03-31 PROCEDURE — 1101F PR PT FALLS ASSESS DOC 0-1 FALLS W/OUT INJ PAST YR: ICD-10-PCS | Mod: CPTII,S$GLB,, | Performed by: PHYSICIAN ASSISTANT

## 2021-03-31 PROCEDURE — 3074F PR MOST RECENT SYSTOLIC BLOOD PRESSURE < 130 MM HG: ICD-10-PCS | Mod: CPTII,S$GLB,, | Performed by: PHYSICIAN ASSISTANT

## 2021-03-31 PROCEDURE — 99214 OFFICE O/P EST MOD 30 MIN: CPT | Mod: S$GLB,,, | Performed by: PHYSICIAN ASSISTANT

## 2021-03-31 PROCEDURE — 99214 PR OFFICE/OUTPT VISIT, EST, LEVL IV, 30-39 MIN: ICD-10-PCS | Mod: S$GLB,,, | Performed by: PHYSICIAN ASSISTANT

## 2021-03-31 PROCEDURE — 1126F AMNT PAIN NOTED NONE PRSNT: CPT | Mod: S$GLB,,, | Performed by: PHYSICIAN ASSISTANT

## 2021-03-31 PROCEDURE — 99999 PR PBB SHADOW E&M-EST. PATIENT-LVL IV: ICD-10-PCS | Mod: PBBFAC,,, | Performed by: PHYSICIAN ASSISTANT

## 2021-03-31 PROCEDURE — 3078F DIAST BP <80 MM HG: CPT | Mod: CPTII,S$GLB,, | Performed by: PHYSICIAN ASSISTANT

## 2021-03-31 PROCEDURE — 3288F FALL RISK ASSESSMENT DOCD: CPT | Mod: CPTII,S$GLB,, | Performed by: PHYSICIAN ASSISTANT

## 2021-03-31 PROCEDURE — 99499 UNLISTED E&M SERVICE: CPT | Mod: S$GLB,,, | Performed by: PHYSICIAN ASSISTANT

## 2021-03-31 PROCEDURE — 1126F PR PAIN SEVERITY QUANTIFIED, NO PAIN PRESENT: ICD-10-PCS | Mod: S$GLB,,, | Performed by: PHYSICIAN ASSISTANT

## 2021-03-31 PROCEDURE — 3078F PR MOST RECENT DIASTOLIC BLOOD PRESSURE < 80 MM HG: ICD-10-PCS | Mod: CPTII,S$GLB,, | Performed by: PHYSICIAN ASSISTANT

## 2021-03-31 PROCEDURE — 99499 RISK ADDL DX/OHS AUDIT: ICD-10-PCS | Mod: S$GLB,,, | Performed by: PHYSICIAN ASSISTANT

## 2021-03-31 PROCEDURE — 3288F PR FALLS RISK ASSESSMENT DOCUMENTED: ICD-10-PCS | Mod: CPTII,S$GLB,, | Performed by: PHYSICIAN ASSISTANT

## 2021-05-26 ENCOUNTER — OFFICE VISIT (OUTPATIENT)
Dept: INTERNAL MEDICINE | Facility: CLINIC | Age: 76
End: 2021-05-26
Payer: MEDICARE

## 2021-05-26 VITALS
TEMPERATURE: 98 F | SYSTOLIC BLOOD PRESSURE: 134 MMHG | HEIGHT: 62 IN | WEIGHT: 187.63 LBS | BODY MASS INDEX: 34.53 KG/M2 | HEART RATE: 62 BPM | DIASTOLIC BLOOD PRESSURE: 76 MMHG

## 2021-05-26 DIAGNOSIS — Z00.00 ROUTINE GENERAL MEDICAL EXAMINATION AT HEALTH CARE FACILITY: Primary | ICD-10-CM

## 2021-05-26 DIAGNOSIS — Z78.0 ASYMPTOMATIC MENOPAUSAL STATE: ICD-10-CM

## 2021-05-26 DIAGNOSIS — H53.9 VISION CHANGES: ICD-10-CM

## 2021-05-26 DIAGNOSIS — F51.01 PRIMARY INSOMNIA: ICD-10-CM

## 2021-05-26 DIAGNOSIS — I49.3 PVC (PREMATURE VENTRICULAR CONTRACTION): ICD-10-CM

## 2021-05-26 DIAGNOSIS — I10 ESSENTIAL HYPERTENSION: Chronic | ICD-10-CM

## 2021-05-26 DIAGNOSIS — I42.8 NONISCHEMIC CARDIOMYOPATHY: ICD-10-CM

## 2021-05-26 PROCEDURE — 99499 UNLISTED E&M SERVICE: CPT | Mod: S$GLB,,, | Performed by: INTERNAL MEDICINE

## 2021-05-26 PROCEDURE — 99214 PR OFFICE/OUTPT VISIT, EST, LEVL IV, 30-39 MIN: ICD-10-PCS | Mod: S$GLB,,, | Performed by: INTERNAL MEDICINE

## 2021-05-26 PROCEDURE — 99999 PR PBB SHADOW E&M-EST. PATIENT-LVL V: CPT | Mod: PBBFAC,,, | Performed by: INTERNAL MEDICINE

## 2021-05-26 PROCEDURE — 1126F AMNT PAIN NOTED NONE PRSNT: CPT | Mod: S$GLB,,, | Performed by: INTERNAL MEDICINE

## 2021-05-26 PROCEDURE — 99214 OFFICE O/P EST MOD 30 MIN: CPT | Mod: S$GLB,,, | Performed by: INTERNAL MEDICINE

## 2021-05-26 PROCEDURE — 1101F PT FALLS ASSESS-DOCD LE1/YR: CPT | Mod: CPTII,S$GLB,, | Performed by: INTERNAL MEDICINE

## 2021-05-26 PROCEDURE — 99999 PR PBB SHADOW E&M-EST. PATIENT-LVL V: ICD-10-PCS | Mod: PBBFAC,,, | Performed by: INTERNAL MEDICINE

## 2021-05-26 PROCEDURE — 99499 RISK ADDL DX/OHS AUDIT: ICD-10-PCS | Mod: S$GLB,,, | Performed by: INTERNAL MEDICINE

## 2021-05-26 PROCEDURE — 3288F PR FALLS RISK ASSESSMENT DOCUMENTED: ICD-10-PCS | Mod: CPTII,S$GLB,, | Performed by: INTERNAL MEDICINE

## 2021-05-26 PROCEDURE — 1101F PR PT FALLS ASSESS DOC 0-1 FALLS W/OUT INJ PAST YR: ICD-10-PCS | Mod: CPTII,S$GLB,, | Performed by: INTERNAL MEDICINE

## 2021-05-26 PROCEDURE — 1126F PR PAIN SEVERITY QUANTIFIED, NO PAIN PRESENT: ICD-10-PCS | Mod: S$GLB,,, | Performed by: INTERNAL MEDICINE

## 2021-05-26 PROCEDURE — 3288F FALL RISK ASSESSMENT DOCD: CPT | Mod: CPTII,S$GLB,, | Performed by: INTERNAL MEDICINE

## 2021-05-26 RX ORDER — DOXEPIN HYDROCHLORIDE 10 MG/1
10 CAPSULE ORAL NIGHTLY
Qty: 30 CAPSULE | Refills: 0 | Status: SHIPPED | OUTPATIENT
Start: 2021-05-26 | End: 2021-06-18

## 2021-06-16 ENCOUNTER — OFFICE VISIT (OUTPATIENT)
Dept: PODIATRY | Facility: CLINIC | Age: 76
End: 2021-06-16
Payer: MEDICARE

## 2021-06-16 VITALS — HEIGHT: 62 IN | BODY MASS INDEX: 34.48 KG/M2 | WEIGHT: 187.38 LBS

## 2021-06-16 DIAGNOSIS — L60.0 INGROWN TOENAIL OF LEFT FOOT: Primary | ICD-10-CM

## 2021-06-16 DIAGNOSIS — B35.1 DERMATOPHYTOSIS OF NAIL: ICD-10-CM

## 2021-06-16 DIAGNOSIS — M79.674 TOE PAIN, BILATERAL: ICD-10-CM

## 2021-06-16 DIAGNOSIS — M79.675 TOE PAIN, BILATERAL: ICD-10-CM

## 2021-06-16 PROCEDURE — 11721 PR DEBRIDEMENT OF NAILS, 6 OR MORE: ICD-10-PCS | Mod: S$GLB,,, | Performed by: PODIATRIST

## 2021-06-16 PROCEDURE — 11721 DEBRIDE NAIL 6 OR MORE: CPT | Mod: S$GLB,,, | Performed by: PODIATRIST

## 2021-06-16 PROCEDURE — 1101F PR PT FALLS ASSESS DOC 0-1 FALLS W/OUT INJ PAST YR: ICD-10-PCS | Mod: CPTII,S$GLB,, | Performed by: PODIATRIST

## 2021-06-16 PROCEDURE — 3288F PR FALLS RISK ASSESSMENT DOCUMENTED: ICD-10-PCS | Mod: CPTII,S$GLB,, | Performed by: PODIATRIST

## 2021-06-16 PROCEDURE — 3288F FALL RISK ASSESSMENT DOCD: CPT | Mod: CPTII,S$GLB,, | Performed by: PODIATRIST

## 2021-06-16 PROCEDURE — 99213 OFFICE O/P EST LOW 20 MIN: CPT | Mod: 25,S$GLB,, | Performed by: PODIATRIST

## 2021-06-16 PROCEDURE — 1101F PT FALLS ASSESS-DOCD LE1/YR: CPT | Mod: CPTII,S$GLB,, | Performed by: PODIATRIST

## 2021-06-16 PROCEDURE — 1126F AMNT PAIN NOTED NONE PRSNT: CPT | Mod: S$GLB,,, | Performed by: PODIATRIST

## 2021-06-16 PROCEDURE — 99213 PR OFFICE/OUTPT VISIT, EST, LEVL III, 20-29 MIN: ICD-10-PCS | Mod: 25,S$GLB,, | Performed by: PODIATRIST

## 2021-06-16 PROCEDURE — 1159F MED LIST DOCD IN RCRD: CPT | Mod: S$GLB,,, | Performed by: PODIATRIST

## 2021-06-16 PROCEDURE — 99999 PR PBB SHADOW E&M-EST. PATIENT-LVL III: CPT | Mod: PBBFAC,,, | Performed by: PODIATRIST

## 2021-06-16 PROCEDURE — 1126F PR PAIN SEVERITY QUANTIFIED, NO PAIN PRESENT: ICD-10-PCS | Mod: S$GLB,,, | Performed by: PODIATRIST

## 2021-06-16 PROCEDURE — 1159F PR MEDICATION LIST DOCUMENTED IN MEDICAL RECORD: ICD-10-PCS | Mod: S$GLB,,, | Performed by: PODIATRIST

## 2021-06-16 PROCEDURE — 99999 PR PBB SHADOW E&M-EST. PATIENT-LVL III: ICD-10-PCS | Mod: PBBFAC,,, | Performed by: PODIATRIST

## 2021-06-30 ENCOUNTER — APPOINTMENT (OUTPATIENT)
Dept: RADIOLOGY | Facility: HOSPITAL | Age: 76
End: 2021-06-30
Attending: INTERNAL MEDICINE
Payer: MEDICARE

## 2021-06-30 DIAGNOSIS — Z78.0 ASYMPTOMATIC MENOPAUSAL STATE: ICD-10-CM

## 2021-06-30 PROCEDURE — 77080 DXA BONE DENSITY AXIAL: CPT | Mod: 26,,, | Performed by: RADIOLOGY

## 2021-06-30 PROCEDURE — 77080 DXA BONE DENSITY AXIAL: CPT | Mod: TC

## 2021-06-30 PROCEDURE — 77080 DEXA BONE DENSITY SPINE HIP: ICD-10-PCS | Mod: 26,,, | Performed by: RADIOLOGY

## 2021-07-14 ENCOUNTER — OFFICE VISIT (OUTPATIENT)
Dept: OPHTHALMOLOGY | Facility: CLINIC | Age: 76
End: 2021-07-14
Payer: MEDICARE

## 2021-07-14 DIAGNOSIS — H43.813 POSTERIOR VITREOUS DETACHMENT OF BOTH EYES: ICD-10-CM

## 2021-07-14 DIAGNOSIS — H53.9 VISION CHANGES: ICD-10-CM

## 2021-07-14 DIAGNOSIS — H25.13 NUCLEAR SCLEROTIC CATARACT OF BOTH EYES: ICD-10-CM

## 2021-07-14 DIAGNOSIS — H52.223 REGULAR ASTIGMATISM OF BOTH EYES: ICD-10-CM

## 2021-07-14 DIAGNOSIS — H43.822 VITREOMACULAR TRACTION SYNDROME OF LEFT EYE: Primary | ICD-10-CM

## 2021-07-14 PROCEDURE — 92015 DETERMINE REFRACTIVE STATE: CPT | Mod: S$GLB,,, | Performed by: OPTOMETRIST

## 2021-07-14 PROCEDURE — 92004 COMPRE OPH EXAM NEW PT 1/>: CPT | Mod: S$GLB,,, | Performed by: OPTOMETRIST

## 2021-07-14 PROCEDURE — 92134 OCT, RETINA - OU - BOTH EYES: ICD-10-PCS | Mod: S$GLB,,, | Performed by: OPTOMETRIST

## 2021-07-14 PROCEDURE — 99999 PR PBB SHADOW E&M-EST. PATIENT-LVL III: CPT | Mod: PBBFAC,,, | Performed by: OPTOMETRIST

## 2021-07-14 PROCEDURE — 92134 CPTRZ OPH DX IMG PST SGM RTA: CPT | Mod: S$GLB,,, | Performed by: OPTOMETRIST

## 2021-07-14 PROCEDURE — 92004 PR EYE EXAM, NEW PATIENT,COMPREHESV: ICD-10-PCS | Mod: S$GLB,,, | Performed by: OPTOMETRIST

## 2021-07-14 PROCEDURE — 99999 PR PBB SHADOW E&M-EST. PATIENT-LVL III: ICD-10-PCS | Mod: PBBFAC,,, | Performed by: OPTOMETRIST

## 2021-07-14 PROCEDURE — 92015 PR REFRACTION: ICD-10-PCS | Mod: S$GLB,,, | Performed by: OPTOMETRIST

## 2021-08-16 ENCOUNTER — TELEPHONE (OUTPATIENT)
Dept: OPHTHALMOLOGY | Facility: CLINIC | Age: 76
End: 2021-08-16

## 2021-09-17 ENCOUNTER — TELEPHONE (OUTPATIENT)
Dept: INTERNAL MEDICINE | Facility: CLINIC | Age: 76
End: 2021-09-17

## 2021-09-17 ENCOUNTER — OFFICE VISIT (OUTPATIENT)
Dept: OPHTHALMOLOGY | Facility: CLINIC | Age: 76
End: 2021-09-17
Payer: MEDICARE

## 2021-09-17 DIAGNOSIS — H43.822 VITREOMACULAR TRACTION SYNDROME OF LEFT EYE: Primary | ICD-10-CM

## 2021-09-17 PROCEDURE — 92134 CPTRZ OPH DX IMG PST SGM RTA: CPT | Mod: S$GLB,,, | Performed by: OPHTHALMOLOGY

## 2021-09-17 PROCEDURE — 92004 COMPRE OPH EXAM NEW PT 1/>: CPT | Mod: S$GLB,,, | Performed by: OPHTHALMOLOGY

## 2021-09-17 PROCEDURE — 1160F RVW MEDS BY RX/DR IN RCRD: CPT | Mod: CPTII,S$GLB,, | Performed by: OPHTHALMOLOGY

## 2021-09-17 PROCEDURE — 99999 PR PBB SHADOW E&M-EST. PATIENT-LVL II: ICD-10-PCS | Mod: PBBFAC,,, | Performed by: OPHTHALMOLOGY

## 2021-09-17 PROCEDURE — 1159F PR MEDICATION LIST DOCUMENTED IN MEDICAL RECORD: ICD-10-PCS | Mod: CPTII,S$GLB,, | Performed by: OPHTHALMOLOGY

## 2021-09-17 PROCEDURE — 1159F MED LIST DOCD IN RCRD: CPT | Mod: CPTII,S$GLB,, | Performed by: OPHTHALMOLOGY

## 2021-09-17 PROCEDURE — 99999 PR PBB SHADOW E&M-EST. PATIENT-LVL II: CPT | Mod: PBBFAC,,, | Performed by: OPHTHALMOLOGY

## 2021-09-17 PROCEDURE — 1160F PR REVIEW ALL MEDS BY PRESCRIBER/CLIN PHARMACIST DOCUMENTED: ICD-10-PCS | Mod: CPTII,S$GLB,, | Performed by: OPHTHALMOLOGY

## 2021-09-17 PROCEDURE — 92004 PR EYE EXAM, NEW PATIENT,COMPREHESV: ICD-10-PCS | Mod: S$GLB,,, | Performed by: OPHTHALMOLOGY

## 2021-09-17 PROCEDURE — 92134 POSTERIOR SEGMENT OCT RETINA (OCULAR COHERENCE TOMOGRAPHY)-BOTH EYES: ICD-10-PCS | Mod: S$GLB,,, | Performed by: OPHTHALMOLOGY

## 2021-10-04 ENCOUNTER — OFFICE VISIT (OUTPATIENT)
Dept: DERMATOLOGY | Facility: CLINIC | Age: 76
End: 2021-10-04
Payer: MEDICARE

## 2021-10-04 DIAGNOSIS — L65.8 FEMALE PATTERN HAIR LOSS: Primary | ICD-10-CM

## 2021-10-04 PROCEDURE — 1160F RVW MEDS BY RX/DR IN RCRD: CPT | Mod: CPTII,S$GLB,, | Performed by: DERMATOLOGY

## 2021-10-04 PROCEDURE — 1101F PT FALLS ASSESS-DOCD LE1/YR: CPT | Mod: CPTII,S$GLB,, | Performed by: DERMATOLOGY

## 2021-10-04 PROCEDURE — 1126F PR PAIN SEVERITY QUANTIFIED, NO PAIN PRESENT: ICD-10-PCS | Mod: CPTII,S$GLB,, | Performed by: DERMATOLOGY

## 2021-10-04 PROCEDURE — 99999 PR PBB SHADOW E&M-EST. PATIENT-LVL III: ICD-10-PCS | Mod: PBBFAC,,, | Performed by: DERMATOLOGY

## 2021-10-04 PROCEDURE — 99999 PR PBB SHADOW E&M-EST. PATIENT-LVL III: CPT | Mod: PBBFAC,,, | Performed by: DERMATOLOGY

## 2021-10-04 PROCEDURE — 1160F PR REVIEW ALL MEDS BY PRESCRIBER/CLIN PHARMACIST DOCUMENTED: ICD-10-PCS | Mod: CPTII,S$GLB,, | Performed by: DERMATOLOGY

## 2021-10-04 PROCEDURE — 3288F PR FALLS RISK ASSESSMENT DOCUMENTED: ICD-10-PCS | Mod: CPTII,S$GLB,, | Performed by: DERMATOLOGY

## 2021-10-04 PROCEDURE — 1126F AMNT PAIN NOTED NONE PRSNT: CPT | Mod: CPTII,S$GLB,, | Performed by: DERMATOLOGY

## 2021-10-04 PROCEDURE — 1159F PR MEDICATION LIST DOCUMENTED IN MEDICAL RECORD: ICD-10-PCS | Mod: CPTII,S$GLB,, | Performed by: DERMATOLOGY

## 2021-10-04 PROCEDURE — 99202 OFFICE O/P NEW SF 15 MIN: CPT | Mod: S$GLB,,, | Performed by: DERMATOLOGY

## 2021-10-04 PROCEDURE — 1101F PR PT FALLS ASSESS DOC 0-1 FALLS W/OUT INJ PAST YR: ICD-10-PCS | Mod: CPTII,S$GLB,, | Performed by: DERMATOLOGY

## 2021-10-04 PROCEDURE — 99202 PR OFFICE/OUTPT VISIT, NEW, LEVL II, 15-29 MIN: ICD-10-PCS | Mod: S$GLB,,, | Performed by: DERMATOLOGY

## 2021-10-04 PROCEDURE — 1159F MED LIST DOCD IN RCRD: CPT | Mod: CPTII,S$GLB,, | Performed by: DERMATOLOGY

## 2021-10-04 PROCEDURE — 3288F FALL RISK ASSESSMENT DOCD: CPT | Mod: CPTII,S$GLB,, | Performed by: DERMATOLOGY

## 2021-10-04 RX ORDER — MOMETASONE FUROATE 1 MG/ML
SOLUTION TOPICAL DAILY
Qty: 60 ML | Refills: 3 | Status: SHIPPED | OUTPATIENT
Start: 2021-10-04 | End: 2023-01-11

## 2021-10-26 ENCOUNTER — LAB VISIT (OUTPATIENT)
Dept: LAB | Facility: HOSPITAL | Age: 76
End: 2021-10-26
Attending: PHYSICIAN ASSISTANT
Payer: MEDICARE

## 2021-10-26 DIAGNOSIS — I50.42 CHRONIC COMBINED SYSTOLIC AND DIASTOLIC CONGESTIVE HEART FAILURE: ICD-10-CM

## 2021-10-26 DIAGNOSIS — I42.8 NONISCHEMIC CARDIOMYOPATHY: ICD-10-CM

## 2021-10-26 LAB
ALBUMIN SERPL BCP-MCNC: 3.4 G/DL (ref 3.5–5.2)
ALP SERPL-CCNC: 69 U/L (ref 55–135)
ALT SERPL W/O P-5'-P-CCNC: 21 U/L (ref 10–44)
ANION GAP SERPL CALC-SCNC: 9 MMOL/L (ref 8–16)
AST SERPL-CCNC: 34 U/L (ref 10–40)
BILIRUB SERPL-MCNC: 0.7 MG/DL (ref 0.1–1)
BUN SERPL-MCNC: 18 MG/DL (ref 8–23)
CALCIUM SERPL-MCNC: 10 MG/DL (ref 8.7–10.5)
CHLORIDE SERPL-SCNC: 104 MMOL/L (ref 95–110)
CHOLEST SERPL-MCNC: 188 MG/DL (ref 120–199)
CHOLEST/HDLC SERPL: 3.4 {RATIO} (ref 2–5)
CO2 SERPL-SCNC: 25 MMOL/L (ref 23–29)
CREAT SERPL-MCNC: 1 MG/DL (ref 0.5–1.4)
EST. GFR  (AFRICAN AMERICAN): >60 ML/MIN/1.73 M^2
EST. GFR  (NON AFRICAN AMERICAN): 54.1 ML/MIN/1.73 M^2
GLUCOSE SERPL-MCNC: 91 MG/DL (ref 70–110)
HDLC SERPL-MCNC: 56 MG/DL (ref 40–75)
HDLC SERPL: 29.8 % (ref 20–50)
LDLC SERPL CALC-MCNC: 107.2 MG/DL (ref 63–159)
NONHDLC SERPL-MCNC: 132 MG/DL
POTASSIUM SERPL-SCNC: 4.1 MMOL/L (ref 3.5–5.1)
PROT SERPL-MCNC: 7.4 G/DL (ref 6–8.4)
SODIUM SERPL-SCNC: 138 MMOL/L (ref 136–145)
TRIGL SERPL-MCNC: 124 MG/DL (ref 30–150)

## 2021-10-26 PROCEDURE — 36415 COLL VENOUS BLD VENIPUNCTURE: CPT | Mod: PO | Performed by: PHYSICIAN ASSISTANT

## 2021-10-26 PROCEDURE — 80061 LIPID PANEL: CPT | Performed by: PHYSICIAN ASSISTANT

## 2021-10-26 PROCEDURE — 80053 COMPREHEN METABOLIC PANEL: CPT | Performed by: PHYSICIAN ASSISTANT

## 2021-10-27 ENCOUNTER — TELEPHONE (OUTPATIENT)
Dept: INTERNAL MEDICINE | Facility: CLINIC | Age: 76
End: 2021-10-27
Payer: MEDICARE

## 2021-10-27 DIAGNOSIS — Z12.39 ENCOUNTER FOR SCREENING FOR MALIGNANT NEOPLASM OF BREAST, UNSPECIFIED SCREENING MODALITY: Primary | ICD-10-CM

## 2021-10-28 ENCOUNTER — TELEPHONE (OUTPATIENT)
Dept: INTERNAL MEDICINE | Facility: CLINIC | Age: 76
End: 2021-10-28
Payer: MEDICARE

## 2021-10-28 ENCOUNTER — TELEPHONE (OUTPATIENT)
Dept: CARDIOLOGY | Facility: CLINIC | Age: 76
End: 2021-10-28
Payer: MEDICARE

## 2021-11-05 ENCOUNTER — OFFICE VISIT (OUTPATIENT)
Dept: CARDIOLOGY | Facility: CLINIC | Age: 76
End: 2021-11-05
Payer: MEDICARE

## 2021-11-05 VITALS
SYSTOLIC BLOOD PRESSURE: 138 MMHG | HEART RATE: 54 BPM | HEIGHT: 62 IN | OXYGEN SATURATION: 99 % | DIASTOLIC BLOOD PRESSURE: 80 MMHG | BODY MASS INDEX: 34.85 KG/M2 | WEIGHT: 189.38 LBS

## 2021-11-05 DIAGNOSIS — N18.31 STAGE 3A CHRONIC KIDNEY DISEASE: Chronic | ICD-10-CM

## 2021-11-05 DIAGNOSIS — I49.3 PVC (PREMATURE VENTRICULAR CONTRACTION): ICD-10-CM

## 2021-11-05 DIAGNOSIS — I10 ESSENTIAL HYPERTENSION: Primary | Chronic | ICD-10-CM

## 2021-11-05 DIAGNOSIS — I50.42 CHRONIC COMBINED SYSTOLIC AND DIASTOLIC CONGESTIVE HEART FAILURE: ICD-10-CM

## 2021-11-05 DIAGNOSIS — E66.9 OBESITY, CLASS I, BMI 30-34.9: ICD-10-CM

## 2021-11-05 DIAGNOSIS — I42.8 NONISCHEMIC CARDIOMYOPATHY: ICD-10-CM

## 2021-11-05 PROCEDURE — 3288F FALL RISK ASSESSMENT DOCD: CPT | Mod: CPTII,S$GLB,, | Performed by: INTERNAL MEDICINE

## 2021-11-05 PROCEDURE — 3079F PR MOST RECENT DIASTOLIC BLOOD PRESSURE 80-89 MM HG: ICD-10-PCS | Mod: CPTII,S$GLB,, | Performed by: INTERNAL MEDICINE

## 2021-11-05 PROCEDURE — 99999 PR PBB SHADOW E&M-EST. PATIENT-LVL IV: CPT | Mod: PBBFAC,,, | Performed by: INTERNAL MEDICINE

## 2021-11-05 PROCEDURE — 1160F RVW MEDS BY RX/DR IN RCRD: CPT | Mod: CPTII,S$GLB,, | Performed by: INTERNAL MEDICINE

## 2021-11-05 PROCEDURE — 3075F PR MOST RECENT SYSTOLIC BLOOD PRESS GE 130-139MM HG: ICD-10-PCS | Mod: CPTII,S$GLB,, | Performed by: INTERNAL MEDICINE

## 2021-11-05 PROCEDURE — 1126F AMNT PAIN NOTED NONE PRSNT: CPT | Mod: CPTII,S$GLB,, | Performed by: INTERNAL MEDICINE

## 2021-11-05 PROCEDURE — 3075F SYST BP GE 130 - 139MM HG: CPT | Mod: CPTII,S$GLB,, | Performed by: INTERNAL MEDICINE

## 2021-11-05 PROCEDURE — 1101F PT FALLS ASSESS-DOCD LE1/YR: CPT | Mod: CPTII,S$GLB,, | Performed by: INTERNAL MEDICINE

## 2021-11-05 PROCEDURE — 99214 OFFICE O/P EST MOD 30 MIN: CPT | Mod: S$GLB,,, | Performed by: INTERNAL MEDICINE

## 2021-11-05 PROCEDURE — 99214 PR OFFICE/OUTPT VISIT, EST, LEVL IV, 30-39 MIN: ICD-10-PCS | Mod: S$GLB,,, | Performed by: INTERNAL MEDICINE

## 2021-11-05 PROCEDURE — 1160F PR REVIEW ALL MEDS BY PRESCRIBER/CLIN PHARMACIST DOCUMENTED: ICD-10-PCS | Mod: CPTII,S$GLB,, | Performed by: INTERNAL MEDICINE

## 2021-11-05 PROCEDURE — 1101F PR PT FALLS ASSESS DOC 0-1 FALLS W/OUT INJ PAST YR: ICD-10-PCS | Mod: CPTII,S$GLB,, | Performed by: INTERNAL MEDICINE

## 2021-11-05 PROCEDURE — 1159F PR MEDICATION LIST DOCUMENTED IN MEDICAL RECORD: ICD-10-PCS | Mod: CPTII,S$GLB,, | Performed by: INTERNAL MEDICINE

## 2021-11-05 PROCEDURE — 99999 PR PBB SHADOW E&M-EST. PATIENT-LVL IV: ICD-10-PCS | Mod: PBBFAC,,, | Performed by: INTERNAL MEDICINE

## 2021-11-05 PROCEDURE — 1126F PR PAIN SEVERITY QUANTIFIED, NO PAIN PRESENT: ICD-10-PCS | Mod: CPTII,S$GLB,, | Performed by: INTERNAL MEDICINE

## 2021-11-05 PROCEDURE — 3079F DIAST BP 80-89 MM HG: CPT | Mod: CPTII,S$GLB,, | Performed by: INTERNAL MEDICINE

## 2021-11-05 PROCEDURE — 3288F PR FALLS RISK ASSESSMENT DOCUMENTED: ICD-10-PCS | Mod: CPTII,S$GLB,, | Performed by: INTERNAL MEDICINE

## 2021-11-05 PROCEDURE — 1159F MED LIST DOCD IN RCRD: CPT | Mod: CPTII,S$GLB,, | Performed by: INTERNAL MEDICINE

## 2021-11-29 ENCOUNTER — TELEPHONE (OUTPATIENT)
Dept: ADMINISTRATIVE | Facility: HOSPITAL | Age: 76
End: 2021-11-29
Payer: MEDICARE

## 2021-12-03 ENCOUNTER — HOSPITAL ENCOUNTER (OUTPATIENT)
Dept: RADIOLOGY | Facility: HOSPITAL | Age: 76
Discharge: HOME | End: 2021-12-03
Attending: INTERNAL MEDICINE
Payer: MEDICARE

## 2021-12-03 PROCEDURE — 77067 SCR MAMMO BI INCL CAD: CPT | Mod: 26,,, | Performed by: RADIOLOGY

## 2021-12-03 PROCEDURE — 77063 MAMMO DIGITAL SCREENING BILAT WITH TOMO: ICD-10-PCS | Mod: 26,,, | Performed by: RADIOLOGY

## 2021-12-03 PROCEDURE — 77063 BREAST TOMOSYNTHESIS BI: CPT | Mod: 26,,, | Performed by: RADIOLOGY

## 2021-12-03 PROCEDURE — 77067 MAMMO DIGITAL SCREENING BILAT WITH TOMO: ICD-10-PCS | Mod: 26,,, | Performed by: RADIOLOGY

## 2021-12-03 PROCEDURE — 77067 SCR MAMMO BI INCL CAD: CPT | Mod: TC

## 2021-12-06 ENCOUNTER — OFFICE VISIT (OUTPATIENT)
Dept: INTERNAL MEDICINE | Facility: CLINIC | Age: 76
End: 2021-12-06
Payer: MEDICARE

## 2021-12-06 VITALS
TEMPERATURE: 97 F | DIASTOLIC BLOOD PRESSURE: 86 MMHG | WEIGHT: 190.94 LBS | SYSTOLIC BLOOD PRESSURE: 118 MMHG | HEIGHT: 62 IN | HEART RATE: 86 BPM | BODY MASS INDEX: 35.14 KG/M2

## 2021-12-06 DIAGNOSIS — F51.01 PRIMARY INSOMNIA: ICD-10-CM

## 2021-12-06 DIAGNOSIS — N18.31 STAGE 3A CHRONIC KIDNEY DISEASE: ICD-10-CM

## 2021-12-06 DIAGNOSIS — E66.9 OBESITY, CLASS I, BMI 30-34.9: ICD-10-CM

## 2021-12-06 DIAGNOSIS — I50.42 CHRONIC COMBINED SYSTOLIC AND DIASTOLIC CONGESTIVE HEART FAILURE: ICD-10-CM

## 2021-12-06 DIAGNOSIS — I10 ESSENTIAL HYPERTENSION: Primary | ICD-10-CM

## 2021-12-06 PROCEDURE — 99999 PR PBB SHADOW E&M-EST. PATIENT-LVL III: ICD-10-PCS | Mod: PBBFAC,,, | Performed by: NURSE PRACTITIONER

## 2021-12-06 PROCEDURE — 99999 PR PBB SHADOW E&M-EST. PATIENT-LVL III: CPT | Mod: PBBFAC,,, | Performed by: NURSE PRACTITIONER

## 2021-12-06 PROCEDURE — 99214 OFFICE O/P EST MOD 30 MIN: CPT | Mod: S$GLB,,, | Performed by: NURSE PRACTITIONER

## 2021-12-06 PROCEDURE — 99214 PR OFFICE/OUTPT VISIT, EST, LEVL IV, 30-39 MIN: ICD-10-PCS | Mod: S$GLB,,, | Performed by: NURSE PRACTITIONER

## 2021-12-14 ENCOUNTER — IMMUNIZATION (OUTPATIENT)
Dept: PRIMARY CARE CLINIC | Facility: CLINIC | Age: 76
End: 2021-12-14
Payer: MEDICARE

## 2021-12-14 DIAGNOSIS — Z23 NEED FOR VACCINATION: Primary | ICD-10-CM

## 2021-12-14 PROCEDURE — 0004A COVID-19, MRNA, LNP-S, PF, 30 MCG/0.3 ML DOSE VACCINE: CPT | Mod: CV19,PBBFAC | Performed by: FAMILY MEDICINE

## 2022-04-19 ENCOUNTER — PES CALL (OUTPATIENT)
Dept: ADMINISTRATIVE | Facility: CLINIC | Age: 77
End: 2022-04-19
Payer: MEDICARE

## 2022-04-27 ENCOUNTER — TELEPHONE (OUTPATIENT)
Dept: INTERNAL MEDICINE | Facility: CLINIC | Age: 77
End: 2022-04-27
Payer: MEDICARE

## 2022-04-27 DIAGNOSIS — I10 ESSENTIAL HYPERTENSION: Primary | ICD-10-CM

## 2022-04-27 NOTE — TELEPHONE ENCOUNTER
Patient is requesting blood work prior to appointment in June. Labs pending. Patient would like to have blood work done tomorrow along with the other blood work she is doing for Dr. Gilliland, which is Lipid panel and CMP.  Will she need another Lipid and CMP done for your visit?

## 2022-04-27 NOTE — TELEPHONE ENCOUNTER
----- Message from Concepcion Raza sent at 4/27/2022 10:45 AM CDT -----  Contact: Megan  Patient is calling to speak with a nurse regarding test. Patient requests to discuss test for kidneys and did not provide additional information. Please give patient a call back at [phone] to discuss further.   Thanks,  GH

## 2022-04-28 ENCOUNTER — LAB VISIT (OUTPATIENT)
Dept: LAB | Facility: HOSPITAL | Age: 77
End: 2022-04-28
Attending: INTERNAL MEDICINE
Payer: MEDICARE

## 2022-04-28 DIAGNOSIS — I42.8 NONISCHEMIC CARDIOMYOPATHY: ICD-10-CM

## 2022-04-28 DIAGNOSIS — I10 ESSENTIAL HYPERTENSION: Chronic | ICD-10-CM

## 2022-04-28 DIAGNOSIS — I50.42 CHRONIC COMBINED SYSTOLIC AND DIASTOLIC CONGESTIVE HEART FAILURE: ICD-10-CM

## 2022-04-28 LAB
ALBUMIN SERPL BCP-MCNC: 3.5 G/DL (ref 3.5–5.2)
ALP SERPL-CCNC: 62 U/L (ref 55–135)
ALT SERPL W/O P-5'-P-CCNC: 19 U/L (ref 10–44)
ANION GAP SERPL CALC-SCNC: 7 MMOL/L (ref 8–16)
AST SERPL-CCNC: 28 U/L (ref 10–40)
BILIRUB SERPL-MCNC: 0.9 MG/DL (ref 0.1–1)
BUN SERPL-MCNC: 14 MG/DL (ref 8–23)
CALCIUM SERPL-MCNC: 10.1 MG/DL (ref 8.7–10.5)
CHLORIDE SERPL-SCNC: 105 MMOL/L (ref 95–110)
CHOLEST SERPL-MCNC: 187 MG/DL (ref 120–199)
CHOLEST/HDLC SERPL: 3.2 {RATIO} (ref 2–5)
CO2 SERPL-SCNC: 28 MMOL/L (ref 23–29)
CREAT SERPL-MCNC: 1.2 MG/DL (ref 0.5–1.4)
EST. GFR  (AFRICAN AMERICAN): 49.7 ML/MIN/1.73 M^2
EST. GFR  (NON AFRICAN AMERICAN): 43.1 ML/MIN/1.73 M^2
GLUCOSE SERPL-MCNC: 104 MG/DL (ref 70–110)
HDLC SERPL-MCNC: 59 MG/DL (ref 40–75)
HDLC SERPL: 31.6 % (ref 20–50)
LDLC SERPL CALC-MCNC: 108.2 MG/DL (ref 63–159)
NONHDLC SERPL-MCNC: 128 MG/DL
POTASSIUM SERPL-SCNC: 4.4 MMOL/L (ref 3.5–5.1)
PROT SERPL-MCNC: 7.3 G/DL (ref 6–8.4)
SODIUM SERPL-SCNC: 140 MMOL/L (ref 136–145)
TRIGL SERPL-MCNC: 99 MG/DL (ref 30–150)

## 2022-04-28 PROCEDURE — 80053 COMPREHEN METABOLIC PANEL: CPT | Performed by: INTERNAL MEDICINE

## 2022-04-28 PROCEDURE — 80061 LIPID PANEL: CPT | Performed by: INTERNAL MEDICINE

## 2022-04-28 PROCEDURE — 36415 COLL VENOUS BLD VENIPUNCTURE: CPT | Mod: PO | Performed by: INTERNAL MEDICINE

## 2022-05-05 ENCOUNTER — TELEPHONE (OUTPATIENT)
Dept: CARDIOLOGY | Facility: CLINIC | Age: 77
End: 2022-05-05
Payer: MEDICARE

## 2022-05-05 NOTE — TELEPHONE ENCOUNTER
Spoke to patient and r/s to next available 7/27/22 and added appt to waitlist----- Message from Britta Segura sent at 5/5/2022  3:27 PM CDT -----  Regarding: appt  Contact: pt  Type:  Sooner Appointment Request    Caller is requesting a sooner appointment.  Caller declined first available appointment listed below.  Caller will not accept being placed on the waitlist and is requesting a message be sent to doctor.  Name of Caller: pt  When is the first available appointment? 07/27  Symptoms: 6mth f/u  Would the patient rather a call back or a response via [facility]? Call back  Best Call Back Number:[phone]  Additional Information: n/a

## 2022-05-16 ENCOUNTER — PATIENT OUTREACH (OUTPATIENT)
Dept: ADMINISTRATIVE | Facility: OTHER | Age: 77
End: 2022-05-16
Payer: MEDICARE

## 2022-05-17 ENCOUNTER — OFFICE VISIT (OUTPATIENT)
Dept: OPHTHALMOLOGY | Facility: CLINIC | Age: 77
End: 2022-05-17
Payer: MEDICARE

## 2022-05-17 DIAGNOSIS — H43.813 POSTERIOR VITREOUS DETACHMENT OF BOTH EYES: ICD-10-CM

## 2022-05-17 DIAGNOSIS — H35.372 EPIRETINAL MEMBRANE, LEFT: Primary | ICD-10-CM

## 2022-05-17 DIAGNOSIS — H33.302: ICD-10-CM

## 2022-05-17 DIAGNOSIS — H25.013 CORTICAL AGE-RELATED CATARACT OF BOTH EYES: ICD-10-CM

## 2022-05-17 PROCEDURE — 1126F PR PAIN SEVERITY QUANTIFIED, NO PAIN PRESENT: ICD-10-PCS | Mod: CPTII,S$GLB,, | Performed by: OPHTHALMOLOGY

## 2022-05-17 PROCEDURE — 92134 CPTRZ OPH DX IMG PST SGM RTA: CPT | Mod: S$GLB,,, | Performed by: OPHTHALMOLOGY

## 2022-05-17 PROCEDURE — 1159F MED LIST DOCD IN RCRD: CPT | Mod: CPTII,S$GLB,, | Performed by: OPHTHALMOLOGY

## 2022-05-17 PROCEDURE — 1160F PR REVIEW ALL MEDS BY PRESCRIBER/CLIN PHARMACIST DOCUMENTED: ICD-10-PCS | Mod: CPTII,S$GLB,, | Performed by: OPHTHALMOLOGY

## 2022-05-17 PROCEDURE — 99213 PR OFFICE/OUTPT VISIT, EST, LEVL III, 20-29 MIN: ICD-10-PCS | Mod: S$GLB,,, | Performed by: OPHTHALMOLOGY

## 2022-05-17 PROCEDURE — 1160F RVW MEDS BY RX/DR IN RCRD: CPT | Mod: CPTII,S$GLB,, | Performed by: OPHTHALMOLOGY

## 2022-05-17 PROCEDURE — 99999 PR PBB SHADOW E&M-EST. PATIENT-LVL III: ICD-10-PCS | Mod: PBBFAC,,, | Performed by: OPHTHALMOLOGY

## 2022-05-17 PROCEDURE — 99999 PR PBB SHADOW E&M-EST. PATIENT-LVL III: CPT | Mod: PBBFAC,,, | Performed by: OPHTHALMOLOGY

## 2022-05-17 PROCEDURE — 92134 POSTERIOR SEGMENT OCT RETINA (OCULAR COHERENCE TOMOGRAPHY)-BOTH EYES: ICD-10-PCS | Mod: S$GLB,,, | Performed by: OPHTHALMOLOGY

## 2022-05-17 PROCEDURE — 1159F PR MEDICATION LIST DOCUMENTED IN MEDICAL RECORD: ICD-10-PCS | Mod: CPTII,S$GLB,, | Performed by: OPHTHALMOLOGY

## 2022-05-17 PROCEDURE — 1126F AMNT PAIN NOTED NONE PRSNT: CPT | Mod: CPTII,S$GLB,, | Performed by: OPHTHALMOLOGY

## 2022-05-17 PROCEDURE — 99213 OFFICE O/P EST LOW 20 MIN: CPT | Mod: S$GLB,,, | Performed by: OPHTHALMOLOGY

## 2022-05-17 NOTE — PROGRESS NOTES
===============================  Megan Sams,  5/17/2022 today   77 y.o. female   Last visit [facility]: :9/17/2021   Last visit eye dept. Visit date not found  [facility]:  Corrected distance visual acuity was 20/30 in the right eye and 20/30 in the left eye.  Tonometry     Tonometry (Applanation, 1:08 PM)       Right Left    Pressure 10 10              Not recorded       Not recorded       Not recorded       Chief Complaint   Patient presents with    Vitreomacular traction syndrome of left eye     6 months check up       ________________  5/17/2022 today  HPI     Vitreomacular traction syndrome of left eye      Additional comments: 6 months check up              Comments     1. VMT OS   2. Cataracts OU   3. PVD OU   4. Dermatochalasis BUL            Last edited by Conchita Sandoval on 5/17/2022 12:59 PM. (History)      Problem List Items Addressed This Visit        Eye/Vision problems    Cortical age-related cataract of both eyes      Other Visit Diagnoses     Epiretinal membrane, left    -  Primary    Relevant Orders    Posterior Segment OCT Retina-Both eyes (Completed)    Retinal traction of left eye        Relevant Orders    Posterior Segment OCT Retina-Both eyes (Completed)    Posterior vitreous detachment of both eyes              .OS ERM with foveal eversion, Vitreoretinal traction OS, No surgery indicated  PVD OU, no holes or tears in retina, reviewed s/s RT, RD, instructed to call with any worsening  Asymptomatic mild cortical cataracts OU, no surgery indicated, will follow  rtc 1 year      ===============================

## 2022-05-17 NOTE — PROGRESS NOTES
Health Maintenance Due   Topic Date Due    Shingles Vaccine (1 of 2) Never done    COVID-19 Vaccine (4 - Booster for Pfizer series) 04/14/2022     Updates were requested from care everywhere.  Chart was reviewed for overdue [facility]active [facility] Encounters ([facility]) topics (CRS, Breast Cancer Screening, Eye exam)  Health Maintenance has been updated.  [facility] immunization registry triggered.  Immunizations were reconciled.

## 2022-05-30 RX ORDER — CANDESARTAN 32 MG/1
32 TABLET ORAL DAILY
Qty: 90 TABLET | Refills: 0 | Status: SHIPPED | OUTPATIENT
Start: 2022-05-30 | End: 2022-06-23

## 2022-05-30 NOTE — TELEPHONE ENCOUNTER
Refill Authorization Note   Megan Latrell  is requesting a refill authorization.  Brief Assessment and Rationale for Refill:  Approve          Medication Reconciliation Completed: No   Comments:     No Care Gaps recommended.     Note composed:12:05 PM 05/30/2022

## 2022-05-30 NOTE — TELEPHONE ENCOUNTER
Care Due:                  Date            Visit Type   Department     Provider  --------------------------------------------------------------------------------                                EP -                              PRIMARY      [facility] INTERNAL  Last Visit: 05-      CARE ([facility])   MEDICINE       Javad Bowen                              EP -                              PRIMARY      [facility] INTERNAL  Next Visit: 06-      CARE ([facility])   MEDICINE       Javad Bowen                                                            Last  Test          Frequency    Reason                     Performed    Due Date  --------------------------------------------------------------------------------    CBC.........  12 months..  allopurinoL..............  12- 11-    Uric Acid...  12 months..  allopurinoL..............  Not Found    Overdue    [employer] Embedded Care Gaps. Reference number: [id number]. 5/30/2022   8:31:27 AM CDT

## 2022-06-07 ENCOUNTER — TELEPHONE (OUTPATIENT)
Dept: INTERNAL MEDICINE | Facility: CLINIC | Age: 77
End: 2022-06-07
Payer: MEDICARE

## 2022-06-07 NOTE — TELEPHONE ENCOUNTER
----- Message from Amaris Virgen sent at 6/7/2022 10:35 AM CDT -----  Contact: Pt  [phone]  Patient has questions about scheduling labs. Please call and advise.    Thank you and have a great day.

## 2022-06-07 NOTE — TELEPHONE ENCOUNTER
Spoke with patient, and patient wants to know does she need blood work done prior to her appointment with Dr. Bowen on June 27th, because patient stated that she had blood work order by Dr. Gilliland on April 28th, 2022? The labs were a lipid panel and a cmp.  Please advise.

## 2022-06-27 ENCOUNTER — OFFICE VISIT (OUTPATIENT)
Dept: INTERNAL MEDICINE | Facility: CLINIC | Age: 77
End: 2022-06-27
Payer: MEDICARE

## 2022-06-27 VITALS
HEART RATE: 61 BPM | DIASTOLIC BLOOD PRESSURE: 68 MMHG | BODY MASS INDEX: 33.43 KG/M2 | OXYGEN SATURATION: 98 % | TEMPERATURE: 97 F | SYSTOLIC BLOOD PRESSURE: 120 MMHG | WEIGHT: 181.69 LBS | HEIGHT: 62 IN

## 2022-06-27 DIAGNOSIS — Z00.00 ROUTINE GENERAL MEDICAL EXAMINATION AT HEALTH CARE FACILITY: Primary | ICD-10-CM

## 2022-06-27 DIAGNOSIS — I10 ESSENTIAL HYPERTENSION: Chronic | ICD-10-CM

## 2022-06-27 DIAGNOSIS — N18.31 STAGE 3A CHRONIC KIDNEY DISEASE: Chronic | ICD-10-CM

## 2022-06-27 DIAGNOSIS — I42.8 NONISCHEMIC CARDIOMYOPATHY: ICD-10-CM

## 2022-06-27 PROCEDURE — 99397 PR PREVENTIVE VISIT,EST,65 & OVER: ICD-10-PCS | Mod: GZ,S$GLB,, | Performed by: INTERNAL MEDICINE

## 2022-06-27 PROCEDURE — 99499 RISK ADDL DX/OHS AUDIT: ICD-10-PCS | Mod: S$GLB,,, | Performed by: INTERNAL MEDICINE

## 2022-06-27 PROCEDURE — 3074F SYST BP LT 130 MM HG: CPT | Mod: CPTII,S$GLB,, | Performed by: INTERNAL MEDICINE

## 2022-06-27 PROCEDURE — 99999 PR PBB SHADOW E&M-EST. PATIENT-LVL IV: CPT | Mod: PBBFAC,,, | Performed by: INTERNAL MEDICINE

## 2022-06-27 PROCEDURE — 99999 PR PBB SHADOW E&M-EST. PATIENT-LVL IV: ICD-10-PCS | Mod: PBBFAC,,, | Performed by: INTERNAL MEDICINE

## 2022-06-27 PROCEDURE — 1126F PR PAIN SEVERITY QUANTIFIED, NO PAIN PRESENT: ICD-10-PCS | Mod: CPTII,S$GLB,, | Performed by: INTERNAL MEDICINE

## 2022-06-27 PROCEDURE — 1101F PR PT FALLS ASSESS DOC 0-1 FALLS W/OUT INJ PAST YR: ICD-10-PCS | Mod: CPTII,S$GLB,, | Performed by: INTERNAL MEDICINE

## 2022-06-27 PROCEDURE — 1159F MED LIST DOCD IN RCRD: CPT | Mod: CPTII,S$GLB,, | Performed by: INTERNAL MEDICINE

## 2022-06-27 PROCEDURE — 1160F RVW MEDS BY RX/DR IN RCRD: CPT | Mod: CPTII,S$GLB,, | Performed by: INTERNAL MEDICINE

## 2022-06-27 PROCEDURE — 3078F PR MOST RECENT DIASTOLIC BLOOD PRESSURE < 80 MM HG: ICD-10-PCS | Mod: CPTII,S$GLB,, | Performed by: INTERNAL MEDICINE

## 2022-06-27 PROCEDURE — 3288F PR FALLS RISK ASSESSMENT DOCUMENTED: ICD-10-PCS | Mod: CPTII,S$GLB,, | Performed by: INTERNAL MEDICINE

## 2022-06-27 PROCEDURE — 1101F PT FALLS ASSESS-DOCD LE1/YR: CPT | Mod: CPTII,S$GLB,, | Performed by: INTERNAL MEDICINE

## 2022-06-27 PROCEDURE — 3288F FALL RISK ASSESSMENT DOCD: CPT | Mod: CPTII,S$GLB,, | Performed by: INTERNAL MEDICINE

## 2022-06-27 PROCEDURE — 3078F DIAST BP <80 MM HG: CPT | Mod: CPTII,S$GLB,, | Performed by: INTERNAL MEDICINE

## 2022-06-27 PROCEDURE — 1126F AMNT PAIN NOTED NONE PRSNT: CPT | Mod: CPTII,S$GLB,, | Performed by: INTERNAL MEDICINE

## 2022-06-27 PROCEDURE — 3074F PR MOST RECENT SYSTOLIC BLOOD PRESSURE < 130 MM HG: ICD-10-PCS | Mod: CPTII,S$GLB,, | Performed by: INTERNAL MEDICINE

## 2022-06-27 PROCEDURE — 99499 UNLISTED E&M SERVICE: CPT | Mod: S$GLB,,, | Performed by: INTERNAL MEDICINE

## 2022-06-27 PROCEDURE — 1159F PR MEDICATION LIST DOCUMENTED IN MEDICAL RECORD: ICD-10-PCS | Mod: CPTII,S$GLB,, | Performed by: INTERNAL MEDICINE

## 2022-06-27 PROCEDURE — 1160F PR REVIEW ALL MEDS BY PRESCRIBER/CLIN PHARMACIST DOCUMENTED: ICD-10-PCS | Mod: CPTII,S$GLB,, | Performed by: INTERNAL MEDICINE

## 2022-06-27 PROCEDURE — 99397 PER PM REEVAL EST PAT 65+ YR: CPT | Mod: GZ,S$GLB,, | Performed by: INTERNAL MEDICINE

## 2022-06-27 NOTE — PROGRESS NOTES
"Subjective:       Patient ID: Megan Sams is a 77 y.o. female.    Chief Complaint: Annual Exam    HPI Patient is a 77-year-old female presenting today for updated physical exam review of chronic health issues.  Patient has history of hypertension, stage 3 kidney disease and nonischemic cardiomyopathy.  She indicates she has been doing well overall.  She is taking her medications as prescribed.  She is not having problems tolerating the medications.  She has not had any significant issues with breathing or shortness of breath.  She has had a fleeting episode of some shortness of breath 1 day when shopping a few weeks ago but she has had no issues since.  She has not had any orthopnea.  She is continued take her medications as prescribed.    Review of Systems   Constitutional: Negative for chills and fever.   HENT: Negative for hearing loss.    Eyes: Negative for photophobia and visual disturbance.   Respiratory: Negative for cough, shortness of breath and wheezing.    Cardiovascular: Negative for chest pain and palpitations.   Gastrointestinal: Negative for blood in stool, constipation, nausea and vomiting.   Genitourinary: Negative for dysuria and hematuria.   Musculoskeletal: Negative for neck pain and neck stiffness.   Skin: Negative for rash.   Neurological: Negative for syncope, weakness, light-headedness and headaches.   Hematological: Negative for adenopathy.   Psychiatric/Behavioral: Negative for dysphoric mood. The patient is not nervous/anxious.        Objective:   /68   Pulse 61   Temp 97.4 °F (36.3 °C) (Temporal)   Ht 5' 2" (1.575 m)   Wt 82.4 kg (181 lb 10.5 oz)   SpO2 98%   BMI 33.23 kg/m²      Physical Exam  Vitals reviewed.   Constitutional:       Appearance: Normal appearance. She is well-developed. She is not ill-appearing.   HENT:      Head: Normocephalic and atraumatic.      Right Ear: Tympanic membrane, ear canal and external ear normal.      Left Ear: Tympanic membrane, ear canal " and external ear normal.   Eyes:      Pupils: Pupils are equal, round, and reactive to light.   Neck:      Thyroid: No thyromegaly.   Cardiovascular:      Rate and Rhythm: Normal rate and regular rhythm.      Heart sounds: No murmur heard.    No friction rub. No gallop.   Pulmonary:      Effort: Pulmonary effort is normal.      Breath sounds: Normal breath sounds. No wheezing or rales.   Chest:      Chest wall: No tenderness.   Abdominal:      General: Abdomen is flat. Bowel sounds are normal. There is no distension.      Palpations: Abdomen is soft. There is no mass.      Tenderness: There is no abdominal tenderness. There is no guarding or rebound.   Musculoskeletal:      Cervical back: Normal range of motion and neck supple.   Lymphadenopathy:      Cervical: No cervical adenopathy.   Skin:     General: Skin is warm and dry.      Findings: No rash.   Neurological:      General: No focal deficit present.      Mental Status: She is alert and oriented to person, place, and time.      Cranial Nerves: No cranial nerve deficit.      Deep Tendon Reflexes: Reflexes are normal and symmetric. Reflexes normal.   Psychiatric:         Behavior: Behavior normal.         Judgment: Judgment normal.         No visits with results within 2 Week(s) from this visit.   Latest known visit with results is:   Lab Visit on 04/28/2022   Component Date Value    Sodium 04/28/2022 140     Potassium 04/28/2022 4.4     Chloride 04/28/2022 105     CO2 04/28/2022 28     Glucose 04/28/2022 104     BUN 04/28/2022 14     Creatinine 04/28/2022 1.2     Calcium 04/28/2022 10.1     Total Protein 04/28/2022 7.3     Albumin 04/28/2022 3.5     Total Bilirubin 04/28/2022 0.9     Alkaline Phosphatase 04/28/2022 62     AST 04/28/2022 28     ALT 04/28/2022 19     Anion Gap 04/28/2022 7 (A)    eGFR if  04/28/2022 49.7 (A)    eGFR if non African Amer* 04/28/2022 43.1 (A)    Cholesterol 04/28/2022 187     Triglycerides  04/28/2022 99     HDL 04/28/2022 59     LDL Cholesterol 04/28/2022 108.2     HDL/Cholesterol Ratio 04/28/2022 31.6     Total Cholesterol/HDL Ra* 04/28/2022 3.2     Non-HDL Cholesterol 04/28/2022 128        Assessment:       1. Routine general medical examination at health care facility    2. Essential hypertension    3. Stage 3a chronic kidney disease    4. Nonischemic cardiomyopathy        Plan:   Essential hypertension  Blood pressure is under good control.  We will continue the current regimen.  Will work on regular aerobic exercise and a low salt diet.        Routine general medical examination at health care facility  Comments:  Focus on good health habits, low fat diet, regular exercise, seatbelt use, sunscreen use    Essential hypertension  -     CBC Auto Differential; Future; Expected date: 07/01/2022  -     Comprehensive Metabolic Panel; Future; Expected date: 07/01/2022  -     Lipid Panel; Future; Expected date: 07/01/2022    Stage 3a chronic kidney disease  Comments:  Stable.  Avoid nsaids and drink plenty of water.    Nonischemic cardiomyopathy  Comments:  No fluid accumulation. Continue current regimen. Follow up with DR. Gilliland as planned next month          Follow up in about 6 months (around 12/27/2022).

## 2022-06-30 ENCOUNTER — LAB VISIT (OUTPATIENT)
Dept: LAB | Facility: HOSPITAL | Age: 77
End: 2022-06-30
Attending: INTERNAL MEDICINE
Payer: MEDICARE

## 2022-06-30 DIAGNOSIS — I10 ESSENTIAL HYPERTENSION: Chronic | ICD-10-CM

## 2022-06-30 LAB
ALBUMIN SERPL BCP-MCNC: 3.4 G/DL (ref 3.5–5.2)
ALP SERPL-CCNC: 60 U/L (ref 55–135)
ALT SERPL W/O P-5'-P-CCNC: 19 U/L (ref 10–44)
ANION GAP SERPL CALC-SCNC: 9 MMOL/L (ref 8–16)
AST SERPL-CCNC: 31 U/L (ref 10–40)
BASOPHILS # BLD AUTO: 0.06 K/UL (ref 0–0.2)
BASOPHILS NFR BLD: 1 % (ref 0–1.9)
BILIRUB SERPL-MCNC: 0.8 MG/DL (ref 0.1–1)
BUN SERPL-MCNC: 19 MG/DL (ref 8–23)
CALCIUM SERPL-MCNC: 9.6 MG/DL (ref 8.7–10.5)
CHLORIDE SERPL-SCNC: 108 MMOL/L (ref 95–110)
CHOLEST SERPL-MCNC: 178 MG/DL (ref 120–199)
CHOLEST/HDLC SERPL: 3 {RATIO} (ref 2–5)
CO2 SERPL-SCNC: 24 MMOL/L (ref 23–29)
CREAT SERPL-MCNC: 1 MG/DL (ref 0.5–1.4)
DIFFERENTIAL METHOD: ABNORMAL
EOSINOPHIL # BLD AUTO: 0.2 K/UL (ref 0–0.5)
EOSINOPHIL NFR BLD: 4 % (ref 0–8)
ERYTHROCYTE [DISTWIDTH] IN BLOOD BY AUTOMATED COUNT: 15.2 % (ref 11.5–14.5)
EST. GFR  (AFRICAN AMERICAN): >60 ML/MIN/1.73 M^2
EST. GFR  (NON AFRICAN AMERICAN): 53.7 ML/MIN/1.73 M^2
GLUCOSE SERPL-MCNC: 87 MG/DL (ref 70–110)
HCT VFR BLD AUTO: 43.2 % (ref 37–48.5)
HDLC SERPL-MCNC: 59 MG/DL (ref 40–75)
HDLC SERPL: 33.1 % (ref 20–50)
HGB BLD-MCNC: 13.4 G/DL (ref 12–16)
IMM GRANULOCYTES # BLD AUTO: 0.01 K/UL (ref 0–0.04)
IMM GRANULOCYTES NFR BLD AUTO: 0.2 % (ref 0–0.5)
LDLC SERPL CALC-MCNC: 100.2 MG/DL (ref 63–159)
LYMPHOCYTES # BLD AUTO: 2.8 K/UL (ref 1–4.8)
LYMPHOCYTES NFR BLD: 49.1 % (ref 18–48)
MCH RBC QN AUTO: 27.7 PG (ref 27–31)
MCHC RBC AUTO-ENTMCNC: 31 G/DL (ref 32–36)
MCV RBC AUTO: 89 FL (ref 82–98)
MONOCYTES # BLD AUTO: 0.8 K/UL (ref 0.3–1)
MONOCYTES NFR BLD: 14.5 % (ref 4–15)
NEUTROPHILS # BLD AUTO: 1.8 K/UL (ref 1.8–7.7)
NEUTROPHILS NFR BLD: 31.2 % (ref 38–73)
NONHDLC SERPL-MCNC: 119 MG/DL
NRBC BLD-RTO: 0 /100 WBC
PLATELET # BLD AUTO: 185 K/UL (ref 150–450)
PMV BLD AUTO: 12 FL (ref 9.2–12.9)
POTASSIUM SERPL-SCNC: 4.4 MMOL/L (ref 3.5–5.1)
PROT SERPL-MCNC: 6.8 G/DL (ref 6–8.4)
RBC # BLD AUTO: 4.84 M/UL (ref 4–5.4)
SODIUM SERPL-SCNC: 141 MMOL/L (ref 136–145)
TRIGL SERPL-MCNC: 94 MG/DL (ref 30–150)
WBC # BLD AUTO: 5.74 K/UL (ref 3.9–12.7)

## 2022-06-30 PROCEDURE — 80061 LIPID PANEL: CPT | Performed by: INTERNAL MEDICINE

## 2022-06-30 PROCEDURE — 36415 COLL VENOUS BLD VENIPUNCTURE: CPT | Mod: PO | Performed by: INTERNAL MEDICINE

## 2022-06-30 PROCEDURE — 80053 COMPREHEN METABOLIC PANEL: CPT | Performed by: INTERNAL MEDICINE

## 2022-06-30 PROCEDURE — 85025 COMPLETE CBC W/AUTO DIFF WBC: CPT | Performed by: INTERNAL MEDICINE

## 2022-07-01 ENCOUNTER — TELEPHONE (OUTPATIENT)
Dept: INTERNAL MEDICINE | Facility: CLINIC | Age: 77
End: 2022-07-01
Payer: MEDICARE

## 2022-07-06 RX ORDER — METOPROLOL SUCCINATE 50 MG/1
TABLET, EXTENDED RELEASE ORAL
Qty: 90 TABLET | Refills: 3 | Status: SHIPPED | OUTPATIENT
Start: 2022-07-06 | End: 2023-07-30

## 2022-07-06 NOTE — TELEPHONE ENCOUNTER
No new care gaps identified.  [facility] Embedded Care Gaps. Reference number: [id number]. 7/06/2022   1:36:12 AM CDT

## 2022-07-06 NOTE — TELEPHONE ENCOUNTER
Refill Decision Note   Megan Latrell  is requesting a refill authorization.  Brief Assessment and Rationale for Refill:  Approve     Medication Therapy Plan:       Medication Reconciliation Completed: No   Comments:     No Care Gaps recommended.     Note composed:2:12 PM 07/06/2022

## 2022-07-27 ENCOUNTER — OFFICE VISIT (OUTPATIENT)
Dept: CARDIOLOGY | Facility: CLINIC | Age: 77
End: 2022-07-27
Payer: MEDICARE

## 2022-07-27 ENCOUNTER — HOSPITAL ENCOUNTER (OUTPATIENT)
Dept: CARDIOLOGY | Facility: HOSPITAL | Age: 77
Discharge: HOME | End: 2022-07-27
Attending: INTERNAL MEDICINE
Payer: MEDICARE

## 2022-07-27 VITALS
DIASTOLIC BLOOD PRESSURE: 76 MMHG | WEIGHT: 180.75 LBS | HEIGHT: 62 IN | HEART RATE: 61 BPM | BODY MASS INDEX: 33.26 KG/M2 | SYSTOLIC BLOOD PRESSURE: 130 MMHG

## 2022-07-27 DIAGNOSIS — I50.42 CHRONIC COMBINED SYSTOLIC AND DIASTOLIC CONGESTIVE HEART FAILURE: Primary | ICD-10-CM

## 2022-07-27 DIAGNOSIS — I42.8 NONISCHEMIC CARDIOMYOPATHY: ICD-10-CM

## 2022-07-27 DIAGNOSIS — I10 ESSENTIAL HYPERTENSION: Chronic | ICD-10-CM

## 2022-07-27 DIAGNOSIS — I49.3 PVC (PREMATURE VENTRICULAR CONTRACTION): ICD-10-CM

## 2022-07-27 DIAGNOSIS — N18.31 STAGE 3A CHRONIC KIDNEY DISEASE: Chronic | ICD-10-CM

## 2022-07-27 DIAGNOSIS — I50.42 CHRONIC COMBINED SYSTOLIC AND DIASTOLIC HEART FAILURE: ICD-10-CM

## 2022-07-27 DIAGNOSIS — E66.9 OBESITY, CLASS I, BMI 30-34.9: ICD-10-CM

## 2022-07-27 PROCEDURE — 3078F PR MOST RECENT DIASTOLIC BLOOD PRESSURE < 80 MM HG: ICD-10-PCS | Mod: CPTII,S$GLB,, | Performed by: INTERNAL MEDICINE

## 2022-07-27 PROCEDURE — 1159F PR MEDICATION LIST DOCUMENTED IN MEDICAL RECORD: ICD-10-PCS | Mod: CPTII,S$GLB,, | Performed by: INTERNAL MEDICINE

## 2022-07-27 PROCEDURE — 99999 PR PBB SHADOW E&M-EST. PATIENT-LVL III: ICD-10-PCS | Mod: PBBFAC,,, | Performed by: INTERNAL MEDICINE

## 2022-07-27 PROCEDURE — 3075F SYST BP GE 130 - 139MM HG: CPT | Mod: CPTII,S$GLB,, | Performed by: INTERNAL MEDICINE

## 2022-07-27 PROCEDURE — 1160F RVW MEDS BY RX/DR IN RCRD: CPT | Mod: CPTII,S$GLB,, | Performed by: INTERNAL MEDICINE

## 2022-07-27 PROCEDURE — 3075F PR MOST RECENT SYSTOLIC BLOOD PRESS GE 130-139MM HG: ICD-10-PCS | Mod: CPTII,S$GLB,, | Performed by: INTERNAL MEDICINE

## 2022-07-27 PROCEDURE — 99214 PR OFFICE/OUTPT VISIT, EST, LEVL IV, 30-39 MIN: ICD-10-PCS | Mod: S$GLB,,, | Performed by: INTERNAL MEDICINE

## 2022-07-27 PROCEDURE — 93010 ELECTROCARDIOGRAM REPORT: CPT | Mod: ,,, | Performed by: INTERNAL MEDICINE

## 2022-07-27 PROCEDURE — 93010 EKG 12-LEAD: ICD-10-PCS | Mod: ,,, | Performed by: INTERNAL MEDICINE

## 2022-07-27 PROCEDURE — 1160F PR REVIEW ALL MEDS BY PRESCRIBER/CLIN PHARMACIST DOCUMENTED: ICD-10-PCS | Mod: CPTII,S$GLB,, | Performed by: INTERNAL MEDICINE

## 2022-07-27 PROCEDURE — 99999 PR PBB SHADOW E&M-EST. PATIENT-LVL III: CPT | Mod: PBBFAC,,, | Performed by: INTERNAL MEDICINE

## 2022-07-27 PROCEDURE — 3078F DIAST BP <80 MM HG: CPT | Mod: CPTII,S$GLB,, | Performed by: INTERNAL MEDICINE

## 2022-07-27 PROCEDURE — 1126F AMNT PAIN NOTED NONE PRSNT: CPT | Mod: CPTII,S$GLB,, | Performed by: INTERNAL MEDICINE

## 2022-07-27 PROCEDURE — 99214 OFFICE O/P EST MOD 30 MIN: CPT | Mod: S$GLB,,, | Performed by: INTERNAL MEDICINE

## 2022-07-27 PROCEDURE — 1159F MED LIST DOCD IN RCRD: CPT | Mod: CPTII,S$GLB,, | Performed by: INTERNAL MEDICINE

## 2022-07-27 PROCEDURE — 1126F PR PAIN SEVERITY QUANTIFIED, NO PAIN PRESENT: ICD-10-PCS | Mod: CPTII,S$GLB,, | Performed by: INTERNAL MEDICINE

## 2022-07-27 PROCEDURE — 93005 ELECTROCARDIOGRAM TRACING: CPT

## 2022-07-27 NOTE — PROGRESS NOTES
Subjective:   Patient ID:  Megan Sams is a 77 y.o. female who presents for follow up of No chief complaint on file.      HPI  9/9/2020   74 yo female with dilated cardiomyopathy htn ckd is here frof /u she ahs been compliant with diet and meds. Has no palpitation no chf has good exercise tolerance has no angina minimal leg swelling at the end of tehd ay none in the morning. Has no headaches blurred vision. No syncope near syncope no tia  Her bop is well controlled by review   Hr is the 50s 60s. Feels tired at times. Has good exercise tolerance goes to [employer] walks leaning on a cart. her weight is unchanged.      10/16/2020  Here for f/u test result no change in status no cardiac symptoms. Tolerating meds ok   Her ef is unchanged   Her holter showed pac pvc nsvt no adjustments in therapy needed discussed unofficially with ep service. She ahs no chf angina syncope near  syncope compliant   She did not sleep well overnite.     Gabriella albright 3/2021  Ms. Sams is a 75 year old female patient whose current medical conditions include combined CHF, NICM, and obesity who presents today for her routine follow-up. She returns today and states she is doing well overall. No chest pain, heaviness, or tightness. No SOB/PAGE. No PND, orthopnea, or excessive weight gain. Does admit to some BLE edema, resolevs with elevation. No lightheadedness, dizziness, palpitations, near syncope, or syncope. BP stable and controlled. Patient is compliant with her medications. Very mindful of her salt intake.      Recent labs reviewed. Lipids stable. CMP stable. Echo 10/20 showed EF of 40%.      11/5/2021   Here frof /u has been getting steroids for hair loss ? Affecting bp. Has some cramps in arm has no chest pain or shortness of breath she tries to be compliant with salt has no leg swelling. Has dependent edema resolve with leg elvation. Ha sno orthopnea pnd tia claudication no syncope near syncope.     7/27/2022   here for f/u has  shortness of breath intermittent with exertion has some non compliance with salt intake she is compliant with her meds. Has  Leg swelling intermittent resolve in am gets worse at ayah of the day. She ahs not been using salt substitute. No chest pain orthopnea pnd no palpitation   Past Medical History:   Diagnosis Date    Arthritis     Blood transfusion     CHF (congestive heart failure)     Chronic kidney disease     Depression     Hypertension        Past Surgical History:   Procedure Laterality Date    HYSTERECTOMY  1978    OOPHORECTOMY  1978       Social History     Tobacco Use    Smoking status: Never Smoker    Smokeless tobacco: Never Used   Substance Use Topics    Alcohol use: No     Alcohol/week: 0.0 standard drinks    Drug use: No       Family History   Problem Relation Age of Onset    Early death Mother     Heart disease Mother     Hypertension Mother     Kidney disease Father     Heart disease Brother        Current Outpatient Medications   Medication Sig    acetaminophen (TYLENOL) 500 MG tablet Take 500 mg by mouth every 6 (six) hours as needed.    allopurinoL (ZYLOPRIM) 100 MG tablet TAKE 2 TABLETS BY MOUTH ONCE DAILY    aspirin (ECOTRIN) 81 MG EC tablet Take 81 mg by mouth once daily.    candesartan (ATACAND) 32 MG tablet Take 1 tablet (32 mg total) by mouth once daily.    ciclopirox (PENLAC) 8 % Soln APPLY TO AFFECTED TOENAILS AT NIGHT TIME DAILY. ON 7TH DAY, FILE NAILS DOWN, CLEAN ALL NAILS WITH ALCOHOL AND RESTART APPLICATION PROCESS.    ketoconazole (NIZORAL) 2 % cream APPLY TOPICALLY ONCE DAILY. CLEANSE AND DRY NAVEL AS DIRECTED, THEN APPLY SMALL AMOUNT OF CREAM IN AND AROUND NAVAL ONCE DAILY FOR 14 DAYS    metoprolol succinate (TOPROL-XL) 50 MG 24 hr tablet TAKE 1 TABLET BY MOUTH EVERY DAY    mometasone (ELOCON) 0.1 % solution Apply topically once daily.    MULTIVIT WITH CALCIUM,IRON,MIN (MULTIPLE VITAMIN, WOMENS ORAL) Take by mouth once daily.    spironolactone  (ALDACTONE) 50 MG tablet TAKE 1 TABLET BY MOUTH EVERY DAY     No current facility-administered medications for this visit.     Current Outpatient Medications on File Prior to Visit   Medication Sig    acetaminophen (TYLENOL) 500 MG tablet Take 500 mg by mouth every 6 (six) hours as needed.    allopurinoL (ZYLOPRIM) 100 MG tablet TAKE 2 TABLETS BY MOUTH ONCE DAILY    aspirin (ECOTRIN) 81 MG EC tablet Take 81 mg by mouth once daily.    candesartan (ATACAND) 32 MG tablet Take 1 tablet (32 mg total) by mouth once daily.    ciclopirox (PENLAC) 8 % Soln APPLY TO AFFECTED TOENAILS AT NIGHT TIME DAILY. ON 7TH DAY, FILE NAILS DOWN, CLEAN ALL NAILS WITH ALCOHOL AND RESTART APPLICATION PROCESS.    ketoconazole (NIZORAL) 2 % cream APPLY TOPICALLY ONCE DAILY. CLEANSE AND DRY NAVEL AS DIRECTED, THEN APPLY SMALL AMOUNT OF CREAM IN AND AROUND NAVAL ONCE DAILY FOR 14 DAYS    metoprolol succinate (TOPROL-XL) 50 MG 24 hr tablet TAKE 1 TABLET BY MOUTH EVERY DAY    mometasone (ELOCON) 0.1 % solution Apply topically once daily.    MULTIVIT WITH CALCIUM,IRON,MIN (MULTIPLE VITAMIN, WOMENS ORAL) Take by mouth once daily.    spironolactone (ALDACTONE) 50 MG tablet TAKE 1 TABLET BY MOUTH EVERY DAY     No current facility-administered medications on file prior to visit.     Review of patient's allergies indicates:   Allergen Reactions    Adhesive Blisters    Codeine Other (See Comments)     Hallucinations    Doxycycline monohydrate Nausea Only     Elevated heart rate    Gabapentin Other (See Comments)     Lowered heart rate    Lanoxin [digoxin] Other (See Comments)     Too low heart rate    Thorazine [chlorpromazine] Other (See Comments)     hallucinations    Ultracet [tramadol-acetaminophen] Other (See Comments)     Elevated BP    Penicillins Nausea Only and Rash       Review of Systems   Constitutional: Negative for diaphoresis, malaise/fatigue and weight gain.   HENT: Negative for hoarse voice.    Eyes: Negative for double  vision and visual disturbance.   Cardiovascular: Positive for dyspnea on exertion. Negative for chest pain, claudication, cyanosis, irregular heartbeat, leg swelling, near-syncope, orthopnea, palpitations, paroxysmal nocturnal dyspnea and syncope.   Respiratory: Positive for shortness of breath. Negative for cough, hemoptysis and snoring.    Hematologic/Lymphatic: Negative for bleeding problem. Does not bruise/bleed easily.   Skin: Negative for color change and poor wound healing.   Musculoskeletal: Negative for muscle cramps, muscle weakness and myalgias.   Gastrointestinal: Negative for bloating, abdominal pain, change in bowel habit, diarrhea, heartburn, hematemesis, hematochezia, melena and nausea.   Neurological: Negative for excessive daytime sleepiness, dizziness, headaches, light-headedness, loss of balance, numbness and weakness.   Psychiatric/Behavioral: Negative for memory loss. The patient does not have insomnia.    Allergic/Immunologic: Negative for hives.       Objective:   Physical Exam  Vitals and nursing note reviewed.   Constitutional:       General: She is not in acute distress.     Appearance: Normal appearance. She is well-developed. She is obese. She is not ill-appearing.   HENT:      Head: Normocephalic and atraumatic.   Eyes:      General: No scleral icterus.     Pupils: Pupils are equal, round, and reactive to light.   Neck:      Thyroid: No thyromegaly.      Vascular: Normal carotid pulses. No carotid bruit, hepatojugular reflux or JVD.      Trachea: No tracheal deviation.   Cardiovascular:      Rate and Rhythm: Normal rate and regular rhythm.      Pulses: Normal pulses.           Carotid pulses are 2+ on the right side and 2+ on the left side.       Radial pulses are 2+ on the right side and 2+ on the left side.        Femoral pulses are 2+ on the right side and 2+ on the left side.       Popliteal pulses are 2+ on the right side and 2+ on the left side.        Dorsalis pedis pulses are 2+  "on the right side and 2+ on the left side.        Posterior tibial pulses are 2+ on the right side and 2+ on the left side.      Heart sounds: Murmur heard.   High-pitched blowing holosystolic murmur is present with a grade of 2/6 at the apex.    No friction rub. No gallop.   Pulmonary:      Effort: Pulmonary effort is normal. No respiratory distress.      Breath sounds: Normal breath sounds. No wheezing, rhonchi or rales.   Chest:      Chest wall: No tenderness.   Abdominal:      General: Bowel sounds are normal. There is no abdominal bruit.      Palpations: Abdomen is soft. There is no hepatomegaly or pulsatile mass.      Tenderness: There is no abdominal tenderness.   Musculoskeletal:      Right shoulder: No deformity.      Cervical back: Normal range of motion and neck supple.      Right lower leg: No edema.      Left lower leg: No edema.   Skin:     General: Skin is warm and dry.      Findings: No erythema or rash.      Nails: There is no clubbing.   Neurological:      Mental Status: She is alert and oriented to person, place, and time.      Cranial Nerves: No cranial nerve deficit.      Coordination: Coordination normal.   Psychiatric:         Speech: Speech normal.         Behavior: Behavior normal.         Judgment: Judgment normal.       Vitals:    07/27/22 1449 07/27/22 1450 07/27/22 1528   BP: 139/80 (!) 147/81 130/76   BP Location: Right arm  Right arm   Patient Position: Sitting  Sitting   BP Method: Medium (Manual)     Pulse: 61 61    Weight: 82 kg (180 lb 12.4 oz)     Height: 5' 2" (1.575 m)       Lab Results   Component Value Date    CHOL 178 06/30/2022    CHOL 187 04/28/2022    CHOL 188 10/26/2021     Lab Results   Component Value Date    HDL 59 06/30/2022    HDL 59 04/28/2022    HDL 56 10/26/2021     Lab Results   Component Value Date    LDLCALC 100.2 06/30/2022    LDLCALC 108.2 04/28/2022    LDLCALC 107.2 10/26/2021     Lab Results   Component Value Date    TRIG 94 06/30/2022    TRIG 99 04/28/2022 "    TRIG 124 10/26/2021     Lab Results   Component Value Date    CHOLHDL 33.1 06/30/2022    CHOLHDL 31.6 04/28/2022    CHOLHDL 29.8 10/26/2021       Chemistry        Component Value Date/Time     06/30/2022 1031    K 4.4 06/30/2022 1031     06/30/2022 1031    CO2 24 06/30/2022 1031    BUN 19 06/30/2022 1031    CREATININE 1.0 06/30/2022 1031    GLU 87 06/30/2022 1031        Component Value Date/Time    CALCIUM 9.6 06/30/2022 1031    ALKPHOS 60 06/30/2022 1031    AST 31 06/30/2022 1031    ALT 19 06/30/2022 1031    BILITOT 0.8 06/30/2022 1031    ESTGFRAFRICA >60.0 06/30/2022 1031    EGFRNONAA 53.7 (A) 06/30/2022 1031        Lab Results   Component Value Date    HGBA1C 5.5 03/23/2021     Lab Results   Component Value Date    TSH 1.922 09/09/2020     Lab Results   Component Value Date    INR 1.0 02/14/2017    INR 1.0 03/02/2014     Lab Results   Component Value Date    WBC 5.74 06/30/2022    HGB 13.4 06/30/2022    HCT 43.2 06/30/2022    MCV 89 06/30/2022     06/30/2022     BMP  Sodium   Date Value Ref Range Status   06/30/2022 141 136 - 145 mmol/L Final     Potassium   Date Value Ref Range Status   06/30/2022 4.4 3.5 - 5.1 mmol/L Final     Chloride   Date Value Ref Range Status   06/30/2022 108 95 - 110 mmol/L Final     CO2   Date Value Ref Range Status   06/30/2022 24 23 - 29 mmol/L Final     BUN   Date Value Ref Range Status   06/30/2022 19 8 - 23 mg/dL Final     Creatinine   Date Value Ref Range Status   06/30/2022 1.0 0.5 - 1.4 mg/dL Final     Calcium   Date Value Ref Range Status   06/30/2022 9.6 8.7 - 10.5 mg/dL Final     Anion Gap   Date Value Ref Range Status   06/30/2022 9 8 - 16 mmol/L Final     eGFR if    Date Value Ref Range Status   06/30/2022 >60.0 >60 mL/min/1.73 m^2 Final     eGFR if non    Date Value Ref Range Status   06/30/2022 53.7 (A) >60 mL/min/1.73 m^2 Final     Comment:     Calculation used to obtain the estimated glomerular filtration  rate  (eGFR) is the CKD-EPI equation.        CrCl cannot be calculated (Patient's most recent lab result is older than the maximum 7 days allowed.).  Summary    · There is left ventricular concentric hypertrophy.  · With mildly decreased systolic function. The estimated ejection fraction is 40%.  · Grade I diastolic dysfunction.  · There is mild left ventricular global hypokinesis.  · Normal right ventricular systolic function.  · Normal central venous pressure (3 mmHg).  · The estimated PA systolic pressure is 36 mmHg.  · Mild mitral regurgitation.       Assessment:     1. Chronic combined systolic and diastolic congestive heart failure    2. Essential hypertension    3. Nonischemic cardiomyopathy    4. PVC (premature ventricular contraction)    5. Stage 3a chronic kidney disease    6. Obesity, Class I, BMI 30-34.9    has shortness of breath intermittent probably related to fluctuation in her blood pressure due to salt indiscretion counseled however due to chf history will get repeat echo make sure no new lv dysfucntion.  And if sio will r/o ischemia . In meantime counseled about continued compliance weight loss.     Plan:   Echo   Continue current therapy  Cardiac low salt diet.  Risk factor modification and excercise program.  F/u in 6 months with lipid cmp

## 2022-08-01 DIAGNOSIS — I50.42 CHRONIC COMBINED SYSTOLIC AND DIASTOLIC HEART FAILURE: Primary | ICD-10-CM

## 2022-08-25 ENCOUNTER — HOSPITAL ENCOUNTER (OUTPATIENT)
Dept: CARDIOLOGY | Facility: HOSPITAL | Age: 77
Discharge: HOME | End: 2022-08-25
Attending: INTERNAL MEDICINE
Payer: MEDICARE

## 2022-08-25 VITALS
SYSTOLIC BLOOD PRESSURE: 130 MMHG | WEIGHT: 180 LBS | BODY MASS INDEX: 33.13 KG/M2 | DIASTOLIC BLOOD PRESSURE: 76 MMHG | HEIGHT: 62 IN

## 2022-08-25 DIAGNOSIS — I50.42 CHRONIC COMBINED SYSTOLIC AND DIASTOLIC CONGESTIVE HEART FAILURE: ICD-10-CM

## 2022-08-25 DIAGNOSIS — E66.9 OBESITY, CLASS I, BMI 30-34.9: ICD-10-CM

## 2022-08-25 LAB
AV INDEX (PROSTH): 0.64
AV MEAN GRADIENT: 3 MMHG
AV PEAK GRADIENT: 5 MMHG
AV VALVE AREA: 2.08 CM2
AV VELOCITY RATIO: 0.65
BSA FOR ECHO PROCEDURE: 1.89 M2
CV ECHO LV RWT: 0.26 CM
DOP CALC AO PEAK VEL: 1.14 M/S
DOP CALC AO VTI: 23.6 CM
DOP CALC LVOT AREA: 3.2 CM2
DOP CALC LVOT DIAMETER: 2.03 CM
DOP CALC LVOT PEAK VEL: 0.74 M/S
DOP CALC LVOT STROKE VOLUME: 49.17 CM3
DOP CALC RVOT PEAK VEL: 0.65 M/S
DOP CALC RVOT VTI: 17.3 CM
DOP CALCLVOT PEAK VEL VTI: 15.2 CM
E WAVE DECELERATION TIME: 201.83 MSEC
E/A RATIO: 0.79
E/E' RATIO: 8.31 M/S
ECHO LV POSTERIOR WALL: 0.71 CM (ref 0.6–1.1)
EJECTION FRACTION: 35 %
FRACTIONAL SHORTENING: 21 % (ref 28–44)
INTERVENTRICULAR SEPTUM: 0.89 CM (ref 0.6–1.1)
IVRT: 105.61 MSEC
LA MAJOR: 5.64 CM
LA MINOR: 3.7 CM
LA WIDTH: 3.7 CM
LEFT ATRIUM SIZE: 3.92 CM
LEFT ATRIUM VOLUME INDEX MOD: 27 ML/M2
LEFT ATRIUM VOLUME INDEX: 30.1 ML/M2
LEFT ATRIUM VOLUME MOD: 49.37 CM3
LEFT ATRIUM VOLUME: 55.09 CM3
LEFT INTERNAL DIMENSION IN SYSTOLE: 4.29 CM (ref 2.1–4)
LEFT VENTRICLE DIASTOLIC VOLUME INDEX: 78.55 ML/M2
LEFT VENTRICLE DIASTOLIC VOLUME: 143.75 ML
LEFT VENTRICLE MASS INDEX: 86 G/M2
LEFT VENTRICLE SYSTOLIC VOLUME INDEX: 45.2 ML/M2
LEFT VENTRICLE SYSTOLIC VOLUME: 82.73 ML
LEFT VENTRICULAR INTERNAL DIMENSION IN DIASTOLE: 5.44 CM (ref 3.5–6)
LEFT VENTRICULAR MASS: 156.95 G
LV LATERAL E/E' RATIO: 7.71 M/S
LV SEPTAL E/E' RATIO: 9 M/S
LVOT MG: 1.17 MMHG
LVOT MV: 0.51 CM/S
MV PEAK A VEL: 0.68 M/S
MV PEAK E VEL: 0.54 M/S
MV STENOSIS PRESSURE HALF TIME: 58.53 MS
MV VALVE AREA P 1/2 METHOD: 3.76 CM2
PISA TR MAX VEL: 3.1 M/S
PULM VEIN S/D RATIO: 1.38
PV MEAN GRADIENT: 1.15 MMHG
PV MV: 0.54 M/S
PV PEAK D VEL: 0.34 M/S
PV PEAK S VEL: 0.47 M/S
PV PEAK VELOCITY: 0.82 CM/S
RA MAJOR: 4.43 CM
RA PRESSURE: 3 MMHG
RA WIDTH: 2.81 CM
RIGHT VENTRICULAR END-DIASTOLIC DIMENSION: 3.02 CM
SINUS: 2.04 CM
STJ: 2.03 CM
TDI LATERAL: 0.07 M/S
TDI SEPTAL: 0.06 M/S
TDI: 0.07 M/S
TR MAX PG: 38 MMHG
TRICUSPID ANNULAR PLANE SYSTOLIC EXCURSION: 2.29 CM
TV REST PULMONARY ARTERY PRESSURE: 41 MMHG

## 2022-08-25 PROCEDURE — 93306 ECHO (CUPID ONLY): ICD-10-PCS | Mod: 26,,, | Performed by: INTERNAL MEDICINE

## 2022-08-25 PROCEDURE — 93306 TTE W/DOPPLER COMPLETE: CPT | Mod: 26,,, | Performed by: INTERNAL MEDICINE

## 2022-08-25 PROCEDURE — 93306 TTE W/DOPPLER COMPLETE: CPT | Mod: PO

## 2022-08-30 DIAGNOSIS — I50.42 CHRONIC COMBINED SYSTOLIC AND DIASTOLIC CONGESTIVE HEART FAILURE: Primary | ICD-10-CM

## 2022-08-31 ENCOUNTER — TELEPHONE (OUTPATIENT)
Dept: CARDIOLOGY | Facility: CLINIC | Age: 77
End: 2022-08-31
Payer: MEDICARE

## 2022-08-31 ENCOUNTER — TELEPHONE (OUTPATIENT)
Dept: CARDIOLOGY | Facility: HOSPITAL | Age: 77
End: 2022-08-31
Payer: MEDICARE

## 2022-08-31 NOTE — TELEPHONE ENCOUNTER
Patient was notified of results. All questions were answered. Pt verbalized understanding. Pt will call back with any other questions or concerns.      Informed her Nicky will call her to schedule her stress test.  ----- Message from Alyssa Gilliland MD sent at 8/30/2022 10:14 PM CDT -----  Her heart function is decreased.she has some cahnges in her heart muscle that needs to suggest we need to r/o blockages by performing a chemical stress test.  Orders placed

## 2022-09-02 ENCOUNTER — TELEPHONE (OUTPATIENT)
Dept: CARDIOLOGY | Facility: CLINIC | Age: 77
End: 2022-09-02
Payer: MEDICARE

## 2022-09-02 NOTE — TELEPHONE ENCOUNTER
----- Message from Zachary Jam sent at 9/2/2022  3:32 PM CDT -----  Contact: self  Pt is calling in regards to her upcoming stress test. She wants to know if she has to stop her medication before testing? Please give her a call back at [phone].   Thank you   Eddie

## 2022-09-02 NOTE — TELEPHONE ENCOUNTER
I returned pts call. The patient has been notified of this information and all questions answered.

## 2022-10-31 RX ORDER — CANDESARTAN 32 MG/1
32 TABLET ORAL DAILY
Qty: 90 TABLET | Refills: 3 | Status: SHIPPED | OUTPATIENT
Start: 2022-10-31 | End: 2023-10-06

## 2022-10-31 NOTE — TELEPHONE ENCOUNTER
No new care gaps identified.  [facility] Embedded Care Gaps. Reference number: [id number]. 10/31/2022   1:33:01 PM CDT

## 2022-11-11 ENCOUNTER — OFFICE VISIT (OUTPATIENT)
Dept: INTERNAL MEDICINE | Facility: CLINIC | Age: 77
End: 2022-11-11
Payer: MEDICARE

## 2022-11-11 VITALS
BODY MASS INDEX: 33.55 KG/M2 | DIASTOLIC BLOOD PRESSURE: 74 MMHG | OXYGEN SATURATION: 99 % | TEMPERATURE: 98 F | WEIGHT: 182.31 LBS | HEIGHT: 62 IN | HEART RATE: 74 BPM | SYSTOLIC BLOOD PRESSURE: 128 MMHG

## 2022-11-11 DIAGNOSIS — H60.502 ACUTE OTITIS EXTERNA OF LEFT EAR, UNSPECIFIED TYPE: Primary | ICD-10-CM

## 2022-11-11 DIAGNOSIS — L65.9 ALOPECIA: ICD-10-CM

## 2022-11-11 PROCEDURE — 1160F RVW MEDS BY RX/DR IN RCRD: CPT | Mod: CPTII,S$GLB,, | Performed by: NURSE PRACTITIONER

## 2022-11-11 PROCEDURE — 1159F PR MEDICATION LIST DOCUMENTED IN MEDICAL RECORD: ICD-10-PCS | Mod: CPTII,S$GLB,, | Performed by: NURSE PRACTITIONER

## 2022-11-11 PROCEDURE — 99999 PR PBB SHADOW E&M-EST. PATIENT-LVL IV: ICD-10-PCS | Mod: PBBFAC,,, | Performed by: NURSE PRACTITIONER

## 2022-11-11 PROCEDURE — 1125F PR PAIN SEVERITY QUANTIFIED, PAIN PRESENT: ICD-10-PCS | Mod: CPTII,S$GLB,, | Performed by: NURSE PRACTITIONER

## 2022-11-11 PROCEDURE — 1159F MED LIST DOCD IN RCRD: CPT | Mod: CPTII,S$GLB,, | Performed by: NURSE PRACTITIONER

## 2022-11-11 PROCEDURE — 3074F SYST BP LT 130 MM HG: CPT | Mod: CPTII,S$GLB,, | Performed by: NURSE PRACTITIONER

## 2022-11-11 PROCEDURE — 1125F AMNT PAIN NOTED PAIN PRSNT: CPT | Mod: CPTII,S$GLB,, | Performed by: NURSE PRACTITIONER

## 2022-11-11 PROCEDURE — 1160F PR REVIEW ALL MEDS BY PRESCRIBER/CLIN PHARMACIST DOCUMENTED: ICD-10-PCS | Mod: CPTII,S$GLB,, | Performed by: NURSE PRACTITIONER

## 2022-11-11 PROCEDURE — 99999 PR PBB SHADOW E&M-EST. PATIENT-LVL IV: CPT | Mod: PBBFAC,,, | Performed by: NURSE PRACTITIONER

## 2022-11-11 PROCEDURE — 99213 OFFICE O/P EST LOW 20 MIN: CPT | Mod: S$GLB,,, | Performed by: NURSE PRACTITIONER

## 2022-11-11 PROCEDURE — 3078F DIAST BP <80 MM HG: CPT | Mod: CPTII,S$GLB,, | Performed by: NURSE PRACTITIONER

## 2022-11-11 PROCEDURE — 3074F PR MOST RECENT SYSTOLIC BLOOD PRESSURE < 130 MM HG: ICD-10-PCS | Mod: CPTII,S$GLB,, | Performed by: NURSE PRACTITIONER

## 2022-11-11 PROCEDURE — 3078F PR MOST RECENT DIASTOLIC BLOOD PRESSURE < 80 MM HG: ICD-10-PCS | Mod: CPTII,S$GLB,, | Performed by: NURSE PRACTITIONER

## 2022-11-11 PROCEDURE — 99213 PR OFFICE/OUTPT VISIT, EST, LEVL III, 20-29 MIN: ICD-10-PCS | Mod: S$GLB,,, | Performed by: NURSE PRACTITIONER

## 2022-11-11 RX ORDER — OFLOXACIN 3 MG/ML
5 SOLUTION AURICULAR (OTIC) 2 TIMES DAILY
Qty: 10 ML | Refills: 0 | Status: SHIPPED | OUTPATIENT
Start: 2022-11-11 | End: 2023-01-11

## 2022-11-11 NOTE — PROGRESS NOTES
"Subjective:       Patient ID: Megan Sams is a 77 y.o. female.    Chief Complaint: Ear Drainage and Otalgia    Pt presents to clinic today for left ear pain and drainage  Started a few days ago  Waking up with drainage on her pillow  No URI symptoms  No swelling    Pt also complaining that her hair is falling out  She is requesting to see Dr. David vazquez in [place]      /74   Pulse 74   Temp 97.9 °F (36.6 °C) (Temporal)   Ht 5' 2" (1.575 m)   Wt 82.7 kg (182 lb 5.1 oz)   SpO2 99%   BMI 33.35 kg/m²     Review of Systems   Constitutional:  Negative for activity change, appetite change, chills, diaphoresis, fatigue, fever and unexpected weight change.   HENT:  Positive for ear discharge and ear pain.    Respiratory:  Negative for cough and shortness of breath.    Cardiovascular:  Negative for chest pain, palpitations and leg swelling.   Gastrointestinal: Negative.    Endocrine:        Hair loss   Genitourinary: Negative.    Musculoskeletal: Negative.    Skin:  Negative for color change, pallor, rash and wound.   Allergic/Immunologic: Negative for immunocompromised state.   Neurological: Negative.  Negative for dizziness and facial asymmetry.   Hematological:  Negative for adenopathy. Does not bruise/bleed easily.   Psychiatric/Behavioral:  Negative for agitation, behavioral problems and confusion.      Objective:      Physical Exam  Vitals and nursing note reviewed.   Constitutional:       General: She is not in acute distress.     Appearance: Normal appearance. She is well-developed. She is not diaphoretic.   HENT:      Head: Normocephalic and atraumatic.      Comments: Thinning of hair line, few bald spots      Right Ear: Tympanic membrane and ear canal normal.      Left Ear: Drainage and swelling present.   Cardiovascular:      Rate and Rhythm: Normal rate and regular rhythm.      Heart sounds: Normal heart sounds. No murmur heard.  Pulmonary:      Effort: Pulmonary effort is normal. No respiratory " distress.      Breath sounds: Normal breath sounds.   Musculoskeletal:         General: Normal range of motion.   Skin:     General: Skin is warm and dry.      Findings: No rash.   Neurological:      Mental Status: She is alert.   Psychiatric:         Mood and Affect: Mood normal.         Behavior: Behavior normal. Behavior is cooperative.         Thought Content: Thought content normal.         Judgment: Judgment normal.       Assessment:       1. Acute otitis externa of left ear, unspecified type    2. Alopecia          Plan:       Megan was seen today for ear drainage and otalgia.    Diagnoses and all orders for this visit:    Acute otitis externa of left ear, unspecified type  -     ofloxacin (FLOXIN) 0.3 % otic solution; Place 5 drops into the left ear 2 (two) times daily.  - Keep ears clean and dry, use drops for 7 days    Alopecia  -     Ambulatory referral/consult to Dermatology; Future  - Discussed with pt this could be normal thinning of the hair with aging, continue multivitamins. Will refer to derm per request     Ear drops for 7 days  Keep upcoming appt with Dr. Bowen  Follow up for worsening or no improvement in symptoms and PRN.

## 2022-12-02 ENCOUNTER — PATIENT OUTREACH (OUTPATIENT)
Dept: ADMINISTRATIVE | Facility: HOSPITAL | Age: 77
End: 2022-12-02
Payer: MEDICARE

## 2022-12-02 NOTE — PROGRESS NOTES
PHN HTN Report: Per chart review pt had wnl bp 11/11/22 128/74, pt has follow up appt scheduled 12/29/22 with Dr Bowen.

## 2022-12-30 ENCOUNTER — OFFICE VISIT (OUTPATIENT)
Dept: PODIATRY | Facility: CLINIC | Age: 77
End: 2022-12-30
Payer: MEDICARE

## 2022-12-30 VITALS — WEIGHT: 182.31 LBS | BODY MASS INDEX: 33.55 KG/M2 | HEIGHT: 62 IN

## 2022-12-30 DIAGNOSIS — M79.674 TOE PAIN, BILATERAL: ICD-10-CM

## 2022-12-30 DIAGNOSIS — L60.0 INGROWN TOENAIL OF LEFT FOOT: Primary | ICD-10-CM

## 2022-12-30 DIAGNOSIS — M79.675 TOE PAIN, BILATERAL: ICD-10-CM

## 2022-12-30 PROCEDURE — 1160F PR REVIEW ALL MEDS BY PRESCRIBER/CLIN PHARMACIST DOCUMENTED: ICD-10-PCS | Mod: CPTII,S$GLB,, | Performed by: PODIATRIST

## 2022-12-30 PROCEDURE — 99999 PR PBB SHADOW E&M-EST. PATIENT-LVL III: CPT | Mod: PBBFAC,,, | Performed by: PODIATRIST

## 2022-12-30 PROCEDURE — 99213 OFFICE O/P EST LOW 20 MIN: CPT | Mod: S$GLB,,, | Performed by: PODIATRIST

## 2022-12-30 PROCEDURE — 3288F PR FALLS RISK ASSESSMENT DOCUMENTED: ICD-10-PCS | Mod: CPTII,S$GLB,, | Performed by: PODIATRIST

## 2022-12-30 PROCEDURE — 1101F PR PT FALLS ASSESS DOC 0-1 FALLS W/OUT INJ PAST YR: ICD-10-PCS | Mod: CPTII,S$GLB,, | Performed by: PODIATRIST

## 2022-12-30 PROCEDURE — 1126F AMNT PAIN NOTED NONE PRSNT: CPT | Mod: CPTII,S$GLB,, | Performed by: PODIATRIST

## 2022-12-30 PROCEDURE — 3288F FALL RISK ASSESSMENT DOCD: CPT | Mod: CPTII,S$GLB,, | Performed by: PODIATRIST

## 2022-12-30 PROCEDURE — 1126F PR PAIN SEVERITY QUANTIFIED, NO PAIN PRESENT: ICD-10-PCS | Mod: CPTII,S$GLB,, | Performed by: PODIATRIST

## 2022-12-30 PROCEDURE — 1101F PT FALLS ASSESS-DOCD LE1/YR: CPT | Mod: CPTII,S$GLB,, | Performed by: PODIATRIST

## 2022-12-30 PROCEDURE — 1159F PR MEDICATION LIST DOCUMENTED IN MEDICAL RECORD: ICD-10-PCS | Mod: CPTII,S$GLB,, | Performed by: PODIATRIST

## 2022-12-30 PROCEDURE — 99999 PR PBB SHADOW E&M-EST. PATIENT-LVL III: ICD-10-PCS | Mod: PBBFAC,,, | Performed by: PODIATRIST

## 2022-12-30 PROCEDURE — 1160F RVW MEDS BY RX/DR IN RCRD: CPT | Mod: CPTII,S$GLB,, | Performed by: PODIATRIST

## 2022-12-30 PROCEDURE — 99213 PR OFFICE/OUTPT VISIT, EST, LEVL III, 20-29 MIN: ICD-10-PCS | Mod: S$GLB,,, | Performed by: PODIATRIST

## 2022-12-30 PROCEDURE — 1159F MED LIST DOCD IN RCRD: CPT | Mod: CPTII,S$GLB,, | Performed by: PODIATRIST

## 2022-12-30 NOTE — PROGRESS NOTES
"  Subjective:       Patient ID: Megan Sams is a 77 y.o. female.    Chief Complaint: Nail Care (Coming in for nail care, rates pain 0/10, nondiabetic, wearing socks and shoes,last seen PCP Dr. Bowen on 06/27/2022.)      HPI: Megan Sams presents to the office with complaints of pains to the  right left  great  toe, due to dystrophic and thickened nail.  Patient reports increase in pain secondary to rubbing against the toe box of the shoes and causing difficulty with normal ambulation.   Reports no signs of cellulitis, ingrowing, or drainage.  Rates pain as a 0/10. Patient's Primary Care Provider is Javad Bowen MD.     Review of patient's allergies indicates:   Allergen Reactions    Adhesive Blisters    Codeine Other (See Comments)     Hallucinations    Doxycycline monohydrate Nausea Only     Elevated heart rate    Gabapentin Other (See Comments)     Lowered heart rate    Lanoxin [digoxin] Other (See Comments)     Too low heart rate    Thorazine [chlorpromazine] Other (See Comments)     hallucinations    Ultracet [tramadol-acetaminophen] Other (See Comments)     Elevated BP    Penicillins Nausea Only and Rash       Past Medical History:   Diagnosis Date    Arthritis     Blood transfusion     CHF (congestive heart failure)     Chronic kidney disease     Depression     Hypertension        Family History   Problem Relation Age of Onset    Early death Mother     Heart disease Mother     Hypertension Mother     Kidney disease Father     Heart disease Brother        Social History     Socioeconomic History    Marital status:    Tobacco Use    Smoking status: Never    Smokeless tobacco: Never   Substance and Sexual Activity    Alcohol use: No     Alcohol/week: 0.0 standard drinks    Drug use: No    Sexual activity: Never       Past Surgical History:   Procedure Laterality Date    HYSTERECTOMY  1978    OOPHORECTOMY  1978       Review of Systems      Objective:   Ht 5' 2" (1.575 m)   Wt 82.7 kg (182 lb 5.1 oz)  "  BMI 33.35 kg/m²     Physical Exam  LOWER EXTREMITY PHYSICAL EXAMINATION    NEUROLOGY: Sensation to light touch is intact. Proprioception is intact.  Vibratory sensation intact    VASCULAR:  The right dorsalis pedis pulse 2/4 and the right posterior tibial pulse 2/4.  The left dorsalis pedis pulse 2/4 and posterior tibial pulse on the left is 2/4.  Capillary refill is intact.  Pedal hair growth intact    DERMATOLOGY:  Thickened dystrophic toenails present to the right left great toenails.  There is no signs of underlying fluctuance or purulence.  Onychocryptosis present to the medial border of the right left great toe..     ORTHOPEDIC: Manual Muscle Testing is 5/5 in all planes on the  right and left , without pains, with and without resistance. Gait pattern is slightly antalgic.    Assessment:     1. Ingrown toenail of left foot    2. Toe pain, bilateral        Plan:     Ingrown toenail of left foot    Toe pain, bilateral        Discussed treatment options regarding to the nail plate of the right and left great toe.  Sterile nail Nipper was utilized to remove the offending border the right and left great toenails.  No evidence of ingrown was noted.  No underlying fluctuance purulence.  Patient tolerated this well without complications.  Patient follow-up p.r.n.    Future Appointments   Date Time Provider Department Center   1/11/2023  1:45 PM Mikala Ng MD [facility]  [facility]   1/13/2023  8:00 AM LABORATORY, [facility] [place]   1/27/2023  1:20 PM Alyssa Gilliland MD [facility] CARDIO [facility]   2/28/2023 11:20 AM Don Mayen MD [facility] IM [facility]   3/31/2023  1:45 PM Whit Valenzuela DPM [facility] POD [place]

## 2023-01-07 ENCOUNTER — OFFICE VISIT (OUTPATIENT)
Dept: URGENT CARE | Facility: CLINIC | Age: 78
End: 2023-01-07
Payer: MEDICARE

## 2023-01-07 VITALS
SYSTOLIC BLOOD PRESSURE: 136 MMHG | BODY MASS INDEX: 33.49 KG/M2 | OXYGEN SATURATION: 99 % | WEIGHT: 182 LBS | HEART RATE: 54 BPM | TEMPERATURE: 99 F | RESPIRATION RATE: 16 BRPM | DIASTOLIC BLOOD PRESSURE: 73 MMHG | HEIGHT: 62 IN

## 2023-01-07 DIAGNOSIS — M79.605 BILATERAL LEG PAIN: Primary | ICD-10-CM

## 2023-01-07 DIAGNOSIS — M19.90 ARTHRITIS: ICD-10-CM

## 2023-01-07 DIAGNOSIS — M79.604 BILATERAL LEG PAIN: Primary | ICD-10-CM

## 2023-01-07 PROCEDURE — 3078F PR MOST RECENT DIASTOLIC BLOOD PRESSURE < 80 MM HG: ICD-10-PCS | Mod: CPTII,S$GLB,, | Performed by: NURSE PRACTITIONER

## 2023-01-07 PROCEDURE — 99214 OFFICE O/P EST MOD 30 MIN: CPT | Mod: S$GLB,,, | Performed by: NURSE PRACTITIONER

## 2023-01-07 PROCEDURE — 3078F DIAST BP <80 MM HG: CPT | Mod: CPTII,S$GLB,, | Performed by: NURSE PRACTITIONER

## 2023-01-07 PROCEDURE — 3075F SYST BP GE 130 - 139MM HG: CPT | Mod: CPTII,S$GLB,, | Performed by: NURSE PRACTITIONER

## 2023-01-07 PROCEDURE — 3075F PR MOST RECENT SYSTOLIC BLOOD PRESS GE 130-139MM HG: ICD-10-PCS | Mod: CPTII,S$GLB,, | Performed by: NURSE PRACTITIONER

## 2023-01-07 PROCEDURE — 1160F PR REVIEW ALL MEDS BY PRESCRIBER/CLIN PHARMACIST DOCUMENTED: ICD-10-PCS | Mod: CPTII,S$GLB,, | Performed by: NURSE PRACTITIONER

## 2023-01-07 PROCEDURE — 1125F PR PAIN SEVERITY QUANTIFIED, PAIN PRESENT: ICD-10-PCS | Mod: CPTII,S$GLB,, | Performed by: NURSE PRACTITIONER

## 2023-01-07 PROCEDURE — 1159F PR MEDICATION LIST DOCUMENTED IN MEDICAL RECORD: ICD-10-PCS | Mod: CPTII,S$GLB,, | Performed by: NURSE PRACTITIONER

## 2023-01-07 PROCEDURE — 1160F RVW MEDS BY RX/DR IN RCRD: CPT | Mod: CPTII,S$GLB,, | Performed by: NURSE PRACTITIONER

## 2023-01-07 PROCEDURE — 1125F AMNT PAIN NOTED PAIN PRSNT: CPT | Mod: CPTII,S$GLB,, | Performed by: NURSE PRACTITIONER

## 2023-01-07 PROCEDURE — 99214 PR OFFICE/OUTPT VISIT, EST, LEVL IV, 30-39 MIN: ICD-10-PCS | Mod: S$GLB,,, | Performed by: NURSE PRACTITIONER

## 2023-01-07 PROCEDURE — 1159F MED LIST DOCD IN RCRD: CPT | Mod: CPTII,S$GLB,, | Performed by: NURSE PRACTITIONER

## 2023-01-07 RX ORDER — METHYLPREDNISOLONE 4 MG/1
TABLET ORAL
Qty: 1 EACH | Refills: 0 | Status: SHIPPED | OUTPATIENT
Start: 2023-01-07 | End: 2023-01-26

## 2023-01-07 NOTE — PROGRESS NOTES
"Subjective:       Patient ID: Megan Sams is a 77 y.o. female.    Vitals:  height is 5' 2" (1.575 m) and weight is 82.6 kg (182 lb). Her temperature is 99 °F (37.2 °C). Her blood pressure is 136/73 and her pulse is 54 (abnormal). Her respiration is 16 and oxygen saturation is 99%.     Chief Complaint: Leg Pain    Megan Sams is a 77 year old female whom presents to the clinic with complaints of bilateral leg pain for the last pass two days. She reports the pain starts in her thighs and radiates down to her shins. She reports the pain is to her bones. She denies any numbness/tingling, lower ext edema or chest pain. She denies any injury to the site. She reports a similar pain in the pass that was responsive to steroids. She reports she applied Voltaren gel which provided some relief but she was unsure if she should use it so she washed it off.     Leg Pain   The incident occurred 2 days ago. There was no injury mechanism. The pain is present in the left leg and right leg. The quality of the pain is described as aching, shooting and stabbing (shooting pains when walking). The pain is at a severity of 7/10. The pain is moderate. The pain has been Constant since onset. Associated symptoms comments: Patient states Legs hurt no matter what she's doing. . She reports no foreign bodies present. Nothing aggravates the symptoms. Treatments tried: Tylenol. The treatment provided mild relief.   ROS    Objective:      Physical Exam   Constitutional: She is oriented to person, place, and time. She appears well-developed. She is cooperative.  Non-toxic appearance. She does not appear ill. No distress.   HENT:   Head: Normocephalic and atraumatic.   Ears:   Right Ear: Hearing, tympanic membrane, external ear and ear canal normal.   Left Ear: Hearing, tympanic membrane, external ear and ear canal normal.   Nose: Nose normal. No mucosal edema, rhinorrhea or nasal deformity. No epistaxis. Right sinus exhibits no maxillary sinus " tenderness and no frontal sinus tenderness. Left sinus exhibits no maxillary sinus tenderness and no frontal sinus tenderness.   Mouth/Throat: Uvula is midline, oropharynx is clear and moist and mucous membranes are normal. No trismus in the jaw. Normal dentition. No uvula swelling. No oropharyngeal exudate, posterior oropharyngeal edema or posterior oropharyngeal erythema.   Eyes: Conjunctivae and lids are normal. No scleral icterus.   Neck: Trachea normal and phonation normal. Neck supple. No edema present. No erythema present. No neck rigidity present.   Cardiovascular: Normal rate, regular rhythm, normal heart sounds and normal pulses.   Pulmonary/Chest: Effort normal and breath sounds normal. No respiratory distress. She has no decreased breath sounds. She has no rhonchi.   Abdominal: Normal appearance.   Musculoskeletal: Normal range of motion.         General: Tenderness present. No swelling, deformity or signs of injury. Normal range of motion.      Right lower leg: She exhibits bony tenderness. She exhibits no swelling. No edema.      Left lower leg: She exhibits bony tenderness. She exhibits no swelling. No edema.        Legs:       Comments: Bony tenderness upon palpation bilaterally from thigh bone to shine right above the ankle.    Neurological: She is alert and oriented to person, place, and time. She exhibits normal muscle tone. Coordination normal.   Skin: Skin is warm, dry, intact, not diaphoretic and not pale.   Psychiatric: Her speech is normal and behavior is normal. Judgment and thought content normal.   Nursing note and vitals reviewed.Dictation #1  MRN:[id number]  CSN:[id number]       Assessment:       1. Bilateral leg pain    2. Arthritis          Plan:         Bilateral leg pain  -     methylPREDNISolone (MEDROL DOSEPACK) 4 mg tablet; use as directed  Dispense: 1 each; Refill: 0    Arthritis  -     methylPREDNISolone (MEDROL DOSEPACK) 4 mg tablet; use as directed  Dispense: 1 each; Refill:  0         Discussed with patient  all pertinent information. Discussed patient diagnosis and plan of treatment. Patient  was given all follow up and return instructions. All questions and concerns were addressed at this time. Patient  expresses understanding of information and instructions, and is comfortable with plan.    Patient  was instructed to return to clinic  or go to ED immediately  for any worsening or change in current symptoms.          Patient Instructions   May use valtoren gel once daily to affected area for five days. If no improvement in symptoms within the next 3-5 days please follow up with your PCP. Report to the nearest ER with any new or worsening symptoms.      If your condition worsens or fails to improve we recommend that you receive another evaluation at the emergency room immediately or contact your primary medical clinic to discuss your concerns.   You must understand that you have received an Urgent Care treatment only and that you may be released before all of your medical problems are known or treated. You, the patient, will arrange for follow up care as instructed.     RED FLAGS/WARNING SYMPTOMS DISCUSSED WITH PATIENT THAT WOULD WARRANT EMERGENT MEDICAL ATTENTION. PATIENT VERBALIZED UNDERSTANDING.

## 2023-01-07 NOTE — PATIENT INSTRUCTIONS
May use valtoren gel once daily to affected area for five days. If no improvement in symptoms within the next 3-5 days please follow up with your PCP. Report to the nearest ER with any new or worsening symptoms.      If your condition worsens or fails to improve we recommend that you receive another evaluation at the emergency room immediately or contact your primary medical clinic to discuss your concerns.   You must understand that you have received an Urgent Care treatment only and that you may be released before all of your medical problems are known or treated. You, the patient, will arrange for follow up care as instructed.     RED FLAGS/WARNING SYMPTOMS DISCUSSED WITH PATIENT THAT WOULD WARRANT EMERGENT MEDICAL ATTENTION. PATIENT VERBALIZED UNDERSTANDING.

## 2023-01-10 ENCOUNTER — TELEPHONE (OUTPATIENT)
Dept: URGENT CARE | Facility: CLINIC | Age: 78
End: 2023-01-10
Payer: MEDICARE

## 2023-01-11 ENCOUNTER — OFFICE VISIT (OUTPATIENT)
Dept: CARDIOLOGY | Facility: CLINIC | Age: 78
End: 2023-01-11
Payer: MEDICARE

## 2023-01-11 VITALS
SYSTOLIC BLOOD PRESSURE: 132 MMHG | DIASTOLIC BLOOD PRESSURE: 74 MMHG | HEIGHT: 62 IN | BODY MASS INDEX: 32.76 KG/M2 | WEIGHT: 178 LBS

## 2023-01-11 DIAGNOSIS — I42.8 NONISCHEMIC CARDIOMYOPATHY: ICD-10-CM

## 2023-01-11 DIAGNOSIS — I50.42 CHRONIC COMBINED SYSTOLIC AND DIASTOLIC CONGESTIVE HEART FAILURE: Primary | ICD-10-CM

## 2023-01-11 DIAGNOSIS — I10 ESSENTIAL HYPERTENSION: ICD-10-CM

## 2023-01-11 PROCEDURE — 99214 OFFICE O/P EST MOD 30 MIN: CPT | Mod: S$GLB,,, | Performed by: INTERNAL MEDICINE

## 2023-01-11 PROCEDURE — 3075F SYST BP GE 130 - 139MM HG: CPT | Mod: CPTII,S$GLB,, | Performed by: INTERNAL MEDICINE

## 2023-01-11 PROCEDURE — 3078F DIAST BP <80 MM HG: CPT | Mod: CPTII,S$GLB,, | Performed by: INTERNAL MEDICINE

## 2023-01-11 PROCEDURE — 1126F AMNT PAIN NOTED NONE PRSNT: CPT | Mod: CPTII,S$GLB,, | Performed by: INTERNAL MEDICINE

## 2023-01-11 PROCEDURE — 99214 PR OFFICE/OUTPT VISIT, EST, LEVL IV, 30-39 MIN: ICD-10-PCS | Mod: S$GLB,,, | Performed by: INTERNAL MEDICINE

## 2023-01-11 PROCEDURE — 3288F FALL RISK ASSESSMENT DOCD: CPT | Mod: CPTII,S$GLB,, | Performed by: INTERNAL MEDICINE

## 2023-01-11 PROCEDURE — 1126F PR PAIN SEVERITY QUANTIFIED, NO PAIN PRESENT: ICD-10-PCS | Mod: CPTII,S$GLB,, | Performed by: INTERNAL MEDICINE

## 2023-01-11 PROCEDURE — 1101F PR PT FALLS ASSESS DOC 0-1 FALLS W/OUT INJ PAST YR: ICD-10-PCS | Mod: CPTII,S$GLB,, | Performed by: INTERNAL MEDICINE

## 2023-01-11 PROCEDURE — 1159F PR MEDICATION LIST DOCUMENTED IN MEDICAL RECORD: ICD-10-PCS | Mod: CPTII,S$GLB,, | Performed by: INTERNAL MEDICINE

## 2023-01-11 PROCEDURE — 3078F PR MOST RECENT DIASTOLIC BLOOD PRESSURE < 80 MM HG: ICD-10-PCS | Mod: CPTII,S$GLB,, | Performed by: INTERNAL MEDICINE

## 2023-01-11 PROCEDURE — 1159F MED LIST DOCD IN RCRD: CPT | Mod: CPTII,S$GLB,, | Performed by: INTERNAL MEDICINE

## 2023-01-11 PROCEDURE — 3288F PR FALLS RISK ASSESSMENT DOCUMENTED: ICD-10-PCS | Mod: CPTII,S$GLB,, | Performed by: INTERNAL MEDICINE

## 2023-01-11 PROCEDURE — 3075F PR MOST RECENT SYSTOLIC BLOOD PRESS GE 130-139MM HG: ICD-10-PCS | Mod: CPTII,S$GLB,, | Performed by: INTERNAL MEDICINE

## 2023-01-11 PROCEDURE — 99999 PR PBB SHADOW E&M-EST. PATIENT-LVL III: CPT | Mod: PBBFAC,,, | Performed by: INTERNAL MEDICINE

## 2023-01-11 PROCEDURE — 1160F RVW MEDS BY RX/DR IN RCRD: CPT | Mod: CPTII,S$GLB,, | Performed by: INTERNAL MEDICINE

## 2023-01-11 PROCEDURE — 1160F PR REVIEW ALL MEDS BY PRESCRIBER/CLIN PHARMACIST DOCUMENTED: ICD-10-PCS | Mod: CPTII,S$GLB,, | Performed by: INTERNAL MEDICINE

## 2023-01-11 PROCEDURE — 99999 PR PBB SHADOW E&M-EST. PATIENT-LVL III: ICD-10-PCS | Mod: PBBFAC,,, | Performed by: INTERNAL MEDICINE

## 2023-01-11 PROCEDURE — 1101F PT FALLS ASSESS-DOCD LE1/YR: CPT | Mod: CPTII,S$GLB,, | Performed by: INTERNAL MEDICINE

## 2023-01-11 NOTE — PROGRESS NOTES
[id number]     SUBJECTIVE:     History of Present Illness:  Patient is a 77 y.o. female presents to establish cardiac care . Seen by me in 2013  for followup of nonischemic CM with EF 45%. Has seen other cardiologists in [place] and here for followup     Primary Diagnosis:   Hypertension  DM: none  Nonsmoker  Heart disease: CHF and was being followed by  in [place]   ROS  No chest pains  No shortness of breath; no PND orthopnea  No syncope  No palpitations   Activity: walks; takes care of herself; walks in the store; use stationary foot bike; no symptoms noted with it   Blood pressures around 150 or so; sometimes 130   Review of patient's allergies indicates:   Allergen Reactions    Adhesive Blisters    Codeine Other (See Comments)     Hallucinations    Doxycycline monohydrate Nausea Only     Elevated heart rate    Gabapentin Other (See Comments)     Lowered heart rate    Lanoxin [digoxin] Other (See Comments)     Too low heart rate    Thorazine [chlorpromazine] Other (See Comments)     hallucinations    Ultracet [tramadol-acetaminophen] Other (See Comments)     Elevated BP    Penicillins Nausea Only and Rash       Past Medical History:   Diagnosis Date    Arthritis     Blood transfusion     CHF (congestive heart failure)     Chronic kidney disease     Depression     Hypertension        Past Surgical History:   Procedure Laterality Date    HYSTERECTOMY  1978    OOPHORECTOMY  1978       Family History   Problem Relation Age of Onset    Early death Mother     Heart disease Mother     Hypertension Mother     Kidney disease Father     Heart disease Brother        Social History     Tobacco Use    Smoking status: Never    Smokeless tobacco: Never   Substance Use Topics    Alcohol use: No     Alcohol/week: 0.0 standard drinks    Drug use: No        Past Hospitalization:     Home meds:  Current Outpatient Medications on File Prior to Visit   Medication Sig Dispense Refill    acetaminophen (TYLENOL)  "500 MG tablet Take 500 mg by mouth every 6 (six) hours as needed.      allopurinoL (ZYLOPRIM) 100 MG tablet TAKE 2 TABLETS BY MOUTH EVERY  tablet 0    aspirin (ECOTRIN) 81 MG EC tablet Take 81 mg by mouth once daily.      candesartan (ATACAND) 32 MG tablet Take 1 tablet (32 mg total) by mouth once daily. 90 tablet 3    methylPREDNISolone (MEDROL DOSEPACK) 4 mg tablet use as directed 1 each 0    metoprolol succinate (TOPROL-XL) 50 MG 24 hr tablet TAKE 1 TABLET BY MOUTH EVERY DAY 90 tablet 3    MULTIVIT WITH CALCIUM,IRON,MIN (MULTIPLE VITAMIN, WOMENS ORAL) Take by mouth once daily.      spironolactone (ALDACTONE) 50 MG tablet TAKE 1 TABLET BY MOUTH EVERY DAY 90 tablet 2    ciclopirox (PENLAC) 8 % Soln APPLY TO AFFECTED TOENAILS AT NIGHT TIME DAILY. ON 7TH DAY, FILE NAILS DOWN, CLEAN ALL NAILS WITH ALCOHOL AND RESTART APPLICATION PROCESS. 6.6 mL 10    ketoconazole (NIZORAL) 2 % cream APPLY TOPICALLY ONCE DAILY. CLEANSE AND DRY NAVEL AS DIRECTED, THEN APPLY SMALL AMOUNT OF CREAM IN AND AROUND NAVAL ONCE DAILY FOR 14 DAYS 15 g 1    mometasone (ELOCON) 0.1 % solution Apply topically once daily. 60 mL 3    ofloxacin (FLOXIN) 0.3 % otic solution Place 5 drops into the left ear 2 (two) times daily. 10 mL 0     No current facility-administered medications on file prior to visit.       Cardiac meds:  Aldactone 50 mg  Metoprolol succinate 50 mg  Candesartan 32 mg   ASA         OBJECTIVE:     Vital Signs (Most Recent)  Vitals:    01/11/23 1310   BP: 132/74   Weight: 80.7 kg (178 lb)   Height: 5' 2" (1.575 m)         Physical Exam:  Neck: normal carotids, no bruits; normal JVP  Lungs :clear  Heart: RR, normal S1,S2, no murmurs, no gallops  Abd: no masses; no bruits;   Exts: normal DP and PT pulses bilaterally, no edema noted           LABS    CMP  No results for input(s): K in the last 2160 hours.    Invalid input(s): GFR    LIPID  No results for input(s): HDL, CHOL, TRIG, LDLCALC, CHOLHDL, NONHDL, TOTALCHOLEST in the last " 2160 hours.    :  ;  ; GFR >60, K 4.4  Diagnostic Results:    EK/22; NSR: LVH,  nonspecific T wave changes     Echo: : EF 35%; diastolic dysfunction; mild hypokinesis inferoposterior wall     Chart review:    Echo: 2020: EF 40%  Cardiac cath: : normal coronaries   ASSESSMENT/PLAN:     Hypertension: can be better controlled   Low EF: unclear why except for uncontrolled hypertension - presently on ARB and metoprolol succinate  and asymptomatic     Plan: 1. Decrease spironolactone to 25 mg (1/2 50 mg)  2. She wishes to continue the same regimen rather than switch to entresto   3. Continue metoprolol succinate; monitor blood pressure   4. Return 4 months     Mikala Ng MD

## 2023-01-12 ENCOUNTER — TELEPHONE (OUTPATIENT)
Dept: INTERNAL MEDICINE | Facility: CLINIC | Age: 78
End: 2023-01-12
Payer: MEDICARE

## 2023-01-12 ENCOUNTER — TELEPHONE (OUTPATIENT)
Dept: URGENT CARE | Facility: CLINIC | Age: 78
End: 2023-01-12
Payer: MEDICARE

## 2023-01-12 NOTE — TELEPHONE ENCOUNTER
----- Message from Shantel Fox sent at 1/12/2023  1:29 PM CST -----  Contact: Pt @[phone]  type: Lab    Caller is requesting to schedule their Lab appointment prior to annual appointment.  Order is not listed in EPIC.  Please enter order and contact patient to schedule.    Name of Caller Megan     Preferred Date and Time of Labs: 10:00/ any day     Date of Annual Physical Appointment: 1.26.23    Where would they like the lab performed? [facility]     Would the patient rather a call back or a response via [facility]? Call back     Best Call Back Number: [phone]    Additional Information: call back to advise on labs.

## 2023-01-13 NOTE — TELEPHONE ENCOUNTER
Called patient to follow up on her leg pain since her urgent care visit. Patient report her pain is improving more and more each day. She reports she is still taking the medrol dose pack. She denies any additional complaints or concerns at this time.

## 2023-01-26 ENCOUNTER — OFFICE VISIT (OUTPATIENT)
Dept: INTERNAL MEDICINE | Facility: CLINIC | Age: 78
End: 2023-01-26
Payer: MEDICARE

## 2023-01-26 VITALS
WEIGHT: 178.38 LBS | HEIGHT: 62 IN | BODY MASS INDEX: 32.82 KG/M2 | HEART RATE: 55 BPM | DIASTOLIC BLOOD PRESSURE: 80 MMHG | TEMPERATURE: 98 F | SYSTOLIC BLOOD PRESSURE: 130 MMHG

## 2023-01-26 DIAGNOSIS — M25.511 CHRONIC RIGHT SHOULDER PAIN: ICD-10-CM

## 2023-01-26 DIAGNOSIS — N18.31 STAGE 3A CHRONIC KIDNEY DISEASE: Chronic | ICD-10-CM

## 2023-01-26 DIAGNOSIS — K59.01 SLOW TRANSIT CONSTIPATION: ICD-10-CM

## 2023-01-26 DIAGNOSIS — I10 ESSENTIAL HYPERTENSION: Primary | Chronic | ICD-10-CM

## 2023-01-26 DIAGNOSIS — Z12.31 ENCOUNTER FOR SCREENING MAMMOGRAM FOR HIGH-RISK PATIENT: ICD-10-CM

## 2023-01-26 DIAGNOSIS — G89.29 CHRONIC RIGHT SHOULDER PAIN: ICD-10-CM

## 2023-01-26 PROCEDURE — 99999 PR PBB SHADOW E&M-EST. PATIENT-LVL IV: ICD-10-PCS | Mod: PBBFAC,,, | Performed by: INTERNAL MEDICINE

## 2023-01-26 PROCEDURE — 1126F AMNT PAIN NOTED NONE PRSNT: CPT | Mod: CPTII,S$GLB,, | Performed by: INTERNAL MEDICINE

## 2023-01-26 PROCEDURE — 99214 OFFICE O/P EST MOD 30 MIN: CPT | Mod: 25,S$GLB,, | Performed by: INTERNAL MEDICINE

## 2023-01-26 PROCEDURE — 99999 PR PBB SHADOW E&M-EST. PATIENT-LVL IV: CPT | Mod: PBBFAC,,, | Performed by: INTERNAL MEDICINE

## 2023-01-26 PROCEDURE — 1126F PR PAIN SEVERITY QUANTIFIED, NO PAIN PRESENT: ICD-10-PCS | Mod: CPTII,S$GLB,, | Performed by: INTERNAL MEDICINE

## 2023-01-26 PROCEDURE — 3075F PR MOST RECENT SYSTOLIC BLOOD PRESS GE 130-139MM HG: ICD-10-PCS | Mod: CPTII,S$GLB,, | Performed by: INTERNAL MEDICINE

## 2023-01-26 PROCEDURE — 1101F PT FALLS ASSESS-DOCD LE1/YR: CPT | Mod: CPTII,S$GLB,, | Performed by: INTERNAL MEDICINE

## 2023-01-26 PROCEDURE — 3288F FALL RISK ASSESSMENT DOCD: CPT | Mod: CPTII,S$GLB,, | Performed by: INTERNAL MEDICINE

## 2023-01-26 PROCEDURE — 90694 VACC AIIV4 NO PRSRV 0.5ML IM: CPT | Mod: S$GLB,,, | Performed by: INTERNAL MEDICINE

## 2023-01-26 PROCEDURE — 1101F PR PT FALLS ASSESS DOC 0-1 FALLS W/OUT INJ PAST YR: ICD-10-PCS | Mod: CPTII,S$GLB,, | Performed by: INTERNAL MEDICINE

## 2023-01-26 PROCEDURE — G0008 ADMIN INFLUENZA VIRUS VAC: HCPCS | Mod: S$GLB,,, | Performed by: INTERNAL MEDICINE

## 2023-01-26 PROCEDURE — 1159F MED LIST DOCD IN RCRD: CPT | Mod: CPTII,S$GLB,, | Performed by: INTERNAL MEDICINE

## 2023-01-26 PROCEDURE — 1159F PR MEDICATION LIST DOCUMENTED IN MEDICAL RECORD: ICD-10-PCS | Mod: CPTII,S$GLB,, | Performed by: INTERNAL MEDICINE

## 2023-01-26 PROCEDURE — 3288F PR FALLS RISK ASSESSMENT DOCUMENTED: ICD-10-PCS | Mod: CPTII,S$GLB,, | Performed by: INTERNAL MEDICINE

## 2023-01-26 PROCEDURE — 90694 FLU VACCINE - QUADRIVALENT - ADJUVANTED: ICD-10-PCS | Mod: S$GLB,,, | Performed by: INTERNAL MEDICINE

## 2023-01-26 PROCEDURE — 3079F DIAST BP 80-89 MM HG: CPT | Mod: CPTII,S$GLB,, | Performed by: INTERNAL MEDICINE

## 2023-01-26 PROCEDURE — 99214 PR OFFICE/OUTPT VISIT, EST, LEVL IV, 30-39 MIN: ICD-10-PCS | Mod: 25,S$GLB,, | Performed by: INTERNAL MEDICINE

## 2023-01-26 PROCEDURE — 3075F SYST BP GE 130 - 139MM HG: CPT | Mod: CPTII,S$GLB,, | Performed by: INTERNAL MEDICINE

## 2023-01-26 PROCEDURE — G0008 FLU VACCINE - QUADRIVALENT - ADJUVANTED: ICD-10-PCS | Mod: S$GLB,,, | Performed by: INTERNAL MEDICINE

## 2023-01-26 PROCEDURE — 3079F PR MOST RECENT DIASTOLIC BLOOD PRESSURE 80-89 MM HG: ICD-10-PCS | Mod: CPTII,S$GLB,, | Performed by: INTERNAL MEDICINE

## 2023-01-26 RX ORDER — BETAMETHASONE DIPROPIONATE 0.5 MG/G
LOTION TOPICAL
COMMUNITY
Start: 2022-11-28 | End: 2023-08-03

## 2023-01-26 RX ORDER — FINASTERIDE 1 MG/1
1 TABLET, FILM COATED ORAL 2 TIMES DAILY
COMMUNITY
Start: 2022-11-28 | End: 2023-08-03

## 2023-01-26 NOTE — PROGRESS NOTES
"Subjective:       Patient ID: Megan Sams is a 78 y.o. female.    Chief Complaint: Follow-up     Patient is an 78-year-old female presenting today following up on chronic health issues.  Patient has history of hypertension, stage 3 kidney disease.  Patient's blood pressure is doing well.  No issues or now.  No signs of cardiac decompensation.  She is recently seen her cardiologist and everything looked good.      For kidney disease she is not having any issues there.  She knows to stay well hydrated and avoid anti-inflammatories.      She is had issues with slow transit constipation.  She is been using MiraLax once daily but is not working as well as he used to.  We discussed adjusting the dose and seem does better.      She describes a chronic right shoulder discomfort.  She notes that it is in the posterior aspect shoulder in the area of the supraspinatus and she is had some aching kind of pains at times but at other times it gets kind of severe.  It usually does respond to Tylenol.    Review of Systems   Constitutional:  Negative for chills and fever.   Respiratory:  Negative for cough, shortness of breath and wheezing.    Cardiovascular:  Negative for chest pain and palpitations.   Gastrointestinal:  Negative for blood in stool, constipation, nausea and vomiting.   Genitourinary:  Negative for dysuria and hematuria.   Musculoskeletal:  Positive for arthralgias.   Skin:  Negative for rash.     Objective:   /80   Pulse (!) 55   Temp 97.8 °F (36.6 °C)   Ht 5' 2" (1.575 m)   Wt 80.9 kg (178 lb 5.6 oz)   BMI 32.62 kg/m²      Physical Exam  Vitals reviewed.   Constitutional:       General: She is not in acute distress.     Appearance: Normal appearance. She is well-developed. She is not ill-appearing.   HENT:      Head: Normocephalic and atraumatic.      Right Ear: External ear normal.      Left Ear: External ear normal.   Cardiovascular:      Rate and Rhythm: Normal rate and regular rhythm.      Heart " sounds: No murmur heard.    No friction rub. No gallop.   Pulmonary:      Effort: Pulmonary effort is normal.      Breath sounds: Normal breath sounds. No wheezing or rales.   Chest:      Chest wall: No tenderness.   Abdominal:      General: Bowel sounds are normal. There is no distension.      Palpations: Abdomen is soft.      Tenderness: There is no abdominal tenderness.   Musculoskeletal:      Comments: Good range of motion of the right shoulder.  Tenderness in the musculature along the superior aspect of the shoulder blade.   Lymphadenopathy:      Cervical: No cervical adenopathy.   Skin:     Findings: No rash.   Neurological:      General: No focal deficit present.      Mental Status: She is alert and oriented to person, place, and time.      Cranial Nerves: No cranial nerve deficit.           Assessment:       1. Essential hypertension    2. Stage 3a chronic kidney disease    3. Slow transit constipation    4. Chronic right shoulder pain    5. Encounter for screening mammogram for high-risk patient          Plan:   No problem-specific Assessment & Plan notes found for this encounter.    Essential hypertension  Comments:  continue meds, stable.  Orders:  -     CBC Auto Differential; Future; Expected date: 07/26/2023  -     Comprehensive Metabolic Panel; Future; Expected date: 07/26/2023  -     Lipid Panel; Future; Expected date: 07/26/2023  -     Hemoglobin A1C; Future; Expected date: 07/26/2023    Stage 3a chronic kidney disease  Comments:  stay well hydrated, avoid nsaids    Slow transit constipation  Comments:  Increase miralax to twice daily, make sure to drink plenty of water    Chronic right shoulder pain  Comments:  take tylenol 500 mg twice daily    Encounter for screening mammogram for high-risk patient  -     Mammo Digital Screening Bilat w/ [facility]; Future; Expected date: 01/26/2023    Other orders  -     Influenza - Quadrivalent (Adjuvanted)          Follow up in about 27 weeks (around 8/3/2023).

## 2023-02-07 ENCOUNTER — HOSPITAL ENCOUNTER (OUTPATIENT)
Dept: RADIOLOGY | Facility: HOSPITAL | Age: 78
Discharge: HOME | End: 2023-02-07
Attending: INTERNAL MEDICINE
Payer: MEDICARE

## 2023-02-07 DIAGNOSIS — Z12.31 ENCOUNTER FOR SCREENING MAMMOGRAM FOR HIGH-RISK PATIENT: ICD-10-CM

## 2023-02-07 PROCEDURE — 77067 SCR MAMMO BI INCL CAD: CPT | Mod: 26,,, | Performed by: RADIOLOGY

## 2023-02-07 PROCEDURE — 77067 SCR MAMMO BI INCL CAD: CPT | Mod: TC,PO

## 2023-02-07 PROCEDURE — 77063 BREAST TOMOSYNTHESIS BI: CPT | Mod: 26,,, | Performed by: RADIOLOGY

## 2023-02-07 PROCEDURE — 77063 MAMMO DIGITAL SCREENING BILAT WITH TOMO: ICD-10-PCS | Mod: 26,,, | Performed by: RADIOLOGY

## 2023-02-07 PROCEDURE — 77067 MAMMO DIGITAL SCREENING BILAT WITH TOMO: ICD-10-PCS | Mod: 26,,, | Performed by: RADIOLOGY

## 2023-05-02 ENCOUNTER — TELEPHONE (OUTPATIENT)
Dept: INTERNAL MEDICINE | Facility: CLINIC | Age: 78
End: 2023-05-02
Payer: MEDICARE

## 2023-05-02 NOTE — TELEPHONE ENCOUNTER
----- Message from Irene Ribeiro sent at 5/2/2023  8:09 AM CDT -----  Pt stated she is having cramps in her legs and feet. Dr. Bowen ordered her something last time but it isn't working. She would like to know if he can call something in to the pharmacy or do she need to schedule an appt. Call the pt back at .[phone] to advise. Nelyx. EL

## 2023-05-09 ENCOUNTER — OFFICE VISIT (OUTPATIENT)
Dept: PODIATRY | Facility: CLINIC | Age: 78
End: 2023-05-09
Payer: MEDICARE

## 2023-05-09 VITALS — BODY MASS INDEX: 32.76 KG/M2 | HEIGHT: 62 IN | WEIGHT: 178 LBS

## 2023-05-09 DIAGNOSIS — M79.675 TOE PAIN, LEFT: Primary | ICD-10-CM

## 2023-05-09 DIAGNOSIS — L60.0 INGROWN TOENAIL OF LEFT FOOT: ICD-10-CM

## 2023-05-09 PROCEDURE — 99213 OFFICE O/P EST LOW 20 MIN: CPT | Mod: S$GLB,,, | Performed by: PODIATRIST

## 2023-05-09 PROCEDURE — 99999 PR PBB SHADOW E&M-EST. PATIENT-LVL III: ICD-10-PCS | Mod: PBBFAC,,, | Performed by: PODIATRIST

## 2023-05-09 PROCEDURE — 3288F FALL RISK ASSESSMENT DOCD: CPT | Mod: CPTII,S$GLB,, | Performed by: PODIATRIST

## 2023-05-09 PROCEDURE — 1101F PR PT FALLS ASSESS DOC 0-1 FALLS W/OUT INJ PAST YR: ICD-10-PCS | Mod: CPTII,S$GLB,, | Performed by: PODIATRIST

## 2023-05-09 PROCEDURE — 3288F PR FALLS RISK ASSESSMENT DOCUMENTED: ICD-10-PCS | Mod: CPTII,S$GLB,, | Performed by: PODIATRIST

## 2023-05-09 PROCEDURE — 99213 PR OFFICE/OUTPT VISIT, EST, LEVL III, 20-29 MIN: ICD-10-PCS | Mod: S$GLB,,, | Performed by: PODIATRIST

## 2023-05-09 PROCEDURE — 1125F PR PAIN SEVERITY QUANTIFIED, PAIN PRESENT: ICD-10-PCS | Mod: CPTII,S$GLB,, | Performed by: PODIATRIST

## 2023-05-09 PROCEDURE — 1101F PT FALLS ASSESS-DOCD LE1/YR: CPT | Mod: CPTII,S$GLB,, | Performed by: PODIATRIST

## 2023-05-09 PROCEDURE — 99999 PR PBB SHADOW E&M-EST. PATIENT-LVL III: CPT | Mod: PBBFAC,,, | Performed by: PODIATRIST

## 2023-05-09 PROCEDURE — 1125F AMNT PAIN NOTED PAIN PRSNT: CPT | Mod: CPTII,S$GLB,, | Performed by: PODIATRIST

## 2023-05-09 NOTE — PROGRESS NOTES
PODIATRIC MEDICINE AND SURGERY  6/2/2023    PCP: Dr. Javad Bowen MD    CHIEF COMPLAINT   Chief Complaint   Patient presents with    Nail Problem     C/o 2nd toe soreness, left foot, x 1 week, 0 injury, rates pain 8/10, non-diabetic, wears casual shoes and socks, last seen PCP Dr. Bowen on 01/26/23       HPI:    Megan Sams is a 78 y.o. female who has a past medical history of Arthritis, Blood transfusion, CHF (congestive heart failure), Chronic kidney disease, Depression, and Hypertension.   Megan presents to clinic today complaining of left second toe pain. She denies injury. Pain is 8/10. No treatment to date.      Patient denies other pedal complaints at this time.     PMH  Past Medical History:   Diagnosis Date    Arthritis     Blood transfusion     CHF (congestive heart failure)     Chronic kidney disease     Depression     Hypertension        PROBLEM LIST  Patient Active Problem List    Diagnosis Date Noted    Cortical age-related cataract of both eyes 05/17/2022    PVC (premature ventricular contraction) 03/31/2021    Intertrigo 08/31/2018    Obesity, Class I, BMI 30-34.9 01/20/2017    Nonischemic cardiomyopathy 12/17/2013    Arthritis     CHF (congestive heart failure)     Essential hypertension     CKD (chronic kidney disease) stage 3, GFR 30-59 ml/min        MEDS  Current Outpatient Medications on File Prior to Visit   Medication Sig Dispense Refill    acetaminophen (TYLENOL) 500 MG tablet Take 500 mg by mouth every 6 (six) hours as needed.      allopurinoL (ZYLOPRIM) 100 MG tablet TAKE 2 TABLETS BY MOUTH EVERY  tablet 0    aspirin (ECOTRIN) 81 MG EC tablet Take 81 mg by mouth once daily.      betamethasone dipropionate (DIPROLENE) 0.05 % lotion APPLY TO SCALP TWICE A DAY      candesartan (ATACAND) 32 MG tablet Take 1 tablet (32 mg total) by mouth once daily. 90 tablet 3    finasteride (PROPECIA) 1 mg tablet Take 1 mg by mouth 2 (two) times daily.      metoprolol succinate (TOPROL-XL) 50  MG 24 hr tablet TAKE 1 TABLET BY MOUTH EVERY DAY 90 tablet 3    MULTIVIT WITH CALCIUM,IRON,MIN (MULTIPLE VITAMIN, WOMENS ORAL) Take by mouth once daily.      spironolactone (ALDACTONE) 50 MG tablet TAKE 1 TABLET BY MOUTH EVERY DAY (Patient taking differently: 25 mg.) 90 tablet 2     No current facility-administered medications on file prior to visit.       Medication List with Changes/Refills   New Medications    AMLODIPINE (NORVASC) 5 MG TABLET    Take 1 tablet (5 mg total) by mouth once daily.   Current Medications    ACETAMINOPHEN (TYLENOL) 500 MG TABLET    Take 500 mg by mouth every 6 (six) hours as needed.    ALLOPURINOL (ZYLOPRIM) 100 MG TABLET    TAKE 2 TABLETS BY MOUTH EVERY DAY    ASPIRIN (ECOTRIN) 81 MG EC TABLET    Take 81 mg by mouth once daily.    BETAMETHASONE DIPROPIONATE (DIPROLENE) 0.05 % LOTION    APPLY TO SCALP TWICE A DAY    CANDESARTAN (ATACAND) 32 MG TABLET    Take 1 tablet (32 mg total) by mouth once daily.    FINASTERIDE (PROPECIA) 1 MG TABLET    Take 1 mg by mouth 2 (two) times daily.    METOPROLOL SUCCINATE (TOPROL-XL) 50 MG 24 HR TABLET    TAKE 1 TABLET BY MOUTH EVERY DAY    MULTIVIT WITH CALCIUM,IRON,MIN (MULTIPLE VITAMIN, WOMENS ORAL)    Take by mouth once daily.    SPIRONOLACTONE (ALDACTONE) 50 MG TABLET    TAKE 1 TABLET BY MOUTH EVERY DAY       PSH     Past Surgical History:   Procedure Laterality Date    HYSTERECTOMY  1978    OOPHORECTOMY  1978        ALL  Review of patient's allergies indicates:   Allergen Reactions    Adhesive Blisters    Codeine Other (See Comments)     Hallucinations    Doxycycline monohydrate Nausea Only     Elevated heart rate    Gabapentin Other (See Comments)     Lowered heart rate    Lanoxin [digoxin] Other (See Comments)     Too low heart rate    Thorazine [chlorpromazine] Other (See Comments)     hallucinations    Ultracet [tramadol-acetaminophen] Other (See Comments)     Elevated BP    Penicillins Nausea Only and Rash       SOC     Social History  "    Tobacco Use    Smoking status: Never    Smokeless tobacco: Never   Substance Use Topics    Alcohol use: No     Alcohol/week: 0.0 standard drinks    Drug use: No         FAMILY HX    Family History   Problem Relation Age of Onset    Early death Mother     Heart disease Mother     Hypertension Mother     Kidney disease Father     Heart disease Brother             REVIEW OF SYSTEMS  General: This patient is well-developed, well-nourished and appears stated age, well-oriented to person, place and time, and cooperative and pleasant on today's visit   Constitutional: Negative for chills and fever.   Respiratory: Negative for shortness of breath.    Cardiovascular: Negative for chest pain, palpitations, orthopnea  Gastrointestinal: Negative for diarrhea, nausea and vomiting.   Musculoskeletal: Positive for above noted in HPI  Peripheral Vascular: no claudication or cyanosis  Psychiatric/Behavioral: Negative for altered mental status     PHYSICAL EXAM:      Vitals:    05/09/23 1453   Weight: 80.7 kg (178 lb)   Height: 5' 2" (1.575 m)   PainSc:   8         LOWER EXTREMITY PHYSICAL EXAM  VASCULAR  Dorsalis pedis and posterior tibial pulses palpable 2/4 bilaterally. Capillary refill time immediate to the toes. Feet are warm to the touch. Skin temperature warm to warm from proximally to distally There are no varicosities, telangiectasias noted to bilateral foot and ankle regions. There are no ecchymoses noted to bilateral foot and ankle regions. There is no gross lower extremity edema.    DERMATOLOGIC  Skin moist with healthy texture and turgor.There are no open ulcerations, lacerations, or fissures to bilateral foot and ankle regions. There are no signs of infection as there is no erythema, no proximal-extending lymphangiitis, no fluctuance, or crepitus noted on palpation to bilateral foot and ankle regions. There is no interdigital maceration.   There are hyperkeratotic lesions noted to feet. Incurvated border left second " digit, no acute SOI    NEUROLOGIC  Epicritic sensation is intact as the patient is able to sense light touch to bilateral foot and ankle regions. Achilles and patellar deep tendon reflexes intact. Babinski reflex absent    ORTHOPEDIC/BIOMECHANICAL  TTP border of left second digit.  Muscle strength AT/EHL/EDL/PT: 5/5; Achilles/Gastroc/Soleus: 5/5; PB/PL: 5/5 Muscle tone is normal.   IMAGING   Reviewed by me and I agree with radiologist findings, 3 views of foot/ankle, reveal:  No results found for this or any previous visit.          No results found for this or any previous visit.      No results found for this or any previous visit.       Results for orders placed during the hospital encounter of 05/17/19    X-Ray Foot Complete Right    Narrative  EXAMINATION:  XR FOOT COMPLETE 3 VIEW RIGHT    CLINICAL HISTORY:  possible foreign body to right posterolateral heel x 2 weeks;. Pain in right foot    TECHNIQUE:  AP, lateral, and oblique views of the right foot were performed.    COMPARISON:  None    FINDINGS:  Generalized osteopenia.  Multi articular degenerative changes throughout the ankle and foot with most pronounced findings in the midfoot.  There is anterior spurring at the TMT joints.    Pes planus and moderate hallux valgus deformities noted.  Evaluation of the phalanges limited secondary flexion deformities 2nd through 5th digits.  Prominent dorsal and plantar calcaneal spurs.    No acute or healing fracture identified.  No subcutaneous air, calcification or discrete radiopaque foreign body identified.    IMPRESSION:    As above.  Follow-up and/or further evaluation as warranted.      Electronically signed by: Leo Pulido MD  Date:    05/17/2019  Time:    09:46          ASSESSMENT     Encounter Diagnoses   Name Primary?    Toe pain, left Yes    Ingrown toenail of left foot          PLAN  Patient was educated about clinical and imaging findings, and verbalizes understanding of above.     Diagnoses and all  orders for this visit:  Toe pain, left    Ingrown toenail of left foot      Utilizing sterile toenail clippers I aggressively trimmed the offending nail border approximately 3 mm from its edge and carried the nail plate incision down at an angle in order to wedge out the offending cryptotic portion of the nail plate. The offending border was then removed in toto. The remaining nail was grinded down with an electric  down to nail bed. Minimal blood was drawn. Applied betadine and covered with band-aid. Patient tolerated the procedure well and related significant relief.        Disclaimer:  This note may have been prepared using voice recognition software, it may have not been extensively proofed, as such there could be errors within the text such as sound alike errors.         Future Appointments   Date Time Provider Department [place]   7/26/2023  8:30 AM [facility]   8/3/2023 10:00 [facility]   8/16/2023  1:15 PM Mikala Ng MD [facility]       Report Electronically Signed By:     Larissa Mckoy DPM   Podiatry  [facility]  6/2/2023

## 2023-05-11 ENCOUNTER — OFFICE VISIT (OUTPATIENT)
Dept: CARDIOLOGY | Facility: CLINIC | Age: 78
End: 2023-05-11
Payer: MEDICARE

## 2023-05-11 VITALS
BODY MASS INDEX: 31.64 KG/M2 | SYSTOLIC BLOOD PRESSURE: 162 MMHG | OXYGEN SATURATION: 98 % | HEIGHT: 62 IN | HEART RATE: 72 BPM | DIASTOLIC BLOOD PRESSURE: 81 MMHG | WEIGHT: 171.94 LBS

## 2023-05-11 DIAGNOSIS — I50.42 CHRONIC COMBINED SYSTOLIC AND DIASTOLIC CONGESTIVE HEART FAILURE: Primary | ICD-10-CM

## 2023-05-11 DIAGNOSIS — N18.31 STAGE 3A CHRONIC KIDNEY DISEASE: ICD-10-CM

## 2023-05-11 DIAGNOSIS — I10 ESSENTIAL HYPERTENSION: ICD-10-CM

## 2023-05-11 DIAGNOSIS — I42.8 NONISCHEMIC CARDIOMYOPATHY: ICD-10-CM

## 2023-05-11 PROCEDURE — 1101F PT FALLS ASSESS-DOCD LE1/YR: CPT | Mod: CPTII,S$GLB,, | Performed by: INTERNAL MEDICINE

## 2023-05-11 PROCEDURE — 99214 PR OFFICE/OUTPT VISIT, EST, LEVL IV, 30-39 MIN: ICD-10-PCS | Mod: S$GLB,,, | Performed by: INTERNAL MEDICINE

## 2023-05-11 PROCEDURE — 1125F AMNT PAIN NOTED PAIN PRSNT: CPT | Mod: CPTII,S$GLB,, | Performed by: INTERNAL MEDICINE

## 2023-05-11 PROCEDURE — 1159F PR MEDICATION LIST DOCUMENTED IN MEDICAL RECORD: ICD-10-PCS | Mod: CPTII,S$GLB,, | Performed by: INTERNAL MEDICINE

## 2023-05-11 PROCEDURE — 1101F PR PT FALLS ASSESS DOC 0-1 FALLS W/OUT INJ PAST YR: ICD-10-PCS | Mod: CPTII,S$GLB,, | Performed by: INTERNAL MEDICINE

## 2023-05-11 PROCEDURE — 3077F PR MOST RECENT SYSTOLIC BLOOD PRESSURE >= 140 MM HG: ICD-10-PCS | Mod: CPTII,S$GLB,, | Performed by: INTERNAL MEDICINE

## 2023-05-11 PROCEDURE — 99214 OFFICE O/P EST MOD 30 MIN: CPT | Mod: S$GLB,,, | Performed by: INTERNAL MEDICINE

## 2023-05-11 PROCEDURE — 3079F DIAST BP 80-89 MM HG: CPT | Mod: CPTII,S$GLB,, | Performed by: INTERNAL MEDICINE

## 2023-05-11 PROCEDURE — 3077F SYST BP >= 140 MM HG: CPT | Mod: CPTII,S$GLB,, | Performed by: INTERNAL MEDICINE

## 2023-05-11 PROCEDURE — 99999 PR PBB SHADOW E&M-EST. PATIENT-LVL III: ICD-10-PCS | Mod: PBBFAC,,, | Performed by: INTERNAL MEDICINE

## 2023-05-11 PROCEDURE — 3079F PR MOST RECENT DIASTOLIC BLOOD PRESSURE 80-89 MM HG: ICD-10-PCS | Mod: CPTII,S$GLB,, | Performed by: INTERNAL MEDICINE

## 2023-05-11 PROCEDURE — 3288F PR FALLS RISK ASSESSMENT DOCUMENTED: ICD-10-PCS | Mod: CPTII,S$GLB,, | Performed by: INTERNAL MEDICINE

## 2023-05-11 PROCEDURE — 1125F PR PAIN SEVERITY QUANTIFIED, PAIN PRESENT: ICD-10-PCS | Mod: CPTII,S$GLB,, | Performed by: INTERNAL MEDICINE

## 2023-05-11 PROCEDURE — 99999 PR PBB SHADOW E&M-EST. PATIENT-LVL III: CPT | Mod: PBBFAC,,, | Performed by: INTERNAL MEDICINE

## 2023-05-11 PROCEDURE — 1160F RVW MEDS BY RX/DR IN RCRD: CPT | Mod: CPTII,S$GLB,, | Performed by: INTERNAL MEDICINE

## 2023-05-11 PROCEDURE — 1160F PR REVIEW ALL MEDS BY PRESCRIBER/CLIN PHARMACIST DOCUMENTED: ICD-10-PCS | Mod: CPTII,S$GLB,, | Performed by: INTERNAL MEDICINE

## 2023-05-11 PROCEDURE — 3288F FALL RISK ASSESSMENT DOCD: CPT | Mod: CPTII,S$GLB,, | Performed by: INTERNAL MEDICINE

## 2023-05-11 PROCEDURE — 1159F MED LIST DOCD IN RCRD: CPT | Mod: CPTII,S$GLB,, | Performed by: INTERNAL MEDICINE

## 2023-05-11 RX ORDER — AMLODIPINE BESYLATE 5 MG/1
5 TABLET ORAL DAILY
Qty: 90 TABLET | Refills: 1 | Status: SHIPPED | OUTPATIENT
Start: 2023-05-11 | End: 2023-06-26

## 2023-05-11 NOTE — PROGRESS NOTES
[facility] [address]     SUBJECTIVE:     History of Present Illness:  Patient is a 78 y.o. female presents to establish cardiac care . Seen by me in 2013  for followup of nonischemic CM with EF 45%.   Primary Diagnosis:   Hypertension  DM: none  Nonsmoker  Heart disease: CHF and was being followed by  in [facility]  Since last visit 1/23:   No chest pains  No shortness of breath; no PND orthopnea  No syncope  No palpitations   Activity: walks; takes care of herself; walks in the store; use stationary foot bike; no symptoms noted with it   Blood pressures around 130-140's  Sleeps on the left side and achyiness in the left arm; sometimes in the morning; short lived; cannot lift her arms up too high due to the pain   Review of patient's allergies indicates:   Allergen Reactions    Adhesive Blisters    Codeine Other (See Comments)     Hallucinations    Doxycycline monohydrate Nausea Only     Elevated heart rate    Gabapentin Other (See Comments)     Lowered heart rate    Lanoxin [digoxin] Other (See Comments)     Too low heart rate    Thorazine [chlorpromazine] Other (See Comments)     hallucinations    Ultracet [tramadol-acetaminophen] Other (See Comments)     Elevated BP    Penicillins Nausea Only and Rash       Past Medical History:   Diagnosis Date    Arthritis     Blood transfusion     CHF (congestive heart failure)     Chronic kidney disease     Depression     Hypertension        Past Surgical History:   Procedure Laterality Date    HYSTERECTOMY  1978    OOPHORECTOMY  1978       Family History   Problem Relation Age of Onset    Early death Mother     Heart disease Mother     Hypertension Mother     Kidney disease Father     Heart disease Brother        Social History     Tobacco Use    Smoking status: Never    Smokeless tobacco: Never   Substance Use Topics    Alcohol use: No     Alcohol/week: 0.0 standard drinks    Drug use: No        Past Hospitalization:     Home meds:  Current Outpatient  "Medications on File Prior to Visit   Medication Sig Dispense Refill    acetaminophen (TYLENOL) 500 MG tablet Take 500 mg by mouth every 6 (six) hours as needed.      allopurinoL (ZYLOPRIM) 100 MG tablet TAKE 2 TABLETS BY MOUTH EVERY  tablet 0    aspirin (ECOTRIN) 81 MG EC tablet Take 81 mg by mouth once daily.      betamethasone dipropionate (DIPROLENE) 0.05 % lotion APPLY TO SCALP TWICE A DAY      candesartan (ATACAND) 32 MG tablet Take 1 tablet (32 mg total) by mouth once daily. 90 tablet 3    finasteride (PROPECIA) 1 mg tablet Take 1 mg by mouth 2 (two) times daily.      metoprolol succinate (TOPROL-XL) 50 MG 24 hr tablet TAKE 1 TABLET BY MOUTH EVERY DAY 90 tablet 3    MULTIVIT WITH CALCIUM,IRON,MIN (MULTIPLE VITAMIN, WOMENS ORAL) Take by mouth once daily.      spironolactone (ALDACTONE) 50 MG tablet TAKE 1 TABLET BY MOUTH EVERY DAY (Patient taking differently: 25 mg.) 90 tablet 2     No current facility-administered medications on file prior to visit.       Cardiac meds:  Aldactone 25mg  Metoprolol succinate 50 mg  Candesartan 32 mg   ASA  81 mg        OBJECTIVE:     Vital Signs (Most Recent)  Vitals:    23 1328   BP: (!) 162/81   Pulse: 72   SpO2: 98%   Weight: 78 kg (171 lb 15.3 oz)   Height: 5' 2" (1.575 m)           Physical Exam:  Neck: normal carotids, no bruits; normal JVP  Lungs :clear  Heart: RR, normal S1,S2, no murmurs, no gallops  Abd: no masses; no bruits;   Exts: normal DP and PT pulses bilaterally, no edema noted           LABS    CMP  No results for input(s): K in the last 2160 hours.    Invalid input(s): GFR    LIPID  No results for input(s): HDL, CHOL, TRIG, LDLCALC, CHOLHDL, NONHDL, TOTALCHOLEST in the last 2160 hours.    :  ;  ; GFR 53 K 4.4  Diagnostic Results:    EK/22; NSR: LVH,  nonspecific T wave changes     Echo: : EF 35%; diastolic dysfunction; mild hypokinesis inferoposterior wall     Chart review:    Echo: : EF 40%  Cardiac cath: : normal " coronaries   ASSESSMENT/PLAN:     Hypertension: can be better controlled   Low EF: nonischemic: unclear why except for uncontrolled hypertension - presently on ARB and metoprolol succinate  and asymptomatic . Beta blockers higher dose led to heart rates of 50's     Plan: 1. Start amlodipine 5 mg daily   2. Monitor blood pressures at home  3. Return 3 months     Mikala Ng MD

## 2023-05-15 NOTE — TELEPHONE ENCOUNTER
----- Message from Javad Bowen MD sent at 7/1/2022  7:07 AM CDT -----  Labs look good.  No changes are necessary.   yes

## 2023-05-25 ENCOUNTER — TELEPHONE (OUTPATIENT)
Dept: CARDIOLOGY | Facility: CLINIC | Age: 78
End: 2023-05-25
Payer: MEDICARE

## 2023-05-25 NOTE — TELEPHONE ENCOUNTER
Returned call    She reports she called requesting her VS from her last visit and has already spoken with staff to obtain her VS

## 2023-05-25 NOTE — TELEPHONE ENCOUNTER
----- Message from Corine Carmona sent at 5/25/2023  9:23 AM CDT -----  .Type:  Needs Medical Advice    Who Called: pt    Would the patient rather a call back or a response via [facility]? Call back  Best Call Back Number: [phone]  Additional Information:     Pt would like a call back please

## 2023-06-20 ENCOUNTER — TELEPHONE (OUTPATIENT)
Dept: INTERNAL MEDICINE | Facility: CLINIC | Age: 78
End: 2023-06-20
Payer: MEDICARE

## 2023-06-20 NOTE — TELEPHONE ENCOUNTER
----- Message from Gita Linton sent at 6/19/2023  4:36 PM CDT -----  Name of Who is Calling:  patient         What is the request in detail:Patient is requesting a call regarding a sooner appointment . Patient is experiencing back pain and the soonest I could get patient scheduled was with Nyla ROLDAN on 06/27.           Can the clinic reply by [facility]:  no         What Number to Call Back if not in [facility]: [phone]

## 2023-06-26 ENCOUNTER — OFFICE VISIT (OUTPATIENT)
Dept: INTERNAL MEDICINE | Facility: CLINIC | Age: 78
End: 2023-06-26
Payer: MEDICARE

## 2023-06-26 ENCOUNTER — HOSPITAL ENCOUNTER (OUTPATIENT)
Dept: RADIOLOGY | Facility: HOSPITAL | Age: 78
Discharge: HOME | End: 2023-06-26
Attending: INTERNAL MEDICINE
Payer: MEDICARE

## 2023-06-26 VITALS
DIASTOLIC BLOOD PRESSURE: 80 MMHG | HEART RATE: 88 BPM | OXYGEN SATURATION: 100 % | WEIGHT: 172.63 LBS | HEIGHT: 62 IN | BODY MASS INDEX: 31.77 KG/M2 | SYSTOLIC BLOOD PRESSURE: 132 MMHG | TEMPERATURE: 97 F

## 2023-06-26 DIAGNOSIS — M54.6 CHRONIC RIGHT-SIDED THORACIC BACK PAIN: Primary | ICD-10-CM

## 2023-06-26 DIAGNOSIS — G89.29 CHRONIC RIGHT-SIDED THORACIC BACK PAIN: Primary | ICD-10-CM

## 2023-06-26 DIAGNOSIS — M54.6 CHRONIC RIGHT-SIDED THORACIC BACK PAIN: ICD-10-CM

## 2023-06-26 DIAGNOSIS — G89.29 CHRONIC RIGHT-SIDED THORACIC BACK PAIN: ICD-10-CM

## 2023-06-26 PROCEDURE — 99999 PR PBB SHADOW E&M-EST. PATIENT-LVL IV: ICD-10-PCS | Mod: PBBFAC,,, | Performed by: INTERNAL MEDICINE

## 2023-06-26 PROCEDURE — 3288F PR FALLS RISK ASSESSMENT DOCUMENTED: ICD-10-PCS | Mod: CPTII,S$GLB,, | Performed by: INTERNAL MEDICINE

## 2023-06-26 PROCEDURE — 71046 X-RAY EXAM CHEST 2 VIEWS: CPT | Mod: TC,FY,PO

## 2023-06-26 PROCEDURE — 1160F RVW MEDS BY RX/DR IN RCRD: CPT | Mod: CPTII,S$GLB,, | Performed by: INTERNAL MEDICINE

## 2023-06-26 PROCEDURE — 71100 XR RIBS 2 VIEW RIGHT: ICD-10-PCS | Mod: 26,RT,, | Performed by: RADIOLOGY

## 2023-06-26 PROCEDURE — 71046 X-RAY EXAM CHEST 2 VIEWS: CPT | Mod: 26,,, | Performed by: RADIOLOGY

## 2023-06-26 PROCEDURE — 3079F DIAST BP 80-89 MM HG: CPT | Mod: CPTII,S$GLB,, | Performed by: INTERNAL MEDICINE

## 2023-06-26 PROCEDURE — 3079F PR MOST RECENT DIASTOLIC BLOOD PRESSURE 80-89 MM HG: ICD-10-PCS | Mod: CPTII,S$GLB,, | Performed by: INTERNAL MEDICINE

## 2023-06-26 PROCEDURE — 3075F PR MOST RECENT SYSTOLIC BLOOD PRESS GE 130-139MM HG: ICD-10-PCS | Mod: CPTII,S$GLB,, | Performed by: INTERNAL MEDICINE

## 2023-06-26 PROCEDURE — 1160F PR REVIEW ALL MEDS BY PRESCRIBER/CLIN PHARMACIST DOCUMENTED: ICD-10-PCS | Mod: CPTII,S$GLB,, | Performed by: INTERNAL MEDICINE

## 2023-06-26 PROCEDURE — 1125F PR PAIN SEVERITY QUANTIFIED, PAIN PRESENT: ICD-10-PCS | Mod: CPTII,S$GLB,, | Performed by: INTERNAL MEDICINE

## 2023-06-26 PROCEDURE — 3288F FALL RISK ASSESSMENT DOCD: CPT | Mod: CPTII,S$GLB,, | Performed by: INTERNAL MEDICINE

## 2023-06-26 PROCEDURE — 99214 OFFICE O/P EST MOD 30 MIN: CPT | Mod: S$GLB,,, | Performed by: INTERNAL MEDICINE

## 2023-06-26 PROCEDURE — 71100 X-RAY EXAM RIBS UNI 2 VIEWS: CPT | Mod: 26,RT,, | Performed by: RADIOLOGY

## 2023-06-26 PROCEDURE — 99999 PR PBB SHADOW E&M-EST. PATIENT-LVL IV: CPT | Mod: PBBFAC,,, | Performed by: INTERNAL MEDICINE

## 2023-06-26 PROCEDURE — 71046 XR CHEST PA AND LATERAL: ICD-10-PCS | Mod: 26,,, | Performed by: RADIOLOGY

## 2023-06-26 PROCEDURE — 3075F SYST BP GE 130 - 139MM HG: CPT | Mod: CPTII,S$GLB,, | Performed by: INTERNAL MEDICINE

## 2023-06-26 PROCEDURE — 71100 X-RAY EXAM RIBS UNI 2 VIEWS: CPT | Mod: TC,FY,PO,RT

## 2023-06-26 PROCEDURE — 1101F PR PT FALLS ASSESS DOC 0-1 FALLS W/OUT INJ PAST YR: ICD-10-PCS | Mod: CPTII,S$GLB,, | Performed by: INTERNAL MEDICINE

## 2023-06-26 PROCEDURE — 1159F MED LIST DOCD IN RCRD: CPT | Mod: CPTII,S$GLB,, | Performed by: INTERNAL MEDICINE

## 2023-06-26 PROCEDURE — 1125F AMNT PAIN NOTED PAIN PRSNT: CPT | Mod: CPTII,S$GLB,, | Performed by: INTERNAL MEDICINE

## 2023-06-26 PROCEDURE — 99214 PR OFFICE/OUTPT VISIT, EST, LEVL IV, 30-39 MIN: ICD-10-PCS | Mod: S$GLB,,, | Performed by: INTERNAL MEDICINE

## 2023-06-26 PROCEDURE — 1159F PR MEDICATION LIST DOCUMENTED IN MEDICAL RECORD: ICD-10-PCS | Mod: CPTII,S$GLB,, | Performed by: INTERNAL MEDICINE

## 2023-06-26 PROCEDURE — 1101F PT FALLS ASSESS-DOCD LE1/YR: CPT | Mod: CPTII,S$GLB,, | Performed by: INTERNAL MEDICINE

## 2023-06-26 RX ORDER — CYCLOBENZAPRINE HCL 10 MG
10 TABLET ORAL 3 TIMES DAILY PRN
Qty: 30 TABLET | Refills: 0 | Status: SHIPPED | OUTPATIENT
Start: 2023-06-26 | End: 2023-09-08 | Stop reason: SDUPTHER

## 2023-06-26 RX ORDER — CELECOXIB 200 MG/1
200 CAPSULE ORAL DAILY
Qty: 30 CAPSULE | Refills: 3 | Status: SHIPPED | OUTPATIENT
Start: 2023-06-26 | End: 2023-08-03

## 2023-06-26 NOTE — PROGRESS NOTES
"Subjective:       Patient ID: Megan Sams is a 78 y.o. female.    Chief Complaint: Back Pain      HPI:  Patient is an 78-year-old female complaining of right flank and back discomfort.  She states this has been going on for quite a while.  Maybe as much as 6 months.  She does not recall any injury or specific cause.  She notes that she has started having pain in the right flank and back region with some radiation towards the anterior chest.  She describes occasional feelings of spasm in the area.  She does not noted any specific aggravating factors or any specific alleviating factors.  She has been taking Tylenol which seems to help.    Review of Systems    Objective:   /80 (BP Location: Right arm, Patient Position: Sitting, BP Method: Large (Manual))   Pulse 88   Temp 96.9 °F (36.1 °C) (Tympanic)   Ht 5' 2" (1.575 m)   Wt 78.3 kg (172 lb 9.9 oz)   SpO2 100%   BMI 31.57 kg/m²      Physical Exam  Vitals reviewed.   Constitutional:       General: She is not in acute distress.     Appearance: Normal appearance. She is well-developed. She is not ill-appearing.   HENT:      Head: Normocephalic and atraumatic.      Right Ear: External ear normal.      Left Ear: External ear normal.   Cardiovascular:      Rate and Rhythm: Normal rate and regular rhythm.      Heart sounds: No murmur heard.    No friction rub. No gallop.   Pulmonary:      Effort: Pulmonary effort is normal.      Breath sounds: Normal breath sounds. No wheezing or rales.   Chest:      Chest wall: No tenderness.   Abdominal:      General: Bowel sounds are normal. There is no distension.      Palpations: Abdomen is soft.      Tenderness: There is no abdominal tenderness.   Musculoskeletal:      Comments: No spinous process tenderness.  There is tenderness to palpation over the right flank region lateral to the scapula along the thoracic distribution.  No specific spasm is palpable at this time.  It is not exacerbated by rotational movement of the " back.  It is not exacerbated by any specific motion review use of the right shoulder.   Lymphadenopathy:      Cervical: No cervical adenopathy.   Skin:     Findings: No rash.   Neurological:      General: No focal deficit present.      Mental Status: She is alert and oriented to person, place, and time.      Cranial Nerves: No cranial nerve deficit.           Assessment:       1. Chronic right-sided thoracic back pain        Plan:   No problem-specific Assessment & Plan notes found for this encounter.    Chronic right-sided thoracic back pain  -     X-Ray Chest PA And Lateral; Future; Expected date: 06/26/2023  -     X-Ray Ribs 2 View Right; Future; Expected date: 06/26/2023  -     celecoxib (CELEBREX) 200 MG capsule; Take 1 capsule (200 mg total) by mouth once daily.  Dispense: 30 capsule; Refill: 3  -     cyclobenzaprine (FLEXERIL) 10 MG tablet; Take 1 tablet (10 mg total) by mouth 3 (three) times daily as needed for Muscle spasms.  Dispense: 30 tablet; Refill: 0    Will get a chest x-ray and rib series to rule out an occult rib fracture.  Put on some Celebrex and some Flexeril for acute treatment.  If the symptoms do not respond she will follow-up.

## 2023-07-24 ENCOUNTER — OFFICE VISIT (OUTPATIENT)
Dept: OPHTHALMOLOGY | Facility: CLINIC | Age: 78
End: 2023-07-24
Payer: MEDICARE

## 2023-07-24 DIAGNOSIS — H25.013 CORTICAL AGE-RELATED CATARACT OF BOTH EYES: ICD-10-CM

## 2023-07-24 DIAGNOSIS — H33.302: ICD-10-CM

## 2023-07-24 DIAGNOSIS — H35.372 EPIRETINAL MEMBRANE, LEFT: Primary | ICD-10-CM

## 2023-07-24 PROCEDURE — 1126F PR PAIN SEVERITY QUANTIFIED, NO PAIN PRESENT: ICD-10-PCS | Mod: CPTII,S$GLB,, | Performed by: OPHTHALMOLOGY

## 2023-07-24 PROCEDURE — 92134 CPTRZ OPH DX IMG PST SGM RTA: CPT | Mod: S$GLB,,, | Performed by: OPHTHALMOLOGY

## 2023-07-24 PROCEDURE — 99999 PR PBB SHADOW E&M-EST. PATIENT-LVL III: CPT | Mod: PBBFAC,,, | Performed by: OPHTHALMOLOGY

## 2023-07-24 PROCEDURE — 1126F AMNT PAIN NOTED NONE PRSNT: CPT | Mod: CPTII,S$GLB,, | Performed by: OPHTHALMOLOGY

## 2023-07-24 PROCEDURE — 92134 POSTERIOR SEGMENT OCT RETINA (OCULAR COHERENCE TOMOGRAPHY)-BOTH EYES: ICD-10-PCS | Mod: S$GLB,,, | Performed by: OPHTHALMOLOGY

## 2023-07-24 PROCEDURE — 1160F RVW MEDS BY RX/DR IN RCRD: CPT | Mod: CPTII,S$GLB,, | Performed by: OPHTHALMOLOGY

## 2023-07-24 PROCEDURE — 1159F MED LIST DOCD IN RCRD: CPT | Mod: CPTII,S$GLB,, | Performed by: OPHTHALMOLOGY

## 2023-07-24 PROCEDURE — 1159F PR MEDICATION LIST DOCUMENTED IN MEDICAL RECORD: ICD-10-PCS | Mod: CPTII,S$GLB,, | Performed by: OPHTHALMOLOGY

## 2023-07-24 PROCEDURE — 92014 PR EYE EXAM, EST PATIENT,COMPREHESV: ICD-10-PCS | Mod: S$GLB,,, | Performed by: OPHTHALMOLOGY

## 2023-07-24 PROCEDURE — 92014 COMPRE OPH EXAM EST PT 1/>: CPT | Mod: S$GLB,,, | Performed by: OPHTHALMOLOGY

## 2023-07-24 PROCEDURE — 99999 PR PBB SHADOW E&M-EST. PATIENT-LVL III: ICD-10-PCS | Mod: PBBFAC,,, | Performed by: OPHTHALMOLOGY

## 2023-07-24 PROCEDURE — 1160F PR REVIEW ALL MEDS BY PRESCRIBER/CLIN PHARMACIST DOCUMENTED: ICD-10-PCS | Mod: CPTII,S$GLB,, | Performed by: OPHTHALMOLOGY

## 2023-07-24 NOTE — PROGRESS NOTES
===============================  Date today is 7/24/2023  Megan Sams is a 78 y.o. female  Last visit [facility]: :5/17/2022   Last visit eye dept. Visit date not found    Corrected distance visual acuity was 20/25 in the right eye and 20/50 in the left eye.  Tonometry       Tonometry (Applanation, 1:13 PM)         Right Left    Pressure 12 12                  Wearing Rx       Wearing Rx         Sphere Cylinder Axis Add    Right -2.00 +3.75 090 +2.75    Left -3.25 +3.50 075 +2.75                  Not recorded       Not recorded       Chief Complaint   Patient presents with    ERM     Yearly exam     HPI     ERM     Additional comments: Yearly exam           Comments    1. VMT OS   2. Cataracts OU   3. PVD OU   4. Dermatochalasis BUL            Last edited by Conchita Sandoval on 7/24/2023  1:03 PM.      Problem List Items Addressed This Visit          Eye/Vision problems    Cortical age-related cataract of both eyes     Other Visit Diagnoses       Epiretinal membrane, left    -  Primary    Relevant Orders    Posterior Segment OCT Retina-Both eyes (Completed)    Retinal traction of left eye        Relevant Orders    Posterior Segment OCT Retina-Both eyes (Completed)          Instructed to call 24/7 for any worsening of vision, visual distortion or pain.  Check OU independently daily.    Gave my office and personal cell phone number.  ________________  7/24/2023 today  Megan Sams    OS ERM  OCT sl worse ERM OS, OD ok  Minimal NS OU  Sharp disc OU  Macula looks good OD  VRT OS with ERM- will watch for now, still expect to remain stable  MR=NI    RTC 1 year  Instructed to call 24/7 for any worsening of vision or symptoms. Check OU daily.   Gave my office and cell phone number.    =============================

## 2023-07-26 ENCOUNTER — LAB VISIT (OUTPATIENT)
Dept: LAB | Facility: HOSPITAL | Age: 78
End: 2023-07-26
Attending: INTERNAL MEDICINE
Payer: MEDICARE

## 2023-07-26 DIAGNOSIS — I10 ESSENTIAL HYPERTENSION: Chronic | ICD-10-CM

## 2023-07-26 LAB
ALBUMIN SERPL BCP-MCNC: 3.5 G/DL (ref 3.5–5.2)
ALP SERPL-CCNC: 67 U/L (ref 55–135)
ALT SERPL W/O P-5'-P-CCNC: 18 U/L (ref 10–44)
ANION GAP SERPL CALC-SCNC: 13 MMOL/L (ref 8–16)
AST SERPL-CCNC: 31 U/L (ref 10–40)
BASOPHILS # BLD AUTO: 0.08 K/UL (ref 0–0.2)
BASOPHILS NFR BLD: 1.6 % (ref 0–1.9)
BILIRUB SERPL-MCNC: 0.8 MG/DL (ref 0.1–1)
BUN SERPL-MCNC: 17 MG/DL (ref 8–23)
CALCIUM SERPL-MCNC: 9.7 MG/DL (ref 8.7–10.5)
CHLORIDE SERPL-SCNC: 106 MMOL/L (ref 95–110)
CHOLEST SERPL-MCNC: 174 MG/DL (ref 120–199)
CHOLEST/HDLC SERPL: 3.5 {RATIO} (ref 2–5)
CO2 SERPL-SCNC: 24 MMOL/L (ref 23–29)
CREAT SERPL-MCNC: 1.1 MG/DL (ref 0.5–1.4)
DIFFERENTIAL METHOD: ABNORMAL
EOSINOPHIL # BLD AUTO: 0.2 K/UL (ref 0–0.5)
EOSINOPHIL NFR BLD: 3.6 % (ref 0–8)
ERYTHROCYTE [DISTWIDTH] IN BLOOD BY AUTOMATED COUNT: 15 % (ref 11.5–14.5)
EST. GFR  (NO RACE VARIABLE): 50.8 ML/MIN/1.73 M^2
ESTIMATED AVG GLUCOSE: 108 MG/DL (ref 68–131)
GLUCOSE SERPL-MCNC: 105 MG/DL (ref 70–110)
HBA1C MFR BLD: 5.4 % (ref 4–5.6)
HCT VFR BLD AUTO: 43.2 % (ref 37–48.5)
HDLC SERPL-MCNC: 50 MG/DL (ref 40–75)
HDLC SERPL: 28.7 % (ref 20–50)
HGB BLD-MCNC: 13.8 G/DL (ref 12–16)
IMM GRANULOCYTES # BLD AUTO: 0.01 K/UL (ref 0–0.04)
IMM GRANULOCYTES NFR BLD AUTO: 0.2 % (ref 0–0.5)
LDLC SERPL CALC-MCNC: 104 MG/DL (ref 63–159)
LYMPHOCYTES # BLD AUTO: 2 K/UL (ref 1–4.8)
LYMPHOCYTES NFR BLD: 40.1 % (ref 18–48)
MCH RBC QN AUTO: 28.5 PG (ref 27–31)
MCHC RBC AUTO-ENTMCNC: 31.9 G/DL (ref 32–36)
MCV RBC AUTO: 89 FL (ref 82–98)
MONOCYTES # BLD AUTO: 0.7 K/UL (ref 0.3–1)
MONOCYTES NFR BLD: 13.2 % (ref 4–15)
NEUTROPHILS # BLD AUTO: 2.1 K/UL (ref 1.8–7.7)
NEUTROPHILS NFR BLD: 41.3 % (ref 38–73)
NONHDLC SERPL-MCNC: 124 MG/DL
NRBC BLD-RTO: 0 /100 WBC
PLATELET # BLD AUTO: 193 K/UL (ref 150–450)
PMV BLD AUTO: 12.4 FL (ref 9.2–12.9)
POTASSIUM SERPL-SCNC: 4.2 MMOL/L (ref 3.5–5.1)
PROT SERPL-MCNC: 7.6 G/DL (ref 6–8.4)
RBC # BLD AUTO: 4.85 M/UL (ref 4–5.4)
SODIUM SERPL-SCNC: 143 MMOL/L (ref 136–145)
TRIGL SERPL-MCNC: 100 MG/DL (ref 30–150)
WBC # BLD AUTO: 4.99 K/UL (ref 3.9–12.7)

## 2023-07-26 PROCEDURE — 36415 COLL VENOUS BLD VENIPUNCTURE: CPT | Mod: PO | Performed by: INTERNAL MEDICINE

## 2023-07-26 PROCEDURE — 83036 HEMOGLOBIN GLYCOSYLATED A1C: CPT | Mod: GA | Performed by: INTERNAL MEDICINE

## 2023-07-26 PROCEDURE — 80053 COMPREHEN METABOLIC PANEL: CPT | Performed by: INTERNAL MEDICINE

## 2023-07-26 PROCEDURE — 80061 LIPID PANEL: CPT | Performed by: INTERNAL MEDICINE

## 2023-07-26 PROCEDURE — 85025 COMPLETE CBC W/AUTO DIFF WBC: CPT | Performed by: INTERNAL MEDICINE

## 2023-07-29 NOTE — TELEPHONE ENCOUNTER
No care due was identified.  [facility] Embedded Care Due Messages. Reference number: [id number].   7/29/2023 2:10:17 PM CDT

## 2023-07-30 RX ORDER — METOPROLOL SUCCINATE 50 MG/1
TABLET, EXTENDED RELEASE ORAL
Qty: 90 TABLET | Refills: 3 | Status: SHIPPED | OUTPATIENT
Start: 2023-07-30 | End: 2023-11-17 | Stop reason: SDUPTHER

## 2023-07-30 NOTE — TELEPHONE ENCOUNTER
Refill Decision Note   Megan Latrell  is requesting a refill authorization.  Brief Assessment and Rationale for Refill:  Approve     Medication Therapy Plan:       Medication Reconciliation Completed: No   Comments:     No Care Gaps recommended.     Note composed:11:26 AM 07/30/2023

## 2023-08-01 RX ORDER — SPIRONOLACTONE 50 MG/1
TABLET, FILM COATED ORAL
Qty: 90 TABLET | Refills: 3 | Status: SHIPPED | OUTPATIENT
Start: 2023-08-01 | End: 2024-01-26 | Stop reason: SDUPTHER

## 2023-08-01 NOTE — TELEPHONE ENCOUNTER
Refill Decision Note   Megan Latrell  is requesting a refill authorization.  Brief Assessment and Rationale for Refill:  Approve     Medication Therapy Plan:       Medication Reconciliation Completed: No   Comments:     No Care Gaps recommended.     Note composed:8:00 AM 08/01/2023

## 2023-08-03 ENCOUNTER — OFFICE VISIT (OUTPATIENT)
Dept: INTERNAL MEDICINE | Facility: CLINIC | Age: 78
End: 2023-08-03
Payer: MEDICARE

## 2023-08-03 VITALS
HEART RATE: 97 BPM | SYSTOLIC BLOOD PRESSURE: 130 MMHG | HEIGHT: 62 IN | DIASTOLIC BLOOD PRESSURE: 80 MMHG | WEIGHT: 168.88 LBS | TEMPERATURE: 97 F | OXYGEN SATURATION: 98 % | BODY MASS INDEX: 31.08 KG/M2

## 2023-08-03 DIAGNOSIS — I42.8 NONISCHEMIC CARDIOMYOPATHY: ICD-10-CM

## 2023-08-03 DIAGNOSIS — N18.31 STAGE 3A CHRONIC KIDNEY DISEASE: Chronic | ICD-10-CM

## 2023-08-03 DIAGNOSIS — I50.42 CHRONIC COMBINED SYSTOLIC AND DIASTOLIC CONGESTIVE HEART FAILURE: ICD-10-CM

## 2023-08-03 DIAGNOSIS — I10 ESSENTIAL HYPERTENSION: Primary | Chronic | ICD-10-CM

## 2023-08-03 DIAGNOSIS — Z78.0 ASYMPTOMATIC MENOPAUSAL STATE: ICD-10-CM

## 2023-08-03 PROCEDURE — 1126F PR PAIN SEVERITY QUANTIFIED, NO PAIN PRESENT: ICD-10-PCS | Mod: CPTII,S$GLB,, | Performed by: INTERNAL MEDICINE

## 2023-08-03 PROCEDURE — 99999 PR PBB SHADOW E&M-EST. PATIENT-LVL IV: CPT | Mod: PBBFAC,,, | Performed by: INTERNAL MEDICINE

## 2023-08-03 PROCEDURE — 1160F RVW MEDS BY RX/DR IN RCRD: CPT | Mod: CPTII,S$GLB,, | Performed by: INTERNAL MEDICINE

## 2023-08-03 PROCEDURE — 1159F PR MEDICATION LIST DOCUMENTED IN MEDICAL RECORD: ICD-10-PCS | Mod: CPTII,S$GLB,, | Performed by: INTERNAL MEDICINE

## 2023-08-03 PROCEDURE — 99214 OFFICE O/P EST MOD 30 MIN: CPT | Mod: S$GLB,,, | Performed by: INTERNAL MEDICINE

## 2023-08-03 PROCEDURE — 3079F PR MOST RECENT DIASTOLIC BLOOD PRESSURE 80-89 MM HG: ICD-10-PCS | Mod: CPTII,S$GLB,, | Performed by: INTERNAL MEDICINE

## 2023-08-03 PROCEDURE — 1159F MED LIST DOCD IN RCRD: CPT | Mod: CPTII,S$GLB,, | Performed by: INTERNAL MEDICINE

## 2023-08-03 PROCEDURE — 1101F PR PT FALLS ASSESS DOC 0-1 FALLS W/OUT INJ PAST YR: ICD-10-PCS | Mod: CPTII,S$GLB,, | Performed by: INTERNAL MEDICINE

## 2023-08-03 PROCEDURE — 1126F AMNT PAIN NOTED NONE PRSNT: CPT | Mod: CPTII,S$GLB,, | Performed by: INTERNAL MEDICINE

## 2023-08-03 PROCEDURE — 1101F PT FALLS ASSESS-DOCD LE1/YR: CPT | Mod: CPTII,S$GLB,, | Performed by: INTERNAL MEDICINE

## 2023-08-03 PROCEDURE — 3075F SYST BP GE 130 - 139MM HG: CPT | Mod: CPTII,S$GLB,, | Performed by: INTERNAL MEDICINE

## 2023-08-03 PROCEDURE — 99214 PR OFFICE/OUTPT VISIT, EST, LEVL IV, 30-39 MIN: ICD-10-PCS | Mod: S$GLB,,, | Performed by: INTERNAL MEDICINE

## 2023-08-03 PROCEDURE — 3079F DIAST BP 80-89 MM HG: CPT | Mod: CPTII,S$GLB,, | Performed by: INTERNAL MEDICINE

## 2023-08-03 PROCEDURE — 3288F PR FALLS RISK ASSESSMENT DOCUMENTED: ICD-10-PCS | Mod: CPTII,S$GLB,, | Performed by: INTERNAL MEDICINE

## 2023-08-03 PROCEDURE — 3075F PR MOST RECENT SYSTOLIC BLOOD PRESS GE 130-139MM HG: ICD-10-PCS | Mod: CPTII,S$GLB,, | Performed by: INTERNAL MEDICINE

## 2023-08-03 PROCEDURE — 1160F PR REVIEW ALL MEDS BY PRESCRIBER/CLIN PHARMACIST DOCUMENTED: ICD-10-PCS | Mod: CPTII,S$GLB,, | Performed by: INTERNAL MEDICINE

## 2023-08-03 PROCEDURE — 99999 PR PBB SHADOW E&M-EST. PATIENT-LVL IV: ICD-10-PCS | Mod: PBBFAC,,, | Performed by: INTERNAL MEDICINE

## 2023-08-03 PROCEDURE — 3288F FALL RISK ASSESSMENT DOCD: CPT | Mod: CPTII,S$GLB,, | Performed by: INTERNAL MEDICINE

## 2023-08-03 NOTE — PROGRESS NOTES
"Subjective:       Patient ID: Megan Sams is a 78 y.o. female.    Chief Complaint: Follow-up      HPI:  Patient is an 78-year-old female presenting today following up on hypertension, CHF, stage 3 kidney disease, cardiomyopathy.  Generally she indicates she is doing well.  Taking her medications as prescribed without any significant issues.  She has rare occasion of some mild shortness of breath but no other issues.  She was recommended start some amlodipine by her cardiologist at the last visit but she did not started.  She says her blood pressures have been fine.  She states that she feels that was now abnormality that her numbers were high that day in clinic.  As she has been doing well without it I recommend she stay off of it for now.  Otherwise she notes no acute issues with her chronic conditions everything seems to be stable at this point.  She is due for bone density P    Review of Systems   Constitutional:  Negative for chills and fever.   HENT:  Negative for hearing loss.    Eyes:  Negative for photophobia and visual disturbance.   Respiratory:  Negative for cough, shortness of breath and wheezing.    Cardiovascular:  Negative for chest pain and palpitations.   Gastrointestinal:  Negative for blood in stool, constipation, nausea and vomiting.   Genitourinary:  Negative for dysuria and hematuria.   Musculoskeletal:  Negative for neck pain and neck stiffness.   Skin:  Negative for rash.   Neurological:  Negative for syncope, weakness, light-headedness and headaches.   Hematological:  Negative for adenopathy.   Psychiatric/Behavioral:  Negative for dysphoric mood. The patient is not nervous/anxious.        Objective:   /80   Pulse 97   Temp 97.4 °F (36.3 °C) (Tympanic)   Ht 5' 2" (1.575 m)   Wt 76.6 kg (168 lb 14 oz)   SpO2 98%   BMI 30.89 kg/m²      Physical Exam  Vitals reviewed.   Constitutional:       General: She is not in acute distress.     Appearance: Normal appearance. She is " well-developed. She is not ill-appearing.   HENT:      Head: Normocephalic and atraumatic.      Right Ear: External ear normal.      Left Ear: External ear normal.   Cardiovascular:      Rate and Rhythm: Normal rate and regular rhythm.      Heart sounds: No murmur heard.     No friction rub. No gallop.   Pulmonary:      Effort: Pulmonary effort is normal.      Breath sounds: Normal breath sounds. No wheezing or rales.   Chest:      Chest wall: No tenderness.   Abdominal:      General: Bowel sounds are normal. There is no distension.      Palpations: Abdomen is soft.      Tenderness: There is no abdominal tenderness.   Lymphadenopathy:      Cervical: No cervical adenopathy.   Skin:     Findings: No rash.   Neurological:      General: No focal deficit present.      Mental Status: She is alert and oriented to person, place, and time.      Cranial Nerves: No cranial nerve deficit.         Lab Visit on 07/26/2023   Component Date Value    WBC 07/26/2023 4.99     RBC 07/26/2023 4.85     Hemoglobin 07/26/2023 13.8     Hematocrit 07/26/2023 43.2     MCV 07/26/2023 89     MCH 07/26/2023 28.5     MCHC 07/26/2023 31.9 (L)     RDW 07/26/2023 15.0 (H)     Platelets 07/26/2023 193     MPV 07/26/2023 12.4     Immature Granulocytes 07/26/2023 0.2     Gran # (ANC) 07/26/2023 2.1     Immature Grans (Abs) 07/26/2023 0.01     Lymph # 07/26/2023 2.0     Mono # 07/26/2023 0.7     Eos # 07/26/2023 0.2     Baso # 07/26/2023 0.08     nRBC 07/26/2023 0     Gran % 07/26/2023 41.3     Lymph % 07/26/2023 40.1     Mono % 07/26/2023 13.2     Eosinophil % 07/26/2023 3.6     Basophil % 07/26/2023 1.6     Differential Method 07/26/2023 Automated     Sodium 07/26/2023 143     Potassium 07/26/2023 4.2     Chloride 07/26/2023 106     CO2 07/26/2023 24     Glucose 07/26/2023 105     BUN 07/26/2023 17     Creatinine 07/26/2023 1.1     Calcium 07/26/2023 9.7     Total Protein 07/26/2023 7.6     Albumin 07/26/2023 3.5     Total Bilirubin 07/26/2023 0.8      Alkaline Phosphatase 07/26/2023 67     AST 07/26/2023 31     ALT 07/26/2023 18     eGFR 07/26/2023 50.8 (A)     Anion Gap 07/26/2023 13     Cholesterol 07/26/2023 174     Triglycerides 07/26/2023 100     HDL 07/26/2023 50     LDL Cholesterol 07/26/2023 104.0     HDL/Cholesterol Ratio 07/26/2023 28.7     Total Cholesterol/HDL Ra* 07/26/2023 3.5     Non-HDL Cholesterol 07/26/2023 124     Hemoglobin A1C 07/26/2023 5.4     Estimated Avg Glucose 07/26/2023 108        Assessment:       1. Essential hypertension    2. Chronic combined systolic and diastolic congestive heart failure    3. Stage 3a chronic kidney disease    4. Nonischemic cardiomyopathy    5. Asymptomatic menopausal state        Plan:   Essential hypertension  Blood pressure is under good control.  We will continue the current regimen.  Will work on regular aerobic exercise and a low salt diet.        CKD (chronic kidney disease) stage 3, GFR 30-59 ml/min  Avoid nsaids (ibuprofen, motrin, aleve,etc.), drink plenty of water.      CHF (congestive heart failure)  Stable, no issues at this time.  Continue current regimen    Nonischemic cardiomyopathy  No orthopnea, No PAGE. Continue meds  Essential hypertension  Comments:  Never started norvasc as prescribed by Cardiology last visit.  Numbers are good. will continue monitoring.    Chronic combined systolic and diastolic congestive heart failure    Stage 3a chronic kidney disease    Nonischemic cardiomyopathy    Asymptomatic menopausal state  -     DXA Bone Density Axial Skeleton 1 or more sites; Future; Expected date: 08/03/2023          Follow up in about 6 months (around 2/3/2024) for HTN, CMP, CHF, with Nyla Marrero.

## 2023-08-09 ENCOUNTER — APPOINTMENT (OUTPATIENT)
Dept: RADIOLOGY | Facility: HOSPITAL | Age: 78
End: 2023-08-09
Attending: INTERNAL MEDICINE
Payer: MEDICARE

## 2023-08-09 DIAGNOSIS — Z78.0 ASYMPTOMATIC MENOPAUSAL STATE: ICD-10-CM

## 2023-08-09 PROCEDURE — 77080 DXA BONE DENSITY AXIAL SKELETON 1 OR MORE SITES: ICD-10-PCS | Mod: 26,,, | Performed by: RADIOLOGY

## 2023-08-09 PROCEDURE — 77080 DXA BONE DENSITY AXIAL: CPT | Mod: TC

## 2023-08-09 PROCEDURE — 77080 DXA BONE DENSITY AXIAL: CPT | Mod: 26,,, | Performed by: RADIOLOGY

## 2023-08-14 ENCOUNTER — TELEPHONE (OUTPATIENT)
Dept: CARDIOLOGY | Facility: CLINIC | Age: 78
End: 2023-08-14
Payer: MEDICARE

## 2023-08-14 NOTE — TELEPHONE ENCOUNTER
----- Message from Bijan Thompson sent at 8/14/2023  7:49 AM CDT -----  Contact: pt  Type: Requesting to speak with nurse        Who Called: PT  Regarding: pt had to cancel 8/16 appointment. Would like the next available appointment   Would the patient rather a call back or a response via [facility]? Call back  Best Call Back Number: [phone]  Additional Information:

## 2023-08-14 NOTE — TELEPHONE ENCOUNTER
Pt called to get another appt because she canceled the appt for wed so I rescheduled her and she was fine with the new appt

## 2023-08-23 ENCOUNTER — OFFICE VISIT (OUTPATIENT)
Dept: CARDIOLOGY | Facility: CLINIC | Age: 78
End: 2023-08-23
Payer: MEDICARE

## 2023-08-23 VITALS
HEART RATE: 89 BPM | BODY MASS INDEX: 30.91 KG/M2 | HEIGHT: 62 IN | WEIGHT: 168 LBS | OXYGEN SATURATION: 96 % | DIASTOLIC BLOOD PRESSURE: 84 MMHG | SYSTOLIC BLOOD PRESSURE: 130 MMHG

## 2023-08-23 DIAGNOSIS — I10 ESSENTIAL HYPERTENSION: ICD-10-CM

## 2023-08-23 DIAGNOSIS — I50.42 CHRONIC COMBINED SYSTOLIC AND DIASTOLIC CONGESTIVE HEART FAILURE: Primary | ICD-10-CM

## 2023-08-23 DIAGNOSIS — N18.31 STAGE 3A CHRONIC KIDNEY DISEASE: ICD-10-CM

## 2023-08-23 PROCEDURE — 1160F PR REVIEW ALL MEDS BY PRESCRIBER/CLIN PHARMACIST DOCUMENTED: ICD-10-PCS | Mod: CPTII,S$GLB,, | Performed by: INTERNAL MEDICINE

## 2023-08-23 PROCEDURE — 1159F PR MEDICATION LIST DOCUMENTED IN MEDICAL RECORD: ICD-10-PCS | Mod: CPTII,S$GLB,, | Performed by: INTERNAL MEDICINE

## 2023-08-23 PROCEDURE — 1159F MED LIST DOCD IN RCRD: CPT | Mod: CPTII,S$GLB,, | Performed by: INTERNAL MEDICINE

## 2023-08-23 PROCEDURE — 1126F AMNT PAIN NOTED NONE PRSNT: CPT | Mod: CPTII,S$GLB,, | Performed by: INTERNAL MEDICINE

## 2023-08-23 PROCEDURE — 99999 PR PBB SHADOW E&M-EST. PATIENT-LVL III: CPT | Mod: PBBFAC,,, | Performed by: INTERNAL MEDICINE

## 2023-08-23 PROCEDURE — 3288F PR FALLS RISK ASSESSMENT DOCUMENTED: ICD-10-PCS | Mod: CPTII,S$GLB,, | Performed by: INTERNAL MEDICINE

## 2023-08-23 PROCEDURE — 3075F PR MOST RECENT SYSTOLIC BLOOD PRESS GE 130-139MM HG: ICD-10-PCS | Mod: CPTII,S$GLB,, | Performed by: INTERNAL MEDICINE

## 2023-08-23 PROCEDURE — 3079F DIAST BP 80-89 MM HG: CPT | Mod: CPTII,S$GLB,, | Performed by: INTERNAL MEDICINE

## 2023-08-23 PROCEDURE — 3079F PR MOST RECENT DIASTOLIC BLOOD PRESSURE 80-89 MM HG: ICD-10-PCS | Mod: CPTII,S$GLB,, | Performed by: INTERNAL MEDICINE

## 2023-08-23 PROCEDURE — 99214 OFFICE O/P EST MOD 30 MIN: CPT | Mod: S$GLB,,, | Performed by: INTERNAL MEDICINE

## 2023-08-23 PROCEDURE — 1101F PR PT FALLS ASSESS DOC 0-1 FALLS W/OUT INJ PAST YR: ICD-10-PCS | Mod: CPTII,S$GLB,, | Performed by: INTERNAL MEDICINE

## 2023-08-23 PROCEDURE — 3288F FALL RISK ASSESSMENT DOCD: CPT | Mod: CPTII,S$GLB,, | Performed by: INTERNAL MEDICINE

## 2023-08-23 PROCEDURE — 1126F PR PAIN SEVERITY QUANTIFIED, NO PAIN PRESENT: ICD-10-PCS | Mod: CPTII,S$GLB,, | Performed by: INTERNAL MEDICINE

## 2023-08-23 PROCEDURE — 3075F SYST BP GE 130 - 139MM HG: CPT | Mod: CPTII,S$GLB,, | Performed by: INTERNAL MEDICINE

## 2023-08-23 PROCEDURE — 99999 PR PBB SHADOW E&M-EST. PATIENT-LVL III: ICD-10-PCS | Mod: PBBFAC,,, | Performed by: INTERNAL MEDICINE

## 2023-08-23 PROCEDURE — 99214 PR OFFICE/OUTPT VISIT, EST, LEVL IV, 30-39 MIN: ICD-10-PCS | Mod: S$GLB,,, | Performed by: INTERNAL MEDICINE

## 2023-08-23 PROCEDURE — 1160F RVW MEDS BY RX/DR IN RCRD: CPT | Mod: CPTII,S$GLB,, | Performed by: INTERNAL MEDICINE

## 2023-08-23 PROCEDURE — 1101F PT FALLS ASSESS-DOCD LE1/YR: CPT | Mod: CPTII,S$GLB,, | Performed by: INTERNAL MEDICINE

## 2023-08-23 NOTE — PROGRESS NOTES
[facility] [id number]     SUBJECTIVE:     History of Present Illness:  Patient is a 78 y.o. female presents to establish cardiac care . Seen by me in 2013  for followup of nonischemic CM with EF 45%. Complaining of back pain and off celebrex . Blood pressures normal off celebrex and not on amlodipine   Primary Diagnosis:   Hypertension  DM: none  Nonsmoker  Heart disease: CHF and was being followed by  in [place] ; Low EF heart failure   ROS  Since last visit 5/23:   No chest pains  No shortness of breath; no PND orthopnea  No syncope  No palpitations   Activity: walks; takes care of herself; walks in the store; use stationary foot bike; no symptoms noted with it   Blood pressures around 130-140's  Sleeps on the left side and achyiness in the left arm; sometimes in the morning; short lived; cannot lift her arms up too high due to the pain   Review of patient's allergies indicates:   Allergen Reactions    Adhesive Blisters    Codeine Other (See Comments)     Hallucinations    Doxycycline monohydrate Nausea Only     Elevated heart rate    Gabapentin Other (See Comments)     Lowered heart rate    Lanoxin [digoxin] Other (See Comments)     Too low heart rate    Thorazine [chlorpromazine] Other (See Comments)     hallucinations    Ultracet [tramadol-acetaminophen] Other (See Comments)     Elevated BP    Penicillins Nausea Only and Rash       Past Medical History:   Diagnosis Date    Arthritis     Blood transfusion     CHF (congestive heart failure)     Chronic kidney disease     Depression     Hypertension        Past Surgical History:   Procedure Laterality Date    HYSTERECTOMY  1978    OOPHORECTOMY  1978       Family History   Problem Relation Age of Onset    Early death Mother     Heart disease Mother     Hypertension Mother     Kidney disease Father     Heart disease Brother        Social History     Tobacco Use    Smoking status: Never    Smokeless tobacco: Never   Substance Use Topics    Alcohol use: No  "    Alcohol/week: 0.0 standard drinks of alcohol    Drug use: No        Past Hospitalization:     Home meds:  Current Outpatient Medications on File Prior to Visit   Medication Sig Dispense Refill    acetaminophen (TYLENOL) 500 MG tablet Take 500 mg by mouth every 6 (six) hours as needed.      allopurinoL (ZYLOPRIM) 100 MG tablet TAKE 2 TABLETS BY MOUTH EVERY  tablet 0    aspirin (ECOTRIN) 81 MG EC tablet Take 81 mg by mouth once daily.      candesartan (ATACAND) 32 MG tablet Take 1 tablet (32 mg total) by mouth once daily. 90 tablet 3    metoprolol succinate (TOPROL-XL) 50 MG 24 hr tablet TAKE 1 TABLET BY MOUTH EVERY DAY 90 tablet 3    MULTIVIT WITH CALCIUM,IRON,MIN (MULTIPLE VITAMIN, WOMENS ORAL) Take by mouth once daily.      spironolactone (ALDACTONE) 50 MG tablet TAKE 1 TABLET BY MOUTH EVERY DAY 90 tablet 3     No current facility-administered medications on file prior to visit.       Cardiac meds:  Aldactone 25mg  Metoprolol succinate 50 mg  Candesartan 32 mg   ASA  81 mg        OBJECTIVE:     Vital Signs (Most Recent)  Vitals:    23 1532   BP: 130/84   Pulse: 89   SpO2: 96%   Weight: 76.2 kg (168 lb)   Height: 5' 2" (1.575 m)             Physical Exam:  Neck: normal carotids, no bruits; normal JVP  Lungs :clear  Heart: RR, normal S1,S2, no murmurs, no gallops  Abd: no masses; no bruits;   Exts: normal DP and PT pulses bilaterally, no edema noted           LABS    :  ;  ; GFR 53 K 4.4  : A1c: 5.4; LDL 04; ; GFR 50; LFT's normal CBC normal   Diagnostic Results:    EK/22; NSR: LVH,  nonspecific T wave changes     Echo: : EF 35%; diastolic dysfunction; mild hypokinesis inferoposterior wall     Chart review:    Echo: 2020: EF 40%  Cardiac cath: : normal coronaries   ASSESSMENT/PLAN:     Hypertension: can be better controlled   Low EF: nonischemic: unclear why except for uncontrolled hypertension - presently on ARB and metoprolol succinate  and asymptomatic . Beta " blockers higher dose led to heart rates of 50's . She is on all the medications for low EF except jardiance and will start that     Plan: 1. Jardiance 10 mg   2. Continue off amlodipine  3. Continue others  4. Monitor blood pressures at home   5. Return 3 months   Mikala Ng MD

## 2023-09-01 ENCOUNTER — TELEPHONE (OUTPATIENT)
Dept: INTERNAL MEDICINE | Facility: CLINIC | Age: 78
End: 2023-09-01
Payer: MEDICARE

## 2023-09-01 NOTE — TELEPHONE ENCOUNTER
----- Message from Sindy Buckner sent at 9/1/2023 10:35 AM CDT -----  Contact: [phone]  Patient would like to consult with a nurse in regards to muscle spasms she is having. Pt stated that previous medication did not help and she would like a prescription sent over to the pharmacy. Please call pt back at [phone]. Thanks KB

## 2023-09-05 ENCOUNTER — TELEPHONE (OUTPATIENT)
Dept: CARDIOLOGY | Facility: CLINIC | Age: 78
End: 2023-09-05
Payer: MEDICARE

## 2023-09-05 NOTE — TELEPHONE ENCOUNTER
----- Message from Clifford Segura sent at 9/5/2023 11:33 AM CDT -----  Pt Requesting Call Back    Who called: pt  Who called for pt:  Best call back #: [phone]  Add notes: pt said she requests a nurse to call her back regarding her medication

## 2023-09-08 ENCOUNTER — OFFICE VISIT (OUTPATIENT)
Dept: INTERNAL MEDICINE | Facility: CLINIC | Age: 78
End: 2023-09-08
Payer: MEDICARE

## 2023-09-08 VITALS
HEART RATE: 72 BPM | BODY MASS INDEX: 30.34 KG/M2 | WEIGHT: 164.88 LBS | HEIGHT: 62 IN | DIASTOLIC BLOOD PRESSURE: 76 MMHG | SYSTOLIC BLOOD PRESSURE: 110 MMHG | OXYGEN SATURATION: 99 % | TEMPERATURE: 98 F

## 2023-09-08 DIAGNOSIS — M54.6 CHRONIC RIGHT-SIDED THORACIC BACK PAIN: Primary | ICD-10-CM

## 2023-09-08 DIAGNOSIS — G89.29 CHRONIC RIGHT-SIDED THORACIC BACK PAIN: Primary | ICD-10-CM

## 2023-09-08 PROCEDURE — 1159F PR MEDICATION LIST DOCUMENTED IN MEDICAL RECORD: ICD-10-PCS | Mod: CPTII,S$GLB,, | Performed by: NURSE PRACTITIONER

## 2023-09-08 PROCEDURE — 1101F PT FALLS ASSESS-DOCD LE1/YR: CPT | Mod: CPTII,S$GLB,, | Performed by: NURSE PRACTITIONER

## 2023-09-08 PROCEDURE — 1101F PR PT FALLS ASSESS DOC 0-1 FALLS W/OUT INJ PAST YR: ICD-10-PCS | Mod: CPTII,S$GLB,, | Performed by: NURSE PRACTITIONER

## 2023-09-08 PROCEDURE — 1160F PR REVIEW ALL MEDS BY PRESCRIBER/CLIN PHARMACIST DOCUMENTED: ICD-10-PCS | Mod: CPTII,S$GLB,, | Performed by: NURSE PRACTITIONER

## 2023-09-08 PROCEDURE — 1125F PR PAIN SEVERITY QUANTIFIED, PAIN PRESENT: ICD-10-PCS | Mod: CPTII,S$GLB,, | Performed by: NURSE PRACTITIONER

## 2023-09-08 PROCEDURE — 99213 OFFICE O/P EST LOW 20 MIN: CPT | Mod: S$GLB,,, | Performed by: NURSE PRACTITIONER

## 2023-09-08 PROCEDURE — 99999 PR PBB SHADOW E&M-EST. PATIENT-LVL III: CPT | Mod: PBBFAC,,, | Performed by: NURSE PRACTITIONER

## 2023-09-08 PROCEDURE — 3288F PR FALLS RISK ASSESSMENT DOCUMENTED: ICD-10-PCS | Mod: CPTII,S$GLB,, | Performed by: NURSE PRACTITIONER

## 2023-09-08 PROCEDURE — 1125F AMNT PAIN NOTED PAIN PRSNT: CPT | Mod: CPTII,S$GLB,, | Performed by: NURSE PRACTITIONER

## 2023-09-08 PROCEDURE — 3078F DIAST BP <80 MM HG: CPT | Mod: CPTII,S$GLB,, | Performed by: NURSE PRACTITIONER

## 2023-09-08 PROCEDURE — 99999 PR PBB SHADOW E&M-EST. PATIENT-LVL III: ICD-10-PCS | Mod: PBBFAC,,, | Performed by: NURSE PRACTITIONER

## 2023-09-08 PROCEDURE — 1160F RVW MEDS BY RX/DR IN RCRD: CPT | Mod: CPTII,S$GLB,, | Performed by: NURSE PRACTITIONER

## 2023-09-08 PROCEDURE — 3078F PR MOST RECENT DIASTOLIC BLOOD PRESSURE < 80 MM HG: ICD-10-PCS | Mod: CPTII,S$GLB,, | Performed by: NURSE PRACTITIONER

## 2023-09-08 PROCEDURE — 99213 PR OFFICE/OUTPT VISIT, EST, LEVL III, 20-29 MIN: ICD-10-PCS | Mod: S$GLB,,, | Performed by: NURSE PRACTITIONER

## 2023-09-08 PROCEDURE — 1159F MED LIST DOCD IN RCRD: CPT | Mod: CPTII,S$GLB,, | Performed by: NURSE PRACTITIONER

## 2023-09-08 PROCEDURE — 3074F PR MOST RECENT SYSTOLIC BLOOD PRESSURE < 130 MM HG: ICD-10-PCS | Mod: CPTII,S$GLB,, | Performed by: NURSE PRACTITIONER

## 2023-09-08 PROCEDURE — 3074F SYST BP LT 130 MM HG: CPT | Mod: CPTII,S$GLB,, | Performed by: NURSE PRACTITIONER

## 2023-09-08 PROCEDURE — 3288F FALL RISK ASSESSMENT DOCD: CPT | Mod: CPTII,S$GLB,, | Performed by: NURSE PRACTITIONER

## 2023-09-08 RX ORDER — CYCLOBENZAPRINE HCL 10 MG
10 TABLET ORAL 3 TIMES DAILY PRN
Qty: 30 TABLET | Refills: 0 | Status: SHIPPED | OUTPATIENT
Start: 2023-09-08 | End: 2023-09-18

## 2023-09-08 NOTE — PROGRESS NOTES
"Subjective:       Patient ID: Megan Sams is a 78 y.o. female.    Chief Complaint: Back Pain (Pain that moves from breast area to back area)    Patient is an 78-year-old female complaining of right chest wall pain and back discomfort.    She states this has been going on for quite a while.    Maybe as much as 6 months.    She does not recall any injury or specific cause.     She describes occasional feelings of spasm in the area.    She does not noted any specific aggravating factors or any specific alleviating factors.    She has been taking Tylenol which seems to help.  Was prescribed flexeril and uses that from time to time- request refills        /76   Pulse 72   Temp 97.5 °F (36.4 °C) (Tympanic)   Ht 5' 2" (1.575 m)   Wt 74.8 kg (164 lb 14.5 oz)   SpO2 99%   BMI 30.16 kg/m²     Review of Systems   Constitutional:  Negative for activity change, appetite change, chills, diaphoresis, fatigue, fever and unexpected weight change.   HENT: Negative.     Respiratory:  Negative for cough and shortness of breath.    Cardiovascular:  Negative for chest pain, palpitations and leg swelling.   Gastrointestinal: Negative.    Genitourinary: Negative.    Musculoskeletal:  Positive for arthralgias and myalgias.   Skin:  Negative for color change, pallor, rash and wound.   Allergic/Immunologic: Negative for immunocompromised state.   Neurological: Negative.  Negative for dizziness and facial asymmetry.   Hematological:  Negative for adenopathy. Does not bruise/bleed easily.   Psychiatric/Behavioral:  Negative for agitation, behavioral problems and confusion.        Objective:      Physical Exam  Vitals reviewed.   Constitutional:       General: She is not in acute distress.     Appearance: Normal appearance. She is well-developed. She is not ill-appearing.   HENT:      Head: Normocephalic and atraumatic.      Right Ear: External ear normal.      Left Ear: External ear normal.   Cardiovascular:      Rate and Rhythm: " Normal rate and regular rhythm.      Heart sounds: No murmur heard.     No friction rub. No gallop.   Pulmonary:      Effort: Pulmonary effort is normal.      Breath sounds: Normal breath sounds. No wheezing or rales.   Chest:      Chest wall: No tenderness.   Abdominal:      General: Bowel sounds are normal. There is no distension.      Palpations: Abdomen is soft.      Tenderness: There is no abdominal tenderness.   Musculoskeletal:      Comments: No spinous process tenderness.  There is tenderness to palpation over the right flank region lateral to the scapula along the thoracic distribution.  No specific spasm is palpable at this time.     Lymphadenopathy:      Cervical: No cervical adenopathy.   Skin:     Findings: No rash.   Neurological:      General: No focal deficit present.      Mental Status: She is alert and oriented to person, place, and time.      Cranial Nerves: No cranial nerve deficit.         Assessment:       1. Chronic right-sided thoracic back pain    2. BMI 30.0-30.9,adult        Plan:       Megan was seen today for back pain.    Diagnoses and all orders for this visit:    Chronic right-sided thoracic back pain  -     cyclobenzaprine (FLEXERIL) 10 MG tablet; Take 1 tablet (10 mg total) by mouth 3 (three) times daily as needed for Muscle spasms.    BMI 30.0-30.9,adult      Tylenol prn  Safe use of flexeril discussed  Follow up for worsening or no improvement in symptoms and PRN.

## 2023-10-05 ENCOUNTER — OFFICE VISIT (OUTPATIENT)
Dept: INTERNAL MEDICINE | Facility: CLINIC | Age: 78
End: 2023-10-05
Payer: MEDICARE

## 2023-10-05 ENCOUNTER — HOSPITAL ENCOUNTER (OUTPATIENT)
Dept: RADIOLOGY | Facility: HOSPITAL | Age: 78
Discharge: HOME | End: 2023-10-05
Attending: FAMILY MEDICINE
Payer: MEDICARE

## 2023-10-05 ENCOUNTER — HOSPITAL ENCOUNTER (OUTPATIENT)
Dept: CARDIOLOGY | Facility: HOSPITAL | Age: 78
Discharge: HOME | End: 2023-10-05
Attending: FAMILY MEDICINE
Payer: MEDICARE

## 2023-10-05 VITALS
SYSTOLIC BLOOD PRESSURE: 130 MMHG | TEMPERATURE: 98 F | DIASTOLIC BLOOD PRESSURE: 90 MMHG | HEART RATE: 87 BPM | HEIGHT: 62 IN | WEIGHT: 164.44 LBS | BODY MASS INDEX: 30.26 KG/M2 | OXYGEN SATURATION: 97 %

## 2023-10-05 DIAGNOSIS — R06.09 DOE (DYSPNEA ON EXERTION): ICD-10-CM

## 2023-10-05 DIAGNOSIS — R07.89 RIGHT-SIDED CHEST WALL PAIN: ICD-10-CM

## 2023-10-05 DIAGNOSIS — I50.42 CHRONIC COMBINED SYSTOLIC AND DIASTOLIC CONGESTIVE HEART FAILURE: ICD-10-CM

## 2023-10-05 DIAGNOSIS — R07.9 CHEST PAIN, UNSPECIFIED TYPE: Primary | ICD-10-CM

## 2023-10-05 PROCEDURE — 3080F DIAST BP >= 90 MM HG: CPT | Mod: CPTII,S$GLB,, | Performed by: FAMILY MEDICINE

## 2023-10-05 PROCEDURE — 3075F PR MOST RECENT SYSTOLIC BLOOD PRESS GE 130-139MM HG: ICD-10-PCS | Mod: CPTII,S$GLB,, | Performed by: FAMILY MEDICINE

## 2023-10-05 PROCEDURE — 1101F PT FALLS ASSESS-DOCD LE1/YR: CPT | Mod: CPTII,S$GLB,, | Performed by: FAMILY MEDICINE

## 2023-10-05 PROCEDURE — 1125F PR PAIN SEVERITY QUANTIFIED, PAIN PRESENT: ICD-10-PCS | Mod: CPTII,S$GLB,, | Performed by: FAMILY MEDICINE

## 2023-10-05 PROCEDURE — 1101F PR PT FALLS ASSESS DOC 0-1 FALLS W/OUT INJ PAST YR: ICD-10-PCS | Mod: CPTII,S$GLB,, | Performed by: FAMILY MEDICINE

## 2023-10-05 PROCEDURE — 3080F PR MOST RECENT DIASTOLIC BLOOD PRESSURE >= 90 MM HG: ICD-10-PCS | Mod: CPTII,S$GLB,, | Performed by: FAMILY MEDICINE

## 2023-10-05 PROCEDURE — 3075F SYST BP GE 130 - 139MM HG: CPT | Mod: CPTII,S$GLB,, | Performed by: FAMILY MEDICINE

## 2023-10-05 PROCEDURE — 99214 OFFICE O/P EST MOD 30 MIN: CPT | Mod: S$GLB,,, | Performed by: FAMILY MEDICINE

## 2023-10-05 PROCEDURE — 99999 PR PBB SHADOW E&M-EST. PATIENT-LVL IV: CPT | Mod: PBBFAC,,, | Performed by: FAMILY MEDICINE

## 2023-10-05 PROCEDURE — 3288F FALL RISK ASSESSMENT DOCD: CPT | Mod: CPTII,S$GLB,, | Performed by: FAMILY MEDICINE

## 2023-10-05 PROCEDURE — 1159F PR MEDICATION LIST DOCUMENTED IN MEDICAL RECORD: ICD-10-PCS | Mod: CPTII,S$GLB,, | Performed by: FAMILY MEDICINE

## 2023-10-05 PROCEDURE — 93010 ELECTROCARDIOGRAM REPORT: CPT | Mod: ,,, | Performed by: INTERNAL MEDICINE

## 2023-10-05 PROCEDURE — 93005 ELECTROCARDIOGRAM TRACING: CPT

## 2023-10-05 PROCEDURE — 71046 XR CHEST PA AND LATERAL: ICD-10-PCS | Mod: 26,,, | Performed by: RADIOLOGY

## 2023-10-05 PROCEDURE — 3288F PR FALLS RISK ASSESSMENT DOCUMENTED: ICD-10-PCS | Mod: CPTII,S$GLB,, | Performed by: FAMILY MEDICINE

## 2023-10-05 PROCEDURE — 1125F AMNT PAIN NOTED PAIN PRSNT: CPT | Mod: CPTII,S$GLB,, | Performed by: FAMILY MEDICINE

## 2023-10-05 PROCEDURE — 1159F MED LIST DOCD IN RCRD: CPT | Mod: CPTII,S$GLB,, | Performed by: FAMILY MEDICINE

## 2023-10-05 PROCEDURE — 71046 X-RAY EXAM CHEST 2 VIEWS: CPT | Mod: 26,,, | Performed by: RADIOLOGY

## 2023-10-05 PROCEDURE — 99214 PR OFFICE/OUTPT VISIT, EST, LEVL IV, 30-39 MIN: ICD-10-PCS | Mod: S$GLB,,, | Performed by: FAMILY MEDICINE

## 2023-10-05 PROCEDURE — 93010 EKG 12-LEAD: ICD-10-PCS | Mod: ,,, | Performed by: INTERNAL MEDICINE

## 2023-10-05 PROCEDURE — 71046 X-RAY EXAM CHEST 2 VIEWS: CPT | Mod: TC

## 2023-10-05 PROCEDURE — 99999 PR PBB SHADOW E&M-EST. PATIENT-LVL IV: ICD-10-PCS | Mod: PBBFAC,,, | Performed by: FAMILY MEDICINE

## 2023-10-05 NOTE — PROGRESS NOTES
Subjective:      Patient ID: Megan Sams is a 78 y.o. female.    Chief Complaint: Establish Care    HPI several months of right anterior chest pain that radiates to back some days without pain at all sometimes helped with Flexeril was taking Celebrex but raised her blood pressure so stopped no exertional component no shortness a breath component see 2.  She did have x-rays which were fine right side chest in June and mammogram February she was having the pain then intermittently as well    2.  One-week of more dyspnea on exertion without associated chest pain left-sided chest pain no cough complaint she saw her cardiologist least a couple weeks ago prior to the dyspnea on exertion she is located [facility] ;would like to establish care with cardiologist closer to home    Past Medical History:   Diagnosis Date    Arthritis     Blood transfusion     CHF (congestive heart failure)     Chronic kidney disease     Depression     Hypertension       Past Surgical History:   Procedure Laterality Date    HYSTERECTOMY  1978    OOPHORECTOMY  1978      Social History     Socioeconomic History    Marital status:    Tobacco Use    Smoking status: Never    Smokeless tobacco: Never   Substance and Sexual Activity    Alcohol use: No     Alcohol/week: 0.0 standard drinks of alcohol    Drug use: No    Sexual activity: Never      Family History   Problem Relation Age of Onset    Early death Mother     Heart disease Mother     Hypertension Mother     Kidney disease Father     Heart disease Brother       Review of Systems        Objective:     Physical Exam  Constitutional:       Appearance: Normal appearance.   HENT:      Head: Normocephalic and atraumatic.   Cardiovascular:      Rate and Rhythm: Normal rate and regular rhythm.   Pulmonary:      Effort: Pulmonary effort is normal.      Breath sounds: Normal breath sounds.   Neurological:      Mental Status: She is alert.     M/s:  Tenderness is reproduced palpation right posterior  lateral thoracic area around T10    Ext no edema  Assessment:         ICD-10-CM ICD-9-CM   1. Chest pain, unspecified type  R07.9 786.50   2. PAGE (dyspnea on exertion)  R06.09 786.09   3. Right-sided chest wall pain  R07.89 786.52      Plan:      Discussed her right-sided chest pain seems more musculoskeletal may resolve with physical therapy but also want to address some more new shortness of breath at separate issue     Discussed prevailing [facility] closer to her home and she can reestablish care with me if needed but can think about     Follow up clinician tomorrow we will check on [facility]    Chest pain, unspecified type  -     Ambulatory referral/consult to Cardiology; Future; Expected date: 10/12/2023    PAGE (dyspnea on exertion)  -     X-Ray Chest PA And Lateral; Future; Expected date: 10/05/2023  -     EKG 12-lead; Future; Expected date: 10/05/2023  -     CBC Auto Differential; Future; Expected date: 10/05/2023  -     Comprehensive Metabolic Panel; Future; Expected date: 10/05/2023  -     B-TYPE NATRIURETIC PEPTIDE; Future; Expected date: 10/05/2023  -     D-DIMER, QUANTITATIVE; Future; Expected date: 10/05/2023    Right-sided chest wall pain  -     Ambulatory referral/consult to Physical/Occupational Therapy; Future; Expected date: 10/12/2023    Chronic combined systolic and diastolic congestive heart failure  -     Ambulatory referral/consult to Cardiology; Future; Expected date: 10/12/2023

## 2023-10-06 ENCOUNTER — TELEPHONE (OUTPATIENT)
Dept: INTERNAL MEDICINE | Facility: CLINIC | Age: 78
End: 2023-10-06
Payer: MEDICARE

## 2023-10-06 ENCOUNTER — OFFICE VISIT (OUTPATIENT)
Dept: CARDIOLOGY | Facility: CLINIC | Age: 78
End: 2023-10-06
Payer: MEDICARE

## 2023-10-06 VITALS
DIASTOLIC BLOOD PRESSURE: 80 MMHG | BODY MASS INDEX: 30.1 KG/M2 | HEART RATE: 72 BPM | HEIGHT: 62 IN | SYSTOLIC BLOOD PRESSURE: 120 MMHG | OXYGEN SATURATION: 98 % | WEIGHT: 163.56 LBS

## 2023-10-06 DIAGNOSIS — I50.42 CHRONIC COMBINED SYSTOLIC AND DIASTOLIC HEART FAILURE: Primary | ICD-10-CM

## 2023-10-06 DIAGNOSIS — R00.2 PALPITATIONS: ICD-10-CM

## 2023-10-06 DIAGNOSIS — R06.09 DOE (DYSPNEA ON EXERTION): ICD-10-CM

## 2023-10-06 DIAGNOSIS — N18.31 STAGE 3A CHRONIC KIDNEY DISEASE: ICD-10-CM

## 2023-10-06 DIAGNOSIS — E66.9 OBESITY, CLASS I, BMI 30-34.9: ICD-10-CM

## 2023-10-06 DIAGNOSIS — I42.8 NONISCHEMIC CARDIOMYOPATHY: ICD-10-CM

## 2023-10-06 DIAGNOSIS — I50.42 CHRONIC COMBINED SYSTOLIC AND DIASTOLIC CONGESTIVE HEART FAILURE: ICD-10-CM

## 2023-10-06 DIAGNOSIS — I10 ESSENTIAL HYPERTENSION: ICD-10-CM

## 2023-10-06 DIAGNOSIS — I49.3 PVC (PREMATURE VENTRICULAR CONTRACTION): ICD-10-CM

## 2023-10-06 DIAGNOSIS — R07.9 CHEST PAIN, UNSPECIFIED TYPE: ICD-10-CM

## 2023-10-06 PROCEDURE — 3079F PR MOST RECENT DIASTOLIC BLOOD PRESSURE 80-89 MM HG: ICD-10-PCS | Mod: CPTII,S$GLB,, | Performed by: INTERNAL MEDICINE

## 2023-10-06 PROCEDURE — 1159F PR MEDICATION LIST DOCUMENTED IN MEDICAL RECORD: ICD-10-PCS | Mod: CPTII,S$GLB,, | Performed by: INTERNAL MEDICINE

## 2023-10-06 PROCEDURE — 3074F PR MOST RECENT SYSTOLIC BLOOD PRESSURE < 130 MM HG: ICD-10-PCS | Mod: CPTII,S$GLB,, | Performed by: INTERNAL MEDICINE

## 2023-10-06 PROCEDURE — 3074F SYST BP LT 130 MM HG: CPT | Mod: CPTII,S$GLB,, | Performed by: INTERNAL MEDICINE

## 2023-10-06 PROCEDURE — 1126F PR PAIN SEVERITY QUANTIFIED, NO PAIN PRESENT: ICD-10-PCS | Mod: CPTII,S$GLB,, | Performed by: INTERNAL MEDICINE

## 2023-10-06 PROCEDURE — 99215 OFFICE O/P EST HI 40 MIN: CPT | Mod: S$GLB,,, | Performed by: INTERNAL MEDICINE

## 2023-10-06 PROCEDURE — 1159F MED LIST DOCD IN RCRD: CPT | Mod: CPTII,S$GLB,, | Performed by: INTERNAL MEDICINE

## 2023-10-06 PROCEDURE — 99999 PR PBB SHADOW E&M-EST. PATIENT-LVL IV: ICD-10-PCS | Mod: PBBFAC,,, | Performed by: INTERNAL MEDICINE

## 2023-10-06 PROCEDURE — 99215 PR OFFICE/OUTPT VISIT, EST, LEVL V, 40-54 MIN: ICD-10-PCS | Mod: S$GLB,,, | Performed by: INTERNAL MEDICINE

## 2023-10-06 PROCEDURE — 3079F DIAST BP 80-89 MM HG: CPT | Mod: CPTII,S$GLB,, | Performed by: INTERNAL MEDICINE

## 2023-10-06 PROCEDURE — 3288F PR FALLS RISK ASSESSMENT DOCUMENTED: ICD-10-PCS | Mod: CPTII,S$GLB,, | Performed by: INTERNAL MEDICINE

## 2023-10-06 PROCEDURE — 1101F PR PT FALLS ASSESS DOC 0-1 FALLS W/OUT INJ PAST YR: ICD-10-PCS | Mod: CPTII,S$GLB,, | Performed by: INTERNAL MEDICINE

## 2023-10-06 PROCEDURE — 1101F PT FALLS ASSESS-DOCD LE1/YR: CPT | Mod: CPTII,S$GLB,, | Performed by: INTERNAL MEDICINE

## 2023-10-06 PROCEDURE — 1126F AMNT PAIN NOTED NONE PRSNT: CPT | Mod: CPTII,S$GLB,, | Performed by: INTERNAL MEDICINE

## 2023-10-06 PROCEDURE — 99999 PR PBB SHADOW E&M-EST. PATIENT-LVL IV: CPT | Mod: PBBFAC,,, | Performed by: INTERNAL MEDICINE

## 2023-10-06 PROCEDURE — 1160F PR REVIEW ALL MEDS BY PRESCRIBER/CLIN PHARMACIST DOCUMENTED: ICD-10-PCS | Mod: CPTII,S$GLB,, | Performed by: INTERNAL MEDICINE

## 2023-10-06 PROCEDURE — 1160F RVW MEDS BY RX/DR IN RCRD: CPT | Mod: CPTII,S$GLB,, | Performed by: INTERNAL MEDICINE

## 2023-10-06 PROCEDURE — 3288F FALL RISK ASSESSMENT DOCD: CPT | Mod: CPTII,S$GLB,, | Performed by: INTERNAL MEDICINE

## 2023-10-06 RX ORDER — SACUBITRIL AND VALSARTAN 24; 26 MG/1; MG/1
1 TABLET, FILM COATED ORAL 2 TIMES DAILY
Qty: 60 TABLET | Refills: 3 | Status: SHIPPED | OUTPATIENT
Start: 2023-10-06 | End: 2024-01-26

## 2023-10-06 RX ORDER — FUROSEMIDE 20 MG/1
20 TABLET ORAL DAILY
Qty: 90 TABLET | Refills: 1 | Status: SHIPPED | OUTPATIENT
Start: 2023-10-06 | End: 2024-01-26 | Stop reason: SDUPTHER

## 2023-10-06 NOTE — PROGRESS NOTES
Subjective:   Patient ID:  Megan Sams is a 78 y.o. female who presents for cardiac consult of Shortness of Breath      Referral by: Don Mayen Md  [id number] [facility]  [place] [address]     Reason for consult: CHF      HPI  The patient came in today for cardiac consult of Shortness of Breath    10/6/23  Megan Sams is a 78 y.o. female pt with NICM EF 35%, PVCs, HTN, obesity, CKD presents for CV follow up.     Pt has been seen by Mikala Hughes MD recently 2 months prior in [place]. She has seen Dr. Gilliland also in 2021/2022.    She went to Dr. Mayen recently -    One-week of more dyspnea on exertion without associated chest pain left-sided chest pain no cough complaint she saw her cardiologist least a couple weeks ago prior to the dyspnea on exertion she is located [facility] ;would like to establish care with cardiologist closer to home    Component Ref Range & Units 1 d ago 6 yr ago   BNP 0 - 99 pg/mL 3,942 High   192 High  CM      [facility] - 3942; CXR stable.    BP and Hr stable today. BM 29 - 163 lbs.   She has lost some weight, she has not been eating much.      Patient is compliant with medications.    Normal sinus rhythm with sinus arrhythmia   Possible Left atrial enlargement   Minimal voltage criteria for LVH, may be normal variant ( [place] product )   Septal infarct ,age undetermined   T wave abnormality, consider lateral ischemia   Abnormal ECG   When compared with ECG of 27-JUL-2022 15:33,   Septal infarct is now Present   Confirmed by CROW KRUSE, ADDIE ([id number]) on 10/5/2023 9:06:18 PM     Results for orders placed during the hospital encounter of 08/25/22    Echo    Interpretation Summary  · Normal right ventricular size with normal right ventricular systolic function.  · The left ventricle is normal in size with moderately decreased systolic function.  · The estimated ejection fraction is 35%.  · Grade I left ventricular diastolic dysfunction.  · Normal  central venous pressure (3 mmHg).  · The estimated PA systolic pressure is 41 mmHg.  · There is pulmonary hypertension.  · There are segmental left ventricular wall motion abnormalities.  · Mild mitral regurgitation.      Echo: 2020: EF 40%  Cardiac cath: 2009: normal coronaries     No results found for this or any previous visit.      No results found for this or any previous visit.      No cardiac monitor results found for the past 12 months         Past Medical History:   Diagnosis Date    Arthritis     Blood transfusion     CHF (congestive heart failure)     Chronic kidney disease     Depression     Hypertension        Past Surgical History:   Procedure Laterality Date    HYSTERECTOMY  1978    OOPHORECTOMY  1978       Social History     Tobacco Use    Smoking status: Never    Smokeless tobacco: Never   Substance Use Topics    Alcohol use: No     Alcohol/week: 0.0 standard drinks of alcohol    Drug use: No       Family History   Problem Relation Age of Onset    Early death Mother     Heart disease Mother     Hypertension Mother     Kidney disease Father     Heart disease Brother        Patient's Medications   New Prescriptions    FUROSEMIDE (LASIX) 20 MG TABLET    Take 1 tablet (20 mg total) by mouth once daily.    SACUBITRIL-VALSARTAN (ENTRESTO) 24-26 MG PER TABLET    Take 1 tablet by mouth 2 (two) times daily.   Previous Medications    ACETAMINOPHEN (TYLENOL) 500 MG TABLET    Take 500 mg by mouth every 6 (six) hours as needed.    ALLOPURINOL (ZYLOPRIM) 100 MG TABLET    TAKE 2 TABLETS BY MOUTH EVERY DAY    ASPIRIN (ECOTRIN) 81 MG EC TABLET    Take 81 mg by mouth once daily.    EMPAGLIFLOZIN (JARDIANCE) 10 MG TABLET    Take 1 tablet (10 mg total) by mouth once daily.    METOPROLOL SUCCINATE (TOPROL-XL) 50 MG 24 HR TABLET    TAKE 1 TABLET BY MOUTH EVERY DAY    MULTIVIT WITH CALCIUM,IRON,MIN (MULTIPLE VITAMIN, WOMENS ORAL)    Take by mouth once daily.    SPIRONOLACTONE (ALDACTONE) 50 MG TABLET    TAKE 1 TABLET BY  "MOUTH EVERY DAY   Modified Medications    No medications on file   Discontinued Medications    CANDESARTAN (ATACAND) 32 MG TABLET    Take 1 tablet (32 mg total) by mouth once daily.       Review of Systems   Constitutional:  Positive for malaise/fatigue.   HENT: Negative.     Eyes: Negative.    Respiratory:  Positive for shortness of breath.    Cardiovascular:  Positive for chest pain and palpitations.   Gastrointestinal: Negative.    Genitourinary: Negative.    Musculoskeletal: Negative.    Skin: Negative.    Neurological: Negative.    Endo/Heme/Allergies: Negative.    Psychiatric/Behavioral: Negative.     All 12 systems otherwise negative.      Wt Readings from Last 3 Encounters:   10/06/23 74.2 kg (163 lb 9.3 oz)   10/05/23 74.6 kg (164 lb 7.4 oz)   09/08/23 74.8 kg (164 lb 14.5 oz)     Temp Readings from Last 3 Encounters:   10/05/23 97.9 °F (36.6 °C) (Tympanic)   09/08/23 97.5 °F (36.4 °C) (Tympanic)   08/03/23 97.4 °F (36.3 °C) (Tympanic)     BP Readings from Last 3 Encounters:   10/06/23 120/80   10/05/23 (!) 130/90   09/08/23 110/76     Pulse Readings from Last 3 Encounters:   10/06/23 72   10/05/23 87   09/08/23 72       /80 (BP Location: Left arm, Patient Position: Sitting, BP Method: Medium (Manual))   Pulse 72   Ht 5' 2" (1.575 m)   Wt 74.2 kg (163 lb 9.3 oz)   SpO2 98%   BMI 29.92 kg/m²     Objective:   Physical Exam  Vitals and nursing note reviewed.   Constitutional:       General: She is not in acute distress.     Appearance: She is well-developed. She is obese. She is not diaphoretic.   HENT:      Head: Normocephalic and atraumatic.      Nose: Nose normal.   Eyes:      General: No scleral icterus.     Conjunctiva/sclera: Conjunctivae normal.   Neck:      Thyroid: No thyromegaly.      Vascular: No JVD.   Cardiovascular:      Rate and Rhythm: Normal rate and regular rhythm.      Heart sounds: S1 normal and S2 normal. Murmur heard.      No friction rub. No gallop. No S3 or S4 sounds. "   Pulmonary:      Effort: Pulmonary effort is normal. No respiratory distress.      Breath sounds: Normal breath sounds. No stridor. No wheezing or rales.   Chest:      Chest wall: No tenderness.   Abdominal:      General: Bowel sounds are normal. There is no distension.      Palpations: Abdomen is soft. There is no mass.      Tenderness: There is no abdominal tenderness. There is no rebound.   Genitourinary:     Comments: Deferred  Musculoskeletal:         General: No tenderness or deformity. Normal range of motion.      Cervical back: Normal range of motion and neck supple.   Lymphadenopathy:      Cervical: No cervical adenopathy.   Skin:     General: Skin is warm and dry.      Coloration: Skin is not pale.      Findings: No erythema or rash.   Neurological:      Mental Status: She is alert and oriented to person, place, and time.      Motor: No abnormal muscle tone.      Coordination: Coordination normal.   Psychiatric:         Behavior: Behavior normal.         Thought Content: Thought content normal.         Judgment: Judgment normal.         Lab Results   Component Value Date     10/05/2023    K 4.4 10/05/2023     10/05/2023    CO2 22 (L) 10/05/2023    BUN 21 10/05/2023    CREATININE 1.2 10/05/2023     (H) 10/05/2023    HGBA1C 5.4 07/26/2023    MG 1.7 12/03/2020    AST 40 10/05/2023    ALT 24 10/05/2023    ALBUMIN 3.5 10/05/2023    PROT 7.7 10/05/2023    BILITOT 1.1 (H) 10/05/2023    WBC 6.32 10/05/2023    HGB 14.4 10/05/2023    HCT 45.9 10/05/2023    MCV 87 10/05/2023     10/05/2023    INR 1.0 02/14/2017    TSH 1.922 09/09/2020    CHOL 174 07/26/2023    HDL 50 07/26/2023    LDLCALC 104.0 07/26/2023    TRIG 100 07/26/2023    BNP 3,942 (H) 10/05/2023         BNP (pg/mL)   Date Value   10/05/2023 3,942 (H)   02/14/2017 192 (H)     INR (no units)   Date Value   02/14/2017 1.0   03/02/2014 1.0          Assessment:      1. Chronic combined systolic and diastolic heart failure    2. Chest  pain, unspecified type    3. Chronic combined systolic and diastolic congestive heart failure    4. Essential hypertension    5. PAGE (dyspnea on exertion)    6. Stage 3a chronic kidney disease    7. Nonischemic cardiomyopathy    8. Obesity, Class I, BMI 30-34.9    9. PVC (premature ventricular contraction)    10. Palpitations        Plan:     NICM  EF 35%; Chest pain, PAGE;  BNP - 3942  - cont Toprol and ARB - change to Entresto BID  - started on Jardiance recently  - order ECHO and pharm nuclear stress test  - repeat labs in 1 week  - needs low salt diet     2. HTN, PVCs  - titrate meds  - order Holter    3. Obesity  - cont weight loss    4. CKD3  - cont to monitor    Thank you for allowing me to participate in this patient's care. Please do not hesitate to contact me with any questions or concerns. Consult note has been forwarded to the referral physician.

## 2023-10-06 NOTE — TELEPHONE ENCOUNTER
Spoke with the patient . Patient stated that she missed a call from the PT department. The patient was informed that she would need to speak with someone in the PT department. Patient was transferred to leave a message. The patient stated understanding

## 2023-10-06 NOTE — TELEPHONE ENCOUNTER
----- Message from Luana Arriaza sent at 10/6/2023  8:57 AM CDT -----  States she would like the nurse to give her a call regarding her appt. Please call pt [phone]. Thank you

## 2023-10-09 ENCOUNTER — OFFICE VISIT (OUTPATIENT)
Dept: INTERNAL MEDICINE | Facility: CLINIC | Age: 78
End: 2023-10-09
Payer: MEDICARE

## 2023-10-09 ENCOUNTER — LAB VISIT (OUTPATIENT)
Dept: LAB | Facility: HOSPITAL | Age: 78
End: 2023-10-09
Attending: PHYSICIAN ASSISTANT
Payer: MEDICARE

## 2023-10-09 VITALS
HEART RATE: 75 BPM | WEIGHT: 158.31 LBS | DIASTOLIC BLOOD PRESSURE: 82 MMHG | HEIGHT: 62 IN | TEMPERATURE: 97 F | OXYGEN SATURATION: 97 % | BODY MASS INDEX: 29.13 KG/M2 | RESPIRATION RATE: 16 BRPM | SYSTOLIC BLOOD PRESSURE: 124 MMHG

## 2023-10-09 DIAGNOSIS — M62.838 MUSCLE SPASM: ICD-10-CM

## 2023-10-09 DIAGNOSIS — R25.2 HAND CRAMPS: ICD-10-CM

## 2023-10-09 DIAGNOSIS — I10 ESSENTIAL HYPERTENSION: Chronic | ICD-10-CM

## 2023-10-09 DIAGNOSIS — I50.42 CHRONIC COMBINED SYSTOLIC AND DIASTOLIC CONGESTIVE HEART FAILURE: ICD-10-CM

## 2023-10-09 DIAGNOSIS — I50.42 CHRONIC COMBINED SYSTOLIC AND DIASTOLIC CONGESTIVE HEART FAILURE: Primary | ICD-10-CM

## 2023-10-09 DIAGNOSIS — N18.31 STAGE 3A CHRONIC KIDNEY DISEASE: Chronic | ICD-10-CM

## 2023-10-09 LAB
ANION GAP SERPL CALC-SCNC: 14 MMOL/L (ref 8–16)
BUN SERPL-MCNC: 20 MG/DL (ref 8–23)
CALCIUM SERPL-MCNC: 9.9 MG/DL (ref 8.7–10.5)
CHLORIDE SERPL-SCNC: 104 MMOL/L (ref 95–110)
CO2 SERPL-SCNC: 25 MMOL/L (ref 23–29)
CREAT SERPL-MCNC: 1.3 MG/DL (ref 0.5–1.4)
EST. GFR  (NO RACE VARIABLE): 42 ML/MIN/1.73 M^2
GLUCOSE SERPL-MCNC: 114 MG/DL (ref 70–110)
MAGNESIUM SERPL-MCNC: 2.1 MG/DL (ref 1.6–2.6)
POTASSIUM SERPL-SCNC: 4.3 MMOL/L (ref 3.5–5.1)
SODIUM SERPL-SCNC: 143 MMOL/L (ref 136–145)

## 2023-10-09 PROCEDURE — 1126F AMNT PAIN NOTED NONE PRSNT: CPT | Mod: CPTII,S$GLB,, | Performed by: PHYSICIAN ASSISTANT

## 2023-10-09 PROCEDURE — 1101F PR PT FALLS ASSESS DOC 0-1 FALLS W/OUT INJ PAST YR: ICD-10-PCS | Mod: CPTII,S$GLB,, | Performed by: PHYSICIAN ASSISTANT

## 2023-10-09 PROCEDURE — 36415 COLL VENOUS BLD VENIPUNCTURE: CPT | Performed by: PHYSICIAN ASSISTANT

## 2023-10-09 PROCEDURE — 80048 BASIC METABOLIC PNL TOTAL CA: CPT | Performed by: PHYSICIAN ASSISTANT

## 2023-10-09 PROCEDURE — 3288F PR FALLS RISK ASSESSMENT DOCUMENTED: ICD-10-PCS | Mod: CPTII,S$GLB,, | Performed by: PHYSICIAN ASSISTANT

## 2023-10-09 PROCEDURE — 99213 OFFICE O/P EST LOW 20 MIN: CPT | Mod: S$GLB,,, | Performed by: PHYSICIAN ASSISTANT

## 2023-10-09 PROCEDURE — 3079F PR MOST RECENT DIASTOLIC BLOOD PRESSURE 80-89 MM HG: ICD-10-PCS | Mod: CPTII,S$GLB,, | Performed by: PHYSICIAN ASSISTANT

## 2023-10-09 PROCEDURE — 99999 PR PBB SHADOW E&M-EST. PATIENT-LVL IV: ICD-10-PCS | Mod: PBBFAC,,, | Performed by: PHYSICIAN ASSISTANT

## 2023-10-09 PROCEDURE — 1159F MED LIST DOCD IN RCRD: CPT | Mod: CPTII,S$GLB,, | Performed by: PHYSICIAN ASSISTANT

## 2023-10-09 PROCEDURE — 3079F DIAST BP 80-89 MM HG: CPT | Mod: CPTII,S$GLB,, | Performed by: PHYSICIAN ASSISTANT

## 2023-10-09 PROCEDURE — 99213 PR OFFICE/OUTPT VISIT, EST, LEVL III, 20-29 MIN: ICD-10-PCS | Mod: S$GLB,,, | Performed by: PHYSICIAN ASSISTANT

## 2023-10-09 PROCEDURE — 1160F RVW MEDS BY RX/DR IN RCRD: CPT | Mod: CPTII,S$GLB,, | Performed by: PHYSICIAN ASSISTANT

## 2023-10-09 PROCEDURE — 3074F SYST BP LT 130 MM HG: CPT | Mod: CPTII,S$GLB,, | Performed by: PHYSICIAN ASSISTANT

## 2023-10-09 PROCEDURE — 3074F PR MOST RECENT SYSTOLIC BLOOD PRESSURE < 130 MM HG: ICD-10-PCS | Mod: CPTII,S$GLB,, | Performed by: PHYSICIAN ASSISTANT

## 2023-10-09 PROCEDURE — 83735 ASSAY OF MAGNESIUM: CPT | Performed by: PHYSICIAN ASSISTANT

## 2023-10-09 PROCEDURE — 99999 PR PBB SHADOW E&M-EST. PATIENT-LVL IV: CPT | Mod: PBBFAC,,, | Performed by: PHYSICIAN ASSISTANT

## 2023-10-09 PROCEDURE — 1126F PR PAIN SEVERITY QUANTIFIED, NO PAIN PRESENT: ICD-10-PCS | Mod: CPTII,S$GLB,, | Performed by: PHYSICIAN ASSISTANT

## 2023-10-09 PROCEDURE — 1101F PT FALLS ASSESS-DOCD LE1/YR: CPT | Mod: CPTII,S$GLB,, | Performed by: PHYSICIAN ASSISTANT

## 2023-10-09 PROCEDURE — 3288F FALL RISK ASSESSMENT DOCD: CPT | Mod: CPTII,S$GLB,, | Performed by: PHYSICIAN ASSISTANT

## 2023-10-09 PROCEDURE — 1159F PR MEDICATION LIST DOCUMENTED IN MEDICAL RECORD: ICD-10-PCS | Mod: CPTII,S$GLB,, | Performed by: PHYSICIAN ASSISTANT

## 2023-10-09 PROCEDURE — 1160F PR REVIEW ALL MEDS BY PRESCRIBER/CLIN PHARMACIST DOCUMENTED: ICD-10-PCS | Mod: CPTII,S$GLB,, | Performed by: PHYSICIAN ASSISTANT

## 2023-10-09 NOTE — PROGRESS NOTES
Subjective:      Patient ID: Megan Sams is a 78 y.o. female.    Chief Complaint: Follow-up    HPI  Here today for a follow up. Last seen by DR. Mayen for eval of chest pain and sob. Muscloskeletal vs. Cardiac. Saw cardiology for follow up due to elevated BNP and d-dimer on her blood work. Started on entresto and scheduled to repeat labs in 1 week (bnp, cmp, mg).  Cramps in her hands and calves. Worse in her hands since starting the new medication. Hands lock up in place and has to manually open her hands.   Shortness of breath has improved.   No chest pain.   Currently on lasix 20 mg daily + spironolactone 50mg once daily.     Patient Active Problem List   Diagnosis    Arthritis    CHF (congestive heart failure)    Essential hypertension    CKD (chronic kidney disease) stage 3, GFR 30-59 ml/min    Nonischemic cardiomyopathy    Obesity, Class I, BMI 30-34.9    Intertrigo    PVC (premature ventricular contraction)    Cortical age-related cataract of both eyes         Current Outpatient Medications:     acetaminophen (TYLENOL) 500 MG tablet, Take 500 mg by mouth every 6 (six) hours as needed., Disp: , Rfl:     allopurinoL (ZYLOPRIM) 100 MG tablet, TAKE 2 TABLETS BY MOUTH EVERY DAY, Disp: 180 tablet, Rfl: 0    aspirin (ECOTRIN) 81 MG EC tablet, Take 81 mg by mouth once daily., Disp: , Rfl:     empagliflozin (JARDIANCE) 10 mg tablet, Take 1 tablet (10 mg total) by mouth once daily., Disp: 90 tablet, Rfl: 1    furosemide (LASIX) 20 MG tablet, Take 1 tablet (20 mg total) by mouth once daily., Disp: 90 tablet, Rfl: 1    metoprolol succinate (TOPROL-XL) 50 MG 24 hr tablet, TAKE 1 TABLET BY MOUTH EVERY DAY, Disp: 90 tablet, Rfl: 3    MULTIVIT WITH CALCIUM,IRON,MIN (MULTIPLE VITAMIN, WOMENS ORAL), Take by mouth once daily., Disp: , Rfl:     sacubitriL-valsartan (ENTRESTO) 24-26 mg per tablet, Take 1 tablet by mouth 2 (two) times daily., Disp: 60 tablet, Rfl: 3    spironolactone (ALDACTONE) 50 MG tablet, TAKE 1 TABLET BY  "MOUTH EVERY DAY, Disp: 90 tablet, Rfl: 3    Review of Systems   Constitutional:  Negative for activity change, appetite change, chills, diaphoresis, fatigue, fever and unexpected weight change.   HENT: Negative.  Negative for congestion, hearing loss, postnasal drip, rhinorrhea, sore throat, trouble swallowing and voice change.    Eyes: Negative.  Negative for visual disturbance.   Respiratory: Negative.  Negative for cough, choking, chest tightness and shortness of breath.    Cardiovascular:  Positive for palpitations. Negative for chest pain and leg swelling.   Gastrointestinal:  Negative for abdominal distention, abdominal pain, blood in stool, constipation, diarrhea, nausea and vomiting.   Endocrine: Negative for cold intolerance, heat intolerance, polydipsia and polyuria.   Genitourinary: Negative.  Negative for difficulty urinating and frequency.   Musculoskeletal:  Positive for arthralgias and myalgias. Negative for back pain, gait problem and joint swelling.   Skin:  Negative for color change, pallor, rash and wound.   Neurological:  Negative for dizziness, tremors, weakness, light-headedness, numbness and headaches.   Hematological:  Negative for adenopathy.   Psychiatric/Behavioral:  Negative for behavioral problems, confusion, self-injury, sleep disturbance and suicidal ideas. The patient is not nervous/anxious.      Objective:   /82 (BP Location: Right arm, Patient Position: Sitting, BP Method: Medium (Manual))   Pulse 75   Temp 96.9 °F (36.1 °C) (Tympanic)   Resp 16   Ht 5' 2" (1.575 m)   Wt 71.8 kg (158 lb 4.6 oz)   SpO2 97%   BMI 28.95 kg/m²     Physical Exam  Vitals and nursing note reviewed.   Constitutional:       General: She is not in acute distress.     Appearance: Normal appearance. She is well-developed. She is not ill-appearing, toxic-appearing or diaphoretic.   HENT:      Head: Normocephalic and atraumatic.   Cardiovascular:      Rate and Rhythm: Normal rate and regular rhythm. "      Heart sounds: Normal heart sounds. No murmur heard.     No friction rub. No gallop.   Pulmonary:      Effort: Pulmonary effort is normal. No respiratory distress.      Breath sounds: Normal breath sounds. No wheezing or rales.   Musculoskeletal:         General: Normal range of motion.   Skin:     General: Skin is warm.      Capillary Refill: Capillary refill takes less than 2 seconds.      Findings: No rash.   Neurological:      Mental Status: She is alert and oriented to person, place, and time.      Motor: No weakness.      Gait: Gait normal.   Psychiatric:         Mood and Affect: Mood normal.         Behavior: Behavior normal.         Thought Content: Thought content normal.         Judgment: Judgment normal.       Lab Visit on 10/05/2023   Component Date Value Ref Range Status    WBC 10/05/2023 6.32  3.90 - 12.70 K/uL Final    RBC 10/05/2023 5.28  4.00 - 5.40 M/uL Final    Hemoglobin 10/05/2023 14.4  12.0 - 16.0 g/dL Final    Hematocrit 10/05/2023 45.9  37.0 - 48.5 % Final    MCV 10/05/2023 87  82 - 98 fL Final    MCH 10/05/2023 27.3  27.0 - 31.0 pg Final    MCHC 10/05/2023 31.4 (L)  32.0 - 36.0 g/dL Final    RDW 10/05/2023 16.6 (H)  11.5 - 14.5 % Final    Platelets 10/05/2023 208  150 - 450 K/uL Final    MPV 10/05/2023 12.7  9.2 - 12.9 fL Final    Immature Granulocytes 10/05/2023 0.3  0.0 - 0.5 % Final    Gran # (ANC) 10/05/2023 3.2  1.8 - 7.7 K/uL Final    Immature Grans (Abs) 10/05/2023 0.02  0.00 - 0.04 K/uL Final    Comment: Mild elevation in immature granulocytes is non specific and   can be seen in a variety of conditions including stress response,   acute inflammation, trauma and pregnancy. Correlation with other   laboratory and clinical findings is essential.      Lymph # 10/05/2023 2.2  1.0 - 4.8 K/uL Final    Mono # 10/05/2023 0.8  0.3 - 1.0 K/uL Final    Eos # 10/05/2023 0.1  0.0 - 0.5 K/uL Final    Baso # 10/05/2023 0.08  0.00 - 0.20 K/uL Final    nRBC 10/05/2023 1 (A)  0 /100 WBC Final     Gran % 10/05/2023 50.0  38.0 - 73.0 % Final    Lymph % 10/05/2023 35.0  18.0 - 48.0 % Final    Mono % 10/05/2023 12.5  4.0 - 15.0 % Final    Eosinophil % 10/05/2023 0.9  0.0 - 8.0 % Final    Basophil % 10/05/2023 1.3  0.0 - 1.9 % Final    Differential Method 10/05/2023 Automated   Final    Sodium 10/05/2023 142  136 - 145 mmol/L Final    Potassium 10/05/2023 4.4  3.5 - 5.1 mmol/L Final    Chloride 10/05/2023 107  95 - 110 mmol/L Final    CO2 10/05/2023 22 (L)  23 - 29 mmol/L Final    Glucose 10/05/2023 122 (H)  70 - 110 mg/dL Final    BUN 10/05/2023 21  8 - 23 mg/dL Final    Creatinine 10/05/2023 1.2  0.5 - 1.4 mg/dL Final    Calcium 10/05/2023 10.1  8.7 - 10.5 mg/dL Final    Total Protein 10/05/2023 7.7  6.0 - 8.4 g/dL Final    Albumin 10/05/2023 3.5  3.5 - 5.2 g/dL Final    Total Bilirubin 10/05/2023 1.1 (H)  0.1 - 1.0 mg/dL Final    Comment: For infants and newborns, interpretation of results should be based  on gestational age, weight and in agreement with clinical  observations.    Premature Infant recommended reference ranges:  Up to 24 hours.............<8.0 mg/dL  Up to 48 hours............<12.0 mg/dL  3-5 days..................<15.0 mg/dL  6-29 days.................<15.0 mg/dL      Alkaline Phosphatase 10/05/2023 74  55 - 135 U/L Final    AST 10/05/2023 40  10 - 40 U/L Final    ALT 10/05/2023 24  10 - 44 U/L Final    eGFR 10/05/2023 45.8 (A)  >60 mL/min/1.73 m^2 Final    Anion Gap 10/05/2023 13  8 - 16 mmol/L Final    BNP 10/05/2023 3,942 (H)  0 - 99 pg/mL Final    Values of less than 100 pg/ml are consistent with non-CHF populations.    D-Dimer 10/05/2023 0.60 (H)  <0.50 mg/L FEU Final    Comment: The quantitative D-dimer assay should be used as an aid in   the diagnosis of deep vein thrombosis and pulmonary embolism  in patients with the appropriate presentation and clinical  history. The upper limit of the reference interval and the clinical   cut off   point are identical. Causes of a positive (>0.50  mg/L FEU) D-Dimer   test  include, but are not limited to: DVT, PE, DIC, thrombolytic   therapy, anticoagulant therapy, recent surgery, trauma, or   pregnancy, disseminated malignancy, aortic aneurysm, cirrhosis,  and severe infection. False negative results may occur in   patients with distal DVT.       X-Ray Chest PA And Lateral  Narrative: EXAM: XR CHEST PA AND LATERAL    CLINICAL HISTORY:  Dyspnea    TECHNIQUE: 2 view chest    PRIOR:  June 2023    FINDINGS:  Cardiomegaly is stable from the previous exam.  Non-confluent patchy interstitial type airspace disease is again present but there is no evidence for focal mass, infiltrate or effusion.  Thoracic spondylosis..  Impression:  Stable exam with cardiomegaly and no evidence for acute pulmonary disease    Finalized on: 10/5/2023 3:33 PM By:  Moi Vázquez MD  [facility]# [id number]      2023-10-05 15:35:[id number]    [facility]     Results for orders placed or performed during the hospital encounter of 10/05/23   EKG 12-lead    Collection Time: 10/05/23  3:55 PM    Narrative    Test Reason : R06.09,    Vent. Rate : 077 BPM     Atrial Rate : 077 BPM     P-R Int : 140 ms          QRS Dur : 106 ms      QT Int : 400 ms       P-R-T Axes : 054 -07 130 degrees     QTc Int : 452 ms    Normal sinus rhythm with sinus arrhythmia  Possible Left atrial enlargement  Minimal voltage criteria for LVH, may be normal variant ( [place] product )  Septal infarct ,age undetermined  T wave abnormality, consider lateral ischemia  Abnormal ECG  When compared with ECG of 27-JUL-2022 15:33,  Septal infarct is now Present  Confirmed by CROW KRUSE, ADDIE ([id number]) on 10/5/2023 9:06:18 PM    Referred By: KELIN WARREN           Confirmed By:ADDIE MARIA MD        Assessment:     1. Chronic combined systolic and diastolic congestive heart failure    2. Essential hypertension    3. Stage 3a chronic kidney disease    4. Hand cramps    5. Muscle spasm      Plan:   Chronic combined systolic and diastolic congestive  heart failure  -     Basic Metabolic Panel; Future; Expected date: 10/09/2023  -     Magnesium; Future; Expected date: 10/09/2023    Essential hypertension  -stable and well controlled on current meds    Stage 3a chronic kidney disease  -stable on jardiance    Hand cramps  -     Basic Metabolic Panel; Future; Expected date: 10/09/2023  -     Magnesium; Future; Expected date: 10/09/2023    Muscle spasm  -     Basic Metabolic Panel; Future; Expected date: 10/09/2023  -     Magnesium; Future; Expected date: 10/09/2023    -check bmp and magnesium today to further evaluate her hand cramps.     -follow up with DR. Mayen in 6 months.     Follow up in about 6 months (around 4/9/2024), or if symptoms worsen or fail to improve.

## 2023-10-10 ENCOUNTER — TELEPHONE (OUTPATIENT)
Dept: CARDIOLOGY | Facility: HOSPITAL | Age: 78
End: 2023-10-10
Payer: MEDICARE

## 2023-10-10 NOTE — TELEPHONE ENCOUNTER
----- Message from Arnulfo Patterson RN sent at 10/10/2023 12:30 PM CDT -----  Regarding: FW: pt call back back    ----- Message -----  From: Chepe Paiz  Sent: 10/10/2023  12:04 PM CDT  To: [facility] Special Card Proc Clinical Support  Subject: pt call back back                                Name of Who is Calling:Pt         What is the request in detail: Pt inquiring on how does Holter Monitor works? Should pt come in to schedule are does dept schedule Holter visit. Please contact if possible           Can the clinic reply by [employer]: no         What Number to Call Back if not in [employer]:Telephone Information:  [employer]          [phone]           oriented to person, place and time

## 2023-10-11 ENCOUNTER — TELEPHONE (OUTPATIENT)
Dept: CARDIOLOGY | Facility: HOSPITAL | Age: 78
End: 2023-10-11
Payer: MEDICARE

## 2023-10-11 NOTE — TELEPHONE ENCOUNTER
Returned call. Nuc Stress Rescheduled as requested to 11/17/2023 start time of 1130, [facility] [place] location.

## 2023-10-11 NOTE — TELEPHONE ENCOUNTER
----- Message from Marie Zepeda sent at 10/11/2023 12:51 PM CDT -----  Contact: Megan Guzman needs to speak with someone regards to reschedule her stress test, please  call her at [phone].    Thanks  Td

## 2023-10-17 ENCOUNTER — TELEPHONE (OUTPATIENT)
Dept: INTERNAL MEDICINE | Facility: CLINIC | Age: 78
End: 2023-10-17
Payer: MEDICARE

## 2023-10-17 NOTE — TELEPHONE ENCOUNTER
----- Message from Helen Nunez sent at 10/17/2023  2:37 PM CDT -----  Contact: Megan  Patient is calling to speak with someone regarding Physical Therapy. Reports her Cardiologist place her on fluid pills and she feels much better. Reports wanting to cancel PT. Please give patient a call back at [phone] to discuss further.  Thank you,  TH

## 2023-10-17 NOTE — TELEPHONE ENCOUNTER
Spoke with the patient stated that her back stop hurting and she wanted to cancel the PT order. The patient was informed to let the PT department know that she do not need the appointment when they call her to schedule an appointment. The patient stated understanding

## 2023-10-20 ENCOUNTER — LAB VISIT (OUTPATIENT)
Dept: LAB | Facility: HOSPITAL | Age: 78
End: 2023-10-20
Attending: INTERNAL MEDICINE
Payer: MEDICARE

## 2023-10-20 DIAGNOSIS — I50.42 CHRONIC COMBINED SYSTOLIC AND DIASTOLIC CONGESTIVE HEART FAILURE: ICD-10-CM

## 2023-10-20 LAB
ALBUMIN SERPL BCP-MCNC: 3.2 G/DL (ref 3.5–5.2)
ALP SERPL-CCNC: 64 U/L (ref 55–135)
ALT SERPL W/O P-5'-P-CCNC: 20 U/L (ref 10–44)
ANION GAP SERPL CALC-SCNC: 10 MMOL/L (ref 8–16)
AST SERPL-CCNC: 37 U/L (ref 10–40)
BILIRUB SERPL-MCNC: 0.7 MG/DL (ref 0.1–1)
BNP SERPL-MCNC: 579 PG/ML (ref 0–99)
BUN SERPL-MCNC: 24 MG/DL (ref 8–23)
CALCIUM SERPL-MCNC: 9.9 MG/DL (ref 8.7–10.5)
CHLORIDE SERPL-SCNC: 105 MMOL/L (ref 95–110)
CO2 SERPL-SCNC: 24 MMOL/L (ref 23–29)
CREAT SERPL-MCNC: 1.3 MG/DL (ref 0.5–1.4)
EST. GFR  (NO RACE VARIABLE): 41.6 ML/MIN/1.73 M^2
GLUCOSE SERPL-MCNC: 90 MG/DL (ref 70–110)
MAGNESIUM SERPL-MCNC: 2.2 MG/DL (ref 1.6–2.6)
POTASSIUM SERPL-SCNC: 4.7 MMOL/L (ref 3.5–5.1)
PROT SERPL-MCNC: 7.4 G/DL (ref 6–8.4)
SODIUM SERPL-SCNC: 139 MMOL/L (ref 136–145)

## 2023-10-20 PROCEDURE — 80053 COMPREHEN METABOLIC PANEL: CPT | Performed by: INTERNAL MEDICINE

## 2023-10-20 PROCEDURE — 36415 COLL VENOUS BLD VENIPUNCTURE: CPT | Mod: PO | Performed by: INTERNAL MEDICINE

## 2023-10-20 PROCEDURE — 83735 ASSAY OF MAGNESIUM: CPT | Performed by: INTERNAL MEDICINE

## 2023-10-20 PROCEDURE — 83880 ASSAY OF NATRIURETIC PEPTIDE: CPT | Performed by: INTERNAL MEDICINE

## 2023-10-23 ENCOUNTER — TELEPHONE (OUTPATIENT)
Dept: CARDIOLOGY | Facility: CLINIC | Age: 78
End: 2023-10-23
Payer: MEDICARE

## 2023-10-23 NOTE — TELEPHONE ENCOUNTER
Called and spoke to pt regarding their lab results. Results were given and understood by pt.    ----- Message from Sammy Dubois MD sent at 10/23/2023  9:38 AM CDT -----  Please contact the patient and let them know that their results of labs reveal improving BNP - fluid level, stable kidneys, potassium and magnesium. Continue current medications and monitor BP and weights.

## 2023-10-23 NOTE — TELEPHONE ENCOUNTER
Called and LVM for pt to return call to the office regarding her lab results.    ----- Message from Sammy Dubois MD sent at 10/23/2023  9:38 AM CDT -----  Please contact the patient and let them know that their results of labs reveal improving BNP - fluid level, stable kidneys, potassium and magnesium. Continue current medications and monitor BP and weights.

## 2023-10-25 ENCOUNTER — TELEPHONE (OUTPATIENT)
Dept: CARDIOLOGY | Facility: HOSPITAL | Age: 78
End: 2023-10-25
Payer: MEDICARE

## 2023-10-25 NOTE — TELEPHONE ENCOUNTER
Returned call. Patient rescheduled echo for same day, different location. Echo at [facility] on 11/03/2023.

## 2023-10-25 NOTE — TELEPHONE ENCOUNTER
----- Message from Helen Nunez sent at 10/25/2023  9:53 AM CDT -----  Contact: Megan  Type:  Sooner Apoointment Request    Caller is requesting a sooner appointment.  Caller declined first available appointment listed below.  Caller will not accept being placed on the waitlist and is requesting a message be sent to doctor.  Name of Caller:Megan  When is the first available appointment?11/03/23  Symptoms:Echo  Would the patient rather a call back or a response via [facility]? Call  Best Call Back Number:[phone]   Additional Information: Request to reschedule appointment.  Thank you,  TH

## 2023-10-28 RX ORDER — CANDESARTAN 32 MG/1
32 TABLET ORAL DAILY
Qty: 90 TABLET | Refills: 3 | OUTPATIENT
Start: 2023-10-28 | End: 2024-10-22

## 2023-10-28 NOTE — TELEPHONE ENCOUNTER
No care due was identified.  [facility] Embedded Care Due Messages. Reference number: [id number].   10/28/2023 7:12:19 AM CDT

## 2023-10-29 NOTE — TELEPHONE ENCOUNTER
Refill Decision Note   Megan Sams  is requesting a refill authorization.  Brief Assessment and Rationale for Refill:  Quick Discontinue     Medication Therapy Plan:  Med d/c by Sammy Dubois MD on 10/6/2023; [facility]      Comments:     Note composed:10:41 PM 10/28/2023

## 2023-10-31 ENCOUNTER — TELEPHONE (OUTPATIENT)
Dept: CARDIOLOGY | Facility: CLINIC | Age: 78
End: 2023-10-31
Payer: MEDICARE

## 2023-10-31 NOTE — TELEPHONE ENCOUNTER
Called and spoke to pt regarding low BP. Pt states since yesterday her BP reads low. 90/48 and 83/67. Pt states she has a headache and whistling in her ears. She took some tylenol for her headache. Pt would like to know how she should proceed. Informed pt if worsen symptoms, ER evaluation. Advised Dr. Dubois and let pt know I will give her an updated call.    ----- Message from Shantel Fox sent at 10/31/2023 10:29 AM CDT -----  Contact: Pt @[phone]  1MEDICALADVICE     Patient is calling for Medical Advice regarding:    BP is low and she is getting headaches. Last time checked pressure was 83/67 at 9:15    Would like response via [facility]:  call back     Comments:   Pt is requesting a call back to advise on what to do.

## 2023-11-01 ENCOUNTER — TELEPHONE (OUTPATIENT)
Dept: CARDIOLOGY | Facility: CLINIC | Age: 78
End: 2023-11-01
Payer: MEDICARE

## 2023-11-01 NOTE — TELEPHONE ENCOUNTER
Called and LVM on pt phone. Contacted pt son to make sure the message gets to the patient ASAP being that Dr. Dubois wants her to have an ER evaluation and hold off on her BP meds and diuretics. Pt son stated he will make sure she gets the recommendation.     ----- Message from Sammy Dubois MD sent at 11/1/2023  3:24 PM CDT -----  Contact: Pt @[phone]  I recommend ER evaluation and maybe admission, she may have worsening CHF/kidney function or possible dehydration or infection. She needs to hold her BP meds and diuretics while BP is low but recommend ER eval. Thanks    ----- Message -----  From: Tomas Parham MA  Sent: 10/31/2023  11:47 AM CDT  To: Sammy Dubois MD    Called and spoke to pt regarding low BP. Pt states since yesterday her BP reads low. 90/48 and 83/67. Pt states she has a headache and whistling in her ears. She took some tylenol for her headache. Pt would like to know how she should proceed. Informed pt if worsen symptoms, ER evaluation.     Please advise.  ----- Message -----  From: Shantel Fox  Sent: 10/31/2023  10:31 AM CDT  To: Silvino Christianson Staff    1MEDICALADVICE     Patient is calling for Medical Advice regarding:    BP is low and she is getting headaches. Last time checked pressure was 83/67 at 9:15    Would like response via [facility]:  call back     Comments:   Pt is requesting a call back to advise on what to do.

## 2023-11-01 NOTE — TELEPHONE ENCOUNTER
Called and spoke to pt regarding Dr. Dubois recommendation to have pt ER evaluation,. Pt states she has taken her BP medication today and her BP read 110/68 today. Pt states she will see how she feels tomorrow and if her BP is till low at that time she will go to the ER. Pt also states she has an appointment on Friday for her echo. Pt will take her BP reading in the morning.       ----- Message from Kimberly Lucia sent at 11/1/2023  3:46 PM CDT -----  Contact: Megan  Type:  Patient Returning Call    Who Called: Megan  Who Left Message for Patient: unknown  Does the patient know what this is regarding?: blood pressure and ear ringing  Would the patient rather a call back or a response via [facility]? call  Best Call Back Number: [phone]  Additional Information: Patient reports missing a call and request another call back.

## 2023-11-03 ENCOUNTER — HOSPITAL ENCOUNTER (OUTPATIENT)
Dept: CARDIOLOGY | Facility: HOSPITAL | Age: 78
Discharge: HOME | End: 2023-11-03
Attending: INTERNAL MEDICINE
Payer: MEDICARE

## 2023-11-03 VITALS
DIASTOLIC BLOOD PRESSURE: 80 MMHG | HEIGHT: 62 IN | WEIGHT: 158 LBS | SYSTOLIC BLOOD PRESSURE: 120 MMHG | BODY MASS INDEX: 29.08 KG/M2

## 2023-11-03 DIAGNOSIS — R00.2 PALPITATIONS: ICD-10-CM

## 2023-11-03 DIAGNOSIS — R07.9 CHEST PAIN, UNSPECIFIED TYPE: ICD-10-CM

## 2023-11-03 DIAGNOSIS — N18.31 STAGE 3A CHRONIC KIDNEY DISEASE: ICD-10-CM

## 2023-11-03 DIAGNOSIS — I42.8 NONISCHEMIC CARDIOMYOPATHY: ICD-10-CM

## 2023-11-03 DIAGNOSIS — R06.09 DOE (DYSPNEA ON EXERTION): ICD-10-CM

## 2023-11-03 DIAGNOSIS — I49.3 PVC (PREMATURE VENTRICULAR CONTRACTION): ICD-10-CM

## 2023-11-03 DIAGNOSIS — I50.42 CHRONIC COMBINED SYSTOLIC AND DIASTOLIC HEART FAILURE: ICD-10-CM

## 2023-11-03 DIAGNOSIS — I50.42 CHRONIC COMBINED SYSTOLIC AND DIASTOLIC CONGESTIVE HEART FAILURE: ICD-10-CM

## 2023-11-03 LAB
AV INDEX (PROSTH): 0.79
AV MEAN GRADIENT: 2 MMHG
AV PEAK GRADIENT: 3 MMHG
AV VALVE AREA BY VELOCITY RATIO: 2.28 CM²
AV VALVE AREA: 2.5 CM²
AV VELOCITY RATIO: 0.72
BSA FOR ECHO PROCEDURE: 1.77 M2
CV ECHO LV RWT: 0.38 CM
DOP CALC AO PEAK VEL: 0.93 M/S
DOP CALC AO VTI: 16 CM
DOP CALC LVOT AREA: 3.2 CM2
DOP CALC LVOT DIAMETER: 2.01 CM
DOP CALC LVOT PEAK VEL: 0.67 M/S
DOP CALC LVOT STROKE VOLUME: 39.96 CM3
DOP CALC RVOT PEAK VEL: 0.62 M/S
DOP CALC RVOT VTI: 16.2 CM
DOP CALCLVOT PEAK VEL VTI: 12.6 CM
E WAVE DECELERATION TIME: 172.25 MSEC
E/A RATIO: 0.59
ECHO LV POSTERIOR WALL: 0.9 CM (ref 0.6–1.1)
FRACTIONAL SHORTENING: 22 % (ref 28–44)
INTERVENTRICULAR SEPTUM: 0.95 CM (ref 0.6–1.1)
IVRT: 99.9 MSEC
LA MAJOR: 3.73 CM
LA MINOR: 4.25 CM
LA WIDTH: 3.7 CM
LEFT ATRIUM SIZE: 3.03 CM
LEFT ATRIUM VOLUME INDEX MOD: 21 ML/M2
LEFT ATRIUM VOLUME INDEX: 21.9 ML/M2
LEFT ATRIUM VOLUME MOD: 36.27 CM3
LEFT ATRIUM VOLUME: 37.86 CM3
LEFT INTERNAL DIMENSION IN SYSTOLE: 3.68 CM (ref 2.1–4)
LEFT VENTRICLE DIASTOLIC VOLUME INDEX: 59.79 ML/M2
LEFT VENTRICLE DIASTOLIC VOLUME: 103.44 ML
LEFT VENTRICLE MASS INDEX: 86 G/M2
LEFT VENTRICLE SYSTOLIC VOLUME INDEX: 33.1 ML/M2
LEFT VENTRICLE SYSTOLIC VOLUME: 57.28 ML
LEFT VENTRICULAR INTERNAL DIMENSION IN DIASTOLE: 4.72 CM (ref 3.5–6)
LEFT VENTRICULAR MASS: 149.06 G
LVOT MG: 0.89 MMHG
LVOT MV: 0.43 CM/S
MV PEAK A VEL: 0.88 M/S
MV PEAK E VEL: 0.52 M/S
MV STENOSIS PRESSURE HALF TIME: 49.95 MS
MV VALVE AREA P 1/2 METHOD: 4.4 CM2
PISA MRMAX VEL: 5.72 M/S
PISA TR MAX VEL: 3.38 M/S
PV MEAN GRADIENT: 1 MMHG
RA MAJOR: 4.23 CM
RA PRESSURE ESTIMATED: 3 MMHG
RA WIDTH: 2.95 CM
RIGHT VENTRICULAR END-DIASTOLIC DIMENSION: 2.62 CM
RV TB RVSP: 6 MMHG
SINUS: 2.45 CM
STJ: 2.11 CM
TR MAX PG: 46 MMHG
TRICUSPID ANNULAR PLANE SYSTOLIC EXCURSION: 2.16 CM
TV REST PULMONARY ARTERY PRESSURE: 49 MMHG
Z-SCORE OF LEFT VENTRICULAR DIMENSION IN END DIASTOLE: -0.15
Z-SCORE OF LEFT VENTRICULAR DIMENSION IN END SYSTOLE: 1.7

## 2023-11-03 PROCEDURE — 93306 TTE W/DOPPLER COMPLETE: CPT | Mod: 26,,, | Performed by: INTERNAL MEDICINE

## 2023-11-03 PROCEDURE — 93306 TTE W/DOPPLER COMPLETE: CPT

## 2023-11-03 PROCEDURE — 93306 ECHO (CUPID ONLY): ICD-10-PCS | Mod: 26,,, | Performed by: INTERNAL MEDICINE

## 2023-11-06 ENCOUNTER — TELEPHONE (OUTPATIENT)
Dept: CARDIOLOGY | Facility: CLINIC | Age: 78
End: 2023-11-06
Payer: MEDICARE

## 2023-11-06 NOTE — TELEPHONE ENCOUNTER
Called and spoke tot pt regarding ECHO results. Results were given and understood by pt.    ----- Message from Sammy Dubois MD sent at 11/6/2023  9:17 AM CST -----  Please contact the patient and let them know that their results of ECHO reveal moderately decreased heart function as prior ECHO, will continue to titrate medications and work on improving function. Monitor BP and weights.

## 2023-11-13 ENCOUNTER — HOSPITAL ENCOUNTER (OUTPATIENT)
Dept: CARDIOLOGY | Facility: HOSPITAL | Age: 78
Discharge: HOME | End: 2023-11-13
Attending: INTERNAL MEDICINE
Payer: MEDICARE

## 2023-11-13 DIAGNOSIS — R07.9 CHEST PAIN, UNSPECIFIED TYPE: ICD-10-CM

## 2023-11-13 DIAGNOSIS — R00.2 PALPITATIONS: ICD-10-CM

## 2023-11-13 DIAGNOSIS — I49.3 PVC (PREMATURE VENTRICULAR CONTRACTION): ICD-10-CM

## 2023-11-13 DIAGNOSIS — I50.42 CHRONIC COMBINED SYSTOLIC AND DIASTOLIC CONGESTIVE HEART FAILURE: ICD-10-CM

## 2023-11-13 DIAGNOSIS — R06.09 DOE (DYSPNEA ON EXERTION): ICD-10-CM

## 2023-11-13 PROCEDURE — 93227 HOLTER MONITOR - 48 HOUR (CUPID ONLY): ICD-10-PCS | Mod: ,,, | Performed by: INTERNAL MEDICINE

## 2023-11-13 PROCEDURE — 93226 XTRNL ECG REC<48 HR SCAN A/R: CPT | Mod: PO

## 2023-11-13 PROCEDURE — 93227 XTRNL ECG REC<48 HR R&I: CPT | Mod: ,,, | Performed by: INTERNAL MEDICINE

## 2023-11-14 ENCOUNTER — TELEPHONE (OUTPATIENT)
Dept: CARDIOLOGY | Facility: HOSPITAL | Age: 78
End: 2023-11-14
Payer: MEDICARE

## 2023-11-14 ENCOUNTER — TELEPHONE (OUTPATIENT)
Dept: CARDIOLOGY | Facility: CLINIC | Age: 78
End: 2023-11-14
Payer: MEDICARE

## 2023-11-14 NOTE — TELEPHONE ENCOUNTER
----- Message from Marie Zepeda sent at 11/14/2023 10:30 AM CST -----  Contact: Megan  Megan needs a  call back at [phone], Regards to her upcoming appointments on 11/17/23.    Thanks  Td

## 2023-11-14 NOTE — TELEPHONE ENCOUNTER
Explained tests- no further questions            ----- Message from Nick Martinez sent at 11/14/2023 10:30 AM CST -----  Good Morning,    Ms. Sams would like someone to call her in regards to her upcoming appointments on Friday November 17th. She would like to know more details about the exams she is getting done.  Please call her back on [phone] at your earliest convenience. .    Thank you,  Nick   Radiology Dept.

## 2023-11-16 ENCOUNTER — TELEPHONE (OUTPATIENT)
Dept: CARDIOLOGY | Facility: HOSPITAL | Age: 78
End: 2023-11-16
Payer: MEDICARE

## 2023-11-16 ENCOUNTER — TELEPHONE (OUTPATIENT)
Dept: CARDIOLOGY | Facility: CLINIC | Age: 78
End: 2023-11-16
Payer: MEDICARE

## 2023-11-16 LAB
OHS CV EVENT MONITOR DAY: 0
OHS CV HOLTER LENGTH DECIMAL HOURS: 48
OHS CV HOLTER LENGTH HOURS: 48
OHS CV HOLTER LENGTH MINUTES: 0
OHS CV HOLTER SINUS AVERAGE HR: 69
OHS CV HOLTER SINUS MAX HR: 102
OHS CV HOLTER SINUS MIN HR: 50

## 2023-11-16 NOTE — TELEPHONE ENCOUNTER
Called and spoke to pt regarding rescheduling her appointment. Pt states she has a death in the family and needs to reschedule her stress testing scheduled for this Friday. She will like to keep her appointment with Dr. Dubois on 12/01/23. She will like a call back on next week to reschedule her stress test.     ----- Message from Cole Yanes sent at 11/16/2023  2:02 PM CST -----  Name of Who is Calling:GENEVIEVE SNIDER [[id number]]        What is the request in detail:Pt would like a call back from the office in regards to loan appt ,pt would also like to loan stress test as well. Please advise thank you       Can the clinic reply by [facility]:NO        What Number to Call Back if not in [facility]:.Telephone Information:[phone]

## 2023-11-17 ENCOUNTER — TELEPHONE (OUTPATIENT)
Dept: CARDIOLOGY | Facility: CLINIC | Age: 78
End: 2023-11-17
Payer: MEDICARE

## 2023-11-17 DIAGNOSIS — I42.8 NONISCHEMIC CARDIOMYOPATHY: ICD-10-CM

## 2023-11-17 DIAGNOSIS — I50.42 CHRONIC COMBINED SYSTOLIC AND DIASTOLIC CONGESTIVE HEART FAILURE: Primary | ICD-10-CM

## 2023-11-17 DIAGNOSIS — I49.3 PVC (PREMATURE VENTRICULAR CONTRACTION): ICD-10-CM

## 2023-11-17 RX ORDER — METOPROLOL SUCCINATE 50 MG/1
50 TABLET, EXTENDED RELEASE ORAL 2 TIMES DAILY
Qty: 180 TABLET | Refills: 1 | Status: SHIPPED | OUTPATIENT
Start: 2023-11-17

## 2023-11-17 NOTE — TELEPHONE ENCOUNTER
----- Message from Sammy Dubois MD sent at 11/17/2023 12:10 PM CST -----  Please contact the patient and let them know that their results of Holter reveals very frequent PVCS - extra beats, will increase metoprolol to 50mg twice a day, but need to monitor BP and heart ratres, I am also referring to EP - rhythm for further treatment of PVCs and discuss if candidate for ICD - defibrillator.    [facility] can you schedule with EP asap? Thanks

## 2023-11-17 NOTE — TELEPHONE ENCOUNTER
Called and spoke to pt regarding her monitor results. Results given and understood by pt. Pt spoke with mS. Ashraf and has an appointment scheduled with Dr. Hope for 01/05/24.    ----- Message from Sammy Dubois MD sent at 11/17/2023 12:10 PM CST -----  Please contact the patient and let them know that their results of Holter reveals very frequent PVCS - extra beats, will increase metoprolol to 50mg twice a day, but need to monitor BP and heart ratres, I am also referring to EP - rhythm for further treatment of PVCs and discuss if candidate for ICD - defibrillator.    Pascale can you schedule with EP asap? Thanks

## 2023-11-17 NOTE — TELEPHONE ENCOUNTER
Pt notified and verbalized understanding pb       ----- Message from Sammy Dubois MD sent at 11/17/2023 12:10 PM CST -----  Please contact the patient and let them know that their results of Holter reveals very frequent PVCS - extra beats, will increase metoprolol to 50mg twice a day, but need to monitor BP and heart ratres, I am also referring to EP - rhythm for further treatment of PVCs and discuss if candidate for ICD - defibrillator.    Pascale can you schedule with EP asap? Thanks

## 2023-11-28 ENCOUNTER — TELEPHONE (OUTPATIENT)
Dept: CARDIOLOGY | Facility: HOSPITAL | Age: 78
End: 2023-11-28
Payer: MEDICARE

## 2023-12-01 ENCOUNTER — OFFICE VISIT (OUTPATIENT)
Dept: CARDIOLOGY | Facility: CLINIC | Age: 78
End: 2023-12-01
Payer: MEDICARE

## 2023-12-01 ENCOUNTER — LAB VISIT (OUTPATIENT)
Dept: LAB | Facility: HOSPITAL | Age: 78
End: 2023-12-01
Attending: INTERNAL MEDICINE
Payer: MEDICARE

## 2023-12-01 VITALS
SYSTOLIC BLOOD PRESSURE: 106 MMHG | BODY MASS INDEX: 29.33 KG/M2 | DIASTOLIC BLOOD PRESSURE: 82 MMHG | WEIGHT: 159.38 LBS | OXYGEN SATURATION: 100 % | HEART RATE: 62 BPM | HEIGHT: 62 IN

## 2023-12-01 DIAGNOSIS — I50.42 CHRONIC COMBINED SYSTOLIC AND DIASTOLIC CONGESTIVE HEART FAILURE: Primary | ICD-10-CM

## 2023-12-01 DIAGNOSIS — I50.42 CHRONIC COMBINED SYSTOLIC AND DIASTOLIC HEART FAILURE: ICD-10-CM

## 2023-12-01 DIAGNOSIS — I10 ESSENTIAL HYPERTENSION: ICD-10-CM

## 2023-12-01 DIAGNOSIS — N18.31 STAGE 3A CHRONIC KIDNEY DISEASE: ICD-10-CM

## 2023-12-01 DIAGNOSIS — I49.3 PVC (PREMATURE VENTRICULAR CONTRACTION): ICD-10-CM

## 2023-12-01 DIAGNOSIS — R06.09 DOE (DYSPNEA ON EXERTION): ICD-10-CM

## 2023-12-01 DIAGNOSIS — R00.2 PALPITATIONS: ICD-10-CM

## 2023-12-01 DIAGNOSIS — I50.42 CHRONIC COMBINED SYSTOLIC AND DIASTOLIC CONGESTIVE HEART FAILURE: ICD-10-CM

## 2023-12-01 LAB
ANION GAP SERPL CALC-SCNC: 9 MMOL/L (ref 8–16)
BUN SERPL-MCNC: 21 MG/DL (ref 8–23)
CALCIUM SERPL-MCNC: 9.9 MG/DL (ref 8.7–10.5)
CHLORIDE SERPL-SCNC: 104 MMOL/L (ref 95–110)
CO2 SERPL-SCNC: 27 MMOL/L (ref 23–29)
CREAT SERPL-MCNC: 1.2 MG/DL (ref 0.5–1.4)
EST. GFR  (NO RACE VARIABLE): 46 ML/MIN/1.73 M^2
GLUCOSE SERPL-MCNC: 89 MG/DL (ref 70–110)
MAGNESIUM SERPL-MCNC: 2.2 MG/DL (ref 1.6–2.6)
POTASSIUM SERPL-SCNC: 4.6 MMOL/L (ref 3.5–5.1)
SODIUM SERPL-SCNC: 140 MMOL/L (ref 136–145)

## 2023-12-01 PROCEDURE — 1126F PR PAIN SEVERITY QUANTIFIED, NO PAIN PRESENT: ICD-10-PCS | Mod: CPTII,S$GLB,, | Performed by: INTERNAL MEDICINE

## 2023-12-01 PROCEDURE — 3288F PR FALLS RISK ASSESSMENT DOCUMENTED: ICD-10-PCS | Mod: CPTII,S$GLB,, | Performed by: INTERNAL MEDICINE

## 2023-12-01 PROCEDURE — 80048 BASIC METABOLIC PNL TOTAL CA: CPT | Performed by: INTERNAL MEDICINE

## 2023-12-01 PROCEDURE — 99999 PR PBB SHADOW E&M-EST. PATIENT-LVL III: ICD-10-PCS | Mod: PBBFAC,,, | Performed by: INTERNAL MEDICINE

## 2023-12-01 PROCEDURE — 1101F PT FALLS ASSESS-DOCD LE1/YR: CPT | Mod: CPTII,S$GLB,, | Performed by: INTERNAL MEDICINE

## 2023-12-01 PROCEDURE — 3074F PR MOST RECENT SYSTOLIC BLOOD PRESSURE < 130 MM HG: ICD-10-PCS | Mod: CPTII,S$GLB,, | Performed by: INTERNAL MEDICINE

## 2023-12-01 PROCEDURE — 1159F MED LIST DOCD IN RCRD: CPT | Mod: CPTII,S$GLB,, | Performed by: INTERNAL MEDICINE

## 2023-12-01 PROCEDURE — 1126F AMNT PAIN NOTED NONE PRSNT: CPT | Mod: CPTII,S$GLB,, | Performed by: INTERNAL MEDICINE

## 2023-12-01 PROCEDURE — 1101F PR PT FALLS ASSESS DOC 0-1 FALLS W/OUT INJ PAST YR: ICD-10-PCS | Mod: CPTII,S$GLB,, | Performed by: INTERNAL MEDICINE

## 2023-12-01 PROCEDURE — 83735 ASSAY OF MAGNESIUM: CPT | Performed by: INTERNAL MEDICINE

## 2023-12-01 PROCEDURE — 1160F RVW MEDS BY RX/DR IN RCRD: CPT | Mod: CPTII,S$GLB,, | Performed by: INTERNAL MEDICINE

## 2023-12-01 PROCEDURE — 83880 ASSAY OF NATRIURETIC PEPTIDE: CPT | Performed by: INTERNAL MEDICINE

## 2023-12-01 PROCEDURE — 36415 COLL VENOUS BLD VENIPUNCTURE: CPT | Performed by: INTERNAL MEDICINE

## 2023-12-01 PROCEDURE — 3288F FALL RISK ASSESSMENT DOCD: CPT | Mod: CPTII,S$GLB,, | Performed by: INTERNAL MEDICINE

## 2023-12-01 PROCEDURE — 1159F PR MEDICATION LIST DOCUMENTED IN MEDICAL RECORD: ICD-10-PCS | Mod: CPTII,S$GLB,, | Performed by: INTERNAL MEDICINE

## 2023-12-01 PROCEDURE — 1160F PR REVIEW ALL MEDS BY PRESCRIBER/CLIN PHARMACIST DOCUMENTED: ICD-10-PCS | Mod: CPTII,S$GLB,, | Performed by: INTERNAL MEDICINE

## 2023-12-01 PROCEDURE — 99214 OFFICE O/P EST MOD 30 MIN: CPT | Mod: S$GLB,,, | Performed by: INTERNAL MEDICINE

## 2023-12-01 PROCEDURE — 3079F PR MOST RECENT DIASTOLIC BLOOD PRESSURE 80-89 MM HG: ICD-10-PCS | Mod: CPTII,S$GLB,, | Performed by: INTERNAL MEDICINE

## 2023-12-01 PROCEDURE — 3079F DIAST BP 80-89 MM HG: CPT | Mod: CPTII,S$GLB,, | Performed by: INTERNAL MEDICINE

## 2023-12-01 PROCEDURE — 99214 PR OFFICE/OUTPT VISIT, EST, LEVL IV, 30-39 MIN: ICD-10-PCS | Mod: S$GLB,,, | Performed by: INTERNAL MEDICINE

## 2023-12-01 PROCEDURE — 99999 PR PBB SHADOW E&M-EST. PATIENT-LVL III: CPT | Mod: PBBFAC,,, | Performed by: INTERNAL MEDICINE

## 2023-12-01 PROCEDURE — 3074F SYST BP LT 130 MM HG: CPT | Mod: CPTII,S$GLB,, | Performed by: INTERNAL MEDICINE

## 2023-12-01 NOTE — PROGRESS NOTES
Subjective:   Patient ID:  Megan Sams is a 78 y.o. female who presents for cardiac consult of Follow-up (6 week follow up.)      Referral by: No referring provider defined for this encounter.     Reason for consult: CHF      HPI  The patient came in today for cardiac consult of Follow-up (6 week follow up.)      Megan Sams is a 78 y.o. female pt with NICM EF 35%, PVCs, HTN, obesity, CKD presents for CV follow up.     10/6/23  Pt has been seen by Mikala Hughes MD recently 2 months prior in [place]. She has seen Dr. Gilliland also in 2021/2022.    She went to Dr. Mayen recently -    One-week of more dyspnea on exertion without associated chest pain left-sided chest pain no cough complaint she saw her cardiologist least a couple weeks ago prior to the dyspnea on exertion she is located [facility] ;would like to establish care with cardiologist closer to home    Component Ref Range & Units 1 d ago 6 yr ago   BNP 0 - 99 pg/mL 3,942 High   192 High  CM      [facility]; CXR stable.    BP and Hr stable today. BM 29 - 163 lbs.   She has lost some weight, she has not been eating much.        12/1/23  ECHO 11/2023 with EF 30-35%, mod TR, PASP 49 mmHg. Holter reveals very frequent PVCS - extra beats, will increase metoprolol to 50mg twice a day, but need to monitor BP and heart ratres, I am also referring to EP     BMP improving.     BNP (pg/mL)   Date Value   10/20/2023 579 (H)   10/05/2023 3,942 (H)   02/14/2017 192 (H)      BP low normal 106/80s. BMI 60    Patient is compliant with medications.        Results for orders placed during the hospital encounter of 11/03/23    Echo    Interpretation Summary    Left Ventricle: The left ventricle is mildly dilated. Normal wall thickness. Moderate global hypokinesis present. There is moderately reduced systolic function with a visually estimated ejection fraction of 30 - 35%. There is normal diastolic function.    Right Ventricle: Normal right  ventricular cavity size. Wall thickness is normal. Right ventricle wall motion  is normal. Systolic function is normal.    Mitral Valve: There is moderate regurgitation with a centrally directed jet.    Tricuspid Valve: There is moderate regurgitation with a centrally directed jet.    Pulmonary Artery: The estimated pulmonary artery systolic pressure is 49 mmHg.    IVC/SVC: Normal venous pressure at 3 mmHg.      Normal sinus rhythm with sinus arrhythmia   Possible Left atrial enlargement   Minimal voltage criteria for LVH, may be normal variant ( William product )   Septal infarct ,age undetermined   T wave abnormality, consider lateral ischemia   Abnormal ECG   When compared with ECG of 27-JUL-2022 15:33,   Septal infarct is now Present   Confirmed by ADDIE MARIA MD ([id number]) on 10/5/2023 9:06:18 PM     Results for orders placed during the hospital encounter of 08/25/22    Echo    Interpretation Summary  · Normal right ventricular size with normal right ventricular systolic function.  · The left ventricle is normal in size with moderately decreased systolic function.  · The estimated ejection fraction is 35%.  · Grade I left ventricular diastolic dysfunction.  · Normal central venous pressure (3 mmHg).  · The estimated PA systolic pressure is 41 mmHg.  · There is pulmonary hypertension.  · There are segmental left ventricular wall motion abnormalities.  · Mild mitral regurgitation.      Echo: 2020: EF 40%  Cardiac cath: 2009: normal coronaries     No results found for this or any previous visit.      No results found for this or any previous visit.      Holter monitor - 48 hour    Result Date: 11/16/2023    The predominant rhythm is sinus.              Past Medical History:   Diagnosis Date    Arthritis     Blood transfusion     CHF (congestive heart failure)     Chronic kidney disease     Depression     Hypertension        Past Surgical History:   Procedure Laterality Date    HYSTERECTOMY  1978    OOPHORECTOMY   1978       Social History     Tobacco Use    Smoking status: Never    Smokeless tobacco: Never   Substance Use Topics    Alcohol use: No     Alcohol/week: 0.0 standard drinks of alcohol    Drug use: No       Family History   Problem Relation Age of Onset    Early death Mother     Heart disease Mother     Hypertension Mother     Kidney disease Father     Heart disease Brother        Patient's Medications   New Prescriptions    No medications on file   Previous Medications    ACETAMINOPHEN (TYLENOL) 500 MG TABLET    Take 500 mg by mouth every 6 (six) hours as needed.    ALLOPURINOL (ZYLOPRIM) 100 MG TABLET    TAKE 2 TABLETS BY MOUTH EVERY DAY    ASPIRIN (ECOTRIN) 81 MG EC TABLET    Take 81 mg by mouth once daily.    EMPAGLIFLOZIN (JARDIANCE) 10 MG TABLET    Take 1 tablet (10 mg total) by mouth once daily.    FUROSEMIDE (LASIX) 20 MG TABLET    Take 1 tablet (20 mg total) by mouth once daily.    METOPROLOL SUCCINATE (TOPROL-XL) 50 MG 24 HR TABLET    Take 1 tablet (50 mg total) by mouth 2 (two) times daily.    MULTIVIT WITH CALCIUM,IRON,MIN (MULTIPLE VITAMIN, WOMENS ORAL)    Take by mouth once daily.    SACUBITRIL-VALSARTAN (ENTRESTO) 24-26 MG PER TABLET    Take 1 tablet by mouth 2 (two) times daily.    SPIRONOLACTONE (ALDACTONE) 50 MG TABLET    TAKE 1 TABLET BY MOUTH EVERY DAY   Modified Medications    No medications on file   Discontinued Medications    No medications on file       Review of Systems   Constitutional:  Positive for malaise/fatigue.   HENT: Negative.     Eyes: Negative.    Respiratory:  Positive for shortness of breath.    Cardiovascular:  Positive for chest pain and palpitations.   Gastrointestinal: Negative.    Genitourinary: Negative.    Musculoskeletal: Negative.    Skin: Negative.    Neurological: Negative.    Endo/Heme/Allergies: Negative.    Psychiatric/Behavioral: Negative.     All 12 systems otherwise negative.      Wt Readings from Last 3 Encounters:   12/01/23 72.3 kg (159 lb 6.3 oz)  "  11/03/23 71.7 kg (158 lb)   10/09/23 71.8 kg (158 lb 4.6 oz)     Temp Readings from Last 3 Encounters:   10/09/23 96.9 °F (36.1 °C) (Tympanic)   10/05/23 97.9 °F (36.6 °C) (Tympanic)   09/08/23 97.5 °F (36.4 °C) (Tympanic)     BP Readings from Last 3 Encounters:   12/01/23 106/82   11/03/23 120/80   10/09/23 124/82     Pulse Readings from Last 3 Encounters:   12/01/23 62   10/09/23 75   10/06/23 72       /82 (BP Location: Left arm, Patient Position: Sitting, BP Method: Large (Manual))   Pulse 62   Ht 5' 2" (1.575 m)   Wt 72.3 kg (159 lb 6.3 oz)   SpO2 100%   BMI 29.15 kg/m²     Objective:   Physical Exam  Vitals and nursing note reviewed.   Constitutional:       General: She is not in acute distress.     Appearance: She is well-developed. She is obese. She is not diaphoretic.   HENT:      Head: Normocephalic and atraumatic.      Nose: Nose normal.   Eyes:      General: No scleral icterus.     Conjunctiva/sclera: Conjunctivae normal.   Neck:      Thyroid: No thyromegaly.      Vascular: No JVD.   Cardiovascular:      Rate and Rhythm: Normal rate and regular rhythm.      Heart sounds: S1 normal and S2 normal. Murmur heard.      No friction rub. No gallop. No S3 or S4 sounds.   Pulmonary:      Effort: Pulmonary effort is normal. No respiratory distress.      Breath sounds: Normal breath sounds. No stridor. No wheezing or rales.   Chest:      Chest wall: No tenderness.   Abdominal:      General: Bowel sounds are normal. There is no distension.      Palpations: Abdomen is soft. There is no mass.      Tenderness: There is no abdominal tenderness. There is no rebound.   Genitourinary:     Comments: Deferred  Musculoskeletal:         General: No tenderness or deformity. Normal range of motion.      Cervical back: Normal range of motion and neck supple.   Lymphadenopathy:      Cervical: No cervical adenopathy.   Skin:     General: Skin is warm and dry.      Coloration: Skin is not pale.      Findings: No erythema " or rash.   Neurological:      Mental Status: She is alert and oriented to person, place, and time.      Motor: No abnormal muscle tone.      Coordination: Coordination normal.   Psychiatric:         Behavior: Behavior normal.         Thought Content: Thought content normal.         Judgment: Judgment normal.         Lab Results   Component Value Date     10/20/2023    K 4.7 10/20/2023     10/20/2023    CO2 24 10/20/2023    BUN 24 (H) 10/20/2023    CREATININE 1.3 10/20/2023    GLU 90 10/20/2023    HGBA1C 5.4 07/26/2023    MG 2.2 10/20/2023    AST 37 10/20/2023    ALT 20 10/20/2023    ALBUMIN 3.2 (L) 10/20/2023    PROT 7.4 10/20/2023    BILITOT 0.7 10/20/2023    WBC 6.32 10/05/2023    HGB 14.4 10/05/2023    HCT 45.9 10/05/2023    MCV 87 10/05/2023     10/05/2023    INR 1.0 02/14/2017    TSH 1.922 09/09/2020    CHOL 174 07/26/2023    HDL 50 07/26/2023    LDLCALC 104.0 07/26/2023    TRIG 100 07/26/2023     (H) 10/20/2023         BNP (pg/mL)   Date Value   10/20/2023 579 (H)   10/05/2023 3,942 (H)   02/14/2017 192 (H)     INR (no units)   Date Value   02/14/2017 1.0   03/02/2014 1.0          Assessment:      1. Chronic combined systolic and diastolic congestive heart failure    2. PVC (premature ventricular contraction)    3. Palpitations    4. PAGE (dyspnea on exertion)    5. Chronic combined systolic and diastolic heart failure    6. Stage 3a chronic kidney disease    7. Essential hypertension          Plan:     NICM  EF 35%; Chest pain, PAGE;  BNP - 3942 --> 579  - cont Toprol and ARB - change to Entresto BID  - started on Jardiance recently  - ECHO 11/2023 with EF 30-35%, mod TR, PASP 49 mmHg.   - needs low salt diet     2. HTN, PVCs  - titrate meds  - frequent PVCs - referred to EP  - increased BB to 50mg BID    3. Obesity - losing weight - 159 lbs  - cont weight loss    4. CKD3  - cont to monitor    Thank you for allowing me to participate in this patient's care. Please do not hesitate to  contact me with any questions or concerns. Consult note has been forwarded to the referral physician.

## 2023-12-02 LAB — BNP SERPL-MCNC: 612 PG/ML (ref 0–99)

## 2023-12-04 ENCOUNTER — TELEPHONE (OUTPATIENT)
Dept: CARDIOLOGY | Facility: CLINIC | Age: 78
End: 2023-12-04
Payer: MEDICARE

## 2023-12-04 NOTE — TELEPHONE ENCOUNTER
Called and spoke to pt regarding her lab work. Results given and understood by pt. Pt sates she is feeling ok with no edema or breathing issues. Pt advised to continue her current medication dosages and monitor BP/weights.     ----- Message from Sammy Dubois MD sent at 12/4/2023 10:11 AM CST -----  Please contact the patient and let them know that their results reveal elevated BNP due to fluid, if having more swelling/breathing issues recommend doubling up lasix for 3 days, but if feeling ok now can continue current medication doses and monitor BP and weights. Rest of labs are stable.

## 2023-12-28 ENCOUNTER — HOSPITAL ENCOUNTER (OUTPATIENT)
Dept: CARDIOLOGY | Facility: HOSPITAL | Age: 78
Discharge: HOME | End: 2023-12-28
Attending: INTERNAL MEDICINE
Payer: MEDICARE

## 2023-12-28 ENCOUNTER — HOSPITAL ENCOUNTER (OUTPATIENT)
Dept: RADIOLOGY | Facility: HOSPITAL | Age: 78
Discharge: HOME | End: 2023-12-28
Attending: INTERNAL MEDICINE
Payer: MEDICARE

## 2023-12-28 DIAGNOSIS — I49.3 PVC (PREMATURE VENTRICULAR CONTRACTION): ICD-10-CM

## 2023-12-28 DIAGNOSIS — R00.2 PALPITATIONS: ICD-10-CM

## 2023-12-28 DIAGNOSIS — N18.31 STAGE 3A CHRONIC KIDNEY DISEASE: ICD-10-CM

## 2023-12-28 DIAGNOSIS — I50.42 CHRONIC COMBINED SYSTOLIC AND DIASTOLIC HEART FAILURE: ICD-10-CM

## 2023-12-28 DIAGNOSIS — I50.42 CHRONIC COMBINED SYSTOLIC AND DIASTOLIC CONGESTIVE HEART FAILURE: ICD-10-CM

## 2023-12-28 DIAGNOSIS — R07.9 CHEST PAIN, UNSPECIFIED TYPE: ICD-10-CM

## 2023-12-28 DIAGNOSIS — R06.09 DOE (DYSPNEA ON EXERTION): ICD-10-CM

## 2023-12-28 DIAGNOSIS — I42.8 NONISCHEMIC CARDIOMYOPATHY: ICD-10-CM

## 2023-12-28 LAB
CV STRESS BASE HR: 57 BPM
DIASTOLIC BLOOD PRESSURE: 87 MMHG
NUC REST EJECTION FRACTION: 34
NUC STRESS EJECTION FRACTION: 23 %
OHS CV CPX 85 PERCENT MAX PREDICTED HEART RATE MALE: 119
OHS CV CPX MAX PREDICTED HEART RATE: 140
OHS CV CPX PATIENT IS FEMALE: 1
OHS CV CPX PATIENT IS MALE: 0
OHS CV CPX PEAK DIASTOLIC BLOOD PRESSURE: 90 MMHG
OHS CV CPX PEAK HEAR RATE: 70 BPM
OHS CV CPX PEAK RATE PRESSURE PRODUCT: NORMAL
OHS CV CPX PEAK SYSTOLIC BLOOD PRESSURE: 166 MMHG
OHS CV CPX PERCENT MAX PREDICTED HEART RATE ACHIEVED: 52
OHS CV CPX RATE PRESSURE PRODUCT PRESENTING: 9291
SYSTOLIC BLOOD PRESSURE: 163 MMHG

## 2023-12-28 PROCEDURE — 93016 CV STRESS TEST SUPVJ ONLY: CPT | Mod: ,,, | Performed by: INTERNAL MEDICINE

## 2023-12-28 PROCEDURE — 63600175 PHARM REV CODE 636 W HCPCS: Performed by: INTERNAL MEDICINE

## 2023-12-28 PROCEDURE — 93018 NUCLEAR STRESS - CARDIOLOGY INTERPRETED (CUPID ONLY): ICD-10-PCS | Mod: ,,, | Performed by: INTERNAL MEDICINE

## 2023-12-28 PROCEDURE — 78452 HT MUSCLE IMAGE SPECT MULT: CPT

## 2023-12-28 PROCEDURE — 78452 NUCLEAR STRESS - CARDIOLOGY INTERPRETED (CUPID ONLY): ICD-10-PCS | Mod: 26,,, | Performed by: INTERNAL MEDICINE

## 2023-12-28 PROCEDURE — 78452 HT MUSCLE IMAGE SPECT MULT: CPT | Mod: 26,,, | Performed by: INTERNAL MEDICINE

## 2023-12-28 PROCEDURE — 93016 NUCLEAR STRESS - CARDIOLOGY INTERPRETED (CUPID ONLY): ICD-10-PCS | Mod: ,,, | Performed by: INTERNAL MEDICINE

## 2023-12-28 PROCEDURE — 93017 CV STRESS TEST TRACING ONLY: CPT

## 2023-12-28 PROCEDURE — A9502 TC99M TETROFOSMIN: HCPCS

## 2023-12-28 PROCEDURE — 93018 CV STRESS TEST I&R ONLY: CPT | Mod: ,,, | Performed by: INTERNAL MEDICINE

## 2023-12-28 RX ORDER — REGADENOSON 0.08 MG/ML
0.4 INJECTION, SOLUTION INTRAVENOUS ONCE
Status: COMPLETED | OUTPATIENT
Start: 2023-12-28 | End: 2023-12-28

## 2023-12-28 RX ADMIN — REGADENOSON 0.4 MG: 0.08 INJECTION, SOLUTION INTRAVENOUS at 01:12

## 2023-12-29 ENCOUNTER — TELEPHONE (OUTPATIENT)
Dept: CARDIOLOGY | Facility: CLINIC | Age: 78
End: 2023-12-29
Payer: MEDICARE

## 2023-12-29 NOTE — TELEPHONE ENCOUNTER
Called and spoke to pt regarding her stress test results. Results given and understood by pt. Confirmed pt follow up appointment scheduled for 01/26/24.    ----- Message from Sammy Dubois MD sent at 12/28/2023  4:50 PM CST -----  Please contact the patient and let them know that their results of nuclear stress test reveals areas of scar in heart with overall function 30-35%, will continue current medical therapy but if having worsening chest pain or breathing issues will discuss angiogram/heart cath. Continue to monitor BP and heart rates, and follow up with me in 4-6 weeks or sooner if needed.

## 2024-01-11 DIAGNOSIS — R00.2 PALPITATIONS: ICD-10-CM

## 2024-01-11 DIAGNOSIS — I50.42 CHRONIC COMBINED SYSTOLIC AND DIASTOLIC CONGESTIVE HEART FAILURE: Primary | ICD-10-CM

## 2024-01-11 DIAGNOSIS — R06.09 DOE (DYSPNEA ON EXERTION): ICD-10-CM

## 2024-01-19 ENCOUNTER — HOSPITAL ENCOUNTER (OUTPATIENT)
Dept: CARDIOLOGY | Facility: HOSPITAL | Age: 79
Discharge: HOME | End: 2024-01-19
Attending: INTERNAL MEDICINE
Payer: MEDICARE

## 2024-01-19 ENCOUNTER — OFFICE VISIT (OUTPATIENT)
Dept: CARDIOLOGY | Facility: CLINIC | Age: 79
End: 2024-01-19
Payer: MEDICARE

## 2024-01-19 VITALS — WEIGHT: 163.13 LBS | BODY MASS INDEX: 29.84 KG/M2

## 2024-01-19 VITALS
SYSTOLIC BLOOD PRESSURE: 130 MMHG | BODY MASS INDEX: 30 KG/M2 | HEIGHT: 62 IN | OXYGEN SATURATION: 96 % | DIASTOLIC BLOOD PRESSURE: 80 MMHG | HEART RATE: 62 BPM | WEIGHT: 163 LBS

## 2024-01-19 DIAGNOSIS — I49.3 PVC (PREMATURE VENTRICULAR CONTRACTION): ICD-10-CM

## 2024-01-19 DIAGNOSIS — E66.9 OBESITY, CLASS I, BMI 30-34.9: ICD-10-CM

## 2024-01-19 DIAGNOSIS — Z79.899 AT RISK FOR AMIODARONE TOXICITY WITH LONG TERM USE: ICD-10-CM

## 2024-01-19 DIAGNOSIS — N18.31 STAGE 3A CHRONIC KIDNEY DISEASE: Chronic | ICD-10-CM

## 2024-01-19 DIAGNOSIS — I42.8 NONISCHEMIC CARDIOMYOPATHY: ICD-10-CM

## 2024-01-19 DIAGNOSIS — I50.42 CHRONIC COMBINED SYSTOLIC AND DIASTOLIC CONGESTIVE HEART FAILURE: ICD-10-CM

## 2024-01-19 DIAGNOSIS — R06.09 DOE (DYSPNEA ON EXERTION): ICD-10-CM

## 2024-01-19 DIAGNOSIS — Z91.89 AT RISK FOR AMIODARONE TOXICITY WITH LONG TERM USE: ICD-10-CM

## 2024-01-19 DIAGNOSIS — I10 ESSENTIAL HYPERTENSION: Chronic | ICD-10-CM

## 2024-01-19 DIAGNOSIS — R00.2 PALPITATIONS: ICD-10-CM

## 2024-01-19 DIAGNOSIS — I50.42 CHRONIC COMBINED SYSTOLIC AND DIASTOLIC CONGESTIVE HEART FAILURE: Primary | ICD-10-CM

## 2024-01-19 DIAGNOSIS — I42.8 NONISCHEMIC CARDIOMYOPATHY: Primary | ICD-10-CM

## 2024-01-19 PROCEDURE — 99999 PR PBB SHADOW E&M-EST. PATIENT-LVL IV: CPT | Mod: PBBFAC,,, | Performed by: INTERNAL MEDICINE

## 2024-01-19 PROCEDURE — 93005 ELECTROCARDIOGRAM TRACING: CPT

## 2024-01-19 PROCEDURE — 99205 OFFICE O/P NEW HI 60 MIN: CPT | Mod: S$GLB,,, | Performed by: INTERNAL MEDICINE

## 2024-01-19 PROCEDURE — 93010 ELECTROCARDIOGRAM REPORT: CPT | Mod: ,,, | Performed by: INTERNAL MEDICINE

## 2024-01-19 RX ORDER — AMIODARONE HYDROCHLORIDE 200 MG/1
300 TABLET ORAL DAILY
Qty: 140 TABLET | Refills: 1 | Status: SHIPPED | OUTPATIENT
Start: 2024-01-19 | End: 2024-01-19

## 2024-01-19 RX ORDER — AMIODARONE HYDROCHLORIDE 200 MG/1
TABLET ORAL
Qty: 140 TABLET | Refills: 1 | Status: SHIPPED | OUTPATIENT
Start: 2024-01-19 | End: 2024-04-12

## 2024-01-19 NOTE — PROGRESS NOTES
Subjective:   Patient ID:  Megan Sams is a 79 y.o. female     Chief complaint:PVCs    HPI  New patient to me. (01/19/2024 )  Referred by Dr Dubois  for evaluation and management of PVCs   --   Background as gleaned from patient's records:  NICM EF 35%, PVCs, HTN, obesity, CKD     12/1/23  ECHO 11/2023 with EF 30-35%, mod TR, PASP 49 mmHg. Holter reveals very frequent PVCS - extra beats,   >. Metoprolol increased to 50mg twice a day,   Pat referred to me       BNP (pg/mL)   Date Value   10/20/2023 579 (H)   10/05/2023 3,942 (H)   02/14/2017 192 (H)      BP low normal 106/80s. P 60      Echo November of 2023  Interpretation Summary    Left Ventricle: The left ventricle is mildly dilated. Normal wall thickness. Moderate global hypokinesis present. There is moderately reduced systolic function with a visually estimated ejection fraction of 30 - 35%. There is normal diastolic function.    Right Ventricle: Normal right ventricular cavity size. Wall thickness is normal. Right ventricle wall motion  is normal. Systolic function is normal.    Mitral Valve: There is moderate regurgitation with a centrally directed jet.    Tricuspid Valve: There is moderate regurgitation with a centrally directed jet.    Pulmonary Artery: The estimated pulmonary artery systolic pressure is 49 mmHg.    IVC/SVC: Normal venous pressure at 3 mmHg.   No sig change from 8/2022     Echo: 2020: EF 40%  Cardiac cath: 2009: normal coronaries      Additional details gleaned from today's interview :  She is here for opinion re: need for ICD and ? Relationship of PVCs to CMP  Patient says she feels well at this time.  She is taking her medications as prescribed.  I have reviewed the actual image of the ECG tracing obtained today and it shows NSR with normal intervals.One PAC noted. HR is 52.    Current Outpatient Medications   Medication Sig    acetaminophen (TYLENOL) 500 MG tablet Take 500 mg by mouth every 6 (six) hours as needed.    allopurinoL  (ZYLOPRIM) 100 MG tablet TAKE 2 TABLETS BY MOUTH EVERY DAY    aspirin (ECOTRIN) 81 MG EC tablet Take 81 mg by mouth once daily.    empagliflozin (JARDIANCE) 10 mg tablet Take 1 tablet (10 mg total) by mouth once daily.    furosemide (LASIX) 20 MG tablet Take 1 tablet (20 mg total) by mouth once daily.    metoprolol succinate (TOPROL-XL) 50 MG 24 hr tablet Take 1 tablet (50 mg total) by mouth 2 (two) times daily.    MULTIVIT WITH CALCIUM,IRON,MIN (MULTIPLE VITAMIN, WOMENS ORAL) Take by mouth once daily.    sacubitriL-valsartan (ENTRESTO) 24-26 mg per tablet Take 1 tablet by mouth 2 (two) times daily.    spironolactone (ALDACTONE) 50 MG tablet TAKE 1 TABLET BY MOUTH EVERY DAY    amiodarone (PACERONE) 200 MG Tab Take one tablet in the am and 1/2 tab at bedtime     No current facility-administered medications for this visit.       Review of Systems     Constitutional: Reviewed  for decreased appetite, weight gain and weight loss.   HENT: Reviewed for nosebleeds.    Eyes:  Reviewed for blurred vision and visual disturbance.   Cardiovascular: Reviewed for chest pain, claudication, cyanosis,dyspnea on exertion, leg swelling, orthopnea,paroxysmal nocturnal dyspnearregular heartbeats, palpitations, near-syncope, and syncope.   Respiratory: Reviewed for cough, shortness of breath, wheezing, sleep disturbances due to breathing and snoring, .    Endocrine: Reviewed for heat intolerance.   Hematologic/Lymphatic: Reviewed for easy bruisability/bleeding.   Skin: Reviewed for rash.   Musculoskeletal: Reviewed for muscle weakness and myalgias.   Gastrointestinal: Reviewed for abdominal pain, anorexia, melena, nausea and vomiting.   Genitourinary: Reviewed for menorrhagia, frequency, nocturia and incontinence.   Neurological: Reviewed for excessive daytime sleepiness, dizziness, vertigo, weakness, headaches, loss of balance and seizures,   Psychiatric/Behavioral:  Reviewed for insomnia, altered mental status, depression, anxiety and  nervousness.       All symptoms reviewed above were negative except for lightheadedness, numbness and tingling, arthritic complaints, urinary frequency diurnal and nocturnal, fatigue and weight loss     Social History     Tobacco Use   Smoking Status Never   Smokeless Tobacco Never       reports no history of alcohol use.   Past Medical History:   Diagnosis Date    Arthritis     Blood transfusion     CHF (congestive heart failure)     Chronic kidney disease     Depression     Hypertension      Family History   Problem Relation Age of Onset    Early death Mother     Heart disease Mother     Hypertension Mother     Kidney disease Father     Heart disease Brother      Social History     Socioeconomic History    Marital status:    Tobacco Use    Smoking status: Never    Smokeless tobacco: Never   Substance and Sexual Activity    Alcohol use: No     Alcohol/week: 0.0 standard drinks of alcohol    Drug use: No    Sexual activity: Never     Past Surgical History:   Procedure Laterality Date    HYSTERECTOMY  1978    OOPHORECTOMY  1978       Objective:   Physical Exam  Vitals and nursing note reviewed.   Constitutional:       Appearance: She is well-developed. She is obese.      Comments: Overweight   HENT:      Head: Normocephalic and atraumatic.      Right Ear: External ear normal.      Left Ear: External ear normal.   Eyes:      General: No scleral icterus.        Left eye: No discharge.      Conjunctiva/sclera: Conjunctivae normal.      Left eye: Left conjunctiva is not injected. No hemorrhage.     Pupils: Pupils are equal, round, and reactive to light.   Neck:      Thyroid: No thyromegaly.   Cardiovascular:      Rate and Rhythm: Normal rate and regular rhythm.      Pulses: Intact distal pulses.           Carotid pulses are 2+ on the right side and 2+ on the left side.       Radial pulses are 2+ on the right side and 2+ on the left side.        Dorsalis pedis pulses are 2+ on the right side and 2+ on the left  "side.        Posterior tibial pulses are 2+ on the right side and 2+ on the left side.      Heart sounds: Normal heart sounds. No midsystolic click and no opening snap. No murmur heard.     No friction rub. No gallop. No S3 or S4 sounds.   Pulmonary:      Effort: Pulmonary effort is normal.      Breath sounds: Normal breath sounds.   Abdominal:      General: There is no distension.      Palpations: Abdomen is soft. Abdomen is not rigid. There is no hepatomegaly.      Tenderness: There is no abdominal tenderness. There is no guarding.      Comments: Obese abdomen   Musculoskeletal:      Cervical back: Normal range of motion and neck supple.      Right lower leg: No swelling.      Left lower leg: No swelling.      Right ankle: No swelling.      Left ankle: No swelling.   Skin:     General: Skin is warm and dry.      Findings: No rash.      Nails: There is no clubbing.   Neurological:      Mental Status: She is alert and oriented to person, place, and time.      Cranial Nerves: No cranial nerve deficit.      Gait: Gait normal.   Psychiatric:         Speech: Speech normal.         Behavior: Behavior normal.         Thought Content: Thought content normal.     /80 (BP Location: Right arm, Patient Position: Sitting, BP Method: Medium (Manual))   Pulse 62   Ht 5' 2" (1.575 m)   Wt 73.9 kg (163 lb)   SpO2 96%   BMI 29.81 kg/m²      Results for orders placed during the hospital encounter of 11/03/23    Echo    Interpretation Summary    Left Ventricle: The left ventricle is mildly dilated. Normal wall thickness. Moderate global hypokinesis present. There is moderately reduced systolic function with a visually estimated ejection fraction of 30 - 35%. There is normal diastolic function.    Right Ventricle: Normal right ventricular cavity size. Wall thickness is normal. Right ventricle wall motion  is normal. Systolic function is normal.    Mitral Valve: There is moderate regurgitation with a centrally directed jet.   "  Tricuspid Valve: There is moderate regurgitation with a centrally directed jet.    Pulmonary Artery: The estimated pulmonary artery systolic pressure is 49 mmHg.    IVC/SVC: Normal venous pressure at 3 mmHg.    WBC   Date Value Ref Range Status   10/05/2023 6.32 3.90 - 12.70 K/uL Final     Hematocrit   Date Value Ref Range Status   10/05/2023 45.9 37.0 - 48.5 % Final     Hemoglobin   Date Value Ref Range Status   10/05/2023 14.4 12.0 - 16.0 g/dL Final     Lab Results   Component Value Date     10/05/2023     Lab Results   Component Value Date    CREATININE 1.2 12/01/2023    EGFRNORACEVR 46 (A) 12/01/2023    K 4.6 12/01/2023     Lab Results   Component Value Date     (H) 12/01/2023         Assessment:    Recent episode of acute CHF.  Now subsided.  Moderate decrease in ejection fraction.  4.3% STEPHEN burden  1. Chronic combined systolic and diastolic congestive heart failure    2. Essential hypertension    3. Nonischemic cardiomyopathy    4. PVC (premature ventricular contraction)    5. Stage 3a chronic kidney disease    6. Obesity, Class I, BMI 30-34.9    7. At risk for amiodarone toxicity with long term use        Plan:    May be a reasonable candidate for ICD - but would like first to give her short trial of amio   Orders Placed This Encounter   Procedures    TSH     Standing Status:   Future     Number of Occurrences:   1     Standing Expiration Date:   3/19/2025    Amiodarone Level     Standing Status:   Future     Standing Expiration Date:   7/19/2024     Follow up in about 2 months (around 3/19/2024), or if symptoms worsen or fail to improve.  Medications Discontinued During This Encounter   Medication Reason    amiodarone (PACERONE) 200 MG Tab      Outpatient Encounter Medications as of 1/19/2024   Medication Sig Dispense Refill    acetaminophen (TYLENOL) 500 MG tablet Take 500 mg by mouth every 6 (six) hours as needed.      allopurinoL (ZYLOPRIM) 100 MG tablet TAKE 2 TABLETS BY MOUTH EVERY DAY  180 tablet 0    aspirin (ECOTRIN) 81 MG EC tablet Take 81 mg by mouth once daily.      empagliflozin (JARDIANCE) 10 mg tablet Take 1 tablet (10 mg total) by mouth once daily. 90 tablet 1    furosemide (LASIX) 20 MG tablet Take 1 tablet (20 mg total) by mouth once daily. 90 tablet 1    metoprolol succinate (TOPROL-XL) 50 MG 24 hr tablet Take 1 tablet (50 mg total) by mouth 2 (two) times daily. 180 tablet 1    MULTIVIT WITH CALCIUM,IRON,MIN (MULTIPLE VITAMIN, WOMENS ORAL) Take by mouth once daily.      sacubitriL-valsartan (ENTRESTO) 24-26 mg per tablet Take 1 tablet by mouth 2 (two) times daily. 60 tablet 3    spironolactone (ALDACTONE) 50 MG tablet TAKE 1 TABLET BY MOUTH EVERY DAY 90 tablet 3    amiodarone (PACERONE) 200 MG Tab Take one tablet in the am and 1/2 tab at bedtime 140 tablet 1    [DISCONTINUED] amiodarone (PACERONE) 200 MG Tab Take 1.5 tablets (300 mg total) by mouth once daily. 140 tablet 1     No facility-administered encounter medications on file as of 1/19/2024.     Medication List with Changes/Refills   New Medications    AMIODARONE (PACERONE) 200 MG TAB    Take one tablet in the am and 1/2 tab at bedtime   Current Medications    ACETAMINOPHEN (TYLENOL) 500 MG TABLET    Take 500 mg by mouth every 6 (six) hours as needed.    ALLOPURINOL (ZYLOPRIM) 100 MG TABLET    TAKE 2 TABLETS BY MOUTH EVERY DAY    ASPIRIN (ECOTRIN) 81 MG EC TABLET    Take 81 mg by mouth once daily.    EMPAGLIFLOZIN (JARDIANCE) 10 MG TABLET    Take 1 tablet (10 mg total) by mouth once daily.    FUROSEMIDE (LASIX) 20 MG TABLET    Take 1 tablet (20 mg total) by mouth once daily.    METOPROLOL SUCCINATE (TOPROL-XL) 50 MG 24 HR TABLET    Take 1 tablet (50 mg total) by mouth 2 (two) times daily.    MULTIVIT WITH CALCIUM,IRON,MIN (MULTIPLE VITAMIN, WOMENS ORAL)    Take by mouth once daily.    SACUBITRIL-VALSARTAN (ENTRESTO) 24-26 MG PER TABLET    Take 1 tablet by mouth 2 (two) times daily.    SPIRONOLACTONE (ALDACTONE) 50 MG TABLET     TAKE 1 TABLET BY MOUTH EVERY DAY        This note is at least partially dictated using the M*Modal Fluency Direct word recognition program. There are word recognition mistakes that are occasionally missed on review.

## 2024-01-26 ENCOUNTER — OFFICE VISIT (OUTPATIENT)
Dept: CARDIOLOGY | Facility: CLINIC | Age: 79
End: 2024-01-26
Payer: MEDICARE

## 2024-01-26 ENCOUNTER — LAB VISIT (OUTPATIENT)
Dept: LAB | Facility: HOSPITAL | Age: 79
End: 2024-01-26
Attending: INTERNAL MEDICINE
Payer: MEDICARE

## 2024-01-26 VITALS
SYSTOLIC BLOOD PRESSURE: 130 MMHG | DIASTOLIC BLOOD PRESSURE: 78 MMHG | BODY MASS INDEX: 29.78 KG/M2 | HEART RATE: 72 BPM | HEIGHT: 62 IN | OXYGEN SATURATION: 100 % | WEIGHT: 161.81 LBS

## 2024-01-26 DIAGNOSIS — I10 ESSENTIAL HYPERTENSION: ICD-10-CM

## 2024-01-26 DIAGNOSIS — R06.09 DOE (DYSPNEA ON EXERTION): ICD-10-CM

## 2024-01-26 DIAGNOSIS — I50.42 CHRONIC COMBINED SYSTOLIC AND DIASTOLIC CONGESTIVE HEART FAILURE: ICD-10-CM

## 2024-01-26 DIAGNOSIS — I42.8 NONISCHEMIC CARDIOMYOPATHY: Primary | ICD-10-CM

## 2024-01-26 DIAGNOSIS — R00.2 PALPITATIONS: ICD-10-CM

## 2024-01-26 DIAGNOSIS — I42.8 NONISCHEMIC CARDIOMYOPATHY: ICD-10-CM

## 2024-01-26 LAB
ANION GAP SERPL CALC-SCNC: 7 MMOL/L (ref 8–16)
BUN SERPL-MCNC: 26 MG/DL (ref 8–23)
CALCIUM SERPL-MCNC: 9.5 MG/DL (ref 8.7–10.5)
CHLORIDE SERPL-SCNC: 106 MMOL/L (ref 95–110)
CO2 SERPL-SCNC: 30 MMOL/L (ref 23–29)
CREAT SERPL-MCNC: 1.4 MG/DL (ref 0.5–1.4)
EST. GFR  (NO RACE VARIABLE): 38 ML/MIN/1.73 M^2
GLUCOSE SERPL-MCNC: 93 MG/DL (ref 70–110)
MAGNESIUM SERPL-MCNC: 2 MG/DL (ref 1.6–2.6)
POTASSIUM SERPL-SCNC: 3.9 MMOL/L (ref 3.5–5.1)
SODIUM SERPL-SCNC: 143 MMOL/L (ref 136–145)

## 2024-01-26 PROCEDURE — 80048 BASIC METABOLIC PNL TOTAL CA: CPT | Performed by: INTERNAL MEDICINE

## 2024-01-26 PROCEDURE — 99999 PR PBB SHADOW E&M-EST. PATIENT-LVL IV: CPT | Mod: PBBFAC,,, | Performed by: INTERNAL MEDICINE

## 2024-01-26 PROCEDURE — 99214 OFFICE O/P EST MOD 30 MIN: CPT | Mod: S$GLB,,, | Performed by: INTERNAL MEDICINE

## 2024-01-26 PROCEDURE — 83735 ASSAY OF MAGNESIUM: CPT | Performed by: INTERNAL MEDICINE

## 2024-01-26 PROCEDURE — 36415 COLL VENOUS BLD VENIPUNCTURE: CPT | Performed by: INTERNAL MEDICINE

## 2024-01-26 PROCEDURE — 83880 ASSAY OF NATRIURETIC PEPTIDE: CPT | Performed by: INTERNAL MEDICINE

## 2024-01-26 RX ORDER — FUROSEMIDE 20 MG/1
20 TABLET ORAL DAILY
Qty: 90 TABLET | Refills: 1 | Status: SHIPPED | OUTPATIENT
Start: 2024-01-26 | End: 2025-01-25

## 2024-01-26 RX ORDER — SACUBITRIL AND VALSARTAN 49; 51 MG/1; MG/1
1 TABLET, FILM COATED ORAL 2 TIMES DAILY
Qty: 60 TABLET | Refills: 1 | Status: SHIPPED | OUTPATIENT
Start: 2024-01-26 | End: 2024-04-16 | Stop reason: SDUPTHER

## 2024-01-26 RX ORDER — SPIRONOLACTONE 50 MG/1
50 TABLET, FILM COATED ORAL DAILY
Qty: 90 TABLET | Refills: 1 | Status: SHIPPED | OUTPATIENT
Start: 2024-01-26

## 2024-01-26 NOTE — PROGRESS NOTES
Subjective:   Patient ID:  Megan Sams is a 79 y.o. female who presents for cardiac consult of Follow-up (Discuss echo results.)      Referral by: No referring provider defined for this encounter.     Reason for consult: CHF      HPI  The patient came in today for cardiac consult of Follow-up (Discuss echo results.)      Megan Sams is a 79 y.o. female pt with NICM EF 35%, PVCs, HTN, obesity, CKD presents for CV follow up.     10/6/23  Pt has been seen by Mikala Hughes MD recently 2 months prior in [place]. She has seen Dr. Gilliland also in 2021/2022.    She went to Dr. Mayen recently -    One-week of more dyspnea on exertion without associated chest pain left-sided chest pain no cough complaint she saw her cardiologist least a couple weeks ago prior to the dyspnea on exertion she is located [facility] ;would like to establish care with cardiologist closer to home    Component Ref Range & Units 1 d ago 6 yr ago   BNP 0 - 99 pg/mL 3,942 High   192 High  CM      [facility]; CX[facility] stable.    BP and Hr stable today. BM 29 - 163 lbs.   She has lost some weight, she has not been eating much.        12/1/23  ECHO 11/2023 with EF 30-35%, mod TR, PASP 49 mmHg. Holter reveals very frequent PVCS - extra beats, will increase metoprolol to 50mg twice a day, but need to monitor BP and heart ratres, I am also referring to EP     BMP improving.     BNP (pg/mL)   Date Value   12/01/2023 612 (H)   10/20/2023 579 (H)   10/05/2023 3,942 (H)   02/14/2017 192 (H)      BP low normal 106/80s. BMI 60    1/26/24  BP and HR well controlled. BMI 29 - 161 lbs    Pt had nuclear stress test Dec 2023 - nuclear stress test reveals areas of scar in heart with overall function 30-35%, will continue current medical therapy but if having worsening chest pain or breathing issues will discuss angiogram/heart cath.     Pt saw Dr. Herminia Haines -  reasonable candidate for ICD - but would like first to give her short trial of  amio    Overall feels stable.       Patient is compliant with medications.    Results for orders placed during the hospital encounter of 12/28/23    Nuclear Stress - Cardiology Interpreted    Interpretation Summary    Abnormal myocardial perfusion scan.    There are two significant perfusion abnormalities.    Perfusion Abnormality #1 - There is a moderate to severe intensity, moderate sized, mostly fixed perfusion abnormality with some reversibilty in the apical, anteroseptal and septal apical wall(s).    Perfusion Abnormality #2 - There is a moderate sized, moderate intensity, mostly fixed perfusion abnormality with some reversibilty in the basal to mid inferolateral wall(s).    There are no other significant perfusion abnormalities.    The gated perfusion images showed an ejection fraction of 34% at rest. The gated perfusion images showed an ejection fraction of 23% post stress.    There is moderate global hypokinesis at rest and severe global hypokinesis at stress .    The ECG portion of the study is negative for ischemia.    The patient reported no chest pain during the stress test.        Results for orders placed during the hospital encounter of 11/03/23    Echo    Interpretation Summary    Left Ventricle: The left ventricle is mildly dilated. Normal wall thickness. Moderate global hypokinesis present. There is moderately reduced systolic function with a visually estimated ejection fraction of 30 - 35%. There is normal diastolic function.    Right Ventricle: Normal right ventricular cavity size. Wall thickness is normal. Right ventricle wall motion  is normal. Systolic function is normal.    Mitral Valve: There is moderate regurgitation with a centrally directed jet.    Tricuspid Valve: There is moderate regurgitation with a centrally directed jet.    Pulmonary Artery: The estimated pulmonary artery systolic pressure is 49 mmHg.    IVC/SVC: Normal venous pressure at 3 mmHg.      Normal sinus rhythm with sinus  arrhythmia   Possible Left atrial enlargement   Minimal voltage criteria for LVH, may be normal variant ( [place] product )   Septal infarct ,age undetermined   T wave abnormality, consider lateral ischemia   Abnormal ECG   When compared with ECG of 27-JUL-2022 15:33,   Septal infarct is now Present   Confirmed by ADDIE MARIA MD ([id number]) on 10/5/2023 9:06:18 PM     Results for orders placed during the hospital encounter of 08/25/22    Echo    Interpretation Summary  · Normal right ventricular size with normal right ventricular systolic function.  · The left ventricle is normal in size with moderately decreased systolic function.  · The estimated ejection fraction is 35%.  · Grade I left ventricular diastolic dysfunction.  · Normal central venous pressure (3 mmHg).  · The estimated PA systolic pressure is 41 mmHg.  · There is pulmonary hypertension.  · There are segmental left ventricular wall motion abnormalities.  · Mild mitral regurgitation.      Echo: 2020: EF 40%  Cardiac cath: 2009: normal coronaries     No results found for this or any previous visit.      No results found for this or any previous visit.      Holter monitor - 48 hour    Result Date: 11/16/2023    The predominant rhythm is sinus.              Past Medical History:   Diagnosis Date    Arthritis     Blood transfusion     CHF (congestive heart failure)     Chronic kidney disease     Depression     Hypertension        Past Surgical History:   Procedure Laterality Date    HYSTERECTOMY  1978    OOPHORECTOMY  1978       Social History     Tobacco Use    Smoking status: Never    Smokeless tobacco: Never   Substance Use Topics    Alcohol use: No     Alcohol/week: 0.0 standard drinks of alcohol    Drug use: No       Family History   Problem Relation Age of Onset    Early death Mother     Heart disease Mother     Hypertension Mother     Kidney disease Father     Heart disease Brother        Patient's Medications   New Prescriptions    No medications on  file   Previous Medications    ACETAMINOPHEN (TYLENOL) 500 MG TABLET    Take 500 mg by mouth every 6 (six) hours as needed.    ALLOPURINOL (ZYLOPRIM) 100 MG TABLET    TAKE 2 TABLETS BY MOUTH EVERY DAY    AMIODARONE (PACERONE) 200 MG TAB    Take one tablet in the am and 1/2 tab at bedtime    ASPIRIN (ECOTRIN) 81 MG EC TABLET    Take 81 mg by mouth once daily.    EMPAGLIFLOZIN (JARDIANCE) 10 MG TABLET    Take 1 tablet (10 mg total) by mouth once daily.    FUROSEMIDE (LASIX) 20 MG TABLET    Take 1 tablet (20 mg total) by mouth once daily.    METOPROLOL SUCCINATE (TOPROL-XL) 50 MG 24 HR TABLET    Take 1 tablet (50 mg total) by mouth 2 (two) times daily.    MULTIVIT WITH CALCIUM,IRON,MIN (MULTIPLE VITAMIN, WOMENS ORAL)    Take by mouth once daily.    SACUBITRIL-VALSARTAN (ENTRESTO) 24-26 MG PER TABLET    Take 1 tablet by mouth 2 (two) times daily.    SPIRONOLACTONE (ALDACTONE) 50 MG TABLET    TAKE 1 TABLET BY MOUTH EVERY DAY   Modified Medications    No medications on file   Discontinued Medications    No medications on file       Review of Systems   Constitutional:  Positive for malaise/fatigue.   HENT: Negative.     Eyes: Negative.    Respiratory:  Positive for shortness of breath.    Cardiovascular:  Positive for chest pain and palpitations.   Gastrointestinal: Negative.    Genitourinary: Negative.    Musculoskeletal: Negative.    Skin: Negative.    Neurological: Negative.    Endo/Heme/Allergies: Negative.    Psychiatric/Behavioral: Negative.     All 12 systems otherwise negative.      Wt Readings from Last 3 Encounters:   01/26/24 73.4 kg (161 lb 13.1 oz)   01/19/24 74 kg (163 lb 2.3 oz)   01/19/24 73.9 kg (163 lb)     Temp Readings from Last 3 Encounters:   10/09/23 96.9 °F (36.1 °C) (Tympanic)   10/05/23 97.9 °F (36.6 °C) (Tympanic)   09/08/23 97.5 °F (36.4 °C) (Tympanic)     BP Readings from Last 3 Encounters:   01/26/24 130/78   01/19/24 130/80   12/01/23 106/82     Pulse Readings from Last 3 Encounters:  "  01/26/24 72   01/19/24 62   12/01/23 62       /78 (BP Location: Right arm, Patient Position: Sitting, BP Method: Large (Manual))   Pulse 72   Ht 5' 2" (1.575 m)   Wt 73.4 kg (161 lb 13.1 oz)   SpO2 100%   BMI 29.60 kg/m²     Objective:   Physical Exam  Vitals and nursing note reviewed.   Constitutional:       General: She is not in acute distress.     Appearance: She is well-developed. She is obese. She is not diaphoretic.   HENT:      Head: Normocephalic and atraumatic.      Nose: Nose normal.   Eyes:      General: No scleral icterus.     Conjunctiva/sclera: Conjunctivae normal.   Neck:      Thyroid: No thyromegaly.      Vascular: No JVD.   Cardiovascular:      Rate and Rhythm: Normal rate and regular rhythm.      Heart sounds: S1 normal and S2 normal. Murmur heard.      No friction rub. No gallop. No S3 or S4 sounds.   Pulmonary:      Effort: Pulmonary effort is normal. No respiratory distress.      Breath sounds: Normal breath sounds. No stridor. No wheezing or rales.   Chest:      Chest wall: No tenderness.   Abdominal:      General: Bowel sounds are normal. There is no distension.      Palpations: Abdomen is soft. There is no mass.      Tenderness: There is no abdominal tenderness. There is no rebound.   Genitourinary:     Comments: Deferred  Musculoskeletal:         General: No tenderness or deformity. Normal range of motion.      Cervical back: Normal range of motion and neck supple.   Lymphadenopathy:      Cervical: No cervical adenopathy.   Skin:     General: Skin is warm and dry.      Coloration: Skin is not pale.      Findings: No erythema or rash.   Neurological:      Mental Status: She is alert and oriented to person, place, and time.      Motor: No abnormal muscle tone.      Coordination: Coordination normal.   Psychiatric:         Behavior: Behavior normal.         Thought Content: Thought content normal.         Judgment: Judgment normal.         Lab Results   Component Value Date    NA " 140 12/01/2023    K 4.6 12/01/2023     12/01/2023    CO2 27 12/01/2023    BUN 21 12/01/2023    CREATININE 1.2 12/01/2023    GLU 89 12/01/2023    HGBA1C 5.4 07/26/2023    MG 2.2 12/01/2023    AST 37 10/20/2023    ALT 20 10/20/2023    ALBUMIN 3.2 (L) 10/20/2023    PROT 7.4 10/20/2023    BILITOT 0.7 10/20/2023    WBC 6.32 10/05/2023    HGB 14.4 10/05/2023    HCT 45.9 10/05/2023    MCV 87 10/05/2023     10/05/2023    INR 1.0 02/14/2017    TSH 3.554 01/19/2024    CHOL 174 07/26/2023    HDL 50 07/26/2023    LDLCALC 104.0 07/26/2023    TRIG 100 07/26/2023     (H) 12/01/2023         BNP (pg/mL)   Date Value   12/01/2023 612 (H)   10/20/2023 579 (H)   10/05/2023 3,942 (H)   02/14/2017 192 (H)     INR (no units)   Date Value   02/14/2017 1.0   03/02/2014 1.0          Assessment:      1. Nonischemic cardiomyopathy    2. Chronic combined systolic and diastolic congestive heart failure    3. Palpitations    4. PAGE (dyspnea on exertion)    5. Essential hypertension            Plan:     NICM  EF 35%; Chest pain, PAGE;  BNP - 3942 --> 579  - cont Toprol and ARB - change to Entresto BID - increase dose   - ECHO 11/2023 with EF 30-35%, mod TR, PASP 49 mmHg.   - nuc stress 12/2023 with mostly fixed defect anteroseptal/apical wall with EF 34%  - needs low salt diet   - repeat labs ordered today     2. HTN, PVCs  - titrate meds  - frequent PVCs - referred to EP - started on Amio with Dr. Herminia Haines , f/u as sched   - increased BB to 50mg BID    3. Obesity - losing weight - 159 lbs -->  BMI 29 - 161 lbs  - cont weight loss    4. CKD3  - cont to monitor    Thank you for allowing me to participate in this patient's care. Please do not hesitate to contact me with any questions or concerns. Consult note has been forwarded to the referral physician.

## 2024-01-27 LAB — BNP SERPL-MCNC: 613 PG/ML (ref 0–99)

## 2024-01-29 ENCOUNTER — TELEPHONE (OUTPATIENT)
Dept: CARDIOLOGY | Facility: CLINIC | Age: 79
End: 2024-01-29
Payer: MEDICARE

## 2024-01-29 NOTE — TELEPHONE ENCOUNTER
Sammy Dubois MD [facility] Staff  Please contact the patient and let them know that their results of labs are overall stable, the fluid level is still elevated but should improve with the higher dose of Entresto started, if feeling more short of breath/having more edema can double up lasix for a few days but need to be careful and monitor BP closely. Follow up with me as scheduled or sooner if needed.    Spoke to patient, verbalized understanding

## 2024-02-21 RX ORDER — EMPAGLIFLOZIN 10 MG/1
10 TABLET, FILM COATED ORAL
Qty: 90 TABLET | Refills: 1 | Status: SHIPPED | OUTPATIENT
Start: 2024-02-21

## 2024-02-26 NOTE — PROGRESS NOTES
HM reviewed and updated. Immunizations abstracted.  Care Everywhere abstracted.  Health Maintenance Due   Topic    TETANUS VACCINE     DEXA SCAN      Previsit chart audit completed.  *KDL*    
show

## 2024-02-28 ENCOUNTER — OFFICE VISIT (OUTPATIENT)
Dept: PODIATRY | Facility: CLINIC | Age: 79
End: 2024-02-28
Payer: MEDICARE

## 2024-02-28 VITALS — WEIGHT: 161.81 LBS | HEIGHT: 62 IN | BODY MASS INDEX: 29.78 KG/M2

## 2024-02-28 DIAGNOSIS — B35.1 ONYCHOMYCOSIS DUE TO DERMATOPHYTE: Primary | ICD-10-CM

## 2024-02-28 PROCEDURE — 99213 OFFICE O/P EST LOW 20 MIN: CPT | Mod: S$GLB,,, | Performed by: PODIATRIST

## 2024-02-28 PROCEDURE — 99999 PR PBB SHADOW E&M-EST. PATIENT-LVL III: CPT | Mod: PBBFAC,,, | Performed by: PODIATRIST

## 2024-02-28 RX ORDER — CICLOPIROX 80 MG/ML
SOLUTION TOPICAL
Qty: 6.6 ML | Refills: 6 | Status: SHIPPED | OUTPATIENT
Start: 2024-02-28

## 2024-02-28 NOTE — PROGRESS NOTES
Subjective:     Patient ID: Megan Sams is a 79 y.o. female.    Chief Complaint: Nail Care (Non-diabetic pt wears sandals, PCP Dr. Bowen last seen 10-9-23)    Megan is a 79 y.o. female who presents to the clinic complaining of thick and discolored toenails on both feet. Megan is inquiring about treatment options.    Patient Active Problem List   Diagnosis    Arthritis    CHF (congestive heart failure)    Essential hypertension    CKD (chronic kidney disease) stage 3, GFR 30-59 ml/min    Nonischemic cardiomyopathy    Obesity, Class I, BMI 30-34.9    Intertrigo    PVC (premature ventricular contraction)    Cortical age-related cataract of both eyes       Medication List with Changes/Refills   New Medications    CICLOPIROX (PENLAC) 8 % SOLN    Apply to nails once daily   Current Medications    ACETAMINOPHEN (TYLENOL) 500 MG TABLET    Take 500 mg by mouth every 6 (six) hours as needed.    ALLOPURINOL (ZYLOPRIM) 100 MG TABLET    TAKE 2 TABLETS BY MOUTH EVERY DAY    AMIODARONE (PACERONE) 200 MG TAB    Take one tablet in the am and 1/2 tab at bedtime    ASPIRIN (ECOTRIN) 81 MG EC TABLET    Take 81 mg by mouth once daily.    FUROSEMIDE (LASIX) 20 MG TABLET    Take 1 tablet (20 mg total) by mouth once daily. Do not take if BP is below 110/70    JARDIANCE 10 MG TABLET    TAKE 1 TABLET BY MOUTH EVERY DAY    METOPROLOL SUCCINATE (TOPROL-XL) 50 MG 24 HR TABLET    Take 1 tablet (50 mg total) by mouth 2 (two) times daily.    MULTIVIT WITH CALCIUM,IRON,MIN (MULTIPLE VITAMIN, WOMENS ORAL)    Take by mouth once daily.    SACUBITRIL-VALSARTAN (ENTRESTO) 49-51 MG PER TABLET    Take 1 tablet by mouth 2 (two) times daily.    SPIRONOLACTONE (ALDACTONE) 50 MG TABLET    Take 1 tablet (50 mg total) by mouth once daily. Do not take if BP is below 110/70       Review of patient's allergies indicates:   Allergen Reactions    Adhesive Blisters    Codeine Other (See Comments)     Hallucinations    Doxycycline monohydrate Nausea Only     Elevated  "heart rate    Gabapentin Other (See Comments)     Lowered heart rate    Lanoxin [digoxin] Other (See Comments)     Too low heart rate    Thorazine [chlorpromazine] Other (See Comments)     hallucinations    Ultracet [tramadol-acetaminophen] Other (See Comments)     Elevated BP    Penicillins Nausea Only and Rash       Past Surgical History:   Procedure Laterality Date    HYSTERECTOMY  1978    OOPHORECTOMY  1978       Family History   Problem Relation Age of Onset    Early death Mother     Heart disease Mother     Hypertension Mother     Kidney disease Father     Heart disease Brother        Social History     Socioeconomic History    Marital status:    Tobacco Use    Smoking status: Never    Smokeless tobacco: Never   Substance and Sexual Activity    Alcohol use: No     Alcohol/week: 0.0 standard drinks of alcohol    Drug use: No    Sexual activity: Never       Vitals:    02/28/24 1414   Weight: 73.4 kg (161 lb 13.1 oz)   Height: 5' 2" (1.575 m)       Hemoglobin A1C   Date Value Ref Range Status   07/26/2023 5.4 4.0 - 5.6 % Final     Comment:     ADA Screening Guidelines:  5.7-6.4%  Consistent with prediabetes  >or=6.5%  Consistent with diabetes    High levels of fetal hemoglobin interfere with the HbA1C  assay. Heterozygous hemoglobin variants (HbS, HgC, etc)do  not significantly interfere with this assay.   However, presence of multiple variants may affect accuracy.     03/23/2021 5.5 4.0 - 5.6 % Final     Comment:     ADA Screening Guidelines:  5.7-6.4%  Consistent with prediabetes  >or=6.5%  Consistent with diabetes    High levels of fetal hemoglobin interfere with the HbA1C  assay. Heterozygous hemoglobin variants (HbS, HgC, etc)do  not significantly interfere with this assay.   However, presence of multiple variants may affect accuracy.     04/02/2019 5.6 4.0 - 5.6 % Final     Comment:     ADA Screening Guidelines:  5.7-6.4%  Consistent with prediabetes  >or=6.5%  Consistent with diabetes  High levels " of fetal hemoglobin interfere with the HbA1C  assay. Heterozygous hemoglobin variants (HbS, HgC, etc)do  not significantly interfere with this assay.   However, presence of multiple variants may affect accuracy.         Review of Systems   Constitutional:  Negative for chills and fever.   Respiratory:  Negative for shortness of breath.    Cardiovascular:  Negative for chest pain, palpitations, orthopnea, claudication and leg swelling.   Gastrointestinal:  Negative for diarrhea, nausea and vomiting.   Musculoskeletal:  Negative for joint pain.   Skin:  Negative for rash.   Neurological:  Negative for dizziness, tingling, sensory change, focal weakness and weakness.   Psychiatric/Behavioral: Negative.           Objective:      PHYSICAL EXAM: Apperance: Alert and orient in no distress,well developed, and with good attention to grooming and body habits  LOWER EXTREMITY EXAM:  VASCULAR: Dorsalis pedis pulses 2/4 bilateral and Posterior Tibial pulses 1/4 bilateral.   DERMATOLOGICAL: No skin rashes, subcutaneous nodules, lesions, or ulcers observed bilateral. Nails 1,2,3,4,5 bilateral elongated, thickened, and discolored with subungual debris. Webspaces 1,2,3,4 bilateral clean, dry and without evidence of break in skin integrity.  NEUROLOGICAL: Light touch, sharp-dull, proprioception all present and equal bilaterally.     MUSCULOSKELETAL: Muscle strength is 5/5 for foot inverters, everters, plantarflexors, and dorsiflexors. Muscle tone is normal. Negative pain on palpation of nails bilateral.         Assessment:       ICD-10-CM ICD-9-CM   1. Onychomycosis due to dermatophyte  B35.1 110.1       Plan:   Onychomycosis due to dermatophyte  -     ciclopirox (PENLAC) 8 % Soln; Apply to nails once daily  Dispense: 6.6 mL; Refill: 6      I counseled the patient on her conditions, regarding findings of my examination, my impressions, and usual treatment plan.   Patient instructed to spray all shoes with Lysol disinfectant spray and  let dry before wearing. Patient instructed to wash all socks in hot water and bleach.  Discuss treatment options for nail fungus.  I explained that fungus lives in a warm dark moist environment and therefore patient should make every attempt to keep feet clean and dry.  We discussed drying feet thoroughly after shower particularly between the toes and then applying powder between the toes and in the shoes.    For fungal toenails I prescribed topical medication to be used daily for up to a year.  We also discussed oral Lamisil but I did not recommend it as a first line of treatment since it is an internal medicine that may potentially have side effects, including liver problems. Patient elects for oral treatment.   Prescription written for Penlac to be applied to nails once daily.   Patient to return as needed.     PROC-B discussed with patient.          Deonna Arevalo DPM  [facility]

## 2024-03-19 ENCOUNTER — LAB VISIT (OUTPATIENT)
Dept: LAB | Facility: HOSPITAL | Age: 79
End: 2024-03-19
Attending: INTERNAL MEDICINE
Payer: MEDICARE

## 2024-03-19 DIAGNOSIS — I42.8 NONISCHEMIC CARDIOMYOPATHY: ICD-10-CM

## 2024-03-19 DIAGNOSIS — I50.42 CHRONIC COMBINED SYSTOLIC AND DIASTOLIC CONGESTIVE HEART FAILURE: ICD-10-CM

## 2024-03-19 PROCEDURE — 36415 COLL VENOUS BLD VENIPUNCTURE: CPT | Mod: PN | Performed by: INTERNAL MEDICINE

## 2024-03-19 PROCEDURE — 80151 DRUG ASSAY AMIODARONE: CPT | Performed by: INTERNAL MEDICINE

## 2024-03-23 LAB
AMIODARONE SERPL-SCNC: 1.1 UG/ML (ref 1–3)
N-DESETHYL-AMIODARONE: 0.7 UG/ML

## 2024-03-25 DIAGNOSIS — I50.9 CONGESTIVE HEART FAILURE, UNSPECIFIED HF CHRONICITY, UNSPECIFIED HEART FAILURE TYPE: Primary | ICD-10-CM

## 2024-03-26 ENCOUNTER — TELEPHONE (OUTPATIENT)
Dept: CARDIOLOGY | Facility: CLINIC | Age: 79
End: 2024-03-26
Payer: MEDICARE

## 2024-03-26 NOTE — TELEPHONE ENCOUNTER
----- Message from Nataly Mccord LPN sent at 3/26/2024  8:39 AM CDT -----    ----- Message -----  From: Galindo Tidwell MD  Sent: 3/25/2024  10:11 PM CDT  To: Nataly Mccord LPN    See comments below and call patient to discuss.   Please close encounter when done -- no need to route back to me.  Thanks  Amio level is Rxc - keep same dose, repeat Holter - order is in

## 2024-03-26 NOTE — PROGRESS NOTES
See comments below and call patient to discuss.   Please close encounter when done -- no need to route back to me.  Thanks  Amio level is Rxc - keep same dose, repeat Holter - order is in

## 2024-03-27 ENCOUNTER — OFFICE VISIT (OUTPATIENT)
Dept: CARDIOLOGY | Facility: CLINIC | Age: 79
End: 2024-03-27
Payer: MEDICARE

## 2024-03-27 VITALS
HEIGHT: 62 IN | OXYGEN SATURATION: 100 % | WEIGHT: 161.38 LBS | HEART RATE: 43 BPM | BODY MASS INDEX: 29.7 KG/M2 | SYSTOLIC BLOOD PRESSURE: 136 MMHG | DIASTOLIC BLOOD PRESSURE: 78 MMHG

## 2024-03-27 DIAGNOSIS — I50.42 CHRONIC COMBINED SYSTOLIC AND DIASTOLIC CONGESTIVE HEART FAILURE: ICD-10-CM

## 2024-03-27 DIAGNOSIS — R00.2 PALPITATIONS: ICD-10-CM

## 2024-03-27 DIAGNOSIS — I42.8 NONISCHEMIC CARDIOMYOPATHY: ICD-10-CM

## 2024-03-27 DIAGNOSIS — I49.3 PVC (PREMATURE VENTRICULAR CONTRACTION): ICD-10-CM

## 2024-03-27 DIAGNOSIS — R06.09 DOE (DYSPNEA ON EXERTION): ICD-10-CM

## 2024-03-27 DIAGNOSIS — I50.9 CONGESTIVE HEART FAILURE, UNSPECIFIED HF CHRONICITY, UNSPECIFIED HEART FAILURE TYPE: Primary | ICD-10-CM

## 2024-03-27 DIAGNOSIS — I10 ESSENTIAL HYPERTENSION: ICD-10-CM

## 2024-03-27 DIAGNOSIS — I73.9 PERIPHERAL VASCULAR DISEASE, UNSPECIFIED: Primary | ICD-10-CM

## 2024-03-27 DIAGNOSIS — N18.31 STAGE 3A CHRONIC KIDNEY DISEASE: ICD-10-CM

## 2024-03-27 PROCEDURE — 99999 PR PBB SHADOW E&M-EST. PATIENT-LVL IV: CPT | Mod: PBBFAC,,, | Performed by: INTERNAL MEDICINE

## 2024-03-27 PROCEDURE — G2211 COMPLEX E/M VISIT ADD ON: HCPCS | Mod: S$GLB,,, | Performed by: INTERNAL MEDICINE

## 2024-03-27 PROCEDURE — 99214 OFFICE O/P EST MOD 30 MIN: CPT | Mod: S$GLB,,, | Performed by: INTERNAL MEDICINE

## 2024-03-27 NOTE — TELEPHONE ENCOUNTER
Pt notified. Holter scheduled. Pt wants to have the holter results when she sees dr suma bhatt. Cxd her orginial appt and made her appt one week later as a work in  pb      ----- Message from Nataly Mccord LPN sent at 3/26/2024  4:56 PM CDT -----    ----- Message -----  From: Nataly Mccord LPN  Sent: 3/26/2024   8:39 AM CDT  To: Yaw NOGUERA Staff      ----- Message -----  From: Galindo Tidwell MD  Sent: 3/25/2024  10:11 PM CDT  To: Nataly Mccord LPN    See comments below and call patient to discuss.   Please close encounter when done -- no need to route back to me.  Thanks  Amio level is Rxc - keep same dose, repeat Holter - order is in

## 2024-03-27 NOTE — PROGRESS NOTES
Subjective:   Patient ID:  Megan Sams is a 79 y.o. female who presents for cardiac consult of Bradycardia      Referral by: No referring provider defined for this encounter.     Reason for consult: CHF      HPI  The patient came in today for cardiac consult of Bradycardia      Megan Sams is a 79 y.o. female pt with NICM EF 35%, PVCs, HTN, obesity, CKD presents for CV follow up.     10/6/23  Pt has been seen by Mikala Hughes MD recently 2 months prior in [place]. She has seen Dr. Gilliland also in 2021/2022.    She went to Dr. Mayen recently -    One-week of more dyspnea on exertion without associated chest pain left-sided chest pain no cough complaint she saw her cardiologist least a couple weeks ago prior to the dyspnea on exertion she is located [facility] ;would like to establish care with cardiologist closer to home    Component Ref Range & Units 1 d ago 6 yr ago   BNP 0 - 99 pg/mL 3,942 High   192 High  CM      [facility]; CXR stable.    BP and Hr stable today. BM 29 - 163 lbs.   She has lost some weight, she has not been eating much.        12/1/23  ECHO 11/2023 with EF 30-35%, mod TR, PASP 49 mmHg. Holter reveals very frequent PVCS - extra beats, will increase metoprolol to 50mg twice a day, but need to monitor BP and heart ratres, I am also referring to EP     BMP improving.     BNP (pg/mL)   Date Value   01/26/2024 613 (H)   12/01/2023 612 (H)   10/20/2023 579 (H)   10/05/2023 3,942 (H)   02/14/2017 192 (H)      BP low normal 106/80s. BMI 60    1/26/24  BP and HR well controlled. BMI 29 - 161 lbs    Pt had nuclear stress test Dec 2023 - nuclear stress test reveals areas of scar in heart with overall function 30-35%, will continue current medical therapy but if having worsening chest pain or breathing issues will discuss angiogram/heart cath.     Pt saw Dr. Herminia Haines -  reasonable candidate for ICD - but would like first to give her short trial of amio    Overall feels  stable.       3/27/24  BP and HR stable. Occ dizzy. HR in 40s.   BMI 29.   Feels well overall.       Patient is compliant with medications.    Results for orders placed during the hospital encounter of 12/28/23    Nuclear Stress - Cardiology Interpreted    Interpretation Summary    Abnormal myocardial perfusion scan.    There are two significant perfusion abnormalities.    Perfusion Abnormality #1 - There is a moderate to severe intensity, moderate sized, mostly fixed perfusion abnormality with some reversibilty in the apical, anteroseptal and septal apical wall(s).    Perfusion Abnormality #2 - There is a moderate sized, moderate intensity, mostly fixed perfusion abnormality with some reversibilty in the basal to mid inferolateral wall(s).    There are no other significant perfusion abnormalities.    The gated perfusion images showed an ejection fraction of 34% at rest. The gated perfusion images showed an ejection fraction of 23% post stress.    There is moderate global hypokinesis at rest and severe global hypokinesis at stress .    The ECG portion of the study is negative for ischemia.    The patient reported no chest pain during the stress test.        Results for orders placed during the hospital encounter of 11/03/23    Echo    Interpretation Summary    Left Ventricle: The left ventricle is mildly dilated. Normal wall thickness. Moderate global hypokinesis present. There is moderately reduced systolic function with a visually estimated ejection fraction of 30 - 35%. There is normal diastolic function.    Right Ventricle: Normal right ventricular cavity size. Wall thickness is normal. Right ventricle wall motion  is normal. Systolic function is normal.    Mitral Valve: There is moderate regurgitation with a centrally directed jet.    Tricuspid Valve: There is moderate regurgitation with a centrally directed jet.    Pulmonary Artery: The estimated pulmonary artery systolic pressure is 49 mmHg.    IVC/SVC:  Normal venous pressure at 3 mmHg.      Normal sinus rhythm with sinus arrhythmia   Possible Left atrial enlargement   Minimal voltage criteria for LVH, may be normal variant ( [place] product )   Septal infarct ,age undetermined   T wave abnormality, consider lateral ischemia   Abnormal ECG   When compared with ECG of 27-JUL-2022 15:33,   Septal infarct is now Present   Confirmed by ADDIE MARIA MD ([id number]) on 10/5/2023 9:06:18 PM     Results for orders placed during the hospital encounter of 08/25/22    Echo    Interpretation Summary  · Normal right ventricular size with normal right ventricular systolic function.  · The left ventricle is normal in size with moderately decreased systolic function.  · The estimated ejection fraction is 35%.  · Grade I left ventricular diastolic dysfunction.  · Normal central venous pressure (3 mmHg).  · The estimated PA systolic pressure is 41 mmHg.  · There is pulmonary hypertension.  · There are segmental left ventricular wall motion abnormalities.  · Mild mitral regurgitation.      Echo: 2020: EF 40%  Cardiac cath: 2009: normal coronaries     No results found for this or any previous visit.      No results found for this or any previous visit.      Holter monitor - 48 hour    Result Date: 11/16/2023    The predominant rhythm is sinus.              Past Medical History:   Diagnosis Date    Arthritis     Blood transfusion     CHF (congestive heart failure)     Chronic kidney disease     Depression     Hypertension        Past Surgical History:   Procedure Laterality Date    HYSTERECTOMY  1978    OOPHORECTOMY  1978       Social History     Tobacco Use    Smoking status: Never    Smokeless tobacco: Never   Substance Use Topics    Alcohol use: No     Alcohol/week: 0.0 standard drinks of alcohol    Drug use: No       Family History   Problem Relation Age of Onset    Early death Mother     Heart disease Mother     Hypertension Mother     Kidney disease Father     Heart disease Brother         Patient's Medications   New Prescriptions    No medications on file   Previous Medications    ACETAMINOPHEN (TYLENOL) 500 MG TABLET    Take 500 mg by mouth every 6 (six) hours as needed.    ALLOPURINOL (ZYLOPRIM) 100 MG TABLET    TAKE 2 TABLETS BY MOUTH EVERY DAY    AMIODARONE (PACERONE) 200 MG TAB    Take one tablet in the am and 1/2 tab at bedtime    ASPIRIN (ECOTRIN) 81 MG EC TABLET    Take 81 mg by mouth once daily.    CICLOPIROX (PENLAC) 8 % SOLN    Apply to nails once daily    FUROSEMIDE (LASIX) 20 MG TABLET    Take 1 tablet (20 mg total) by mouth once daily. Do not take if BP is below 110/70    JARDIANCE 10 MG TABLET    TAKE 1 TABLET BY MOUTH EVERY DAY    METOPROLOL SUCCINATE (TOPROL-XL) 50 MG 24 HR TABLET    Take 1 tablet (50 mg total) by mouth 2 (two) times daily.    MULTIVIT WITH CALCIUM,IRON,MIN (MULTIPLE VITAMIN, WOMENS ORAL)    Take by mouth once daily.    SACUBITRIL-VALSARTAN (ENTRESTO) 49-51 MG PER TABLET    Take 1 tablet by mouth 2 (two) times daily.    SPIRONOLACTONE (ALDACTONE) 50 MG TABLET    Take 1 tablet (50 mg total) by mouth once daily. Do not take if BP is below 110/70   Modified Medications    No medications on file   Discontinued Medications    No medications on file       Review of Systems   Constitutional:  Positive for malaise/fatigue.   HENT: Negative.     Eyes: Negative.    Respiratory:  Positive for shortness of breath.    Cardiovascular:  Positive for chest pain and palpitations.   Gastrointestinal: Negative.    Genitourinary: Negative.    Musculoskeletal: Negative.    Skin: Negative.    Neurological: Negative.    Endo/Heme/Allergies: Negative.    Psychiatric/Behavioral: Negative.     All 12 systems otherwise negative.      Wt Readings from Last 3 Encounters:   03/27/24 73.2 kg (161 lb 6 oz)   02/28/24 73.4 kg (161 lb 13.1 oz)   01/26/24 73.4 kg (161 lb 13.1 oz)     Temp Readings from Last 3 Encounters:   10/09/23 96.9 °F (36.1 °C) (Tympanic)   10/05/23 97.9 °F (36.6 °C)  "(Tympanic)   09/08/23 97.5 °F (36.4 °C) (Tympanic)     BP Readings from Last 3 Encounters:   03/27/24 136/78   01/26/24 130/78   01/19/24 130/80     Pulse Readings from Last 3 Encounters:   03/27/24 (!) 43   01/26/24 72   01/19/24 62       /78 (BP Location: Right arm, Patient Position: Sitting, BP Method: Medium (Manual))   Pulse (!) 43   Ht 5' 2" (1.575 m)   Wt 73.2 kg (161 lb 6 oz)   SpO2 100%   BMI 29.52 kg/m²     Objective:   Physical Exam  Vitals and nursing note reviewed.   Constitutional:       General: She is not in acute distress.     Appearance: She is well-developed. She is obese. She is not diaphoretic.   HENT:      Head: Normocephalic and atraumatic.      Nose: Nose normal.   Eyes:      General: No scleral icterus.     Conjunctiva/sclera: Conjunctivae normal.   Neck:      Thyroid: No thyromegaly.      Vascular: No JVD.   Cardiovascular:      Rate and Rhythm: Normal rate and regular rhythm.      Heart sounds: S1 normal and S2 normal. Murmur heard.      No friction rub. No gallop. No S3 or S4 sounds.   Pulmonary:      Effort: Pulmonary effort is normal. No respiratory distress.      Breath sounds: Normal breath sounds. No stridor. No wheezing or rales.   Chest:      Chest wall: No tenderness.   Abdominal:      General: Bowel sounds are normal. There is no distension.      Palpations: Abdomen is soft. There is no mass.      Tenderness: There is no abdominal tenderness. There is no rebound.   Genitourinary:     Comments: Deferred  Musculoskeletal:         General: No tenderness or deformity. Normal range of motion.      Cervical back: Normal range of motion and neck supple.   Lymphadenopathy:      Cervical: No cervical adenopathy.   Skin:     General: Skin is warm and dry.      Coloration: Skin is not pale.      Findings: No erythema or rash.   Neurological:      Mental Status: She is alert and oriented to person, place, and time.      Motor: No abnormal muscle tone.      Coordination: " Coordination normal.   Psychiatric:         Behavior: Behavior normal.         Thought Content: Thought content normal.         Judgment: Judgment normal.         Lab Results   Component Value Date     01/26/2024    K 3.9 01/26/2024     01/26/2024    CO2 30 (H) 01/26/2024    BUN 26 (H) 01/26/2024    CREATININE 1.4 01/26/2024    GLU 93 01/26/2024    HGBA1C 5.4 07/26/2023    MG 2.0 01/26/2024    AST 37 10/20/2023    ALT 20 10/20/2023    ALBUMIN 3.2 (L) 10/20/2023    PROT 7.4 10/20/2023    BILITOT 0.7 10/20/2023    WBC 6.32 10/05/2023    HGB 14.4 10/05/2023    HCT 45.9 10/05/2023    MCV 87 10/05/2023     10/05/2023    INR 1.0 02/14/2017    TSH 3.554 01/19/2024    CHOL 174 07/26/2023    HDL 50 07/26/2023    LDLCALC 104.0 07/26/2023    TRIG 100 07/26/2023     (H) 01/26/2024         BNP (pg/mL)   Date Value   01/26/2024 613 (H)   12/01/2023 612 (H)   10/20/2023 579 (H)   10/05/2023 3,942 (H)   02/14/2017 192 (H)     INR (no units)   Date Value   02/14/2017 1.0   03/02/2014 1.0          Assessment:      1. Peripheral vascular disease, unspecified    2. Nonischemic cardiomyopathy    3. Chronic combined systolic and diastolic congestive heart failure    4. PAGE (dyspnea on exertion)    5. Palpitations    6. Essential hypertension    7. PVC (premature ventricular contraction)    8. Stage 3a chronic kidney disease            Plan:     NICM  EF 35%; Chest pain, PAGE;  BNP - 3942 --> 579  - cont Toprol and ARB - change to Entresto BID - increase dose   - ECHO 11/2023 with EF 30-35%, mod TR, PASP 49 mmHg.   - nuc stress 12/2023 with mostly fixed defect anteroseptal/apical wall with EF 34%  - needs low salt diet     2. HTN, PVCs - stable  - titrate meds  - frequent PVCs - referred to EP - started on Amio with Dr. Herminia Haines , f/u as sched   - increased BB to 50mg BID  - rec compression stockings for edema     3. Obesity - losing weight - 159 lbs -->  BMI 29 - 161 lbs  - cont weight loss    4. CKD3  - cont to  monitor    Visit today included increased complexity associated with the care of the episodic problem CHF addressed and managing the longitudinal care of the patient due to the serious and/or complex managed problem(s) .    Thank you for allowing me to participate in this patient's care. Please do not hesitate to contact me with any questions or concerns. Consult note has been forwarded to the referral physician.

## 2024-04-08 ENCOUNTER — HOSPITAL ENCOUNTER (OUTPATIENT)
Dept: CARDIOLOGY | Facility: HOSPITAL | Age: 79
Discharge: HOME | End: 2024-04-08
Attending: INTERNAL MEDICINE
Payer: MEDICARE

## 2024-04-08 DIAGNOSIS — I50.9 CONGESTIVE HEART FAILURE, UNSPECIFIED HF CHRONICITY, UNSPECIFIED HEART FAILURE TYPE: ICD-10-CM

## 2024-04-08 PROCEDURE — 93227 XTRNL ECG REC<48 HR R&I: CPT | Mod: ,,, | Performed by: INTERNAL MEDICINE

## 2024-04-08 PROCEDURE — 93225 XTRNL ECG REC<48 HRS REC: CPT | Mod: PO

## 2024-04-10 ENCOUNTER — TELEPHONE (OUTPATIENT)
Dept: CARDIOLOGY | Facility: CLINIC | Age: 79
End: 2024-04-10
Payer: MEDICARE

## 2024-04-10 DIAGNOSIS — I42.8 NONISCHEMIC CARDIOMYOPATHY: ICD-10-CM

## 2024-04-10 DIAGNOSIS — I50.9 CONGESTIVE HEART FAILURE, UNSPECIFIED HF CHRONICITY, UNSPECIFIED HEART FAILURE TYPE: Primary | ICD-10-CM

## 2024-04-10 DIAGNOSIS — R00.2 PALPITATIONS: ICD-10-CM

## 2024-04-10 LAB
OHS CV EVENT MONITOR DAY: 0
OHS CV HOLTER LENGTH DECIMAL HOURS: 47.98
OHS CV HOLTER LENGTH HOURS: 47
OHS CV HOLTER LENGTH MINUTES: 59
OHS CV HOLTER SINUS AVERAGE HR: 42
OHS CV HOLTER SINUS MAX HR: 64
OHS CV HOLTER SINUS MIN HR: 30

## 2024-04-10 NOTE — TELEPHONE ENCOUNTER
Already brought it      ----- Message from Tobias Nelson sent at 4/10/2024  9:27 AM CDT -----  Contact: Patient  Patient is calling to speak with the nurse regarding her Holter Monitor. Wants to know if she can bring it in at 11 instead of 10 due to bad weather. Please call patient at .[phone]

## 2024-04-11 ENCOUNTER — LAB VISIT (OUTPATIENT)
Dept: LAB | Facility: HOSPITAL | Age: 79
End: 2024-04-11
Payer: MEDICARE

## 2024-04-11 ENCOUNTER — OFFICE VISIT (OUTPATIENT)
Dept: INTERNAL MEDICINE | Facility: CLINIC | Age: 79
End: 2024-04-11
Payer: MEDICARE

## 2024-04-11 VITALS
TEMPERATURE: 97 F | HEART RATE: 50 BPM | DIASTOLIC BLOOD PRESSURE: 74 MMHG | WEIGHT: 162.06 LBS | OXYGEN SATURATION: 98 % | SYSTOLIC BLOOD PRESSURE: 120 MMHG | BODY MASS INDEX: 29.82 KG/M2 | HEIGHT: 62 IN

## 2024-04-11 DIAGNOSIS — R31.9 HEMATURIA, UNSPECIFIED TYPE: ICD-10-CM

## 2024-04-11 DIAGNOSIS — I73.9 PERIPHERAL VASCULAR DISEASE, UNSPECIFIED: ICD-10-CM

## 2024-04-11 DIAGNOSIS — I49.3 PVC (PREMATURE VENTRICULAR CONTRACTION): ICD-10-CM

## 2024-04-11 DIAGNOSIS — N18.31 STAGE 3A CHRONIC KIDNEY DISEASE: ICD-10-CM

## 2024-04-11 DIAGNOSIS — N18.32 STAGE 3B CHRONIC KIDNEY DISEASE: ICD-10-CM

## 2024-04-11 DIAGNOSIS — I50.42 CHRONIC COMBINED SYSTOLIC AND DIASTOLIC CONGESTIVE HEART FAILURE: ICD-10-CM

## 2024-04-11 DIAGNOSIS — M1A.9XX0 CHRONIC GOUT WITHOUT TOPHUS, UNSPECIFIED CAUSE, UNSPECIFIED SITE: ICD-10-CM

## 2024-04-11 DIAGNOSIS — Z12.31 ENCOUNTER FOR SCREENING MAMMOGRAM FOR MALIGNANT NEOPLASM OF BREAST: ICD-10-CM

## 2024-04-11 DIAGNOSIS — I10 ESSENTIAL HYPERTENSION: Primary | ICD-10-CM

## 2024-04-11 LAB
BACTERIA #/AREA URNS HPF: NORMAL /HPF
BILIRUB UR QL STRIP: NEGATIVE
CLARITY UR: CLEAR
COLOR UR: YELLOW
GLUCOSE UR QL STRIP: ABNORMAL
HGB UR QL STRIP: NEGATIVE
KETONES UR QL STRIP: NEGATIVE
LEUKOCYTE ESTERASE UR QL STRIP: ABNORMAL
MICROSCOPIC COMMENT: NORMAL
NITRITE UR QL STRIP: NEGATIVE
PH UR STRIP: 6 [PH] (ref 5–8)
PROT UR QL STRIP: NEGATIVE
RBC #/AREA URNS HPF: 0 /HPF (ref 0–4)
SP GR UR STRIP: 1.01 (ref 1–1.03)
SQUAMOUS #/AREA URNS HPF: 6 /HPF
URN SPEC COLLECT METH UR: ABNORMAL
WBC #/AREA URNS HPF: 1 /HPF (ref 0–5)
YEAST URNS QL MICRO: 0

## 2024-04-11 PROCEDURE — 99999 PR PBB SHADOW E&M-EST. PATIENT-LVL IV: CPT | Mod: PBBFAC,,, | Performed by: FAMILY MEDICINE

## 2024-04-11 PROCEDURE — 1101F PT FALLS ASSESS-DOCD LE1/YR: CPT | Mod: CPTII,S$GLB,, | Performed by: FAMILY MEDICINE

## 2024-04-11 PROCEDURE — 1159F MED LIST DOCD IN RCRD: CPT | Mod: CPTII,S$GLB,, | Performed by: FAMILY MEDICINE

## 2024-04-11 PROCEDURE — 81000 URINALYSIS NONAUTO W/SCOPE: CPT | Performed by: FAMILY MEDICINE

## 2024-04-11 PROCEDURE — 3288F FALL RISK ASSESSMENT DOCD: CPT | Mod: CPTII,S$GLB,, | Performed by: FAMILY MEDICINE

## 2024-04-11 PROCEDURE — 99214 OFFICE O/P EST MOD 30 MIN: CPT | Mod: S$GLB,,, | Performed by: FAMILY MEDICINE

## 2024-04-11 PROCEDURE — 3078F DIAST BP <80 MM HG: CPT | Mod: CPTII,S$GLB,, | Performed by: FAMILY MEDICINE

## 2024-04-11 PROCEDURE — G2211 COMPLEX E/M VISIT ADD ON: HCPCS | Mod: S$GLB,,, | Performed by: FAMILY MEDICINE

## 2024-04-11 PROCEDURE — 3074F SYST BP LT 130 MM HG: CPT | Mod: CPTII,S$GLB,, | Performed by: FAMILY MEDICINE

## 2024-04-11 PROCEDURE — 1126F AMNT PAIN NOTED NONE PRSNT: CPT | Mod: CPTII,S$GLB,, | Performed by: FAMILY MEDICINE

## 2024-04-11 NOTE — PROGRESS NOTES
Subjective:      Patient ID: Megan Sams is a . female.    Chief Complaint: Multiple issues see below      HPI HF up-to-date cardiology if your PVCs in mild bradycardia adjusted medications with Cardiology last week to has follow up with electrophysiologist soon his better    Hypertension controlled     Chronic gout asymptomatic long time on allopurinol    Recently noticed a small amount of blood and anterior part of her underpants history of hysterectomy no discomfort or pain that area states not from rectal area  Past Medical History:   Diagnosis Date    Arthritis     Blood transfusion     CHF (congestive heart failure)     Chronic kidney disease     Depression     Hypertension       Past Surgical History:   Procedure Laterality Date    HYSTERECTOMY  1978    OOPHORECTOMY  1978      Social History     Socioeconomic History    Marital status:    Tobacco Use    Smoking status: Never    Smokeless tobacco: Never   Substance and Sexual Activity    Alcohol use: No     Alcohol/week: 0.0 standard drinks of alcohol    Drug use: No    Sexual activity: Never      Family History   Problem Relation Age of Onset    Early death Mother     Heart disease Mother     Hypertension Mother     Kidney disease Father     Heart disease Brother       Review of Systems    No cp sob    Objective:     Physical Exam  Constitutional:       Appearance: Normal appearance.   HENT:      Head: Normocephalic and atraumatic.   Cardiovascular:      Rate and Rhythm: Normal rate and regular rhythm.   Pulmonary:      Effort: Pulmonary effort is normal.      Breath sounds: Normal breath sounds.   Neurological:      Mental Status: She is alert.       Assessment:   Chf  Bradycardia  ?hematuria  ckd      ICD-10-CM ICD-9-CM     Plan:      Lab and follow up after in 3 months Rhoda HOANG today;if nl and rcurr small amt blood underpants then sched vaginal exa     She req mammo    She req estab as pcp    Follow up Cardiology and Cardiology EP as  planned    Essential hypertension  -     Comprehensive Metabolic Panel; Future; Expected date: 07/11/2024  -     TSH; Future; Expected date: 07/11/2024  -     Lipid Panel; Future; Expected date: 07/10/2024    Chronic combined systolic and diastolic congestive heart failure    Stage 3a chronic kidney disease    PVC (premature ventricular contraction)    Peripheral vascular disease, unspecified    Chronic gout without tophus, unspecified cause, unspecified site  -     Uric Acid; Future; Expected date: 07/11/2024  -     CBC Auto Differential; Future; Expected date: 07/10/2024    Hematuria, unspecified type  -     Urinalysis; Future    Encounter for screening mammogram for malignant neoplasm of breast  -     Mammo Digital Screening Bilat w/ [place]ilson; Future; Expected date: 04/11/2024    Stage 3b chronic kidney disease

## 2024-04-12 ENCOUNTER — HOSPITAL ENCOUNTER (OUTPATIENT)
Dept: CARDIOLOGY | Facility: HOSPITAL | Age: 79
Discharge: HOME | End: 2024-04-12
Attending: INTERNAL MEDICINE
Payer: MEDICARE

## 2024-04-12 ENCOUNTER — OFFICE VISIT (OUTPATIENT)
Dept: CARDIOLOGY | Facility: CLINIC | Age: 79
End: 2024-04-12
Payer: MEDICARE

## 2024-04-12 VITALS
HEART RATE: 45 BPM | SYSTOLIC BLOOD PRESSURE: 126 MMHG | DIASTOLIC BLOOD PRESSURE: 80 MMHG | OXYGEN SATURATION: 98 % | HEIGHT: 63 IN | WEIGHT: 162.5 LBS | BODY MASS INDEX: 28.79 KG/M2

## 2024-04-12 DIAGNOSIS — I49.3 PVC (PREMATURE VENTRICULAR CONTRACTION): ICD-10-CM

## 2024-04-12 DIAGNOSIS — I50.42 CHRONIC COMBINED SYSTOLIC AND DIASTOLIC CONGESTIVE HEART FAILURE: Primary | ICD-10-CM

## 2024-04-12 DIAGNOSIS — I50.9 CONGESTIVE HEART FAILURE, UNSPECIFIED HF CHRONICITY, UNSPECIFIED HEART FAILURE TYPE: ICD-10-CM

## 2024-04-12 DIAGNOSIS — Z91.89 AT RISK FOR AMIODARONE TOXICITY WITH LONG TERM USE: ICD-10-CM

## 2024-04-12 DIAGNOSIS — I10 ESSENTIAL HYPERTENSION: Chronic | ICD-10-CM

## 2024-04-12 DIAGNOSIS — I42.8 NONISCHEMIC CARDIOMYOPATHY: ICD-10-CM

## 2024-04-12 DIAGNOSIS — Z79.899 AT RISK FOR AMIODARONE TOXICITY WITH LONG TERM USE: ICD-10-CM

## 2024-04-12 DIAGNOSIS — R00.2 PALPITATIONS: ICD-10-CM

## 2024-04-12 DIAGNOSIS — N18.32 STAGE 3B CHRONIC KIDNEY DISEASE: ICD-10-CM

## 2024-04-12 PROBLEM — E66.9 OBESITY, CLASS I, BMI 30-34.9: Status: RESOLVED | Noted: 2017-01-20 | Resolved: 2024-04-12

## 2024-04-12 PROBLEM — E66.811 OBESITY, CLASS I, BMI 30-34.9: Status: RESOLVED | Noted: 2017-01-20 | Resolved: 2024-04-12

## 2024-04-12 PROBLEM — I73.9 PERIPHERAL VASCULAR DISEASE, UNSPECIFIED: Status: RESOLVED | Noted: 2024-03-27 | Resolved: 2024-04-12

## 2024-04-12 LAB
OHS QRS DURATION: 116 MS
OHS QTC CALCULATION: 449 MS

## 2024-04-12 PROCEDURE — 3288F FALL RISK ASSESSMENT DOCD: CPT | Mod: CPTII,S$GLB,, | Performed by: INTERNAL MEDICINE

## 2024-04-12 PROCEDURE — 99215 OFFICE O/P EST HI 40 MIN: CPT | Mod: S$GLB,,, | Performed by: INTERNAL MEDICINE

## 2024-04-12 PROCEDURE — 99999 PR PBB SHADOW E&M-EST. PATIENT-LVL IV: CPT | Mod: PBBFAC,,, | Performed by: INTERNAL MEDICINE

## 2024-04-12 PROCEDURE — 1126F AMNT PAIN NOTED NONE PRSNT: CPT | Mod: CPTII,S$GLB,, | Performed by: INTERNAL MEDICINE

## 2024-04-12 PROCEDURE — 3079F DIAST BP 80-89 MM HG: CPT | Mod: CPTII,S$GLB,, | Performed by: INTERNAL MEDICINE

## 2024-04-12 PROCEDURE — 93010 ELECTROCARDIOGRAM REPORT: CPT | Mod: ,,, | Performed by: INTERNAL MEDICINE

## 2024-04-12 PROCEDURE — 3074F SYST BP LT 130 MM HG: CPT | Mod: CPTII,S$GLB,, | Performed by: INTERNAL MEDICINE

## 2024-04-12 PROCEDURE — 1101F PT FALLS ASSESS-DOCD LE1/YR: CPT | Mod: CPTII,S$GLB,, | Performed by: INTERNAL MEDICINE

## 2024-04-12 PROCEDURE — 93005 ELECTROCARDIOGRAM TRACING: CPT

## 2024-04-12 PROCEDURE — 1159F MED LIST DOCD IN RCRD: CPT | Mod: CPTII,S$GLB,, | Performed by: INTERNAL MEDICINE

## 2024-04-12 PROCEDURE — 1160F RVW MEDS BY RX/DR IN RCRD: CPT | Mod: CPTII,S$GLB,, | Performed by: INTERNAL MEDICINE

## 2024-04-12 RX ORDER — AMIODARONE HYDROCHLORIDE 200 MG/1
TABLET ORAL
Qty: 90 TABLET | Refills: 3 | Status: SHIPPED | OUTPATIENT
Start: 2024-04-12

## 2024-04-12 NOTE — PROGRESS NOTES
Subjective:   Patient ID:  Megan Sams is a 79 y.o. female     Chief complaint:Congestive Heart Failure      HPI  Initially (01/19/2024 ) referred by Dr Dubois  for evaluation and management of PVCs   --   Background as gleaned from patient's records:  NICM EF 35%, PVCs, HTN, obesity, CKD      12/1/23  ECHO 11/2023 with EF 30-35%, mod TR, PASP 49 mmHg. Holter reveals very frequent PVCS - extra beats,   >. Metoprolol increased to 50mg twice a day,   Pat referred to me           BNP (pg/mL)   Date Value   10/20/2023 579 (H)   10/05/2023 3,942 (H)   02/14/2017 192 (H)      BP low normal 106/80s. P 60      Echo November of 2023  Interpretation Summary    Left Ventricle: The left ventricle is mildly dilated. Normal wall thickness. Moderate global hypokinesis present. There is moderately reduced systolic function with a visually estimated ejection fraction of 30 - 35%. There is normal diastolic function.    Right Ventricle: Normal right ventricular cavity size. Wall thickness is normal. Right ventricle wall motion  is normal. Systolic function is normal.    Mitral Valve: There is moderate regurgitation with a centrally directed jet.    Tricuspid Valve: There is moderate regurgitation with a centrally directed jet.    Pulmonary Artery: The estimated pulmonary artery systolic pressure is 49 mmHg.    IVC/SVC: Normal venous pressure at 3 mmHg.   No sig change from 8/2022     Echo: 2020: EF 40%  Cardiac cath: 2009: normal coronaries      Additional details gleaned from today's interview :  She is here for opinion re: need for ICD and ? Relationship of PVCs to CMP  Patient says she feels well at this time.  She is taking her medications as prescribed.  I have reviewed the actual image of the ECG tracing obtained today and it shows NSR with normal intervals.One PAC noted. HR is 52.  >>  Recent episode of acute CHF.  Now subsided.  Moderate decrease in ejection fraction.  4.3% STEPHEN burden  >>  May be a reasonable candidate  for ICD - but would like first to give her short trial of amio     Update 04/13/2024 :  She had a Holter monitor which I personally reviewed: It revealed persistent sinus bradycardia with the occasional junctional escape beats but pauses were of limited duration and patient was asymptomatic.  Furthermore there were very few PVCs.  This was while on amiodarone 300 mg a day with an amiodarone level of around 1.2/1.0.  Patient has been feeling quite well and the she tells me that the she can do her housework, she can even walk up 10 steps of stairs.  She has occasional lack of energy which she seems to be as cramping to nocturia (she has to go to the bathroom at night 3 to 4 times).  She lives alone but is closely surrounded by her children who call and or check on her several times a day.  I have reviewed the actual image of the ECG tracing obtained today and it shows NSR with normal intervals. HR is 46.    Current Outpatient Medications   Medication Sig Dispense Refill    acetaminophen (TYLENOL) 500 MG tablet Take 500 mg by mouth every 6 (six) hours as needed.      allopurinoL (ZYLOPRIM) 100 MG tablet TAKE 2 TABLETS BY MOUTH EVERY  tablet 0    aspirin (ECOTRIN) 81 MG EC tablet Take 81 mg by mouth once daily.      ciclopirox (PENLAC) 8 % Soln Apply to nails once daily 6.6 mL 6    furosemide (LASIX) 20 MG tablet Take 1 tablet (20 mg total) by mouth once daily. Do not take if BP is below 110/70 90 tablet 1    JARDIANCE 10 mg tablet TAKE 1 TABLET BY MOUTH EVERY DAY 90 tablet 1    metoprolol succinate (TOPROL-XL) 50 MG 24 hr tablet Take 1 tablet (50 mg total) by mouth 2 (two) times daily. 180 tablet 1    MULTIVIT WITH CALCIUM,IRON,MIN (MULTIPLE VITAMIN, WOMENS ORAL) Take by mouth once daily.      sacubitriL-valsartan (ENTRESTO) 49-51 mg per tablet Take 1 tablet by mouth 2 (two) times daily. 60 tablet 1    spironolactone (ALDACTONE) 50 MG tablet Take 1 tablet (50 mg total) by mouth once daily. Do not take  if BP is below 110/70 90 tablet 1    amiodarone (PACERONE) 200 MG Tab Take one tablet in the am 90 tablet 3     No current facility-administered medications for this visit.     Review of Systems     Constitutional: Reviewed  for decreased appetite, weight gain and weight loss.   HENT: Reviewed for nosebleeds.    Eyes:  Reviewed for blurred vision and visual disturbance.   Cardiovascular: Reviewed for chest pain, claudication, cyanosis,dyspnea on exertion, leg swelling, orthopnea,paroxysmal nocturnal dyspnearregular heartbeats, palpitations, near-syncope, and syncope.   Respiratory: Reviewed for cough, shortness of breath, wheezing, sleep disturbances due to breathing and snoring, .    Endocrine: Reviewed for heat intolerance.   Hematologic/Lymphatic: Reviewed for easy bruisability/bleeding.   Skin: Reviewed for rash.   Musculoskeletal: Reviewed for muscle weakness and myalgias.   Gastrointestinal: Reviewed for abdominal pain, anorexia, melena, nausea and vomiting.   Genitourinary: Reviewed for menorrhagia, frequency, nocturia and incontinence.   Neurological: Reviewed for excessive daytime sleepiness, dizziness, vertigo, weakness, headaches, loss of balance and seizures,   Psychiatric/Behavioral:  Reviewed for insomnia, altered mental status, depression, anxiety and nervousness.       All symptoms reviewed above were negative except for some weight loss, occasional leg swelling (dependent), headaches, loss of balance, arthritic complaints.       Social History     Tobacco Use   Smoking Status Never   Smokeless Tobacco Never        Objective:     Physical Exam  Vitals and nursing note reviewed.   Constitutional:       Appearance: She is well-developed. She is obese.      Comments: Overweight   HENT:      Head: Normocephalic and atraumatic.      Right Ear: External ear normal.      Left Ear: External ear normal.   Eyes:      General: No scleral icterus.        Left eye: No discharge.      Conjunctiva/sclera:  "Conjunctivae normal.      Left eye: Left conjunctiva is not injected. No hemorrhage.     Pupils: Pupils are equal, round, and reactive to light.   Neck:      Thyroid: No thyromegaly.   Cardiovascular:      Rate and Rhythm: Normal rate and regular rhythm.      Pulses: Intact distal pulses.           Carotid pulses are 2+ on the right side and 2+ on the left side.       Radial pulses are 2+ on the right side and 2+ on the left side.        Dorsalis pedis pulses are 2+ on the right side and 2+ on the left side.        Posterior tibial pulses are 2+ on the right side and 2+ on the left side.      Heart sounds: Normal heart sounds. No midsystolic click and no opening snap. No murmur heard.     No friction rub. No gallop. No S3 or S4 sounds.   Pulmonary:      Effort: Pulmonary effort is normal.      Breath sounds: Normal breath sounds.   Abdominal:      General: There is no distension.      Palpations: Abdomen is soft. Abdomen is not rigid. There is no hepatomegaly.      Tenderness: There is no abdominal tenderness. There is no guarding.      Comments: Obese abdomen   Musculoskeletal:      Cervical back: Normal range of motion and neck supple.      Right lower leg: No swelling.      Left lower leg: No swelling.      Right ankle: No swelling.      Left ankle: No swelling.   Skin:     General: Skin is warm and dry.      Findings: No rash.      Nails: There is no clubbing.   Neurological:      Mental Status: She is alert and oriented to person, place, and time.      Cranial Nerves: No cranial nerve deficit.      Gait: Gait normal.   Psychiatric:         Speech: Speech normal.         Behavior: Behavior normal.         Thought Content: Thought content normal.   /80   Pulse (!) 45   Ht 5' 3" (1.6 m)   Wt 73.7 kg (162 lb 7.7 oz)   SpO2 98%   BMI 28.78 kg/m²       Results for orders placed during the hospital encounter of 11/03/23    Echo    Interpretation Summary    Left Ventricle: The left ventricle is mildly " dilated. Normal wall thickness. Moderate global hypokinesis present. There is moderately reduced systolic function with a visually estimated ejection fraction of 30 - 35%. There is normal diastolic function.    Right Ventricle: Normal right ventricular cavity size. Wall thickness is normal. Right ventricle wall motion  is normal. Systolic function is normal.    Mitral Valve: There is moderate regurgitation with a centrally directed jet.    Tricuspid Valve: There is moderate regurgitation with a centrally directed jet.    Pulmonary Artery: The estimated pulmonary artery systolic pressure is 49 mmHg.    IVC/SVC: Normal venous pressure at 3 mmHg.    WBC   Date Value Ref Range Status   10/05/2023 6.32 3.90 - 12.70 K/uL Final     Hematocrit   Date Value Ref Range Status   10/05/2023 45.9 37.0 - 48.5 % Final     Hemoglobin   Date Value Ref Range Status   10/05/2023 14.4 12.0 - 16.0 g/dL Final     Lab Results   Component Value Date     10/05/2023     Lab Results   Component Value Date    CREATININE 1.4 01/26/2024    EGFRNORACEVR 38 (A) 01/26/2024    K 3.9 01/26/2024     Lab Results   Component Value Date     (H) 04/12/2024            reports no history of alcohol use.  Past Medical History:   Diagnosis Date    Arthritis     Blood transfusion     CHF (congestive heart failure)     Chronic kidney disease     Depression     Hypertension      Past Surgical History:   Procedure Laterality Date    HYSTERECTOMY  1978    OOPHORECTOMY  1978     Family History   Problem Relation Name Age of Onset    Early death Mother      Heart disease Mother      Hypertension Mother      Kidney disease Father      Heart disease Brother         Assessment:   Overall, she seems to be functionally doing quite well and is certainly improved over baseline prior to therapy.  This is coincidental with the initiation of amiodarone and development of bradycardia without attendant hypotension.  The relationship may or may not  be causative.  1. Chronic combined systolic and diastolic congestive heart failure    2. Nonischemic cardiomyopathy    3. Essential hypertension    4. PVC (premature ventricular contraction)    5. Stage 3b chronic kidney disease    6. At risk for amiodarone toxicity with long term use        Plan:      At this stage, we need to reassess her candidacy for ICD.  I will also reduce the dose of amiodarone from 300 to 200 a day.  She went see Ms. Main follow-up soon for advancement of CHF therapy if appropriate and decision making regarding ICD implantation.    Pre-visit chart review: 10 min    Face to face time: 20 min, > 50% of which spent in education    I have reviewed the actual images of all relevant ECG, rhythm, holter and long term monitoring tracings available in EPIC and MUSE.   I have reviewed the actual images of all relevant CXRays.   I have directly evaluated all relevant data pertaining to any CIED.     Post visit chart documentation and care coordination: 15 min    Orders Placed This Encounter   Procedures    BNP     Standing Status:   Future     Number of Occurrences:   1     Standing Expiration Date:   6/11/2025    NT-Pro Natriuretic Peptide     Standing Status:   Future     Number of Occurrences:   1     Standing Expiration Date:   7/11/2025    TSH     Standing Status:   Future     Number of Occurrences:   1     Standing Expiration Date:   7/11/2025    Echo     Standing Status:   Future     Standing Expiration Date:   4/12/2025     Order Specific Question:   Release to patient     Answer:   Immediate       Follow up in about 4 weeks (around 5/10/2024), or if symptoms worsen or fail to improve, for N may.    Medications Discontinued During This Encounter   Medication Reason    amiodarone (PACERONE) 200 MG Tab        Medications Ordered This Encounter   Medications    amiodarone (PACERONE) 200 MG Tab     Sig: Take one tablet in the am     Dispense:  90 tablet     Refill:  3       Medication List  with Changes/Refills   Current Medications    ACETAMINOPHEN (TYLENOL) 500 MG TABLET    Take 500 mg by mouth every 6 (six) hours as needed.    ALLOPURINOL (ZYLOPRIM) 100 MG TABLET    TAKE 2 TABLETS BY MOUTH EVERY DAY    ASPIRIN (ECOTRIN) 81 MG EC TABLET    Take 81 mg by mouth once daily.    CICLOPIROX (PENLAC) 8 % SOLN    Apply to nails once daily    FUROSEMIDE (LASIX) 20 MG TABLET    Take 1 tablet (20 mg total) by mouth once daily. Do not take if BP is below 110/70    JARDIANCE 10 MG TABLET    TAKE 1 TABLET BY MOUTH EVERY DAY    METOPROLOL SUCCINATE (TOPROL-XL) 50 MG 24 HR TABLET    Take 1 tablet (50 mg total) by mouth 2 (two) times daily.    MULTIVIT WITH CALCIUM,IRON,MIN (MULTIPLE VITAMIN, WOMENS ORAL)    Take by mouth once daily.    SACUBITRIL-VALSARTAN (ENTRESTO) 49-51 MG PER TABLET    Take 1 tablet by mouth 2 (two) times daily.    SPIRONOLACTONE (ALDACTONE) 50 MG TABLET    Take 1 tablet (50 mg total) by mouth once daily. Do not take if BP is below 110/70   Changed and/or Refilled Medications    Modified Medication Previous Medication    AMIODARONE (PACERONE) 200 MG TAB amiodarone (PACERONE) 200 MG Tab       Take one tablet in the am    Take one tablet in the am and 1/2 tab at bedtime        This note is at least partially dictated using the M*Modal Fluency Direct word recognition program. There are word recognition mistakes that are occasionally missed on review.

## 2024-04-16 DIAGNOSIS — I50.42 CHRONIC COMBINED SYSTOLIC AND DIASTOLIC CONGESTIVE HEART FAILURE: Primary | ICD-10-CM

## 2024-04-16 RX ORDER — SACUBITRIL AND VALSARTAN 49; 51 MG/1; MG/1
1 TABLET, FILM COATED ORAL 2 TIMES DAILY
Qty: 60 TABLET | Refills: 1 | Status: SHIPPED | OUTPATIENT
Start: 2024-04-16 | End: 2024-06-19 | Stop reason: SDUPTHER

## 2024-04-16 NOTE — TELEPHONE ENCOUNTER
----- Message from Vero Diaz sent at 4/16/2024 10:12 AM CDT -----  Contact: pt  Type:  RX Refill Request    Who Called: pt  Refill or New Rx: refill  RX Name and Strength:sacubitriL-valsartan (ENTRESTO) 49-51 mg per tablet  How is the patient currently taking it? (ex. 1XDay):  Is this a 30 day or 90 day RX:  Preferred Pharmacy with phone number: [phone]  Local or Mail Order: local  Ordering Provider: laurent  Would the patient rather a call back or a response via [facility]?   Best Call Back Number:  Additional Information:       [facility]/pharmacy #[id number] - [place] - [address] [place] [address] [address] [place] [address]  Phone: [phone] Fax: [phone]

## 2024-04-17 ENCOUNTER — HOSPITAL ENCOUNTER (OUTPATIENT)
Dept: RADIOLOGY | Facility: HOSPITAL | Age: 79
Discharge: HOME | End: 2024-04-17
Attending: FAMILY MEDICINE
Payer: MEDICARE

## 2024-04-17 DIAGNOSIS — Z12.31 ENCOUNTER FOR SCREENING MAMMOGRAM FOR MALIGNANT NEOPLASM OF BREAST: ICD-10-CM

## 2024-04-17 PROCEDURE — 77067 SCR MAMMO BI INCL CAD: CPT | Mod: TC,PN

## 2024-04-17 PROCEDURE — 77063 BREAST TOMOSYNTHESIS BI: CPT | Mod: 26,,, | Performed by: RADIOLOGY

## 2024-04-17 PROCEDURE — 77067 SCR MAMMO BI INCL CAD: CPT | Mod: 26,,, | Performed by: RADIOLOGY

## 2024-04-19 ENCOUNTER — HOSPITAL ENCOUNTER (OUTPATIENT)
Dept: CARDIOLOGY | Facility: HOSPITAL | Age: 79
Discharge: HOME | End: 2024-04-19
Attending: INTERNAL MEDICINE
Payer: MEDICARE

## 2024-04-19 VITALS
WEIGHT: 162 LBS | HEIGHT: 63 IN | SYSTOLIC BLOOD PRESSURE: 126 MMHG | BODY MASS INDEX: 28.7 KG/M2 | DIASTOLIC BLOOD PRESSURE: 80 MMHG

## 2024-04-19 DIAGNOSIS — I50.42 CHRONIC COMBINED SYSTOLIC AND DIASTOLIC CONGESTIVE HEART FAILURE: ICD-10-CM

## 2024-04-19 LAB
AORTIC ROOT ANNULUS: 3.68 CM
ASCENDING AORTA: 3.54 CM
AV INDEX (PROSTH): 0.7
AV MEAN GRADIENT: 4 MMHG
AV PEAK GRADIENT: 7 MMHG
AV VALVE AREA BY VELOCITY RATIO: 2.14 CM²
AV VALVE AREA: 2.13 CM²
AV VELOCITY RATIO: 0.7
BSA FOR ECHO PROCEDURE: 1.81 M2
CV ECHO LV RWT: 0.55 CM
DOP CALC AO PEAK VEL: 1.31 M/S
DOP CALC AO VTI: 32.2 CM
DOP CALC LVOT AREA: 3 CM2
DOP CALC LVOT DIAMETER: 1.97 CM
DOP CALC LVOT PEAK VEL: 0.92 M/S
DOP CALC LVOT STROKE VOLUME: 68.55 CM3
DOP CALC RVOT PEAK VEL: 0.7 M/S
DOP CALC RVOT VTI: 15.1 CM
DOP CALCLVOT PEAK VEL VTI: 22.5 CM
E WAVE DECELERATION TIME: 279.34 MSEC
E/A RATIO: 0.65
E/E' RATIO: 10.22 M/S
ECHO LV POSTERIOR WALL: 1.4 CM (ref 0.6–1.1)
FRACTIONAL SHORTENING: 20 % (ref 28–44)
INTERVENTRICULAR SEPTUM: 1.48 CM (ref 0.6–1.1)
IVC DIAMETER: 0.8 CM
IVRT: 68.51 MSEC
LA MAJOR: 5.62 CM
LA MINOR: 4.64 CM
LA WIDTH: 4.4 CM
LEFT ATRIUM SIZE: 2.49 CM
LEFT ATRIUM VOLUME INDEX: 26.7 ML/M2
LEFT ATRIUM VOLUME: 47.34 CM3
LEFT INTERNAL DIMENSION IN SYSTOLE: 4.1 CM (ref 2.1–4)
LEFT VENTRICLE DIASTOLIC VOLUME INDEX: 24.76 ML/M2
LEFT VENTRICLE DIASTOLIC VOLUME: 43.83 ML
LEFT VENTRICLE MASS INDEX: 177 G/M2
LEFT VENTRICLE SYSTOLIC VOLUME INDEX: 17.2 ML/M2
LEFT VENTRICLE SYSTOLIC VOLUME: 30.53 ML
LEFT VENTRICULAR INTERNAL DIMENSION IN DIASTOLE: 5.1 CM (ref 3.5–6)
LEFT VENTRICULAR MASS: 313.03 G
LV LATERAL E/E' RATIO: 9.2 M/S
LV SEPTAL E/E' RATIO: 11.5 M/S
LVOT MG: 1.67 MMHG
LVOT MV: 0.6 CM/S
MV PEAK A VEL: 0.71 M/S
MV PEAK E VEL: 0.46 M/S
MV STENOSIS PRESSURE HALF TIME: 81.01 MS
MV VALVE AREA P 1/2 METHOD: 2.72 CM2
PISA MRMAX VEL: 5.96 M/S
PISA TR MAX VEL: 2.82 M/S
PV MEAN GRADIENT: 1 MMHG
RA MAJOR: 4.39 CM
RA PRESSURE ESTIMATED: 3 MMHG
RA WIDTH: 3.49 CM
RV TB RVSP: 6 MMHG
SINUS: 3.37 CM
STJ: 3.45 CM
TDI LATERAL: 0.05 M/S
TDI SEPTAL: 0.04 M/S
TDI: 0.05 M/S
TR MAX PG: 32 MMHG
TRICUSPID ANNULAR PLANE SYSTOLIC EXCURSION: 2.3 CM
TV REST PULMONARY ARTERY PRESSURE: 35 MMHG
Z-SCORE OF LEFT VENTRICULAR DIMENSION IN END DIASTOLE: 0.41
Z-SCORE OF LEFT VENTRICULAR DIMENSION IN END SYSTOLE: 2.4

## 2024-04-19 PROCEDURE — 93306 TTE W/DOPPLER COMPLETE: CPT | Mod: 26,,, | Performed by: INTERNAL MEDICINE

## 2024-04-19 PROCEDURE — 93306 TTE W/DOPPLER COMPLETE: CPT | Mod: PO

## 2024-04-30 ENCOUNTER — TELEPHONE (OUTPATIENT)
Dept: CARDIOLOGY | Facility: CLINIC | Age: 79
End: 2024-04-30
Payer: MEDICARE

## 2024-04-30 NOTE — TELEPHONE ENCOUNTER
Patient contacted and was advised that we did not call her. There was no note stating Cardiology contacted her. The patient stated she would wait by the phone to see if another clinic calls.

## 2024-05-09 ENCOUNTER — OFFICE VISIT (OUTPATIENT)
Dept: INTERNAL MEDICINE | Facility: CLINIC | Age: 79
End: 2024-05-09
Payer: MEDICARE

## 2024-05-09 VITALS
DIASTOLIC BLOOD PRESSURE: 80 MMHG | HEIGHT: 62 IN | BODY MASS INDEX: 29.78 KG/M2 | HEART RATE: 43 BPM | WEIGHT: 161.81 LBS | TEMPERATURE: 97 F | OXYGEN SATURATION: 91 % | RESPIRATION RATE: 18 BRPM | SYSTOLIC BLOOD PRESSURE: 120 MMHG

## 2024-05-09 DIAGNOSIS — N93.9 VAGINAL BLEEDING: Primary | ICD-10-CM

## 2024-05-09 PROCEDURE — 3288F FALL RISK ASSESSMENT DOCD: CPT | Mod: CPTII,S$GLB,, | Performed by: FAMILY MEDICINE

## 2024-05-09 PROCEDURE — 99999 PR PBB SHADOW E&M-EST. PATIENT-LVL IV: CPT | Mod: PBBFAC,,, | Performed by: FAMILY MEDICINE

## 2024-05-09 PROCEDURE — 3074F SYST BP LT 130 MM HG: CPT | Mod: CPTII,S$GLB,, | Performed by: FAMILY MEDICINE

## 2024-05-09 PROCEDURE — 1159F MED LIST DOCD IN RCRD: CPT | Mod: CPTII,S$GLB,, | Performed by: FAMILY MEDICINE

## 2024-05-09 PROCEDURE — 99214 OFFICE O/P EST MOD 30 MIN: CPT | Mod: S$GLB,,, | Performed by: FAMILY MEDICINE

## 2024-05-09 PROCEDURE — 3079F DIAST BP 80-89 MM HG: CPT | Mod: CPTII,S$GLB,, | Performed by: FAMILY MEDICINE

## 2024-05-09 PROCEDURE — 1101F PT FALLS ASSESS-DOCD LE1/YR: CPT | Mod: CPTII,S$GLB,, | Performed by: FAMILY MEDICINE

## 2024-05-09 PROCEDURE — 1126F AMNT PAIN NOTED NONE PRSNT: CPT | Mod: CPTII,S$GLB,, | Performed by: FAMILY MEDICINE

## 2024-05-09 NOTE — PROGRESS NOTES
Subjective:      Patient ID: Megan Sams is a 79 y.o. female.    Chief Complaint: Follow-up   HPI since our last visit she had another episode of blood in her underpants she take states towards the front so he thought it was from her urine she has not seen blood in her urine she states it has not from the rectal area she does not have a history of hysterectomy for noncancerous reasons the 1970s.  No pain  Past Medical History:   Diagnosis Date    Arthritis     Blood transfusion     CHF (congestive heart failure)     Chronic kidney disease     Depression     Hypertension       Past Surgical History:   Procedure Laterality Date    HYSTERECTOMY  1978    OOPHORECTOMY  1978      Social History     Socioeconomic History    Marital status:    Tobacco Use    Smoking status: Never    Smokeless tobacco: Never   Substance and Sexual Activity    Alcohol use: No     Alcohol/week: 0.0 standard drinks of alcohol    Drug use: No    Sexual activity: Never      Family History   Problem Relation Name Age of Onset    Early death Mother      Heart disease Mother      Hypertension Mother      Kidney disease Father      Heart disease Brother        Review of Systems        Objective:     Physical Exam general no apparent distress    Nurse chaperone present for pelvic exam there were no perineal nor external vaginal abnormalities using speculum vaginal vault partially visualized posterior area vaginal cervical cuff area was small friable smooth area no fresh blood  Assessment:         ICD-10-CM ICD-9-CM   1. Vaginal bleeding  N93.9 623.8      Plan:      Discussed there are no obvious masses or lesions or cut but there is friable area that almost looks like cervical ectropion although history of some sort of hysterectomy difficult to tell if cervical cuff remains for my exam this is a possibility as the source of the bleeding refer to gynecology and if evaluation determined not the source some can always recheck urinalysis she is  certain that is not rectal area  1. Vaginal bleeding  -     Ambulatory referral/consult to Gynecology; Future; Expected date: 05/16/2024

## 2024-05-13 RX ORDER — ALLOPURINOL 100 MG/1
TABLET ORAL
Qty: 180 TABLET | Refills: 4 | Status: SHIPPED | OUTPATIENT
Start: 2024-05-13

## 2024-05-15 ENCOUNTER — HOSPITAL ENCOUNTER (EMERGENCY)
Facility: HOSPITAL | Age: 79
Discharge: HOME | End: 2024-05-15
Attending: EMERGENCY MEDICINE
Payer: MEDICARE

## 2024-05-15 VITALS
TEMPERATURE: 98 F | SYSTOLIC BLOOD PRESSURE: 169 MMHG | OXYGEN SATURATION: 100 % | BODY MASS INDEX: 28.53 KG/M2 | HEIGHT: 63 IN | WEIGHT: 161 LBS | RESPIRATION RATE: 20 BRPM | HEART RATE: 50 BPM | DIASTOLIC BLOOD PRESSURE: 75 MMHG

## 2024-05-15 DIAGNOSIS — N17.9 AKI (ACUTE KIDNEY INJURY): ICD-10-CM

## 2024-05-15 DIAGNOSIS — R07.9 CHEST PAIN: Primary | ICD-10-CM

## 2024-05-15 LAB
ALBUMIN SERPL BCP-MCNC: 3.5 G/DL (ref 3.5–5.2)
ALP SERPL-CCNC: 59 U/L (ref 55–135)
ALT SERPL W/O P-5'-P-CCNC: 21 U/L (ref 10–44)
ANION GAP SERPL CALC-SCNC: 7 MMOL/L (ref 8–16)
AST SERPL-CCNC: 36 U/L (ref 10–40)
BASOPHILS # BLD AUTO: 0.06 K/UL (ref 0–0.2)
BASOPHILS NFR BLD: 1 % (ref 0–1.9)
BILIRUB SERPL-MCNC: 0.7 MG/DL (ref 0.1–1)
BUN SERPL-MCNC: 32 MG/DL (ref 8–23)
CALCIUM SERPL-MCNC: 9.7 MG/DL (ref 8.7–10.5)
CHLORIDE SERPL-SCNC: 108 MMOL/L (ref 95–110)
CK SERPL-CCNC: 112 U/L (ref 20–180)
CO2 SERPL-SCNC: 25 MMOL/L (ref 23–29)
CREAT SERPL-MCNC: 1.6 MG/DL (ref 0.5–1.4)
DIFFERENTIAL METHOD BLD: ABNORMAL
EOSINOPHIL # BLD AUTO: 0.1 K/UL (ref 0–0.5)
EOSINOPHIL NFR BLD: 1.9 % (ref 0–8)
ERYTHROCYTE [DISTWIDTH] IN BLOOD BY AUTOMATED COUNT: 15.1 % (ref 11.5–14.5)
EST. GFR  (NO RACE VARIABLE): 32 ML/MIN/1.73 M^2
GLUCOSE SERPL-MCNC: 87 MG/DL (ref 70–110)
HCT VFR BLD AUTO: 43.7 % (ref 37–48.5)
HGB BLD-MCNC: 14.4 G/DL (ref 12–16)
IMM GRANULOCYTES # BLD AUTO: 0.03 K/UL (ref 0–0.04)
IMM GRANULOCYTES NFR BLD AUTO: 0.5 % (ref 0–0.5)
LYMPHOCYTES # BLD AUTO: 2.9 K/UL (ref 1–4.8)
LYMPHOCYTES NFR BLD: 51 % (ref 18–48)
MCH RBC QN AUTO: 28.3 PG (ref 27–31)
MCHC RBC AUTO-ENTMCNC: 33 G/DL (ref 32–36)
MCV RBC AUTO: 86 FL (ref 82–98)
MONOCYTES # BLD AUTO: 0.9 K/UL (ref 0.3–1)
MONOCYTES NFR BLD: 15.4 % (ref 4–15)
NEUTROPHILS # BLD AUTO: 1.7 K/UL (ref 1.8–7.7)
NEUTROPHILS NFR BLD: 30.2 % (ref 38–73)
NRBC BLD-RTO: 0 /100 WBC
PLATELET # BLD AUTO: 215 K/UL (ref 150–450)
PMV BLD AUTO: 10.4 FL (ref 9.2–12.9)
POTASSIUM SERPL-SCNC: 4.3 MMOL/L (ref 3.5–5.1)
PROT SERPL-MCNC: 7.6 G/DL (ref 6–8.4)
RBC # BLD AUTO: 5.08 M/UL (ref 4–5.4)
SODIUM SERPL-SCNC: 140 MMOL/L (ref 136–145)
TROPONIN I SERPL DL<=0.01 NG/ML-MCNC: 0.02 NG/ML (ref 0–0.03)
TROPONIN I SERPL DL<=0.01 NG/ML-MCNC: 0.02 NG/ML (ref 0–0.03)
WBC # BLD AUTO: 5.77 K/UL (ref 3.9–12.7)

## 2024-05-15 PROCEDURE — 93005 ELECTROCARDIOGRAM TRACING: CPT

## 2024-05-15 PROCEDURE — 80053 COMPREHEN METABOLIC PANEL: CPT | Performed by: NURSE PRACTITIONER

## 2024-05-15 PROCEDURE — 93010 ELECTROCARDIOGRAM REPORT: CPT | Mod: ,,, | Performed by: INTERNAL MEDICINE

## 2024-05-15 PROCEDURE — 99285 EMERGENCY DEPT VISIT HI MDM: CPT | Mod: 25

## 2024-05-15 PROCEDURE — 85025 COMPLETE CBC W/AUTO DIFF WBC: CPT | Performed by: NURSE PRACTITIONER

## 2024-05-15 PROCEDURE — 25000003 PHARM REV CODE 250: Performed by: EMERGENCY MEDICINE

## 2024-05-15 PROCEDURE — 84484 ASSAY OF TROPONIN QUANT: CPT | Performed by: NURSE PRACTITIONER

## 2024-05-15 PROCEDURE — 84484 ASSAY OF TROPONIN QUANT: CPT | Mod: 91 | Performed by: EMERGENCY MEDICINE

## 2024-05-15 PROCEDURE — 96360 HYDRATION IV INFUSION INIT: CPT

## 2024-05-15 PROCEDURE — 82550 ASSAY OF CK (CPK): CPT | Performed by: NURSE PRACTITIONER

## 2024-05-15 RX ADMIN — SODIUM CHLORIDE 500 ML: 9 INJECTION, SOLUTION INTRAVENOUS at 03:05

## 2024-05-15 NOTE — ED NOTES
Bed: 14  Expected date:   Expected time:   Means of arrival: Personal Transportation  Comments:  When clean

## 2024-05-15 NOTE — ED PROVIDER NOTES
SCRIBE #1 NOTE: I, Josh Carter, am scribing for, and in the presence of, Rozina Hall DO. I have scribed the entire note.       History     Chief Complaint   Patient presents with    Chest Pain     Pt c/o L side CP radiating to L arm and L upper back, and nausea x 1 hour, denies sob     Review of patient's allergies indicates:   Allergen Reactions    Adhesive Blisters    Codeine Other (See Comments)     Hallucinations    Doxycycline monohydrate Nausea Only     Elevated heart rate    Gabapentin Other (See Comments)     Lowered heart rate    Lanoxin [digoxin] Other (See Comments)     Too low heart rate    Thorazine [chlorpromazine] Other (See Comments)     hallucinations    Ultracet [tramadol-acetaminophen] Other (See Comments)     Elevated BP    Penicillins Nausea Only and Rash         History of Present Illness     HPI    5/15/2024, 3:46 PM  History obtained from the patient      History of Present Illness: Megan Sams is a 79 y.o. female patient with a PMHx of CHF, HTN, depression, and CKD who presents to the Emergency Department for evaluation of mild left CP which onset suddenly one hour PTA. Pain radiates to left arm and left upper back. Pt's pain has since improved while in the ED. Symptoms are constant and moderate in severity. No mitigating or exacerbating factors reported. No associated sxs. Patient denies any SOB, N/V, palpitations, diaphoresis, and all other sxs at this time. No prior Tx. Pt does not smoke. Pt had a stress test completed 2.5 months ago. No further complaints or concerns at this time.       Arrival mode: Personal vehicle    PCP: Don Mayen MD        Past Medical History:  Past Medical History:   Diagnosis Date    Arthritis     Blood transfusion     CHF (congestive heart failure)     Chronic kidney disease     Depression     Hypertension        Past Surgical History:  Past Surgical History:   Procedure Laterality Date    HYSTERECTOMY  1978    OOPHORECTOMY  1978         Family  History:  Family History   Problem Relation Name Age of Onset    Early death Mother      Heart disease Mother      Hypertension Mother      Kidney disease Father      Heart disease Brother         Social History:  Social History     Tobacco Use    Smoking status: Never    Smokeless tobacco: Never   Substance and Sexual Activity    Alcohol use: No     Alcohol/week: 0.0 standard drinks of alcohol    Drug use: No    Sexual activity: Never        Review of Systems     Review of Systems   Constitutional:  Negative for diaphoresis.   Respiratory:  Negative for shortness of breath.    Cardiovascular:  Positive for chest pain. Negative for palpitations.   Gastrointestinal:  Negative for nausea and vomiting.   Musculoskeletal:  Positive for back pain (upper left) and myalgias (left arm).        Physical Exam     Initial Vitals [05/15/24 1408]   BP Pulse Resp Temp SpO2   (!) 152/69 (!) 48 18 98.3 °F (36.8 °C) 100 %      MAP       --          Physical Exam  Nursing Notes and Vital Signs Reviewed.  Constitutional: Patient is in no acute distress. Well-developed and well-nourished.  Head: Atraumatic. Normocephalic.  Eyes: PERRL. EOM intact. Conjunctivae are not pale. No scleral icterus.  ENT: Mucous membranes are moist.   Neck: Supple. Full ROM. No JVD.  Cardiovascular: Bradycardia. Regular rhythm. No murmurs, rubs, or gallops. Pulmonary/Chest: No respiratory distress. Clear to auscultation bilaterally. No wheezing or rales.  Abdominal: Soft and non-distended.  There is no tenderness.  No rebound, guarding, or rigidity.  Musculoskeletal: Moves all extremities. No obvious deformities. No edema. No calf tenderness.  Skin: Warm and dry.  Neurological:  Alert, awake, and appropriate.  Normal speech.  No acute focal neurological deficits are appreciated.  Psychiatric: Normal affect. Good eye contact. Appropriate in content.     ED Course   Procedures  ED Vital Signs:  Vitals:    05/15/24 1408 05/15/24 1536 05/15/24 1603 05/15/24 1618  "  BP: (!) 152/69 (!) 175/74 (!) 150/66 (!) 150/67   Pulse: (!) 48 (!) 51 (!) 51 (!) 53   Resp: 18 16 17 14   Temp: 98.3 °F (36.8 °C)      TempSrc:       SpO2: 100% 100% 100% 100%   Weight: 73 kg (161 lb)      Height: 5' 2.6" (1.59 m)       05/15/24 1733 05/15/24 1747 05/15/24 1833 05/15/24 1900   BP: (!) 157/68 (!) 156/71 (!) 166/74 (!) 172/77   Pulse: (!) 53 (!) 52 (!) 50 (!) 50   Resp: 13 15 17 17   Temp:    98.3 °F (36.8 °C)   TempSrc:    Oral   SpO2: 100% 100% 100% 100%   Weight:       Height:        05/15/24 2030   BP: (!) 169/75   Pulse: (!) 50   Resp: 20   Temp: 98.3 °F (36.8 °C)   TempSrc: Oral   SpO2: 100%   Weight:    Height:        Abnormal Lab Results:  Labs Reviewed   CBC W/ AUTO DIFFERENTIAL - Abnormal; Notable for the following components:       Result Value    RDW 15.1 (*)     Gran # (ANC) 1.7 (*)     Gran % 30.2 (*)     Lymph % 51.0 (*)     Mono % 15.4 (*)     All other components within normal limits   COMPREHENSIVE METABOLIC PANEL - Abnormal; Notable for the following components:    BUN 32 (*)     Creatinine 1.6 (*)     eGFR 32 (*)     Anion Gap 7 (*)     All other components within normal limits   CK   TROPONIN I   TROPONIN I        All Lab Results:  Results for orders placed or performed during the hospital encounter of 05/15/24   CBC auto differential   Result Value Ref Range    WBC 5.77 3.90 - 12.70 K/uL    RBC 5.08 4.00 - 5.40 M/uL    Hemoglobin 14.4 12.0 - 16.0 g/dL    Hematocrit 43.7 37.0 - 48.5 %    MCV 86 82 - 98 fL    MCH 28.3 27.0 - 31.0 pg    MCHC 33.0 32.0 - 36.0 g/dL    RDW 15.1 (H) 11.5 - 14.5 %    Platelets 215 150 - 450 K/uL    MPV 10.4 9.2 - 12.9 fL    Immature Granulocytes 0.5 0.0 - 0.5 %    Gran # (ANC) 1.7 (L) 1.8 - 7.7 K/uL    Immature Grans (Abs) 0.03 0.00 - 0.04 K/uL    Lymph # 2.9 1.0 - 4.8 K/uL    Mono # 0.9 0.3 - 1.0 K/uL    Eos # 0.1 0.0 - 0.5 K/uL    Baso # 0.06 0.00 - 0.20 K/uL    nRBC 0 0 /100 WBC    Gran % 30.2 (L) 38.0 - 73.0 %    Lymph % 51.0 (H) 18.0 - 48.0 %    " Mono % 15.4 (H) 4.0 - 15.0 %    Eosinophil % 1.9 0.0 - 8.0 %    Basophil % 1.0 0.0 - 1.9 %    Differential Method Automated    Comprehensive metabolic panel   Result Value Ref Range    Sodium 140 136 - 145 mmol/L    Potassium 4.3 3.5 - 5.1 mmol/L    Chloride 108 95 - 110 mmol/L    CO2 25 23 - 29 mmol/L    Glucose 87 70 - 110 mg/dL    BUN 32 (H) 8 - 23 mg/dL    Creatinine 1.6 (H) 0.5 - 1.4 mg/dL    Calcium 9.7 8.7 - 10.5 mg/dL    Total Protein 7.6 6.0 - 8.4 g/dL    Albumin 3.5 3.5 - 5.2 g/dL    Total Bilirubin 0.7 0.1 - 1.0 mg/dL    Alkaline Phosphatase 59 55 - 135 U/L    AST 36 10 - 40 U/L    ALT 21 10 - 44 U/L    eGFR 32 (A) >60 mL/min/1.73 m^2    Anion Gap 7 (L) 8 - 16 mmol/L   CPK   Result Value Ref Range     20 - 180 U/L   Troponin I   Result Value Ref Range    Troponin I 0.017 0.000 - 0.026 ng/mL   Troponin I   Result Value Ref Range    Troponin I 0.022 0.000 - 0.026 ng/mL   EKG 12-lead   Result Value Ref Range    QRS Duration 112 ms    [facility] QTC Calculation 475 ms         Imaging Results:  Imaging Results              X-Ray Chest 1 View (Final result)  Result time 05/15/24 14:47:30      Final result by Don Jett MD (05/15/24 14:47:30)                   Impression:      1.  Negative for acute process involving the chest.    2.  Stable findings as noted above.      Electronically signed by: Don Jett MD  Date:    05/15/2024  Time:    14:47               Narrative:    EXAMINATION:  XR CHEST 1 VIEW    CLINICAL HISTORY:  Chest pain, unspecified    COMPARISON:  Studies dating back to February 14, 2017    FINDINGS:  Stable mild left basilar interstitial changes.  The lungs are free of new pulmonary opacities.  The cardiac silhouette size is normal. The trachea is midline and the mediastinal width is normal. Negative for focal infiltrate, effusion or pneumothorax. Pulmonary vasculature is normal. Negative for osseous abnormalities. Ectatic and tortuous aorta with aortic arch calcifications.   Degenerative changes of the spine.                                       The EKG was ordered, reviewed, and independently interpreted by the ED provider.  Interpretation time: 14:11  Rate: 48 BPM  Rhythm: sinus bradycardia  Interpretation: ST and T wave abnormality, consider lateral ischemia. No STEMI.             The Emergency Provider reviewed the vital signs and test results, which are outlined above.     ED Discussion     6:10 PM: Spoke with Melisa Damico MD (cardiolgoy) who recommends repeat troponin. If nagative with no more CP, pt can f/u as outpatient.    8:03 PM: Reassessed pt at this time. Discussed with pt all pertinent ED information and results. Discussed pt dx and plan of tx. Gave pt all f/u and return to the ED instructions. All questions and concerns were addressed at this time. Pt expresses understanding of information and instructions, and is comfortable with plan to discharge. Pt is stable for discharge.    I discussed with patient and/or family/caretaker that evaluation in the ED does not suggest any emergent or life threatening medical conditions requiring immediate intervention beyond what was provided in the ED, and I believe patient is safe for discharge.  Regardless, an unremarkable evaluation in the ED does not preclude the development or presence of a serious of life threatening condition. As such, patient was instructed to return immediately for any worsening or change in current symptoms.     ED Course as of 05/17/24 1246   Wed May 15, 2024   1528 Creatinine(!): 1.6 [NF]   1528 BUN(!): 32 [NF]   1729 79-year-old female with a history of hypertension presents with left-sided chest pain that radiates to arm and back.  EKG shows nonspecific T-wave changes.  Troponin normal.  CMP shows mild NICHOLE.  IV fluids infusing.  EKG shows no acute ischemic changes and troponin x2 negative.  CPK normal.  CBC unremarkable.  Heart score 5.  Chest pain resolved spontaneously.   [NF]      ED Course User  Index  [NF] Rozina Hall DO     Medical Decision Making  Amount and/or Complexity of Data Reviewed  Labs: ordered. Decision-making details documented in ED Course.  Radiology: ordered. Decision-making details documented in ED Course.  ECG/medicine tests: ordered and independent interpretation performed. Decision-making details documented in ED Course.       Additional MDM:   Differential Diagnosis:   Includes but not limited to ACS, costochondritis, pneumonia, bronchitis, viral URI.  PE unlikely as patient has no tachycardia or hypoxia.     Heart Score:    History:          Moderately suspicious.  ECG:             Nonspecific repolarisation disturbance  Age:               >65 years  Risk factors: 1-2 risk factors  Troponin:       Less than or equal to normal limit  Heart Score = 5                ED Medication(s):  Medications   sodium chloride 0.9% bolus 500 mL 500 mL (0 mLs Intravenous Stopped 5/15/24 0686)       Discharge Medication List as of 5/15/2024  8:17 PM           Follow-up Information       Don Mayen MD On 5/20/2024.    Specialty: Family Medicine  Contact information:  [id number]  [place] [address]  [phone]               [facility] - Emergency Dept..    Specialty: Emergency Medicine  Why: As needed, If symptoms worsen  Contact information:  [address] [facility] [address] [phone]                               Scribe Attestation:   Scribe #1: I performed the above scribed service and the documentation accurately describes the services I performed. I attest to the accuracy of the note.     Attending:   Physician Attestation Statement for Scribe #1: I, Rozina Hall DO, personally performed the services described in this documentation, as scribed by Josh Carter, in my presence, and it is both accurate and complete.           Clinical Impression       ICD-10-CM ICD-9-CM   1. Chest pain  R07.9 786.50   2. NICHOLE (acute kidney injury)  N17.9 584.9        Disposition:   Disposition: Discharged  Condition: Stable        Rozina Hall,   05/17/24 1243

## 2024-05-15 NOTE — FIRST PROVIDER EVALUATION
Medical screening examination initiated.  I have conducted a focused provider triage encounter, findings are as follows:    Brief history of present illness:  79-year-old female with complaint of left-sided chest pain for the past 30 minutes  There were no vitals filed for this visit.    Pertinent physical exam: alert, awake, BBSCTA    Brief workup plan: cardiac workup    Preliminary workup initiated; this workup will be continued and followed by the physician or advanced practice provider that is assigned to the patient when roomed.

## 2024-05-16 ENCOUNTER — PATIENT OUTREACH (OUTPATIENT)
Dept: EMERGENCY MEDICINE | Facility: HOSPITAL | Age: 79
End: 2024-05-16
Payer: MEDICARE

## 2024-05-17 LAB
OHS QRS DURATION: 112 MS
OHS QTC CALCULATION: 475 MS

## 2024-05-17 NOTE — PROGRESS NOTES
Pt visited the ED on 5/16/24. I made 2 attempts to reach patient to assist with scheduling a post ED 7-day follow up with PCP. Unable to reach. Closing encounter.    Terrie Beard

## 2024-05-23 ENCOUNTER — OFFICE VISIT (OUTPATIENT)
Dept: OBSTETRICS AND GYNECOLOGY | Facility: CLINIC | Age: 79
End: 2024-05-23
Payer: MEDICARE

## 2024-05-23 VITALS
HEIGHT: 62 IN | DIASTOLIC BLOOD PRESSURE: 76 MMHG | WEIGHT: 162.25 LBS | SYSTOLIC BLOOD PRESSURE: 152 MMHG | BODY MASS INDEX: 29.86 KG/M2

## 2024-05-23 DIAGNOSIS — N93.9 VAGINAL BLEEDING: ICD-10-CM

## 2024-05-23 PROCEDURE — 3078F DIAST BP <80 MM HG: CPT | Mod: CPTII,S$GLB,,

## 2024-05-23 PROCEDURE — 99202 OFFICE O/P NEW SF 15 MIN: CPT | Mod: S$GLB,,,

## 2024-05-23 PROCEDURE — 1160F RVW MEDS BY RX/DR IN RCRD: CPT | Mod: CPTII,S$GLB,,

## 2024-05-23 PROCEDURE — 3077F SYST BP >= 140 MM HG: CPT | Mod: CPTII,S$GLB,,

## 2024-05-23 PROCEDURE — 1126F AMNT PAIN NOTED NONE PRSNT: CPT | Mod: CPTII,S$GLB,,

## 2024-05-23 PROCEDURE — 99999 PR PBB SHADOW E&M-EST. PATIENT-LVL III: CPT | Mod: PBBFAC,,,

## 2024-05-23 PROCEDURE — 1159F MED LIST DOCD IN RCRD: CPT | Mod: CPTII,S$GLB,,

## 2024-05-23 PROCEDURE — 1101F PT FALLS ASSESS-DOCD LE1/YR: CPT | Mod: CPTII,S$GLB,,

## 2024-05-23 PROCEDURE — 3288F FALL RISK ASSESSMENT DOCD: CPT | Mod: CPTII,S$GLB,,

## 2024-05-23 NOTE — PROGRESS NOTES
Subjective:       Patient ID: Megan Sams is a 79 y.o. female.    Chief Complaint:  Vaginal Bleeding      History of Present Illness  HPI      Patient with Hysterectomy, bilateral salpingo-oophorectomy in  was referred by PCP for evaluation of vaginal bleeding   Patient reports 2 episodes of bright red bleeding when she wiped, no blood in underwear or in the toilet after urinating, blood only on tissue  First episode 5 months ago, second episode 1 month ago and no bleeding since   She reports no blood in stool and she grijalva not think blood is coming from rectum       Blood pressure elevated today, patient reports she did not take medication this morning because blood pressure reading was low   GYN & OB History  No LMP recorded. Patient has had a hysterectomy.   Date of Last Pap: No result found    OB History    Para Term  AB Living   2 2 2         SAB IAB Ectopic Multiple Live Births                  # Outcome Date GA Lbr Esteban/2nd Weight Sex Type Anes PTL Lv   2 Term            1 Term                Review of Systems  Review of Systems   Genitourinary:  Positive for vaginal bleeding.           Objective:      Physical Exam:   Constitutional: She is oriented to person, place, and time. She appears well-developed and well-nourished.    HENT:   Head: Normocephalic and atraumatic.   Nose: Nose normal.    Eyes: Pupils are equal, round, and reactive to light. Conjunctivae and EOM are normal.     Cardiovascular:  Normal rate and regular rhythm.             Pulmonary/Chest: Effort normal. She has no rhonchi.        Abdominal: Soft. There is no abdominal tenderness. There is no rebound and no guarding.     Genitourinary:    Inguinal canal normal.      Pelvic exam was performed with patient supine.   The external female genitalia was normal.   No external genitalia lesions identified,Genitalia hair distrobution normal .     Labial bartholins normal.No vaginal discharge, tenderness or bleeding (no active  bleeding, no source of bleeding visualized) in the vagina. Vaginal atrophy noted. Cervix is absent.Uterus is absent.           Musculoskeletal: Normal range of motion and moves all extremeties.       Neurological: She is alert and oriented to person, place, and time.    Skin: Skin is warm and dry.    Psychiatric: She has a normal mood and affect. Her speech is normal and behavior is normal. Mood, judgment and thought content normal.             Assessment:        1. Vaginal bleeding               Plan:   Reassured patient I don't suspect bleeding coming from vagina, since bleeding only occurring when she wipes, suspect rectal bleeding or blood in urine   Aware to notify my self or PCP if bleeding occurs again              Samantha Giron, YULI

## 2024-05-24 ENCOUNTER — OFFICE VISIT (OUTPATIENT)
Dept: CARDIOLOGY | Facility: CLINIC | Age: 79
End: 2024-05-24
Payer: MEDICARE

## 2024-05-24 VITALS
HEART RATE: 51 BPM | HEIGHT: 62 IN | BODY MASS INDEX: 30.39 KG/M2 | WEIGHT: 165.13 LBS | SYSTOLIC BLOOD PRESSURE: 132 MMHG | OXYGEN SATURATION: 100 % | DIASTOLIC BLOOD PRESSURE: 74 MMHG

## 2024-05-24 DIAGNOSIS — R06.09 DOE (DYSPNEA ON EXERTION): ICD-10-CM

## 2024-05-24 DIAGNOSIS — I10 ESSENTIAL HYPERTENSION: ICD-10-CM

## 2024-05-24 DIAGNOSIS — R00.2 PALPITATIONS: ICD-10-CM

## 2024-05-24 DIAGNOSIS — Z79.899 AT RISK FOR AMIODARONE TOXICITY WITH LONG TERM USE: ICD-10-CM

## 2024-05-24 DIAGNOSIS — I73.9 PERIPHERAL VASCULAR DISEASE, UNSPECIFIED: ICD-10-CM

## 2024-05-24 DIAGNOSIS — I42.8 NONISCHEMIC CARDIOMYOPATHY: ICD-10-CM

## 2024-05-24 DIAGNOSIS — N18.31 STAGE 3A CHRONIC KIDNEY DISEASE: ICD-10-CM

## 2024-05-24 DIAGNOSIS — Z91.89 AT RISK FOR AMIODARONE TOXICITY WITH LONG TERM USE: ICD-10-CM

## 2024-05-24 DIAGNOSIS — E66.9 OBESITY, CLASS I, BMI 30-34.9: ICD-10-CM

## 2024-05-24 DIAGNOSIS — I50.42 CHRONIC COMBINED SYSTOLIC AND DIASTOLIC CONGESTIVE HEART FAILURE: Primary | ICD-10-CM

## 2024-05-24 DIAGNOSIS — I49.3 PVC (PREMATURE VENTRICULAR CONTRACTION): ICD-10-CM

## 2024-05-24 DIAGNOSIS — N18.32 STAGE 3B CHRONIC KIDNEY DISEASE: ICD-10-CM

## 2024-05-24 PROCEDURE — 3288F FALL RISK ASSESSMENT DOCD: CPT | Mod: CPTII,S$GLB,, | Performed by: INTERNAL MEDICINE

## 2024-05-24 PROCEDURE — 1101F PT FALLS ASSESS-DOCD LE1/YR: CPT | Mod: CPTII,S$GLB,, | Performed by: INTERNAL MEDICINE

## 2024-05-24 PROCEDURE — 1159F MED LIST DOCD IN RCRD: CPT | Mod: CPTII,S$GLB,, | Performed by: INTERNAL MEDICINE

## 2024-05-24 PROCEDURE — 1126F AMNT PAIN NOTED NONE PRSNT: CPT | Mod: CPTII,S$GLB,, | Performed by: INTERNAL MEDICINE

## 2024-05-24 PROCEDURE — 3078F DIAST BP <80 MM HG: CPT | Mod: CPTII,S$GLB,, | Performed by: INTERNAL MEDICINE

## 2024-05-24 PROCEDURE — 99999 PR PBB SHADOW E&M-EST. PATIENT-LVL IV: CPT | Mod: PBBFAC,,, | Performed by: INTERNAL MEDICINE

## 2024-05-24 PROCEDURE — G2211 COMPLEX E/M VISIT ADD ON: HCPCS | Mod: S$GLB,,, | Performed by: INTERNAL MEDICINE

## 2024-05-24 PROCEDURE — 1160F RVW MEDS BY RX/DR IN RCRD: CPT | Mod: CPTII,S$GLB,, | Performed by: INTERNAL MEDICINE

## 2024-05-24 PROCEDURE — 99214 OFFICE O/P EST MOD 30 MIN: CPT | Mod: S$GLB,,, | Performed by: INTERNAL MEDICINE

## 2024-05-24 PROCEDURE — 3075F SYST BP GE 130 - 139MM HG: CPT | Mod: CPTII,S$GLB,, | Performed by: INTERNAL MEDICINE

## 2024-05-24 RX ORDER — METOPROLOL SUCCINATE 50 MG/1
50 TABLET, EXTENDED RELEASE ORAL DAILY
Qty: 90 TABLET | Refills: 1 | Status: SHIPPED | OUTPATIENT
Start: 2024-05-24

## 2024-05-24 NOTE — PROGRESS NOTES
Subjective:   Patient ID:  Megan Sams is a 79 y.o. female who presents for cardiac consult of Hospital Follow Up (Recent ER visit at [facility] on [address] on last Wednesday 05/15/24.), Back Pain, and Medication Management      Referral by: No referring provider defined for this encounter.     Reason for consult: CHF      HPI  The patient came in today for cardiac consult of Hospital Follow Up (Recent ER visit at [facility] on [address] on last Wednesday 05/15/24.), Back Pain, and Medication Management      Megan Sams is a 79 y.o. female pt with NICM EF 35%, PVCs, HTN, obesity, CKD presents for CV follow up.     1/26/24  BP and HR well controlled. BMI 29 - 161 lbs  Pt had nuclear stress test Dec 2023 - nuclear stress test reveals areas of scar in heart with overall function 30-35%, will continue current medical therapy but if having worsening chest pain or breathing issues will discuss angiogram/heart cath.   Pt saw Dr. Herminia Haines -  reasonable candidate for ICD - but would like first to give her short trial of amio    Overall feels stable.     3/27/24  BP and HR stable. Occ dizzy. HR in 40s.   BMI 29.   Feels well overall.     5/24/24 - ER follow up  5/15/2024, 3:46 PM  History obtained from the patient                  History of Present Illness: Megan Sams is a 79 y.o. female patient with a PMHx of CHF, HTN, depression, and CKD who presents to the Emergency Department for evaluation of mild left CP which onset suddenly one hour PTA. Pain radiates to left arm and left upper back. Pt's pain has since improved while in the ED. Symptoms are constant and moderate in severity. No mitigating or exacerbating factors reported. No associated sxs. Patient denies any SOB, N/V, palpitations, diaphoresis, and all other sxs at this time. No prior Tx. Pt does not smoke. Pt had a stress test completed 2.5 months ago. No further complaints or concerns at this time.      ECHO 4/2024 with EF 35%, grade 1 DD, PASP 35 mmHG.   Holter 4/2024  "with sinus pause 4.3 sec  Pt saw Dr. Herminia Haines -" reassess her candidacy for ICD.  I will also reduce the dose of amiodarone from 300 to 200 a day.    BP stable. HR low. BMI 30 - 165 lbs     Results for orders placed during the hospital encounter of 04/19/24    Echo    Interpretation Summary    Left Ventricle: The left ventricle is mildly dilated. Moderately increased wall thickness. There is moderate concentric hypertrophy. Moderate global hypokinesis present. There is moderately reduced systolic function with a visually estimated ejection fraction of 35 - 40%. Grade I diastolic dysfunction.    Right Ventricle: Normal right ventricular cavity size. Wall thickness is normal. Right ventricle wall motion  is normal. Systolic function is normal.    Mitral Valve: There is mild to moderate regurgitation.    Tricuspid Valve: There is mild regurgitation.    Pulmonary Artery: The estimated pulmonary artery systolic pressure is 35 mmHg.    IVC/SVC: Normal venous pressure at 3 mmHg.      HOLTER  Interpretation Summary  Show Result Comparison     The predominant rhythm is sinus.    THERE IS A SINUS PAUSE OF 4.3 SECONDS.    THERE ARE MULTIPLE EPISODE OFS EVERE SINUS BRADYCARDIA AND EPISODES OF WIDE COMPLEX JUNCTIONAL ESCAPE BEATS.      Results for orders placed during the hospital encounter of 12/28/23    Nuclear Stress - Cardiology Interpreted    Interpretation Summary    Abnormal myocardial perfusion scan.    There are two significant perfusion abnormalities.    Perfusion Abnormality #1 - There is a moderate to severe intensity, moderate sized, mostly fixed perfusion abnormality with some reversibilty in the apical, anteroseptal and septal apical wall(s).    Perfusion Abnormality #2 - There is a moderate sized, moderate intensity, mostly fixed perfusion abnormality with some reversibilty in the basal to mid inferolateral wall(s).    There are no other significant perfusion abnormalities.    The gated perfusion images showed " an ejection fraction of 34% at rest. The gated perfusion images showed an ejection fraction of 23% post stress.    There is moderate global hypokinesis at rest and severe global hypokinesis at stress .    The ECG portion of the study is negative for ischemia.    The patient reported no chest pain during the stress test.        Results for orders placed during the hospital encounter of 11/03/23    Echo    Interpretation Summary    Left Ventricle: The left ventricle is mildly dilated. Normal wall thickness. Moderate global hypokinesis present. There is moderately reduced systolic function with a visually estimated ejection fraction of 30 - 35%. There is normal diastolic function.    Right Ventricle: Normal right ventricular cavity size. Wall thickness is normal. Right ventricle wall motion  is normal. Systolic function is normal.    Mitral Valve: There is moderate regurgitation with a centrally directed jet.    Tricuspid Valve: There is moderate regurgitation with a centrally directed jet.    Pulmonary Artery: The estimated pulmonary artery systolic pressure is 49 mmHg.    IVC/SVC: Normal venous pressure at 3 mmHg.      Echo: 2020: EF 40%  Cardiac cath: 2009: normal coronaries       Holter monitor - 48 hour    Result Date: 4/10/2024    The predominant rhythm is sinus.    THERE IS A SINUS PAUSE OF 4.3 SECONDS.    THERE ARE MULTIPLE EPISODE OFS EVERE SINUS BRADYCARDIA AND EPISODES OF   WIDE COMPLEX JUNCTIONAL ESCAPE BEATS.        Holter monitor - 48 hour    Result Date: 11/16/2023    The predominant rhythm is sinus.              Past Medical History:   Diagnosis Date    Arthritis     Blood transfusion     CHF (congestive heart failure)     Chronic kidney disease     Depression     Hypertension        Past Surgical History:   Procedure Laterality Date    HYSTERECTOMY  1978    OOPHORECTOMY  1978       Social History     Tobacco Use    Smoking status: Never    Smokeless tobacco: Never   Substance Use Topics    Alcohol use:  No     Alcohol/week: 0.0 standard drinks of alcohol    Drug use: No       Family History   Problem Relation Name Age of Onset    Early death Mother      Heart disease Mother      Hypertension Mother      Kidney disease Father      Heart disease Brother         Patient's Medications   New Prescriptions    No medications on file   Previous Medications    ACETAMINOPHEN (TYLENOL) 500 MG TABLET    Take 500 mg by mouth every 6 (six) hours as needed.    ALLOPURINOL (ZYLOPRIM) 100 MG TABLET    TAKE 2 TABLETS BY MOUTH EVERY DAY    AMIODARONE (PACERONE) 200 MG TAB    Take one tablet in the am    ASPIRIN (ECOTRIN) 81 MG EC TABLET    Take 81 mg by mouth once daily.    CICLOPIROX (PENLAC) 8 % SOLN    Apply to nails once daily    FUROSEMIDE (LASIX) 20 MG TABLET    Take 1 tablet (20 mg total) by mouth once daily. Do not take if BP is below 110/70    JARDIANCE 10 MG TABLET    TAKE 1 TABLET BY MOUTH EVERY DAY    METOPROLOL SUCCINATE (TOPROL-XL) 50 MG 24 HR TABLET    Take 1 tablet (50 mg total) by mouth 2 (two) times daily.    MULTIVIT WITH CALCIUM,IRON,MIN (MULTIPLE VITAMIN, WOMENS ORAL)    Take by mouth once daily.    SACUBITRIL-VALSARTAN (ENTRESTO) 49-51 MG PER TABLET    Take 1 tablet by mouth 2 (two) times daily.    SPIRONOLACTONE (ALDACTONE) 50 MG TABLET    Take 1 tablet (50 mg total) by mouth once daily. Do not take if BP is below 110/70   Modified Medications    No medications on file   Discontinued Medications    No medications on file       Review of Systems   Constitutional:  Positive for malaise/fatigue.   HENT: Negative.     Eyes: Negative.    Respiratory:  Positive for shortness of breath.    Cardiovascular:  Positive for chest pain and palpitations.   Gastrointestinal: Negative.    Genitourinary: Negative.    Musculoskeletal: Negative.    Skin: Negative.    Neurological: Negative.    Endo/Heme/Allergies: Negative.    Psychiatric/Behavioral: Negative.     All 12 systems otherwise negative.      Wt Readings from Last 3  "Encounters:   05/24/24 74.9 kg (165 lb 2 oz)   05/23/24 73.6 kg (162 lb 4.1 oz)   05/15/24 73 kg (161 lb)     Temp Readings from Last 3 Encounters:   05/15/24 98.3 °F (36.8 °C) (Oral)   05/09/24 96.7 °F (35.9 °C) (Tympanic)   04/11/24 97.1 °F (36.2 °C) (Tympanic)     BP Readings from Last 3 Encounters:   05/24/24 132/74   05/23/24 (!) 152/76   05/15/24 (!) 169/75     Pulse Readings from Last 3 Encounters:   05/24/24 (!) 51   05/15/24 (!) 50   05/09/24 (!) 43       /74 (BP Location: Left arm, Patient Position: Sitting, BP Method: Medium (Manual))   Pulse (!) 51   Ht 5' 2" (1.575 m)   Wt 74.9 kg (165 lb 2 oz)   SpO2 100%   BMI 30.20 kg/m²     Objective:   Physical Exam  Vitals and nursing note reviewed.   Constitutional:       General: She is not in acute distress.     Appearance: She is well-developed. She is obese. She is not diaphoretic.   HENT:      Head: Normocephalic and atraumatic.      Nose: Nose normal.   Eyes:      General: No scleral icterus.     Conjunctiva/sclera: Conjunctivae normal.   Neck:      Thyroid: No thyromegaly.      Vascular: No JVD.   Cardiovascular:      Rate and Rhythm: Normal rate and regular rhythm.      Heart sounds: S1 normal and S2 normal. Murmur heard.      No friction rub. No gallop. No S3 or S4 sounds.   Pulmonary:      Effort: Pulmonary effort is normal. No respiratory distress.      Breath sounds: Normal breath sounds. No stridor. No wheezing or rales.   Chest:      Chest wall: No tenderness.   Abdominal:      General: Bowel sounds are normal. There is no distension.      Palpations: Abdomen is soft. There is no mass.      Tenderness: There is no abdominal tenderness. There is no rebound.   Genitourinary:     Comments: Deferred  Musculoskeletal:         General: No tenderness or deformity. Normal range of motion.      Cervical back: Normal range of motion and neck supple.   Lymphadenopathy:      Cervical: No cervical adenopathy.   Skin:     General: Skin is warm and dry. "      Coloration: Skin is not pale.      Findings: No erythema or rash.   Neurological:      Mental Status: She is alert and oriented to person, place, and time.      Motor: No abnormal muscle tone.      Coordination: Coordination normal.   Psychiatric:         Behavior: Behavior normal.         Thought Content: Thought content normal.         Judgment: Judgment normal.         Lab Results   Component Value Date     05/15/2024    K 4.3 05/15/2024     05/15/2024    CO2 25 05/15/2024    BUN 32 (H) 05/15/2024    CREATININE 1.6 (H) 05/15/2024    GLU 87 05/15/2024    HGBA1C 5.4 07/26/2023    MG 2.0 01/26/2024    AST 36 05/15/2024    ALT 21 05/15/2024    ALBUMIN 3.5 05/15/2024    PROT 7.6 05/15/2024    BILITOT 0.7 05/15/2024    WBC 5.77 05/15/2024    HGB 14.4 05/15/2024    HCT 43.7 05/15/2024    MCV 86 05/15/2024     05/15/2024    INR 1.0 02/14/2017    TSH 4.517 (H) 04/12/2024    CHOL 174 07/26/2023    HDL 50 07/26/2023    LDLCALC 104.0 07/26/2023    TRIG 100 07/26/2023     (H) 04/12/2024       BNP (pg/mL)   Date Value   04/12/2024 507 (H)   01/26/2024 613 (H)   12/01/2023 612 (H)   10/20/2023 579 (H)   10/05/2023 3,942 (H)     INR (no units)   Date Value   02/14/2017 1.0   03/02/2014 1.0        Assessment:      1. Chronic combined systolic and diastolic congestive heart failure    2. Nonischemic cardiomyopathy    3. Palpitations    4. Essential hypertension    5. PVC (premature ventricular contraction)    6. Stage 3b chronic kidney disease    7. At risk for amiodarone toxicity with long term use    8. Peripheral vascular disease, unspecified    9. PAGE (dyspnea on exertion)    10. Stage 3a chronic kidney disease    11. Obesity, Class I, BMI 30-34.9        Plan:     NICM  EF 35%; Chest pain, PAGE;  BNP - 3942 --> 579 --> 507  - cont Toprol and ARB - change to Entresto BID - increases dose   - nuc stress 12/2023 with mostly fixed defect anteroseptal/apical wall with EF 34%  - needs low salt diet   -  ECHO 4/2024 with EF 35%, grade 1 DD, PASP 35 mmHG.     2. HTN, PVCs - with bradycardia   - titrate meds  - frequent PVCs - referred to EP - started on Amio with Dr. Herminia Haines  - Holter 4/2024 with sinus pause 4.3 sec  Pt saw Dr. Herminia Haines - reassess her candidacy for ICD.  I will also reduce the dose of amiodarone from 300 to 200 a day.  - increased BB to 50mg BID  - will decrease to Toprol 50mg  - rec compression stockings for edema     3. Obesity - losing weight - 159 lbs -->  BMI 29 - 161 lbs -->BMI 30 - 165 lbs   - cont weight loss    4. CKD3  - cont to monitor    Visit today included increased complexity associated with the care of the episodic problem CHF addressed and managing the longitudinal care of the patient due to the serious and/or complex managed problem(s) .    Thank you for allowing me to participate in this patient's care. Please do not hesitate to contact me with any questions or concerns. Consult note has been forwarded to the referral physician.

## 2024-05-24 NOTE — Clinical Note
ROBERTA Medley, I am lowering her BB to Toprol 50mg daily - seems rates are too slow now, is she a candidate for PPM or ICD? Thanks

## 2024-06-09 NOTE — PROGRESS NOTES
See comments below and call patient to discuss.   Please close encounter when done -- no need to route back to me.  Thanks  EF still on the low side   RTC in  afew weeks

## 2024-06-17 ENCOUNTER — TELEPHONE (OUTPATIENT)
Dept: CARDIOLOGY | Facility: CLINIC | Age: 79
End: 2024-06-17
Payer: MEDICARE

## 2024-06-17 NOTE — TELEPHONE ENCOUNTER
Concerned about her amniodorone dose - was decreased but HR still 50s- will see [facility] 7/11- was offered sooner but pt could not make it    No further questions      ----- Message from Nataly Mccord LPN sent at 6/17/2024 12:06 PM CDT -----  Regarding: FW: self [phone]    ----- Message -----  From: Johana Painter  Sent: 6/17/2024  10:30 AM CDT  To: [facility] Staff  Subject: self [phone]                                .Type: Patient Call Back    Who called:self    What is the request in detail: patient requesting a call back regarding her medication amiodarone (PACERONE) 200 MG Tab. Patient has questions and concerns.    Can the clinic reply by [facility]?no    Would the patient rather a call back or a response via [facility]?call back    Best call back number [phone]

## 2024-06-17 NOTE — TELEPHONE ENCOUNTER
Called [phone] and she had just gotten off the phone with the nurse.  She did not have any questions.     ----- Message from Nataly Mccord LPN sent at 6/17/2024 12:17 PM CDT -----    ----- Message -----  From: Galindo Tidwell MD  Sent: 6/8/2024  10:55 PM CDT  To: Nataly Mccord LPN    See comments below and call patient to discuss.   Please close encounter when done -- no need to route back to me.  Thanks  BNP and proBNP are still elevated.  Will await the echocardiogram further decisions about ICD implant.  Amiodarone levels are mid range around 1.0.  TSH is a little bit elevated but T4 is within normal limits.  No change in amiodarone dosing.

## 2024-06-19 DIAGNOSIS — I50.42 CHRONIC COMBINED SYSTOLIC AND DIASTOLIC CONGESTIVE HEART FAILURE: ICD-10-CM

## 2024-06-19 RX ORDER — SACUBITRIL AND VALSARTAN 49; 51 MG/1; MG/1
1 TABLET, FILM COATED ORAL 2 TIMES DAILY
Qty: 60 TABLET | Refills: 1 | Status: SHIPPED | OUTPATIENT
Start: 2024-06-19

## 2024-07-11 ENCOUNTER — LAB VISIT (OUTPATIENT)
Dept: LAB | Facility: HOSPITAL | Age: 79
End: 2024-07-11
Attending: FAMILY MEDICINE
Payer: MEDICARE

## 2024-07-11 ENCOUNTER — OFFICE VISIT (OUTPATIENT)
Dept: CARDIOLOGY | Facility: CLINIC | Age: 79
End: 2024-07-11
Payer: MEDICARE

## 2024-07-11 VITALS
HEART RATE: 52 BPM | WEIGHT: 164.38 LBS | OXYGEN SATURATION: 99 % | DIASTOLIC BLOOD PRESSURE: 70 MMHG | BODY MASS INDEX: 30.06 KG/M2 | SYSTOLIC BLOOD PRESSURE: 132 MMHG

## 2024-07-11 DIAGNOSIS — I50.42 CHRONIC COMBINED SYSTOLIC AND DIASTOLIC CONGESTIVE HEART FAILURE: Primary | ICD-10-CM

## 2024-07-11 DIAGNOSIS — I10 ESSENTIAL HYPERTENSION: Chronic | ICD-10-CM

## 2024-07-11 DIAGNOSIS — M1A.9XX0 CHRONIC GOUT WITHOUT TOPHUS, UNSPECIFIED CAUSE, UNSPECIFIED SITE: ICD-10-CM

## 2024-07-11 DIAGNOSIS — I49.3 PVC (PREMATURE VENTRICULAR CONTRACTION): ICD-10-CM

## 2024-07-11 DIAGNOSIS — I42.8 NONISCHEMIC CARDIOMYOPATHY: ICD-10-CM

## 2024-07-11 DIAGNOSIS — I10 ESSENTIAL HYPERTENSION: ICD-10-CM

## 2024-07-11 DIAGNOSIS — N18.32 STAGE 3B CHRONIC KIDNEY DISEASE: ICD-10-CM

## 2024-07-11 LAB
ALBUMIN SERPL BCP-MCNC: 3.1 G/DL (ref 3.5–5.2)
ALP SERPL-CCNC: 58 U/L (ref 55–135)
ALT SERPL W/O P-5'-P-CCNC: 17 U/L (ref 10–44)
ANION GAP SERPL CALC-SCNC: 8 MMOL/L (ref 8–16)
AST SERPL-CCNC: 26 U/L (ref 10–40)
BASOPHILS # BLD AUTO: 0.04 K/UL (ref 0–0.2)
BASOPHILS NFR BLD: 0.7 % (ref 0–1.9)
BILIRUB SERPL-MCNC: 0.8 MG/DL (ref 0.1–1)
BUN SERPL-MCNC: 23 MG/DL (ref 8–23)
CALCIUM SERPL-MCNC: 9.2 MG/DL (ref 8.7–10.5)
CHLORIDE SERPL-SCNC: 108 MMOL/L (ref 95–110)
CHOLEST SERPL-MCNC: 182 MG/DL (ref 120–199)
CHOLEST/HDLC SERPL: 3.3 {RATIO} (ref 2–5)
CO2 SERPL-SCNC: 25 MMOL/L (ref 23–29)
CREAT SERPL-MCNC: 1.4 MG/DL (ref 0.5–1.4)
DIFFERENTIAL METHOD BLD: ABNORMAL
EOSINOPHIL # BLD AUTO: 0.1 K/UL (ref 0–0.5)
EOSINOPHIL NFR BLD: 2 % (ref 0–8)
ERYTHROCYTE [DISTWIDTH] IN BLOOD BY AUTOMATED COUNT: 15.4 % (ref 11.5–14.5)
EST. GFR  (NO RACE VARIABLE): 37.8 ML/MIN/1.73 M^2
GLUCOSE SERPL-MCNC: 92 MG/DL (ref 70–110)
HCT VFR BLD AUTO: 43.4 % (ref 37–48.5)
HDLC SERPL-MCNC: 55 MG/DL (ref 40–75)
HDLC SERPL: 30.2 % (ref 20–50)
HGB BLD-MCNC: 13.5 G/DL (ref 12–16)
IMM GRANULOCYTES # BLD AUTO: 0.01 K/UL (ref 0–0.04)
IMM GRANULOCYTES NFR BLD AUTO: 0.2 % (ref 0–0.5)
LDLC SERPL CALC-MCNC: 105.8 MG/DL (ref 63–159)
LYMPHOCYTES # BLD AUTO: 2.7 K/UL (ref 1–4.8)
LYMPHOCYTES NFR BLD: 49.7 % (ref 18–48)
MCH RBC QN AUTO: 28.6 PG (ref 27–31)
MCHC RBC AUTO-ENTMCNC: 31.1 G/DL (ref 32–36)
MCV RBC AUTO: 92 FL (ref 82–98)
MONOCYTES # BLD AUTO: 0.7 K/UL (ref 0.3–1)
MONOCYTES NFR BLD: 12.2 % (ref 4–15)
NEUTROPHILS # BLD AUTO: 1.9 K/UL (ref 1.8–7.7)
NEUTROPHILS NFR BLD: 35.2 % (ref 38–73)
NONHDLC SERPL-MCNC: 127 MG/DL
NRBC BLD-RTO: 0 /100 WBC
PLATELET # BLD AUTO: 226 K/UL (ref 150–450)
PMV BLD AUTO: 11.8 FL (ref 9.2–12.9)
POTASSIUM SERPL-SCNC: 4.1 MMOL/L (ref 3.5–5.1)
PROT SERPL-MCNC: 6.8 G/DL (ref 6–8.4)
RBC # BLD AUTO: 4.72 M/UL (ref 4–5.4)
SODIUM SERPL-SCNC: 141 MMOL/L (ref 136–145)
T4 FREE SERPL-MCNC: 1.14 NG/DL (ref 0.71–1.51)
TRIGL SERPL-MCNC: 106 MG/DL (ref 30–150)
TSH SERPL DL<=0.005 MIU/L-ACNC: 5.79 UIU/ML (ref 0.4–4)
URATE SERPL-MCNC: 4.4 MG/DL (ref 2.4–5.7)
WBC # BLD AUTO: 5.47 K/UL (ref 3.9–12.7)

## 2024-07-11 PROCEDURE — 84439 ASSAY OF FREE THYROXINE: CPT | Performed by: FAMILY MEDICINE

## 2024-07-11 PROCEDURE — 99214 OFFICE O/P EST MOD 30 MIN: CPT | Mod: S$GLB,,, | Performed by: NURSE PRACTITIONER

## 2024-07-11 PROCEDURE — 3288F FALL RISK ASSESSMENT DOCD: CPT | Mod: CPTII,S$GLB,, | Performed by: NURSE PRACTITIONER

## 2024-07-11 PROCEDURE — 1159F MED LIST DOCD IN RCRD: CPT | Mod: CPTII,S$GLB,, | Performed by: NURSE PRACTITIONER

## 2024-07-11 PROCEDURE — 3075F SYST BP GE 130 - 139MM HG: CPT | Mod: CPTII,S$GLB,, | Performed by: NURSE PRACTITIONER

## 2024-07-11 PROCEDURE — 3078F DIAST BP <80 MM HG: CPT | Mod: CPTII,S$GLB,, | Performed by: NURSE PRACTITIONER

## 2024-07-11 PROCEDURE — 80053 COMPREHEN METABOLIC PANEL: CPT | Performed by: FAMILY MEDICINE

## 2024-07-11 PROCEDURE — 99999 PR PBB SHADOW E&M-EST. PATIENT-LVL III: CPT | Mod: PBBFAC,,, | Performed by: NURSE PRACTITIONER

## 2024-07-11 PROCEDURE — 85025 COMPLETE CBC W/AUTO DIFF WBC: CPT | Performed by: FAMILY MEDICINE

## 2024-07-11 PROCEDURE — 36415 COLL VENOUS BLD VENIPUNCTURE: CPT | Mod: PN | Performed by: FAMILY MEDICINE

## 2024-07-11 PROCEDURE — 84443 ASSAY THYROID STIM HORMONE: CPT | Performed by: FAMILY MEDICINE

## 2024-07-11 PROCEDURE — 84550 ASSAY OF BLOOD/URIC ACID: CPT | Performed by: FAMILY MEDICINE

## 2024-07-11 PROCEDURE — 80061 LIPID PANEL: CPT | Performed by: FAMILY MEDICINE

## 2024-07-11 PROCEDURE — 1101F PT FALLS ASSESS-DOCD LE1/YR: CPT | Mod: CPTII,S$GLB,, | Performed by: NURSE PRACTITIONER

## 2024-07-11 RX ORDER — FUROSEMIDE 20 MG/1
20 TABLET ORAL DAILY PRN
Qty: 90 TABLET | Refills: 1 | Status: SHIPPED | OUTPATIENT
Start: 2024-07-11 | End: 2025-07-11

## 2024-07-11 NOTE — PROGRESS NOTES
Subjective:   Patient ID:  Megan Sams is a 79 y.o. female who presents for evaluation of No chief complaint on file.      HPI    Megan Sams is a 79 year old female who presents to Arrhythmia clinic for follow up.    Her current medical conditions include CHF, HFrEF of 35-40%, HTN, CKD, NICM, PVCs.     She returns today and states she is doing ok.     She does endorse some left shoulder pain intermittently.     Reports compliance with medications and dietary restrictions.     Reports 2 pillow orthopnea.     She is walking at [facility] about once a week.     Has issues with hair loss since starting cardiac meds. Will start using minoxidil topical cream to assist with hair regrowth.     Denies chest pain or anginal equivalents. No shortness of breath, PAGE or palpitations. Denies orthopnea, PND or abdominal bloating. Reports regular walking without any issues lately. NO leg swelling or claudications. No recent falls, syncope or near syncopal events. Reports compliance with medications and dietary restrictions. NO CNS complaints to suggest TIA or CVA today. No signs of abnormal bleeding on ASA daily.     Medical therapy:  Entresto 49/51 BID  Toprol XL 50 mg daily  Amiodarone 200 mg daily  Jardiance 10 mg daily  Aldactone 50 mg daily        Past Medical History:   Diagnosis Date    Arthritis     Blood transfusion     CHF (congestive heart failure)     Chronic kidney disease     Depression     Hypertension        Past Surgical History:   Procedure Laterality Date    HYSTERECTOMY  1978    OOPHORECTOMY  1978       Social History     Tobacco Use    Smoking status: Never    Smokeless tobacco: Never   Substance Use Topics    Alcohol use: No     Alcohol/week: 0.0 standard drinks of alcohol    Drug use: No       Family History   Problem Relation Name Age of Onset    Early death Mother      Heart disease Mother      Hypertension Mother      Kidney disease Father      Heart disease Brother         Wt Readings from Last 3  Encounters:   07/11/24 74.6 kg (164 lb 5.7 oz)   05/24/24 74.9 kg (165 lb 2 oz)   05/23/24 73.6 kg (162 lb 4.1 oz)     Temp Readings from Last 3 Encounters:   05/15/24 98.3 °F (36.8 °C) (Oral)   05/09/24 96.7 °F (35.9 °C) (Tympanic)   04/11/24 97.1 °F (36.2 °C) (Tympanic)     BP Readings from Last 3 Encounters:   07/11/24 132/70   05/24/24 132/74   05/23/24 (!) 152/76     Pulse Readings from Last 3 Encounters:   07/11/24 (!) 52   05/24/24 (!) 51   05/15/24 (!) 50       Current Outpatient Medications on File Prior to Visit   Medication Sig Dispense Refill    acetaminophen (TYLENOL) 500 MG tablet Take 500 mg by mouth every 6 (six) hours as needed.      allopurinoL (ZYLOPRIM) 100 MG tablet TAKE 2 TABLETS BY MOUTH EVERY  tablet 4    amiodarone (PACERONE) 200 MG Tab Take one tablet in the am 90 tablet 3    aspirin (ECOTRIN) 81 MG EC tablet Take 81 mg by mouth once daily.      ciclopirox (PENLAC) 8 % Soln Apply to nails once daily 6.6 mL 6    furosemide (LASIX) 20 MG tablet Take 1 tablet (20 mg total) by mouth once daily. Do not take if BP is below 110/70 90 tablet 1    JARDIANCE 10 mg tablet TAKE 1 TABLET BY MOUTH EVERY DAY 90 tablet 1    metoprolol succinate (TOPROL-XL) 50 MG 24 hr tablet Take 1 tablet (50 mg total) by mouth once daily. 90 tablet 1    MULTIVIT WITH CALCIUM,IRON,MIN (MULTIPLE VITAMIN, WOMENS ORAL) Take by mouth once daily.      sacubitriL-valsartan (ENTRESTO) 49-51 mg per tablet Take 1 tablet by mouth 2 (two) times daily. 60 tablet 1    spironolactone (ALDACTONE) 50 MG tablet Take 1 tablet (50 mg total) by mouth once daily. Do not take if BP is below 110/70 90 tablet 1     No current facility-administered medications on file prior to visit.       Holter monitor - 48 hour    Result Date: 4/10/2024    The predominant rhythm is sinus.    THERE IS A SINUS PAUSE OF 4.3 SECONDS.    THERE ARE MULTIPLE EPISODE OFS EVERE SINUS BRADYCARDIA AND EPISODES OF   WIDE COMPLEX JUNCTIONAL ESCAPE  BEATS.        Holter monitor - 48 hour    Result Date: 11/16/2023    The predominant rhythm is sinus.         Results for orders placed during the hospital encounter of 04/19/24    Echo    Interpretation Summary    Left Ventricle: The left ventricle is mildly dilated. Moderately increased wall thickness. There is moderate concentric hypertrophy. Moderate global hypokinesis present. There is moderately reduced systolic function with a visually estimated ejection fraction of 35 - 40%. Grade I diastolic dysfunction.    Right Ventricle: Normal right ventricular cavity size. Wall thickness is normal. Right ventricle wall motion  is normal. Systolic function is normal.    Mitral Valve: There is mild to moderate regurgitation.    Tricuspid Valve: There is mild regurgitation.    Pulmonary Artery: The estimated pulmonary artery systolic pressure is 35 mmHg.    IVC/SVC: Normal venous pressure at 3 mmHg.    Results for orders placed during the hospital encounter of 12/28/23    Nuclear Stress - Cardiology Interpreted    Interpretation Summary    Abnormal myocardial perfusion scan.    There are two significant perfusion abnormalities.    Perfusion Abnormality #1 - There is a moderate to severe intensity, moderate sized, mostly fixed perfusion abnormality with some reversibilty in the apical, anteroseptal and septal apical wall(s).    Perfusion Abnormality #2 - There is a moderate sized, moderate intensity, mostly fixed perfusion abnormality with some reversibilty in the basal to mid inferolateral wall(s).    There are no other significant perfusion abnormalities.    The gated perfusion images showed an ejection fraction of 34% at rest. The gated perfusion images showed an ejection fraction of 23% post stress.    There is moderate global hypokinesis at rest and severe global hypokinesis at stress .    The ECG portion of the study is negative for ischemia.    The patient reported no chest pain during the stress  test.        Results for orders placed or performed during the hospital encounter of 05/15/24   EKG 12-lead    Collection Time: 05/15/24  2:11 PM   Result Value Ref Range    QRS Duration 112 ms    [facility] QTC Calculation 475 ms    Narrative    Test Reason : R07.9,    Vent. Rate : 048 BPM     Atrial Rate : 048 BPM     P-R Int : 152 ms          QRS Dur : 112 ms      QT Int : 532 ms       P-R-T Axes : 041 012 132 degrees     QTc Int : 475 ms    Sinus bradycardia  ST and T wave abnormality, consider lateral ischemia  Abnormal ECG  When compared with ECG of 12-APR-2024 09:08,  No significant change was found  Confirmed by MD KATHARINE, ZOILA ([id number]) on 5/17/2024 9:02:02 PM    Referred By: [email or web address]   SELF           Confirmed By:ZOILA ALCANTAR MD         Review of Systems   HENT:  Negative for hearing loss and hoarse voice.    Eyes:  Negative for visual disturbance.   Cardiovascular:  Negative for chest pain, claudication, dyspnea on exertion, irregular heartbeat, leg swelling, near-syncope, orthopnea, palpitations, paroxysmal nocturnal dyspnea and syncope.   Respiratory:  Negative for cough, hemoptysis, shortness of breath, sleep disturbances due to breathing, snoring and wheezing.    Hematologic/Lymphatic: Does not bruise/bleed easily.   Skin:  Negative for color change, dry skin and nail changes.   Musculoskeletal:  Positive for back pain, joint pain and myalgias.   Gastrointestinal:  Negative for bloating, abdominal pain, constipation, nausea and vomiting.   Genitourinary:  Negative for dysuria, flank pain, hematuria and hesitancy.   Neurological:  Positive for weakness. Negative for headaches, light-headedness, loss of balance, numbness and paresthesias.   Psychiatric/Behavioral:  Negative for altered mental status.    Allergic/Immunologic: Positive for environmental allergies.         Objective:/70 (BP Location: Right arm, Patient Position: Sitting)   Pulse (!) 52   Wt 74.6 kg (164 lb 5.7 oz)   SpO2 99%   BMI 30.06 kg/m²       Physical Exam  Vitals and nursing note reviewed.   Constitutional:       General: She is not in acute distress.     Appearance: Normal appearance. She is well-developed. She is not ill-appearing.   HENT:      Head: Normocephalic and atraumatic.      Nose: Nose normal.      Mouth/Throat:      Mouth: Mucous membranes are moist.   Eyes:      Pupils: Pupils are equal, round, and reactive to light.   Neck:      Thyroid: No thyromegaly.      Vascular: No JVD.      Trachea: No tracheal deviation.   Cardiovascular:      Rate and Rhythm: Regular rhythm. Bradycardia present.      Chest Wall: PMI is not displaced.      Pulses: Intact distal pulses.           Radial pulses are 2+ on the right side and 2+ on the left side.        Dorsalis pedis pulses are 2+ on the right side and 2+ on the left side.      Heart sounds: S1 normal and S2 normal. Heart sounds not distant. No murmur heard.  Pulmonary:      Effort: Pulmonary effort is normal. No respiratory distress.      Breath sounds: Normal breath sounds. No wheezing.   Abdominal:      General: Bowel sounds are normal.      Palpations: Abdomen is soft.   Musculoskeletal:         General: No swelling. Normal range of motion.      Cervical back: Full passive range of motion without pain, normal range of motion and neck supple.      Right ankle: No swelling.      Left ankle: No swelling.   Skin:     General: Skin is warm and dry.      Capillary Refill: Capillary refill takes less than 2 seconds.      Nails: There is no clubbing.   Neurological:      General: No focal deficit present.      Mental Status: She is alert and oriented to person, place, and time.   Psychiatric:         Speech: Speech normal.         Behavior: Behavior normal.         Thought Content: Thought content normal.         Judgment: Judgment normal.         Lab Results   Component Value Date    CHOL 174 07/26/2023    CHOL 178 06/30/2022    CHOL 187 04/28/2022     Lab Results   Component Value Date    HDL 50  07/26/2023    HDL 59 06/30/2022    HDL 59 04/28/2022     Lab Results   Component Value Date    LDLCALC 104.0 07/26/2023    LDLCALC 100.2 06/30/2022    LDLCALC 108.2 04/28/2022     Lab Results   Component Value Date    TRIG 100 07/26/2023    TRIG 94 06/30/2022    TRIG 99 04/28/2022     Lab Results   Component Value Date    CHOLHDL 28.7 07/26/2023    CHOLHDL 33.1 06/30/2022    CHOLHDL 31.6 04/28/2022       Chemistry        Component Value Date/Time     05/15/2024 1424    K 4.3 05/15/2024 1424     05/15/2024 1424    CO2 25 05/15/2024 1424    BUN 32 (H) 05/15/2024 1424    CREATININE 1.6 (H) 05/15/2024 1424    GLU 87 05/15/2024 1424        Component Value Date/Time    CALCIUM 9.7 05/15/2024 1424    ALKPHOS 59 05/15/2024 1424    AST 36 05/15/2024 1424    ALT 21 05/15/2024 1424    BILITOT 0.7 05/15/2024 1424    ESTGFRAFRICA >60.0 06/30/2022 1031    EGFRNONAA 53.7 (A) 06/30/2022 1031          Lab Results   Component Value Date    TSH 4.517 (H) 04/12/2024     Lab Results   Component Value Date    INR 1.0 02/14/2017    INR 1.0 03/02/2014     Lab Results   Component Value Date    WBC 5.77 05/15/2024    HGB 14.4 05/15/2024    HCT 43.7 05/15/2024    MCV 86 05/15/2024     05/15/2024          Assessment:      1. Chronic combined systolic and diastolic congestive heart failure    2. PVC (premature ventricular contraction)    3. Nonischemic cardiomyopathy    4. Essential hypertension    5. Stage 3b chronic kidney disease        Plan:     CHF SYNOPSIS:    GDMT:  ACE/ARB/ARNI: Entresto 49/51 BID  Beta Blocker: Toprol XL 50 mg daily  MRA: Aldactone 50 mg daily  SGLT2: Jardiance 10 mg daily  Diuretic: Lasix 20 mg daily PRN for leg swelling.     Dash diet 2 gm sodium restriction  Profile BP at home  Update ECHO prior to next appt to guide treatment strategy for CHF    Nicole May, FNP-C [facility]

## 2024-07-11 NOTE — PATIENT INSTRUCTIONS
Please only use Lasix if you have leg swelling or weight gain of 3 pounds overnight or 5 pounds in 1 week.     Please update Echo prior to next visit.

## 2024-07-15 ENCOUNTER — OFFICE VISIT (OUTPATIENT)
Dept: INTERNAL MEDICINE | Facility: CLINIC | Age: 79
End: 2024-07-15
Payer: MEDICARE

## 2024-07-15 VITALS
DIASTOLIC BLOOD PRESSURE: 80 MMHG | SYSTOLIC BLOOD PRESSURE: 118 MMHG | HEART RATE: 45 BPM | TEMPERATURE: 97 F | WEIGHT: 163.56 LBS | HEIGHT: 62 IN | OXYGEN SATURATION: 100 % | BODY MASS INDEX: 30.1 KG/M2

## 2024-07-15 DIAGNOSIS — N18.32 STAGE 3B CHRONIC KIDNEY DISEASE: ICD-10-CM

## 2024-07-15 DIAGNOSIS — H25.013 CORTICAL AGE-RELATED CATARACT OF BOTH EYES: ICD-10-CM

## 2024-07-15 DIAGNOSIS — M1A.30X0 CHRONIC GOUT DUE TO RENAL IMPAIRMENT WITHOUT TOPHUS, UNSPECIFIED SITE: ICD-10-CM

## 2024-07-15 DIAGNOSIS — I10 ESSENTIAL HYPERTENSION: Chronic | ICD-10-CM

## 2024-07-15 DIAGNOSIS — I50.42 CHRONIC COMBINED SYSTOLIC AND DIASTOLIC CONGESTIVE HEART FAILURE: ICD-10-CM

## 2024-07-15 DIAGNOSIS — E03.8 SUBCLINICAL HYPOTHYROIDISM: Primary | ICD-10-CM

## 2024-07-15 DIAGNOSIS — I42.8 NONISCHEMIC CARDIOMYOPATHY: ICD-10-CM

## 2024-07-15 PROCEDURE — 1159F MED LIST DOCD IN RCRD: CPT | Mod: CPTII,S$GLB,, | Performed by: PHYSICIAN ASSISTANT

## 2024-07-15 PROCEDURE — 99999 PR PBB SHADOW E&M-EST. PATIENT-LVL IV: CPT | Mod: PBBFAC,,, | Performed by: PHYSICIAN ASSISTANT

## 2024-07-15 PROCEDURE — 1126F AMNT PAIN NOTED NONE PRSNT: CPT | Mod: CPTII,S$GLB,, | Performed by: PHYSICIAN ASSISTANT

## 2024-07-15 PROCEDURE — 99214 OFFICE O/P EST MOD 30 MIN: CPT | Mod: S$GLB,,, | Performed by: PHYSICIAN ASSISTANT

## 2024-07-15 PROCEDURE — 1101F PT FALLS ASSESS-DOCD LE1/YR: CPT | Mod: CPTII,S$GLB,, | Performed by: PHYSICIAN ASSISTANT

## 2024-07-15 PROCEDURE — 3074F SYST BP LT 130 MM HG: CPT | Mod: CPTII,S$GLB,, | Performed by: PHYSICIAN ASSISTANT

## 2024-07-15 PROCEDURE — 3288F FALL RISK ASSESSMENT DOCD: CPT | Mod: CPTII,S$GLB,, | Performed by: PHYSICIAN ASSISTANT

## 2024-07-15 PROCEDURE — 1160F RVW MEDS BY RX/DR IN RCRD: CPT | Mod: CPTII,S$GLB,, | Performed by: PHYSICIAN ASSISTANT

## 2024-07-15 PROCEDURE — 3079F DIAST BP 80-89 MM HG: CPT | Mod: CPTII,S$GLB,, | Performed by: PHYSICIAN ASSISTANT

## 2024-07-15 NOTE — PROGRESS NOTES
Subjective:      Patient ID: Megan Sams is a 79 y.o. female.    Chief Complaint: Follow-up (3m)    HPI  Here today for a routine follow up. Will review recent labs.   Seen by GYN for possible vaginal bleeding. GYN states not coming from vagina, likely rectal or from urine. Normal exam. She has not had any episodes since then.   Up to date with cardiology. Pulse low today. Scheduled to see Dr. Herminia Haines. Reduced metoprolol from 50mg BID to 50mg once daily.   Gout stable on allopurinol. Uric acid normal on recent labs.   Scheduled to see optometry for her eye exam.   BP stable and in good range.     Patient Active Problem List   Diagnosis    Arthritis    CHF (congestive heart failure)    Essential hypertension    Stage 3b chronic kidney disease    Nonischemic cardiomyopathy    Intertrigo    PVC (premature ventricular contraction)    Cortical age-related cataract of both eyes    Chronic gout without tophus         Current Outpatient Medications:     acetaminophen (TYLENOL) 500 MG tablet, Take 500 mg by mouth every 6 (six) hours as needed., Disp: , Rfl:     allopurinoL (ZYLOPRIM) 100 MG tablet, TAKE 2 TABLETS BY MOUTH EVERY DAY, Disp: 180 tablet, Rfl: 4    amiodarone (PACERONE) 200 MG Tab, Take one tablet in the am, Disp: 90 tablet, Rfl: 3    aspirin (ECOTRIN) 81 MG EC tablet, Take 81 mg by mouth once daily., Disp: , Rfl:     ciclopirox (PENLAC) 8 % Soln, Apply to nails once daily, Disp: 6.6 mL, Rfl: 6    furosemide (LASIX) 20 MG tablet, Take 1 tablet (20 mg total) by mouth daily as needed (leg swelling.). Do not take if BP is below 110/70, Disp: 90 tablet, Rfl: 1    JARDIANCE 10 mg tablet, TAKE 1 TABLET BY MOUTH EVERY DAY, Disp: 90 tablet, Rfl: 1    metoprolol succinate (TOPROL-XL) 50 MG 24 hr tablet, Take 1 tablet (50 mg total) by mouth once daily., Disp: 90 tablet, Rfl: 1    MULTIVIT WITH CALCIUM,IRON,MIN (MULTIPLE VITAMIN, WOMENS ORAL), Take by mouth once daily., Disp: , Rfl:     sacubitriL-valsartan (ENTRESTO)  "49-51 mg per tablet, Take 1 tablet by mouth 2 (two) times daily., Disp: 60 tablet, Rfl: 1    spironolactone (ALDACTONE) 50 MG tablet, Take 1 tablet (50 mg total) by mouth once daily. Do not take if BP is below 110/70, Disp: 90 tablet, Rfl: 1    Review of Systems   Constitutional:  Negative for activity change, appetite change, chills, diaphoresis, fatigue, fever and unexpected weight change.   HENT: Negative.  Negative for congestion, hearing loss, postnasal drip, rhinorrhea, sore throat, trouble swallowing and voice change.    Eyes: Negative.  Negative for visual disturbance.   Respiratory: Negative.  Negative for cough, choking, chest tightness and shortness of breath.    Cardiovascular:  Negative for chest pain, palpitations and leg swelling.   Gastrointestinal:  Negative for abdominal distention, abdominal pain, blood in stool, constipation, diarrhea, nausea and vomiting.   Endocrine: Negative for cold intolerance, heat intolerance, polydipsia and polyuria.   Genitourinary: Negative.  Negative for difficulty urinating and frequency.   Musculoskeletal:  Negative for arthralgias, back pain, gait problem, joint swelling and myalgias.   Skin:  Negative for color change, pallor, rash and wound.   Neurological:  Negative for dizziness, tremors, weakness, light-headedness, numbness and headaches.   Hematological:  Negative for adenopathy.   Psychiatric/Behavioral:  Negative for behavioral problems, confusion, self-injury, sleep disturbance and suicidal ideas. The patient is not nervous/anxious.      Objective:   /80 (BP Location: Left arm, Patient Position: Sitting, BP Method: Large (Manual))   Pulse (!) 45   Temp 97.4 °F (36.3 °C) (Tympanic)   Ht 5' 2" (1.575 m)   Wt 74.2 kg (163 lb 9.3 oz)   SpO2 100%   BMI 29.92 kg/m²     Physical Exam  Vitals reviewed.   Constitutional:       General: She is not in acute distress.     Appearance: Normal appearance. She is well-developed. She is not ill-appearing, " toxic-appearing or diaphoretic.   HENT:      Head: Normocephalic and atraumatic.      Right Ear: External ear normal.      Left Ear: External ear normal.      Nose: Nose normal.   Eyes:      Conjunctiva/sclera: Conjunctivae normal.      Pupils: Pupils are equal, round, and reactive to light.   Cardiovascular:      Rate and Rhythm: Normal rate and regular rhythm.      Heart sounds: Normal heart sounds. No murmur heard.     No friction rub. No gallop.   Pulmonary:      Effort: Pulmonary effort is normal. No respiratory distress.      Breath sounds: Normal breath sounds. No wheezing or rales.   Chest:      Chest wall: No tenderness.   Abdominal:      General: There is no distension.      Palpations: Abdomen is soft.      Tenderness: There is no abdominal tenderness.   Musculoskeletal:         General: Normal range of motion.      Cervical back: Normal range of motion and neck supple.   Lymphadenopathy:      Cervical: No cervical adenopathy.   Skin:     General: Skin is warm and dry.      Capillary Refill: Capillary refill takes less than 2 seconds.      Findings: No rash.   Neurological:      Mental Status: She is alert and oriented to person, place, and time.      Motor: No weakness.      Coordination: Coordination normal.      Gait: Gait normal.   Psychiatric:         Mood and Affect: Mood normal.         Behavior: Behavior normal.         Thought Content: Thought content normal.         Judgment: Judgment normal.       Lab Visit on 07/11/2024   Component Date Value Ref Range Status    Sodium 07/11/2024 141  136 - 145 mmol/L Final    Potassium 07/11/2024 4.1  3.5 - 5.1 mmol/L Final    Chloride 07/11/2024 108  95 - 110 mmol/L Final    CO2 07/11/2024 25  23 - 29 mmol/L Final    Glucose 07/11/2024 92  70 - 110 mg/dL Final    BUN 07/11/2024 23  8 - 23 mg/dL Final    Creatinine 07/11/2024 1.4  0.5 - 1.4 mg/dL Final    Calcium 07/11/2024 9.2  8.7 - 10.5 mg/dL Final    Total Protein 07/11/2024 6.8  6.0 - 8.4 g/dL Final     Albumin 07/11/2024 3.1 (L)  3.5 - 5.2 g/dL Final    Total Bilirubin 07/11/2024 0.8  0.1 - 1.0 mg/dL Final    Comment: For infants and newborns, interpretation of results should be based  on gestational age, weight and in agreement with clinical  observations.    Premature Infant recommended reference ranges:  Up to 24 hours.............<8.0 mg/dL  Up to 48 hours............<12.0 mg/dL  3-5 days..................<15.0 mg/dL  6-29 days.................<15.0 mg/dL      Alkaline Phosphatase 07/11/2024 58  55 - 135 U/L Final    AST 07/11/2024 26  10 - 40 U/L Final    ALT 07/11/2024 17  10 - 44 U/L Final    eGFR 07/11/2024 37.8 (A)  >60 mL/min/1.73 m^2 Final    Anion Gap 07/11/2024 8  8 - 16 mmol/L Final    TSH 07/11/2024 5.788 (H)  0.400 - 4.000 uIU/mL Final    Cholesterol 07/11/2024 182  120 - 199 mg/dL Final    Comment: The National Cholesterol Education Program (NCEP) has set the  following guidelines (reference ranges) for Cholesterol:  Optimal.....................<200 mg/dL  Borderline High.............200-239 mg/dL  High........................> or = 240 mg/dL      Triglycerides 07/11/2024 106  30 - 150 mg/dL Final    Comment: The National Cholesterol Education Program (NCEP) has set the  following guidelines (reference values) for triglycerides:  Normal......................<150 mg/dL  Borderline High.............150-199 mg/dL  High........................200-499 mg/dL      HDL 07/11/2024 55  40 - 75 mg/dL Final    Comment: The National Cholesterol Education Program (NCEP) has set the  following guidelines (reference values) for HDL Cholesterol:  Low...............<40 mg/dL  Optimal...........>60 mg/dL      LDL Cholesterol 07/11/2024 105.8  63.0 - 159.0 mg/dL Final    Comment: The National Cholesterol Education Program (NCEP) has set the  following guidelines (reference values) for LDL Cholesterol:  Optimal.......................<130 mg/dL  Borderline High...............130-159  mg/dL  High..........................160-189 mg/dL  Very High.....................>190 mg/dL      HDL/Cholesterol Ratio 07/11/2024 30.2  20.0 - 50.0 % Final    Total Cholesterol/HDL Ratio 07/11/2024 3.3  2.0 - 5.0 Final    Non-HDL Cholesterol 07/11/2024 127  mg/dL Final    Comment: Risk category and Non-HDL cholesterol goals:  Coronary heart disease (CHD)or equivalent (10-year risk of CHD >20%):  Non-HDL cholesterol goal     <130 mg/dL  Two or more CHD risk factors and 10-year risk of CHD <= 20%:  Non-HDL cholesterol goal     <160 mg/dL  0 to 1 CHD risk factor:  Non-HDL cholesterol goal     <190 mg/dL      Uric Acid 07/11/2024 4.4  2.4 - 5.7 mg/dL Final    WBC 07/11/2024 5.47  3.90 - 12.70 K/uL Final    RBC 07/11/2024 4.72  4.00 - 5.40 M/uL Final    Hemoglobin 07/11/2024 13.5  12.0 - 16.0 g/dL Final    Hematocrit 07/11/2024 43.4  37.0 - 48.5 % Final    MCV 07/11/2024 92  82 - 98 fL Final    MCH 07/11/2024 28.6  27.0 - 31.0 pg Final    MCHC 07/11/2024 31.1 (L)  32.0 - 36.0 g/dL Final    RDW 07/11/2024 15.4 (H)  11.5 - 14.5 % Final    Platelets 07/11/2024 226  150 - 450 K/uL Final    MPV 07/11/2024 11.8  9.2 - 12.9 fL Final    Immature Granulocytes 07/11/2024 0.2  0.0 - 0.5 % Final    Gran # (ANC) 07/11/2024 1.9  1.8 - 7.7 K/uL Final    Immature Grans (Abs) 07/11/2024 0.01  0.00 - 0.04 K/uL Final    Comment: Mild elevation in immature granulocytes is non specific and   can be seen in a variety of conditions including stress response,   acute inflammation, trauma and pregnancy. Correlation with other   laboratory and clinical findings is essential.      Lymph # 07/11/2024 2.7  1.0 - 4.8 K/uL Final    Mono # 07/11/2024 0.7  0.3 - 1.0 K/uL Final    Eos # 07/11/2024 0.1  0.0 - 0.5 K/uL Final    Baso # 07/11/2024 0.04  0.00 - 0.20 K/uL Final    nRBC 07/11/2024 0  0 /100 WBC Final    Gran % 07/11/2024 35.2 (L)  38.0 - 73.0 % Final    Lymph % 07/11/2024 49.7 (H)  18.0 - 48.0 % Final    Mono % 07/11/2024 12.2  4.0 - 15.0 %  Final    Eosinophil % 07/11/2024 2.0  0.0 - 8.0 % Final    Basophil % 07/11/2024 0.7  0.0 - 1.9 % Final    Differential Method 07/11/2024 Automated   Final    Free T4 07/11/2024 1.14  0.71 - 1.51 ng/dL Final       Assessment:     1. Subclinical hypothyroidism    2. Stage 3b chronic kidney disease    3. Chronic gout due to renal impairment without tophus, unspecified site    4. Nonischemic cardiomyopathy    5. Essential hypertension    6. Chronic combined systolic and diastolic congestive heart failure    7. Cortical age-related cataract of both eyes      Plan:   Subclinical hypothyroidism  -     TSH; Future; Expected date: 01/15/2025  -stable. On amiodarone. Recheck in 6 months.     Stage 3b chronic kidney disease  -     Comprehensive Metabolic Panel; Future; Expected date: 01/15/2025  -stable. Creatinine normal range.   -avoid all nsaids    Chronic gout due to renal impairment without tophus, unspecified site  -stable and controlled on allopurinol. Uric acid in good range    Nonischemic cardiomyopathy    Essential hypertension  -stable and well controlled    Chronic combined systolic and diastolic congestive heart failure  -up to date with cardiology team    Cortical age-related cataract of both eyes    -recheck tsh and and cmp in 6 months  -follow up with cardiology as scheduled.   -ok to take topical minoxidil. Not the pill form. Too many drug interactions.       Follow up in about 6 months (around 1/15/2025), or if symptoms worsen or fail to improve.

## 2024-07-19 ENCOUNTER — HOSPITAL ENCOUNTER (OUTPATIENT)
Dept: CARDIOLOGY | Facility: HOSPITAL | Age: 79
Discharge: HOME | End: 2024-07-19
Attending: NURSE PRACTITIONER
Payer: MEDICARE

## 2024-07-19 VITALS
HEIGHT: 62 IN | BODY MASS INDEX: 30.18 KG/M2 | DIASTOLIC BLOOD PRESSURE: 70 MMHG | SYSTOLIC BLOOD PRESSURE: 132 MMHG | WEIGHT: 164 LBS

## 2024-07-19 DIAGNOSIS — I50.42 CHRONIC COMBINED SYSTOLIC AND DIASTOLIC CONGESTIVE HEART FAILURE: ICD-10-CM

## 2024-07-19 LAB
AORTIC ROOT ANNULUS: 2.85 CM
APICAL FOUR CHAMBER EJECTION FRACTION: 46 %
ASCENDING AORTA: 2.7 CM
AV INDEX (PROSTH): 0.59
AV MEAN GRADIENT: 3 MMHG
AV PEAK GRADIENT: 5 MMHG
AV VALVE AREA BY VELOCITY RATIO: 1.98 CM²
AV VALVE AREA: 1.92 CM²
AV VELOCITY RATIO: 0.61
BSA FOR ECHO PROCEDURE: 1.8 M2
CV ECHO LV RWT: 0.53 CM
DOP CALC AO PEAK VEL: 1.17 M/S
DOP CALC AO VTI: 31.6 CM
DOP CALC LVOT AREA: 3.3 CM2
DOP CALC LVOT DIAMETER: 2.04 CM
DOP CALC LVOT PEAK VEL: 0.71 M/S
DOP CALC LVOT STROKE VOLUME: 60.76 CM3
DOP CALC RVOT PEAK VEL: 0.6 M/S
DOP CALC RVOT VTI: 18.8 CM
DOP CALCLVOT PEAK VEL VTI: 18.6 CM
E WAVE DECELERATION TIME: 610.56 MSEC
E/A RATIO: 0.6
E/E' RATIO: 8.55 M/S
ECHO LV POSTERIOR WALL: 1.23 CM (ref 0.6–1.1)
EJECTION FRACTION: 40 %
FRACTIONAL SHORTENING: 20 % (ref 28–44)
INTERVENTRICULAR SEPTUM: 1.07 CM (ref 0.6–1.1)
IVC DIAMETER: 1.09 CM
IVRT: 64.7 MSEC
LA MAJOR: 5.88 CM
LA MINOR: 4.58 CM
LA WIDTH: 3.5 CM
LEFT ATRIUM SIZE: 2.97 CM
LEFT ATRIUM VOLUME INDEX: 25.9 ML/M2
LEFT ATRIUM VOLUME: 45.5 CM3
LEFT INTERNAL DIMENSION IN SYSTOLE: 3.7 CM (ref 2.1–4)
LEFT VENTRICLE DIASTOLIC VOLUME INDEX: 55.36 ML/M2
LEFT VENTRICLE DIASTOLIC VOLUME: 97.44 ML
LEFT VENTRICLE END DIASTOLIC VOLUME APICAL 4 CHAMBER: 88.3 ML
LEFT VENTRICLE MASS INDEX: 110 G/M2
LEFT VENTRICLE SYSTOLIC VOLUME INDEX: 33.1 ML/M2
LEFT VENTRICLE SYSTOLIC VOLUME: 58.19 ML
LEFT VENTRICULAR INTERNAL DIMENSION IN DIASTOLE: 4.6 CM (ref 3.5–6)
LEFT VENTRICULAR MASS: 192.94 G
LV LATERAL E/E' RATIO: 9.4 M/S
LV SEPTAL E/E' RATIO: 7.83 M/S
LVED V (TEICH): 97.44 ML
LVES V (TEICH): 58.19 ML
LVOT MG: 1.04 MMHG
LVOT MV: 0.48 CM/S
MV PEAK A VEL: 0.78 M/S
MV PEAK E VEL: 0.47 M/S
MV STENOSIS PRESSURE HALF TIME: 177.06 MS
MV VALVE AREA P 1/2 METHOD: 1.24 CM2
PISA MRMAX VEL: 6.11 M/S
PISA TR MAX VEL: 3.36 M/S
PV MEAN GRADIENT: 1 MMHG
RA MAJOR: 4.67 CM
RA PRESSURE ESTIMATED: 3 MMHG
RA WIDTH: 3.24 CM
RV TB RVSP: 6 MMHG
SINUS: 2.98 CM
STJ: 2.82 CM
TDI LATERAL: 0.05 M/S
TDI SEPTAL: 0.06 M/S
TDI: 0.06 M/S
TR MAX PG: 45 MMHG
TRICUSPID ANNULAR PLANE SYSTOLIC EXCURSION: 2.05 CM
TV REST PULMONARY ARTERY PRESSURE: 48 MMHG
Z-SCORE OF LEFT VENTRICULAR DIMENSION IN END DIASTOLE: -0.56
Z-SCORE OF LEFT VENTRICULAR DIMENSION IN END SYSTOLE: 1.63

## 2024-07-19 PROCEDURE — 93306 TTE W/DOPPLER COMPLETE: CPT | Mod: PO

## 2024-07-19 PROCEDURE — 93306 TTE W/DOPPLER COMPLETE: CPT | Mod: 26,,, | Performed by: STUDENT IN AN ORGANIZED HEALTH CARE EDUCATION/TRAINING PROGRAM

## 2024-07-22 ENCOUNTER — TELEPHONE (OUTPATIENT)
Dept: CARDIOLOGY | Facility: CLINIC | Age: 79
End: 2024-07-22
Payer: MEDICARE

## 2024-07-22 NOTE — TELEPHONE ENCOUNTER
Can she log her BP until her appt on Friday- we may be able to increase her entresto to the next dose to help improve her heart function.    Rozina

## 2024-07-22 NOTE — TELEPHONE ENCOUNTER
----- Message from TERENCE Roy sent at 7/22/2024 12:33 PM CDT -----  ECHO reviewed  EF 40%, mild aortic stenosis    Thanks  TERENCE Hollis

## 2024-07-22 NOTE — TELEPHONE ENCOUNTER
Reviewed echo with patient. She wants to know if there anything she need to do at the moment. Please advise. Thanks----- Message from TERENCE Roy sent at 7/22/2024 12:33 PM CDT -----  ECHO reviewed  EF 40%, mild aortic stenosis    Thanks  TERENCE Hollis

## 2024-07-23 NOTE — TELEPHONE ENCOUNTER
Called, no answer, left message for patient to log BP until office visit on Friday. Left office number for patient to call back with any further questions. kA    Can she log her BP until her appt on Friday- we may be able to increase her entresto to the next dose to help improve her heart function.    Rozina

## 2024-07-26 ENCOUNTER — OFFICE VISIT (OUTPATIENT)
Dept: CARDIOLOGY | Facility: CLINIC | Age: 79
End: 2024-07-26
Payer: MEDICARE

## 2024-07-26 ENCOUNTER — LAB VISIT (OUTPATIENT)
Dept: LAB | Facility: HOSPITAL | Age: 79
End: 2024-07-26
Attending: INTERNAL MEDICINE
Payer: MEDICARE

## 2024-07-26 VITALS
DIASTOLIC BLOOD PRESSURE: 84 MMHG | HEIGHT: 62 IN | BODY MASS INDEX: 29.94 KG/M2 | WEIGHT: 162.69 LBS | OXYGEN SATURATION: 100 % | SYSTOLIC BLOOD PRESSURE: 136 MMHG | HEART RATE: 51 BPM

## 2024-07-26 DIAGNOSIS — I49.3 PVC (PREMATURE VENTRICULAR CONTRACTION): ICD-10-CM

## 2024-07-26 DIAGNOSIS — I50.42 CHRONIC COMBINED SYSTOLIC AND DIASTOLIC CONGESTIVE HEART FAILURE: Primary | ICD-10-CM

## 2024-07-26 DIAGNOSIS — Z79.899 AT RISK FOR AMIODARONE TOXICITY WITH LONG TERM USE: ICD-10-CM

## 2024-07-26 DIAGNOSIS — I50.42 CHRONIC COMBINED SYSTOLIC AND DIASTOLIC CONGESTIVE HEART FAILURE: ICD-10-CM

## 2024-07-26 DIAGNOSIS — Z91.89 AT RISK FOR AMIODARONE TOXICITY WITH LONG TERM USE: ICD-10-CM

## 2024-07-26 DIAGNOSIS — I42.8 NONISCHEMIC CARDIOMYOPATHY: ICD-10-CM

## 2024-07-26 DIAGNOSIS — I10 ESSENTIAL HYPERTENSION: Chronic | ICD-10-CM

## 2024-07-26 PROCEDURE — 3075F SYST BP GE 130 - 139MM HG: CPT | Mod: CPTII,S$GLB,, | Performed by: INTERNAL MEDICINE

## 2024-07-26 PROCEDURE — 83880 ASSAY OF NATRIURETIC PEPTIDE: CPT | Performed by: INTERNAL MEDICINE

## 2024-07-26 PROCEDURE — 3079F DIAST BP 80-89 MM HG: CPT | Mod: CPTII,S$GLB,, | Performed by: INTERNAL MEDICINE

## 2024-07-26 PROCEDURE — 1101F PT FALLS ASSESS-DOCD LE1/YR: CPT | Mod: CPTII,S$GLB,, | Performed by: INTERNAL MEDICINE

## 2024-07-26 PROCEDURE — 1126F AMNT PAIN NOTED NONE PRSNT: CPT | Mod: CPTII,S$GLB,, | Performed by: INTERNAL MEDICINE

## 2024-07-26 PROCEDURE — 3288F FALL RISK ASSESSMENT DOCD: CPT | Mod: CPTII,S$GLB,, | Performed by: INTERNAL MEDICINE

## 2024-07-26 PROCEDURE — 99999 PR PBB SHADOW E&M-EST. PATIENT-LVL IV: CPT | Mod: PBBFAC,,, | Performed by: INTERNAL MEDICINE

## 2024-07-26 PROCEDURE — 99215 OFFICE O/P EST HI 40 MIN: CPT | Mod: S$GLB,,, | Performed by: INTERNAL MEDICINE

## 2024-07-26 PROCEDURE — 83880 ASSAY OF NATRIURETIC PEPTIDE: CPT | Mod: 91 | Performed by: INTERNAL MEDICINE

## 2024-07-26 PROCEDURE — 36415 COLL VENOUS BLD VENIPUNCTURE: CPT | Performed by: INTERNAL MEDICINE

## 2024-07-26 NOTE — PROGRESS NOTES
Subjective:   Patient ID:  Megan Sams is a 79 y.o. female     Chief complaint:  PVCs in a patient with nonischemic cardiomyopathy.    HPI  Initially (01/19/2024 ) referred by Dr Dubois  for evaluation and management of PVCs   --   Background as gleaned from patient's records:  NICM EF 35%, PVCs, HTN, obesity, CKD   ECHO 11/2023 with EF 30-35%, mod TR, PASP 49 mmHg. Holter reveals very frequent PVCS - extra beats,   >. Metoprolol increased to 50mg twice a day,   Pat referred to me             BNP (pg/mL)   Date Value   10/20/2023 579 (H)   10/05/2023 3,942 (H)   02/14/2017 192 (H)      BP low normal 106/80s. P 60      Echo November of 2023  Interpretation Summary    Left Ventricle: The left ventricle is mildly dilated. Normal wall thickness. Moderate global hypokinesis present. There is moderately reduced systolic function with a visually estimated ejection fraction of 30 - 35%. There is normal diastolic function.    Right Ventricle: Normal right ventricular cavity size. Wall thickness is normal. Right ventricle wall motion  is normal. Systolic function is normal.    Mitral Valve: There is moderate regurgitation with a centrally directed jet.    Tricuspid Valve: There is moderate regurgitation with a centrally directed jet.    Pulmonary Artery: The estimated pulmonary artery systolic pressure is 49 mmHg.    IVC/SVC: Normal venous pressure at 3 mmHg.   No sig change from 8/2022     Echo: 2020: EF 40%  Cardiac cath: 2009: normal coronaries      Additional details gleaned from today's interview :  She is here for opinion re: need for ICD and ? Relationship of PVCs to CMP  Patient says she feels well at this time.  She is taking her medications as prescribed.  I have reviewed the actual image of the ECG tracing obtained today and it shows NSR with normal intervals.One PAC noted. HR is 52.  >>  Recent episode of acute CHF.  Now subsided.  Moderate decrease in ejection fraction.  4.3% STEPHNE burden  >>  May be a reasonable  candidate for ICD - but would like first to give her short trial of amio      Update 04/13/2024 :  She had a Holter monitor which I personally reviewed: It revealed persistent sinus bradycardia with the occasional junctional escape beats but pauses were of limited duration and patient was asymptomatic.  Furthermore there were very few PVCs.  This was while on amiodarone 300 mg a day with an amiodarone level of around 1.2/1.0.  Patient has been feeling quite well and  she tells me that  she can do her housework, she can even walk up 10 steps of stairs.  She has occasional lack of energy which she seems to be ascribing to nocturia (she has to go to the bathroom at night 3 to 4 times).  She lives alone but is closely surrounded by her children who call and or check on her several times a day.  I have reviewed the actual image of the ECG tracing obtained today and it shows NSR with normal intervals. HR is 46.  >>  Overall, she seems to be functionally doing quite well and is certainly improved over baseline prior to therapy. This is coincidental with the initiation of amiodarone and development of bradycardia without attendant hypotension. The relationship may or may not be causative.   >>  At this stage, we need to reassess her candidacy for ICD.  I will also reduce the dose of amiodarone from 300 to 200 a day.  She will see Ms. Cher gravesow-up soon for advancement of CHF therapy if appropriate and decision making regarding ICD implantation.       Update 08/20/2024 :  ACE/ARB/ARNI: Entresto 49/51 BID  Beta Blocker: Toprol XL 50 mg daily  MRA: Aldactone 50 mg daily  SGLT2: Jardiance 10 mg daily  Diuretic: Lasix 20 mg daily PRN for leg swelling.   Amiodarone 200 a day    Echo 7/19/24:  Left Ventricle: The left ventricle is normal in size. Normal wall thickness. There is concentric hypertrophy. Mild global hypokinesis present. There is reduced systolic function. Ejection fraction by visual approximation is 40%. There is  indeterminate diastolic function.    Right Ventricle: Normal right ventricular cavity size. Wall thickness is normal. Systolic function is normal.    Aortic Valve: The aortic valve is a trileaflet valve. There is mild stenosis. Aortic valve area by VTI is 1.92 cm². Aortic valve peak velocity is 1.17 m/s. Mean gradient is 3 mmHg. The dimensionless index is 0.59.    Mitral Valve: There is moderate bileaflet sclerosis. There is normal leaflet mobility. There is prolapse of the anterior mitral leaflet. There is mild to moderate regurgitation.    Pulmonary Artery: The estimated pulmonary artery systolic pressure is 48 mmHg.    IVC/SVC: Normal venous pressure at 3 mmHg.      She is here today, doing seemingly well and complaining only about arthritic and muscular issues as well as daytime sleepiness.  She has been doing well from a CV point of view w/o c/o undue dyspnea on exertion, orthopnea, PND, leg edema, abdominal swelling chest pain, palpitations, syncope or near-syncope.        Current Outpatient Medications   Medication Sig    acetaminophen (TYLENOL) 500 MG tablet Take 500 mg by mouth every 6 (six) hours as needed.    allopurinoL (ZYLOPRIM) 100 MG tablet TAKE 2 TABLETS BY MOUTH EVERY DAY    amiodarone (PACERONE) 200 MG Tab Take one tablet in the am    aspirin (ECOTRIN) 81 MG EC tablet Take 81 mg by mouth once daily.    ciclopirox (PENLAC) 8 % Soln Apply to nails once daily    furosemide (LASIX) 20 MG tablet Take 1 tablet (20 mg total) by mouth daily as needed (leg swelling.). Do not take if BP is below 110/70    JARDIANCE 10 mg tablet TAKE 1 TABLET BY MOUTH EVERY DAY    metoprolol succinate (TOPROL-XL) 50 MG 24 hr tablet Take 1 tablet (50 mg total) by mouth once daily.    MULTIVIT WITH CALCIUM,IRON,MIN (MULTIPLE VITAMIN, WOMENS ORAL) Take by mouth once daily.    spironolactone (ALDACTONE) 50 MG tablet Take 1 tablet (50 mg total) by mouth once daily. Do not take if BP is below 110/70    sacubitriL-valsartan  (ENTRESTO) 49-51 mg per tablet Take 1 tablet by mouth 2 (two) times daily.     No current facility-administered medications for this visit.     Review of Systems     Constitutional: Reviewed  for decreased appetite, weight gain and weight loss.   HENT: Reviewed for nosebleeds.    Eyes:  Reviewed for blurred vision and visual disturbance.   Cardiovascular: Reviewed for chest pain, claudication, cyanosis,dyspnea on exertion, leg swelling, orthopnea,paroxysmal nocturnal dyspnearregular heartbeats, palpitations, near-syncope, and syncope.   Respiratory: Reviewed for cough, shortness of breath, wheezing, sleep disturbances due to breathing and snoring, .    Endocrine: Reviewed for heat intolerance.   Hematologic/Lymphatic: Reviewed for easy bruisability/bleeding.   Skin: Reviewed for rash.   Musculoskeletal: Reviewed for muscle weakness and myalgias.   Gastrointestinal: Reviewed for abdominal pain, anorexia, melena, nausea and vomiting.   Genitourinary: Reviewed for menorrhagia, frequency, nocturia and incontinence.   Neurological: Reviewed for excessive daytime sleepiness, dizziness, vertigo, weakness, headaches, loss of balance and seizures,   Psychiatric/Behavioral:  Reviewed for insomnia, altered mental status, depression, anxiety and nervousness.       All symptoms reviewed above were negative except for  see HPI high, urinary frequency and nocturia.       Social History     Tobacco Use   Smoking Status Never   Smokeless Tobacco Never        Objective:     Physical Exam  Vitals and nursing note reviewed.   Constitutional:       Appearance: She is well-developed.   HENT:      Head: Normocephalic and atraumatic.      Right Ear: External ear normal.      Left Ear: External ear normal.   Neck:      Thyroid: No thyromegaly.   Cardiovascular:      Rate and Rhythm: Normal rate and regular rhythm.      Pulses: Intact distal pulses.           Carotid pulses are 2+ on the right side and 2+ on the left side.       Radial  "pulses are 2+ on the right side and 2+ on the left side.        Dorsalis pedis pulses are 2+ on the right side and 2+ on the left side.        Posterior tibial pulses are 2+ on the right side and 2+ on the left side.      Heart sounds: Normal heart sounds. No midsystolic click and no opening snap. No murmur heard.     No friction rub. No gallop. No S3 or S4 sounds.   Pulmonary:      Effort: Pulmonary effort is normal.      Breath sounds: Normal breath sounds.   Abdominal:      General: There is no distension.      Palpations: Abdomen is soft.      Tenderness: There is no abdominal tenderness.   Musculoskeletal:      Cervical back: Normal range of motion and neck supple.      Right lower leg: No swelling.      Left lower leg: No swelling.      Right ankle: No swelling.      Left ankle: No swelling.   Skin:     General: Skin is warm.      Findings: No rash.      Nails: There is no clubbing.   Neurological:      Mental Status: She is alert and oriented to person, place, and time.      Cranial Nerves: No cranial nerve deficit.      Gait: Gait normal.   Psychiatric:         Speech: Speech normal.         Behavior: Behavior normal.         Thought Content: Thought content normal.       /84 (BP Location: Left arm, Patient Position: Sitting, BP Method: Medium (Manual))   Pulse (!) 51   Ht 5' 2" (1.575 m)   Wt 73.8 kg (162 lb 11.2 oz)   SpO2 100%   BMI 29.76 kg/m²       Results for orders placed during the hospital encounter of 07/19/24    Echo    Interpretation Summary    Left Ventricle: The left ventricle is normal in size. Normal wall thickness. There is concentric hypertrophy. Mild global hypokinesis present. There is reduced systolic function. Ejection fraction by visual approximation is 40%. There is indeterminate diastolic function.    Right Ventricle: Normal right ventricular cavity size. Wall thickness is normal. Systolic function is normal.    Aortic Valve: The aortic valve is a trileaflet valve. There " is mild stenosis. Aortic valve area by VTI is 1.92 cm². Aortic valve peak velocity is 1.17 m/s. Mean gradient is 3 mmHg. The dimensionless index is 0.59.    Mitral Valve: There is moderate bileaflet sclerosis. There is normal leaflet mobility. There is prolapse of the anterior mitral leaflet. There is mild to moderate regurgitation.    Pulmonary Artery: The estimated pulmonary artery systolic pressure is 48 mmHg.    IVC/SVC: Normal venous pressure at 3 mmHg.    WBC   Date Value Ref Range Status   07/11/2024 5.47 3.90 - 12.70 K/uL Final     Hematocrit   Date Value Ref Range Status   07/11/2024 43.4 37.0 - 48.5 % Final     Hemoglobin   Date Value Ref Range Status   07/11/2024 13.5 12.0 - 16.0 g/dL Final     Lab Results   Component Value Date     07/11/2024     Lab Results   Component Value Date    CREATININE 1.4 07/11/2024    EGFRNORACEVR 37.8 (A) 07/11/2024    K 4.1 07/11/2024     Lab Results   Component Value Date     (H) 07/26/2024            reports no history of alcohol use.  Past Medical History:   Diagnosis Date    Arthritis     Blood transfusion     CHF (congestive heart failure)     Chronic kidney disease     Depression     Hypertension      Past Surgical History:   Procedure Laterality Date    HYSTERECTOMY  1978    OOPHORECTOMY  1978     Family History   Problem Relation Name Age of Onset    Early death Mother      Heart disease Mother      Hypertension Mother      Kidney disease Father      Heart disease Brother         Assessment:     She seems to be having no more than class 2A symptoms.    Her EF is estimated to be 40% at this time.  We can afford waiting to see how further medical therapy develops.  1. Chronic combined systolic and diastolic congestive heart failure    2. Essential hypertension    3. Nonischemic cardiomyopathy    4. PVC (premature ventricular contraction)    5. At risk for amiodarone toxicity with long term use        Plan:        New ICD implant at this time.  Continue  advancing medical therapy.  She will see Ms. Kwon in 3 months.  Orders Placed This Encounter   Procedures    NT-Pro Natriuretic Peptide     Standing Status:   Future     Number of Occurrences:   1     Standing Expiration Date:   10/24/2025    BNP     Standing Status:   Future     Number of Occurrences:   1     Standing Expiration Date:   9/24/2025    Spirometry with/without bronchodilator & DLCO     Standing Status:   Future     Number of Occurrences:   1     Standing Expiration Date:   7/26/2025     Order Specific Question:   Release to patient     Answer:   Immediate       Follow up in about 3 months (around 10/26/2024), or if symptoms worsen or fail to improve.    There are no discontinued medications.         Medication List with Changes/Refills   Current Medications    ACETAMINOPHEN (TYLENOL) 500 MG TABLET    Take 500 mg by mouth every 6 (six) hours as needed.    ALLOPURINOL (ZYLOPRIM) 100 MG TABLET    TAKE 2 TABLETS BY MOUTH EVERY DAY    AMIODARONE (PACERONE) 200 MG TAB    Take one tablet in the am    ASPIRIN (ECOTRIN) 81 MG EC TABLET    Take 81 mg by mouth once daily.    CICLOPIROX (PENLAC) 8 % SOLN    Apply to nails once daily    FUROSEMIDE (LASIX) 20 MG TABLET    Take 1 tablet (20 mg total) by mouth daily as needed (leg swelling.). Do not take if BP is below 110/70    JARDIANCE 10 MG TABLET    TAKE 1 TABLET BY MOUTH EVERY DAY    METOPROLOL SUCCINATE (TOPROL-XL) 50 MG 24 HR TABLET    Take 1 tablet (50 mg total) by mouth once daily.    MULTIVIT WITH CALCIUM,IRON,MIN (MULTIPLE VITAMIN, WOMENS ORAL)    Take by mouth once daily.    SPIRONOLACTONE (ALDACTONE) 50 MG TABLET    Take 1 tablet (50 mg total) by mouth once daily. Do not take if BP is below 110/70   Changed and/or Refilled Medications    Modified Medication Previous Medication    SACUBITRIL-VALSARTAN (ENTRESTO) 49-51 MG PER TABLET sacubitriL-valsartan (ENTRESTO) 49-51 mg per tablet       Take 1 tablet by mouth 2 (two) times daily.    Take 1 tablet by  mouth 2 (two) times daily.        This note is at least partially dictated using the M*Modal Fluency Direct word recognition program. There are22 word recognition mistakes that are occasionally missed on review.     Pre-visit chart review: 12 min    Face to face time: 20 min, > 50% of which spent in education    I have reviewed the actual images of all relevant ECG, rhythm, holter and long term monitoring tracings available in EPIC, MUSE  and in legacy as well as outside records.   I have reviewed the actual images of all relevant CXRays.   I have directly evaluated all relevant data pertaining to any CIED.     Post visit chart documentation and care coordination: 12 min

## 2024-07-27 LAB — BNP SERPL-MCNC: 756 PG/ML (ref 0–99)

## 2024-07-30 LAB — NT-PROBNP SERPL IA-MCNC: 1566 PG/ML

## 2024-08-12 DIAGNOSIS — I50.42 CHRONIC COMBINED SYSTOLIC AND DIASTOLIC CONGESTIVE HEART FAILURE: ICD-10-CM

## 2024-08-12 RX ORDER — SACUBITRIL AND VALSARTAN 49; 51 MG/1; MG/1
1 TABLET, FILM COATED ORAL 2 TIMES DAILY
Qty: 60 TABLET | Refills: 1 | Status: SHIPPED | OUTPATIENT
Start: 2024-08-12

## 2024-08-16 ENCOUNTER — CLINICAL SUPPORT (OUTPATIENT)
Dept: PULMONOLOGY | Facility: CLINIC | Age: 79
End: 2024-08-16
Payer: MEDICARE

## 2024-08-16 ENCOUNTER — OFFICE VISIT (OUTPATIENT)
Dept: OPHTHALMOLOGY | Facility: CLINIC | Age: 79
End: 2024-08-16
Payer: MEDICARE

## 2024-08-16 DIAGNOSIS — H35.372 EPIRETINAL MEMBRANE, LEFT: Primary | ICD-10-CM

## 2024-08-16 DIAGNOSIS — H25.013 CORTICAL AGE-RELATED CATARACT OF BOTH EYES: ICD-10-CM

## 2024-08-16 DIAGNOSIS — Z79.899 AT RISK FOR AMIODARONE TOXICITY WITH LONG TERM USE: ICD-10-CM

## 2024-08-16 DIAGNOSIS — H33.302: ICD-10-CM

## 2024-08-16 DIAGNOSIS — Z91.89 AT RISK FOR AMIODARONE TOXICITY WITH LONG TERM USE: ICD-10-CM

## 2024-08-16 LAB
BRPFT: NORMAL
DLCO ADJ PRE: 10.87 ML/(MIN*MMHG)
DLCO SINGLE BREATH LLN: 12.58
DLCO SINGLE BREATH PRE REF: 59.3 %
DLCO SINGLE BREATH REF: 18.31
DLCOC SBVA LLN: 2.48
DLCOC SBVA PRE REF: 103.5 %
DLCOC SBVA REF: 4.01
DLCOC SINGLE BREATH LLN: 12.58
DLCOC SINGLE BREATH PRE REF: 59.3 %
DLCOC SINGLE BREATH REF: 18.31
DLCOVA LLN: 2.48
DLCOVA PRE REF: 103.5 %
DLCOVA PRE: 4.15 ML/(MIN*MMHG*L)
DLCOVA REF: 4.01
DLVAADJ PRE: 4.15 ML/(MIN*MMHG*L)
FEF 25 75 CHG: -1.3 %
FEF 25 75 LLN: 0.73
FEF 25 75 POST REF: 86 %
FEF 25 75 PRE REF: 87.1 %
FEF 25 75 REF: 2.13
FET100 CHG: -1.9 %
FEV1 CHG: -2.4 %
FEV1 FVC CHG: -0.6 %
FEV1 FVC LLN: 63
FEV1 FVC POST REF: 114.9 %
FEV1 FVC PRE REF: 115.6 %
FEV1 FVC REF: 78
FEV1 LLN: 1
FEV1 POST REF: 98.8 %
FEV1 PRE REF: 101.3 %
FEV1 REF: 1.52
FVC CHG: -1.8 %
FVC LLN: 1.33
FVC POST REF: 85 %
FVC PRE REF: 86.6 %
FVC REF: 1.98
IVC PRE: 1.33 L
IVC SINGLE BREATH LLN: 1.33
IVC SINGLE BREATH PRE REF: 67.4 %
IVC SINGLE BREATH REF: 1.98
PEF CHG: 1.8 %
PEF LLN: 1.6
PEF POST REF: 85.2 %
PEF PRE REF: 83.7 %
PEF REF: 3.47
POST FEF 25 75: 1.83 L/S
POST FET 100: 2.5 SEC
POST FEV1 FVC: 89.11 %
POST FEV1: 1.5 L
POST FVC: 1.68 L
POST PEF: 2.96 L/S
PRE DLCO: 10.87 ML/(MIN*MMHG)
PRE FEF 25 75: 1.85 L/S
PRE FET 100: 2.55 SEC
PRE FEV1 FVC: 89.66 %
PRE FEV1: 1.53 L
PRE FVC: 1.71 L
PRE PEF: 2.91 L/S
VA PRE: 2.69 L
VA SINGLE BREATH LLN: 4.42
VA SINGLE BREATH PRE REF: 60.7 %
VA SINGLE BREATH REF: 4.42

## 2024-08-16 PROCEDURE — 99999 PR PBB SHADOW E&M-EST. PATIENT-LVL I: CPT | Mod: PBBFAC,,, | Performed by: OPHTHALMOLOGY

## 2024-08-16 NOTE — PROGRESS NOTES
===============================  Megan Sams,  8/16/2024 today   79 y.o. female   Last visit [facility]: :7/24/2023   Last visit eye dept. Visit date not found  [facility]:  Corrected distance visual acuity was 20/40 in the right eye and 20/80 in the left eye.  Not recorded       Not recorded       Not recorded       Not recorded       Chief Complaint   Patient presents with    ERM      Stable vision. No ocular complaints       ________________  8/16/2024 today  HPI     ERM             Comments: Stable vision. No ocular complaints         Last edited by ESDRAS Leiva MD on 8/16/2024  4:14 PM.      Problem List Items Addressed This Visit          Eye/Vision problems    Cortical age-related cataract of both eyes     Other Visit Diagnoses       Epiretinal membrane, left    -  Primary    Relevant Orders    Posterior Segment OCT Retina-Both eyes (Completed)    Retinal traction of left eye        Relevant Orders    Posterior Segment OCT Retina-Both eyes (Completed)            OS ERM  OCT stable  Trace NS OU  Macula looks good  IOP ok  No glaucoma  ERM unchanged  Update glasses today    RTC 1 year  Instructed to call 24/7 for any worsening of vision or symptoms. Check OU daily.   Gave my office and cell phone number.        ===============================

## 2024-08-20 RX ORDER — SACUBITRIL AND VALSARTAN 97; 103 MG/1; MG/1
1 TABLET, FILM COATED ORAL 2 TIMES DAILY
Qty: 60 TABLET | Refills: 11 | Status: SHIPPED | OUTPATIENT
Start: 2024-08-20

## 2024-08-21 ENCOUNTER — TELEPHONE (OUTPATIENT)
Dept: CARDIOLOGY | Facility: CLINIC | Age: 79
End: 2024-08-21
Payer: MEDICARE

## 2024-08-21 DIAGNOSIS — I42.8 NONISCHEMIC CARDIOMYOPATHY: Primary | ICD-10-CM

## 2024-08-21 NOTE — TELEPHONE ENCOUNTER
Pt notified and verbalized understanding pb----- Message from Nataly Mccord LPN sent at 8/21/2024  8:37 AM CDT -----    ----- Message -----  From: Galindo Tidwell MD  Sent: 8/20/2024  11:07 PM CDT  To: Nataly Mccord LPN    See comments below and call patient to discuss.   Please close encounter when done -- no need to route back to me.  Thanks   DLCO is a little bit low.  However, we can safely continue amiodarone at this time.    Repeat PFTs/DLCO in 6 months..

## 2024-08-21 NOTE — TELEPHONE ENCOUNTER
Pt notified and appt made for 6 month pft----- Message from Nataly Mccord LPN sent at 8/21/2024  8:37 AM CDT -----    ----- Message -----  From: Galindo Tidwell MD  Sent: 8/20/2024  11:07 PM CDT  To: Nataly Mccord LPN    See comments below and call patient to discuss.   Please close encounter when done -- no need to route back to me.  Thanks   DLCO is a little bit low.  However, we can safely continue amiodarone at this time.    Repeat PFTs/DLCO in 6 months..

## 2024-08-21 NOTE — TELEPHONE ENCOUNTER
Pt notified and verbalized understanding pb     ----- Message from Nataly Mccord LPN sent at 8/21/2024  8:37 AM CDT -----    ----- Message -----  From: Galindo Tidwell MD  Sent: 8/20/2024  11:02 PM CDT  To: Nataly Mccord LPN    See comments below and call patient to discuss.   Please close encounter when done -- no need to route back to me.  Thanks      BNP and proBNP are elevated still.  However, do not think that her diuretics should be increased.    On the other hand, we should be able to increase her Entresto dose to the highest dose.  I wrote the prescription.

## 2024-08-26 RX ORDER — EMPAGLIFLOZIN 10 MG/1
10 TABLET, FILM COATED ORAL
Qty: 90 TABLET | Refills: 3 | Status: SHIPPED | OUTPATIENT
Start: 2024-08-26

## 2024-09-06 ENCOUNTER — OFFICE VISIT (OUTPATIENT)
Dept: CARDIOLOGY | Facility: CLINIC | Age: 79
End: 2024-09-06
Payer: MEDICARE

## 2024-09-06 VITALS
BODY MASS INDEX: 30.07 KG/M2 | HEART RATE: 44 BPM | WEIGHT: 163.38 LBS | HEIGHT: 62 IN | SYSTOLIC BLOOD PRESSURE: 130 MMHG | DIASTOLIC BLOOD PRESSURE: 84 MMHG | OXYGEN SATURATION: 100 %

## 2024-09-06 DIAGNOSIS — I73.9 PERIPHERAL VASCULAR DISEASE, UNSPECIFIED: ICD-10-CM

## 2024-09-06 DIAGNOSIS — I50.42 CHRONIC COMBINED SYSTOLIC AND DIASTOLIC CONGESTIVE HEART FAILURE: ICD-10-CM

## 2024-09-06 DIAGNOSIS — R06.09 DOE (DYSPNEA ON EXERTION): ICD-10-CM

## 2024-09-06 DIAGNOSIS — E66.9 OBESITY, CLASS I, BMI 30-34.9: ICD-10-CM

## 2024-09-06 DIAGNOSIS — I10 ESSENTIAL HYPERTENSION: ICD-10-CM

## 2024-09-06 DIAGNOSIS — R00.2 PALPITATIONS: ICD-10-CM

## 2024-09-06 DIAGNOSIS — N18.31 STAGE 3A CHRONIC KIDNEY DISEASE: ICD-10-CM

## 2024-09-06 DIAGNOSIS — N18.32 STAGE 3B CHRONIC KIDNEY DISEASE: ICD-10-CM

## 2024-09-06 DIAGNOSIS — I50.42 CHRONIC COMBINED SYSTOLIC AND DIASTOLIC HEART FAILURE: ICD-10-CM

## 2024-09-06 DIAGNOSIS — I42.8 NONISCHEMIC CARDIOMYOPATHY: Primary | ICD-10-CM

## 2024-09-06 DIAGNOSIS — I49.3 PVC (PREMATURE VENTRICULAR CONTRACTION): ICD-10-CM

## 2024-09-06 PROCEDURE — 99999 PR PBB SHADOW E&M-EST. PATIENT-LVL III: CPT | Mod: PBBFAC,,, | Performed by: INTERNAL MEDICINE

## 2024-09-06 RX ORDER — SACUBITRIL AND VALSARTAN 97; 103 MG/1; MG/1
1 TABLET, FILM COATED ORAL 2 TIMES DAILY
Qty: 60 TABLET | Refills: 3 | Status: SHIPPED | OUTPATIENT
Start: 2024-09-06

## 2024-09-06 RX ORDER — SPIRONOLACTONE 50 MG/1
50 TABLET, FILM COATED ORAL DAILY
Qty: 90 TABLET | Refills: 1 | Status: SHIPPED | OUTPATIENT
Start: 2024-09-06

## 2024-09-06 NOTE — PROGRESS NOTES
"Subjective:   Patient ID:  Megan Sams is a 79 y.o. female who presents for cardiac consult of Medication Refill (Jardiance)      Referral by: No referring provider defined for this encounter.     Reason for consult: CHF      HPI  The patient came in today for cardiac consult of Medication Refill (Jardiance)      Megan Sams is a 79 y.o. female pt with NICM EF 40%, PVCs, HTN, obesity, CKD presents for CV follow up.     5/24/24 - ER follow up  5/15/2024, 3:46 PM  History obtained from the patient                  History of Present Illness: Megan Sams is a 79 y.o. female patient with a PMHx of CHF, HTN, depression, and CKD who presents to the Emergency Department for evaluation of mild left CP which onset suddenly one hour PTA. Pain radiates to left arm and left upper back. Pt's pain has since improved while in the ED. Symptoms are constant and moderate in severity. No mitigating or exacerbating factors reported. No associated sxs. Patient denies any SOB, N/V, palpitations, diaphoresis, and all other sxs at this time. No prior Tx. Pt does not smoke. Pt had a stress test completed 2.5 months ago. No further complaints or concerns at this time.      ECHO 4/2024 with EF 35%, grade 1 DD, PASP 35 mmHG.   Holter 4/2024 with sinus pause 4.3 sec  Pt saw Dr. Fuentes Haines -" reassess her candidacy for ICD.  I will also reduce the dose of amiodarone from 300 to 200 a day.  BP stable. HR low. BMI 30 - 165 lbs     9/6/24  ECHO 7/2024 with LVEF 40%, normal RV, mild AS, mild to mod MR, PASP 48mmHG.   From Dr. Fuentes Haines -  DLCO is a little bit low.  However, we can safely continue amiodarone at this time.    Repeat PFTs/DLCO in 6 months..    BP stable. HR 44. BMI 29 - 163 lbs     BNP (pg/mL)   Date Value   07/26/2024 756 (H)   04/12/2024 507 (H)   01/26/2024 613 (H)   12/01/2023 612 (H)   10/20/2023 579 (H)      She will increase Entresto to high dose, and take lasix PRN    Results for orders placed during the hospital encounter of " 07/19/24    Echo    Interpretation Summary    Left Ventricle: The left ventricle is normal in size. Normal wall thickness. There is concentric hypertrophy. Mild global hypokinesis present. There is reduced systolic function. Ejection fraction by visual approximation is 40%. There is indeterminate diastolic function.    Right Ventricle: Normal right ventricular cavity size. Wall thickness is normal. Systolic function is normal.    Aortic Valve: The aortic valve is a trileaflet valve. There is mild stenosis. Aortic valve area by VTI is 1.92 cm². Aortic valve peak velocity is 1.17 m/s. Mean gradient is 3 mmHg. The dimensionless index is 0.59.    Mitral Valve: There is moderate bileaflet sclerosis. There is normal leaflet mobility. There is prolapse of the anterior mitral leaflet. There is mild to moderate regurgitation.    Pulmonary Artery: The estimated pulmonary artery systolic pressure is 48 mmHg.    IVC/SVC: Normal venous pressure at 3 mmHg.      Results for orders placed during the hospital encounter of 04/19/24    Echo    Interpretation Summary    Left Ventricle: The left ventricle is mildly dilated. Moderately increased wall thickness. There is moderate concentric hypertrophy. Moderate global hypokinesis present. There is moderately reduced systolic function with a visually estimated ejection fraction of 35 - 40%. Grade I diastolic dysfunction.    Right Ventricle: Normal right ventricular cavity size. Wall thickness is normal. Right ventricle wall motion  is normal. Systolic function is normal.    Mitral Valve: There is mild to moderate regurgitation.    Tricuspid Valve: There is mild regurgitation.    Pulmonary Artery: The estimated pulmonary artery systolic pressure is 35 mmHg.    IVC/SVC: Normal venous pressure at 3 mmHg.      HOLTER  Interpretation Summary  Show Result Comparison     The predominant rhythm is sinus.    THERE IS A SINUS PAUSE OF 4.3 SECONDS.    THERE ARE MULTIPLE EPISODE OFS EVERE SINUS  BRADYCARDIA AND EPISODES OF WIDE COMPLEX JUNCTIONAL ESCAPE BEATS.      Results for orders placed during the hospital encounter of 12/28/23    Nuclear Stress - Cardiology Interpreted    Interpretation Summary    Abnormal myocardial perfusion scan.    There are two significant perfusion abnormalities.    Perfusion Abnormality #1 - There is a moderate to severe intensity, moderate sized, mostly fixed perfusion abnormality with some reversibilty in the apical, anteroseptal and septal apical wall(s).    Perfusion Abnormality #2 - There is a moderate sized, moderate intensity, mostly fixed perfusion abnormality with some reversibilty in the basal to mid inferolateral wall(s).    There are no other significant perfusion abnormalities.    The gated perfusion images showed an ejection fraction of 34% at rest. The gated perfusion images showed an ejection fraction of 23% post stress.    There is moderate global hypokinesis at rest and severe global hypokinesis at stress .    The ECG portion of the study is negative for ischemia.    The patient reported no chest pain during the stress test.        Results for orders placed during the hospital encounter of 11/03/23    Echo    Interpretation Summary    Left Ventricle: The left ventricle is mildly dilated. Normal wall thickness. Moderate global hypokinesis present. There is moderately reduced systolic function with a visually estimated ejection fraction of 30 - 35%. There is normal diastolic function.    Right Ventricle: Normal right ventricular cavity size. Wall thickness is normal. Right ventricle wall motion  is normal. Systolic function is normal.    Mitral Valve: There is moderate regurgitation with a centrally directed jet.    Tricuspid Valve: There is moderate regurgitation with a centrally directed jet.    Pulmonary Artery: The estimated pulmonary artery systolic pressure is 49 mmHg.    IVC/SVC: Normal venous pressure at 3 mmHg.      Echo: 2020: EF 40%  Cardiac cath:  2009: normal coronaries       Holter monitor - 48 hour    Result Date: 4/10/2024    The predominant rhythm is sinus.    THERE IS A SINUS PAUSE OF 4.3 SECONDS.    THERE ARE MULTIPLE EPISODE OFS EVERE SINUS BRADYCARDIA AND EPISODES OF   WIDE COMPLEX JUNCTIONAL ESCAPE BEATS.        Holter monitor - 48 hour    Result Date: 11/16/2023    The predominant rhythm is sinus.              Past Medical History:   Diagnosis Date    Arthritis     Blood transfusion     CHF (congestive heart failure)     Chronic kidney disease     Depression     Hypertension        Past Surgical History:   Procedure Laterality Date    HYSTERECTOMY  1978    OOPHORECTOMY  1978       Social History     Tobacco Use    Smoking status: Never    Smokeless tobacco: Never   Substance Use Topics    Alcohol use: No     Alcohol/week: 0.0 standard drinks of alcohol    Drug use: No       Family History   Problem Relation Name Age of Onset    Early death Mother      Heart disease Mother      Hypertension Mother      Kidney disease Father      Heart disease Brother         Patient's Medications   New Prescriptions    No medications on file   Previous Medications    ACETAMINOPHEN (TYLENOL) 500 MG TABLET    Take 500 mg by mouth every 6 (six) hours as needed.    ALLOPURINOL (ZYLOPRIM) 100 MG TABLET    TAKE 2 TABLETS BY MOUTH EVERY DAY    AMIODARONE (PACERONE) 200 MG TAB    Take one tablet in the am    ASPIRIN (ECOTRIN) 81 MG EC TABLET    Take 81 mg by mouth once daily.    CICLOPIROX (PENLAC) 8 % SOLN    Apply to nails once daily    FUROSEMIDE (LASIX) 20 MG TABLET    Take 1 tablet (20 mg total) by mouth daily as needed (leg swelling.). Do not take if BP is below 110/70    JARDIANCE 10 MG TABLET    TAKE 1 TABLET BY MOUTH EVERY DAY    METOPROLOL SUCCINATE (TOPROL-XL) 50 MG 24 HR TABLET    Take 1 tablet (50 mg total) by mouth once daily.    MULTIVIT WITH CALCIUM,IRON,MIN (MULTIPLE VITAMIN, WOMENS ORAL)    Take by mouth once daily.    SACUBITRIL-VALSARTAN (ENTRESTO)  " MG PER TABLET    Take 1 tablet by mouth 2 (two) times daily.    SPIRONOLACTONE (ALDACTONE) 50 MG TABLET    Take 1 tablet (50 mg total) by mouth once daily. Do not take if BP is below 110/70   Modified Medications    No medications on file   Discontinued Medications    No medications on file       Review of Systems   Constitutional:  Positive for malaise/fatigue.   HENT: Negative.     Eyes: Negative.    Respiratory:  Positive for shortness of breath.    Cardiovascular:  Positive for chest pain and palpitations.   Gastrointestinal: Negative.    Genitourinary: Negative.    Musculoskeletal: Negative.    Skin: Negative.    Neurological: Negative.    Endo/Heme/Allergies: Negative.    Psychiatric/Behavioral: Negative.     All 12 systems otherwise negative.      Wt Readings from Last 3 Encounters:   09/06/24 74.1 kg (163 lb 5.8 oz)   07/26/24 73.8 kg (162 lb 11.2 oz)   07/19/24 74.4 kg (164 lb)     Temp Readings from Last 3 Encounters:   07/15/24 97.4 °F (36.3 °C) (Tympanic)   05/15/24 98.3 °F (36.8 °C) (Oral)   05/09/24 96.7 °F (35.9 °C) (Tympanic)     BP Readings from Last 3 Encounters:   09/06/24 130/84   07/26/24 136/84   07/19/24 132/70     Pulse Readings from Last 3 Encounters:   09/06/24 (!) 44   07/26/24 (!) 51   07/15/24 (!) 45       /84 (BP Location: Left arm, Patient Position: Sitting, BP Method: Medium (Manual))   Pulse (!) 44   Ht 5' 2" (1.575 m)   Wt 74.1 kg (163 lb 5.8 oz)   SpO2 100%   BMI 29.88 kg/m²     Objective:   Physical Exam  Vitals and nursing note reviewed.   Constitutional:       General: She is not in acute distress.     Appearance: She is well-developed. She is obese. She is not diaphoretic.   HENT:      Head: Normocephalic and atraumatic.      Nose: Nose normal.   Eyes:      General: No scleral icterus.     Conjunctiva/sclera: Conjunctivae normal.   Neck:      Thyroid: No thyromegaly.      Vascular: No JVD.   Cardiovascular:      Rate and Rhythm: Normal rate and regular rhythm. "      Heart sounds: S1 normal and S2 normal. Murmur heard.      No friction rub. No gallop. No S3 or S4 sounds.   Pulmonary:      Effort: Pulmonary effort is normal. No respiratory distress.      Breath sounds: Normal breath sounds. No stridor. No wheezing or rales.   Chest:      Chest wall: No tenderness.   Abdominal:      General: Bowel sounds are normal. There is no distension.      Palpations: Abdomen is soft. There is no mass.      Tenderness: There is no abdominal tenderness. There is no rebound.   Genitourinary:     Comments: Deferred  Musculoskeletal:         General: No tenderness or deformity. Normal range of motion.      Cervical back: Normal range of motion and neck supple.   Lymphadenopathy:      Cervical: No cervical adenopathy.   Skin:     General: Skin is warm and dry.      Coloration: Skin is not pale.      Findings: No erythema or rash.   Neurological:      Mental Status: She is alert and oriented to person, place, and time.      Motor: No abnormal muscle tone.      Coordination: Coordination normal.   Psychiatric:         Behavior: Behavior normal.         Thought Content: Thought content normal.         Judgment: Judgment normal.         Lab Results   Component Value Date     07/11/2024    K 4.1 07/11/2024     07/11/2024    CO2 25 07/11/2024    BUN 23 07/11/2024    CREATININE 1.4 07/11/2024    GLU 92 07/11/2024    HGBA1C 5.4 07/26/2023    MG 2.0 01/26/2024    AST 26 07/11/2024    ALT 17 07/11/2024    ALBUMIN 3.1 (L) 07/11/2024    PROT 6.8 07/11/2024    BILITOT 0.8 07/11/2024    WBC 5.47 07/11/2024    HGB 13.5 07/11/2024    HCT 43.4 07/11/2024    MCV 92 07/11/2024     07/11/2024    INR 1.0 02/14/2017    TSH 5.788 (H) 07/11/2024    CHOL 182 07/11/2024    HDL 55 07/11/2024    LDLCALC 105.8 07/11/2024    TRIG 106 07/11/2024     (H) 07/26/2024       BNP (pg/mL)   Date Value   07/26/2024 756 (H)   04/12/2024 507 (H)   01/26/2024 613 (H)   12/01/2023 612 (H)   10/20/2023 579 (H)      INR (no units)   Date Value   02/14/2017 1.0   03/02/2014 1.0        Assessment:      1. Nonischemic cardiomyopathy    2. Chronic combined systolic and diastolic congestive heart failure    3. Essential hypertension    4. PVC (premature ventricular contraction)    5. Stage 3b chronic kidney disease    6. Peripheral vascular disease, unspecified    7. Palpitations    8. PAGE (dyspnea on exertion)    9. Obesity, Class I, BMI 30-34.9    10. Stage 3a chronic kidney disease    11. Chronic combined systolic and diastolic heart failure          Plan:     NICM  EF 35%; Chest pain, PAGE;  BNP - 3942 --> 579 --> 507 --> 756  - cont Toprol and ARB - change to Entresto BID - increases dose   - nuc stress 12/2023 with mostly fixed defect anteroseptal/apical wall with EF 34%  - needs low salt diet   - ECHO 7/2024 with LVEF 40%, normal RV, mild AS, mild to mod MR, PASP 48mmHG.   -  increase Entresto to high dose, and take lasix PRN    2. HTN, PVCs - with bradycardia   - titrate meds  - frequent PVCs - referred to EP - started on Amio with Dr. Herminia Haines  - Holter 4/2024 with sinus pause 4.3 sec  Pt saw Dr. Herminia Haines - reassess her candidacy for ICD.  I will also reduce the dose of amiodarone from 300 to 200 a day.  - increased BB to 50mg BID  - will decrease to Toprol 50mg  - rec compression stockings for edema   - repeat 7 day vital     3. Obesity - losing weight - 159 lbs -->  BMI 29 - 161 lbs -->BMI 30 - 165 lbs  -->163 lbs   - cont weight loss    4. CKD3  - cont to monitor    Visit today included increased complexity associated with the care of the episodic problem CHF addressed and managing the longitudinal care of the patient due to the serious and/or complex managed problem(s) .    Thank you for allowing me to participate in this patient's care. Please do not hesitate to contact me with any questions or concerns. Consult note has been forwarded to the referral physician.

## 2024-09-09 ENCOUNTER — HOSPITAL ENCOUNTER (OUTPATIENT)
Dept: CARDIOLOGY | Facility: HOSPITAL | Age: 79
Discharge: HOME | End: 2024-09-09
Attending: INTERNAL MEDICINE
Payer: MEDICARE

## 2024-10-21 ENCOUNTER — TELEPHONE (OUTPATIENT)
Dept: INTERNAL MEDICINE | Facility: CLINIC | Age: 79
End: 2024-10-21
Payer: MEDICARE

## 2024-10-21 ENCOUNTER — TELEPHONE (OUTPATIENT)
Dept: CARDIOLOGY | Facility: CLINIC | Age: 79
End: 2024-10-21

## 2024-10-21 DIAGNOSIS — I49.3 PVC (PREMATURE VENTRICULAR CONTRACTION): ICD-10-CM

## 2024-10-21 DIAGNOSIS — Z01.810 ENCOUNTER FOR PRE-OPERATIVE CARDIOVASCULAR CLEARANCE: Primary | ICD-10-CM

## 2024-10-21 NOTE — TELEPHONE ENCOUNTER
----- Message from Nick sent at 10/21/2024 11:11 AM CDT -----  Regarding: Patient Callback Request  Contact: Pt [phone]  [email or web address]     Patient is calling for Medical Advice regarding: Patient wanted a callback to discuss her upcoming dentist appointment and which medications she should or should not take during her dentist visit. She said she has a loose tooth and they may have to pull it. Please call back asap.    How long has patient had these symptoms:    Pharmacy name and phone#:    Patient wants a call back or thru [facility]: Call    Comments:    Please advise patient replies from provider may take up to 48 hours.

## 2024-10-21 NOTE — TELEPHONE ENCOUNTER
Spoke with the patient stated that she has not scheduled her dental appointment but, she was concerning on what medication she would need to stop. The patient was informed that she can stop the 81 aspirin for today and tomorrow. The patient stated understanding

## 2024-10-21 NOTE — TELEPHONE ENCOUNTER
Deena Zepeda Staff  Caller: GENEVIEVE SNIDER [[phone]] (Today, 12:10 PM)  ..Type:  Patient Requesting Call    Who Called: GENEVIEVE SNIDER [[phone]]  Does the patient know what this is regarding?: pt having dental procedure and needs to know what meds to stop taking prior  Would the patient rather a call back or a response via [facility]?  call  Best Call Back Number: .[phone] (home)  Additional Information:    [facility] is requesting clearance and medication instructions for this patient to have 1 extraction     Fax: [phone]

## 2024-10-22 ENCOUNTER — HOSPITAL ENCOUNTER (OUTPATIENT)
Dept: CARDIOLOGY | Facility: HOSPITAL | Age: 79
Discharge: HOME | End: 2024-10-22
Payer: MEDICARE

## 2024-10-22 ENCOUNTER — OFFICE VISIT (OUTPATIENT)
Dept: CARDIOLOGY | Facility: CLINIC | Age: 79
End: 2024-10-22
Payer: MEDICARE

## 2024-10-22 VITALS
DIASTOLIC BLOOD PRESSURE: 80 MMHG | OXYGEN SATURATION: 99 % | SYSTOLIC BLOOD PRESSURE: 142 MMHG | HEART RATE: 46 BPM | BODY MASS INDEX: 29.62 KG/M2 | WEIGHT: 160.94 LBS | HEIGHT: 62 IN

## 2024-10-22 DIAGNOSIS — I50.42 CHRONIC COMBINED SYSTOLIC AND DIASTOLIC CONGESTIVE HEART FAILURE: Primary | ICD-10-CM

## 2024-10-22 DIAGNOSIS — I49.3 PVC (PREMATURE VENTRICULAR CONTRACTION): ICD-10-CM

## 2024-10-22 DIAGNOSIS — Z01.810 PRE-OPERATIVE CARDIOVASCULAR EXAMINATION: ICD-10-CM

## 2024-10-22 DIAGNOSIS — Z01.810 ENCOUNTER FOR PRE-OPERATIVE CARDIOVASCULAR CLEARANCE: ICD-10-CM

## 2024-10-22 DIAGNOSIS — I10 ESSENTIAL HYPERTENSION: Chronic | ICD-10-CM

## 2024-10-22 DIAGNOSIS — I42.8 NONISCHEMIC CARDIOMYOPATHY: ICD-10-CM

## 2024-10-22 LAB
OHS QRS DURATION: 118 MS
OHS QTC CALCULATION: 456 MS

## 2024-10-22 PROCEDURE — 3077F SYST BP >= 140 MM HG: CPT | Mod: CPTII,S$GLB,,

## 2024-10-22 PROCEDURE — 3079F DIAST BP 80-89 MM HG: CPT | Mod: CPTII,S$GLB,,

## 2024-10-22 PROCEDURE — 1100F PTFALLS ASSESS-DOCD GE2>/YR: CPT | Mod: CPTII,S$GLB,,

## 2024-10-22 PROCEDURE — 99999 PR PBB SHADOW E&M-EST. PATIENT-LVL III: CPT | Mod: PBBFAC,,,

## 2024-10-22 PROCEDURE — 3288F FALL RISK ASSESSMENT DOCD: CPT | Mod: CPTII,S$GLB,,

## 2024-10-22 PROCEDURE — 99215 OFFICE O/P EST HI 40 MIN: CPT | Mod: S$GLB,,,

## 2024-10-22 PROCEDURE — 93005 ELECTROCARDIOGRAM TRACING: CPT

## 2024-10-22 PROCEDURE — 1160F RVW MEDS BY RX/DR IN RCRD: CPT | Mod: CPTII,S$GLB,,

## 2024-10-22 PROCEDURE — 93010 ELECTROCARDIOGRAM REPORT: CPT | Mod: ,,, | Performed by: INTERNAL MEDICINE

## 2024-10-22 PROCEDURE — 1159F MED LIST DOCD IN RCRD: CPT | Mod: CPTII,S$GLB,,

## 2024-10-22 PROCEDURE — 1126F AMNT PAIN NOTED NONE PRSNT: CPT | Mod: CPTII,S$GLB,,

## 2024-10-22 RX ORDER — METOPROLOL SUCCINATE 50 MG/1
25 TABLET, EXTENDED RELEASE ORAL DAILY
Qty: 90 TABLET | Refills: 1 | Status: SHIPPED | OUTPATIENT
Start: 2024-10-22

## 2024-10-22 NOTE — PROGRESS NOTES
Subjective:   Patient ID:  Megan Sams is a 79 y.o. female who presents for evaluation of No chief complaint on file.      HPI 80y/o AA F whose current medical conditions include CHF (EF 40% per Echo in July 24'), mild AS, HTN, CKD, PVC followed by Dr. Dubois in cardiology clinic here today for pre op risk stratification. Doing well, denies any CP, angina or anginal equivalent. HR staying in 40s, recently decreased her BB for 50mg BID to daily. EKG SB no acute dynamic changes noted. Denies any h/o complication with anesthesia in the past. Weight stable from previous visit    Past Medical History:   Diagnosis Date    Arthritis     Blood transfusion     CHF (congestive heart failure)     Chronic kidney disease     Depression     Hypertension        Past Surgical History:   Procedure Laterality Date    HYSTERECTOMY  1978    OOPHORECTOMY  1978       Social History     Tobacco Use    Smoking status: Never    Smokeless tobacco: Never   Substance Use Topics    Alcohol use: No     Alcohol/week: 0.0 standard drinks of alcohol    Drug use: No       Family History   Problem Relation Name Age of Onset    Early death Mother      Heart disease Mother      Hypertension Mother      Kidney disease Father      Heart disease Brother         Current Outpatient Medications on File Prior to Visit   Medication Sig Dispense Refill    acetaminophen (TYLENOL) 500 MG tablet Take 500 mg by mouth every 6 (six) hours as needed.      allopurinoL (ZYLOPRIM) 100 MG tablet TAKE 2 TABLETS BY MOUTH EVERY  tablet 4    amiodarone (PACERONE) 200 MG Tab Take one tablet in the am 90 tablet 3    aspirin (ECOTRIN) 81 MG EC tablet Take 81 mg by mouth once daily.      ciclopirox (PENLAC) 8 % Soln Apply to nails once daily 6.6 mL 6    empagliflozin (JARDIANCE) 10 mg tablet Take 1 tablet (10 mg total) by mouth once daily. 90 tablet 3    furosemide (LASIX) 20 MG tablet Take 1 tablet (20 mg total) by mouth daily as needed (leg swelling.). Do not take if BP  is below 110/70 90 tablet 1    metoprolol succinate (TOPROL-XL) 50 MG 24 hr tablet Take 1 tablet (50 mg total) by mouth once daily. 90 tablet 1    MULTIVIT WITH CALCIUM,IRON,MIN (MULTIPLE VITAMIN, WOMENS ORAL) Take by mouth once daily.      sacubitriL-valsartan (ENTRESTO)  mg per tablet Take 1 tablet by mouth 2 (two) times daily. 60 tablet 3    spironolactone (ALDACTONE) 50 MG tablet Take 1 tablet (50 mg total) by mouth once daily. Do not take if BP is below 110/70 90 tablet 1     No current facility-administered medications on file prior to visit.      Wt Readings from Last 3 Encounters:   10/22/24 73 kg (160 lb 15 oz)   09/06/24 74.1 kg (163 lb 5.8 oz)   07/26/24 73.8 kg (162 lb 11.2 oz)     Temp Readings from Last 3 Encounters:   07/15/24 97.4 °F (36.3 °C) (Tympanic)   05/15/24 98.3 °F (36.8 °C) (Oral)   05/09/24 96.7 °F (35.9 °C) (Tympanic)     BP Readings from Last 3 Encounters:   10/22/24 (!) 142/80   09/06/24 130/84   07/26/24 136/84     Pulse Readings from Last 3 Encounters:   10/22/24 (!) 46   09/06/24 (!) 44   07/26/24 (!) 51        Review of Systems   Constitutional: Negative.   HENT: Negative.     Eyes: Negative.    Cardiovascular: Negative.    Respiratory: Negative.     Skin: Negative.    Musculoskeletal: Negative.    Gastrointestinal: Negative.    Genitourinary: Negative.    Neurological: Negative.    Psychiatric/Behavioral: Negative.         Objective:   Physical Exam  Vitals and nursing note reviewed.   Constitutional:       Appearance: Normal appearance.   HENT:      Head: Normocephalic.   Eyes:      Pupils: Pupils are equal, round, and reactive to light.   Cardiovascular:      Rate and Rhythm: Regular rhythm. Bradycardia present.      Heart sounds: Normal heart sounds, S1 normal and S2 normal. No murmur heard.     No S3 or S4 sounds.   Pulmonary:      Effort: Pulmonary effort is normal.      Breath sounds: Normal breath sounds.   Abdominal:      General: Bowel sounds are normal.       Palpations: Abdomen is soft.   Musculoskeletal:         General: Normal range of motion.      Cervical back: Normal range of motion.   Skin:     Capillary Refill: Capillary refill takes less than 2 seconds.   Neurological:      General: No focal deficit present.      Mental Status: She is alert and oriented to person, place, and time.   Psychiatric:         Mood and Affect: Mood normal.         Behavior: Behavior normal.         Thought Content: Thought content normal.         Lab Results   Component Value Date    CHOL 182 07/11/2024    CHOL 174 07/26/2023    CHOL 178 06/30/2022     Lab Results   Component Value Date    HDL 55 07/11/2024    HDL 50 07/26/2023    HDL 59 06/30/2022     Lab Results   Component Value Date    LDLCALC 105.8 07/11/2024    LDLCALC 104.0 07/26/2023    LDLCALC 100.2 06/30/2022     Lab Results   Component Value Date    TRIG 106 07/11/2024    TRIG 100 07/26/2023    TRIG 94 06/30/2022     Lab Results   Component Value Date    CHOLHDL 30.2 07/11/2024    CHOLHDL 28.7 07/26/2023    CHOLHDL 33.1 06/30/2022       Chemistry        Component Value Date/Time     07/11/2024 1350    K 4.1 07/11/2024 1350     07/11/2024 1350    CO2 25 07/11/2024 1350    BUN 23 07/11/2024 1350    CREATININE 1.4 07/11/2024 1350    GLU 92 07/11/2024 1350        Component Value Date/Time    CALCIUM 9.2 07/11/2024 1350    ALKPHOS 58 07/11/2024 1350    AST 26 07/11/2024 1350    ALT 17 07/11/2024 1350    BILITOT 0.8 07/11/2024 1350    ESTGFRAFRICA >60.0 06/30/2022 1031    EGFRNONAA 53.7 (A) 06/30/2022 1031          Lab Results   Component Value Date    TSH 5.788 (H) 07/11/2024     Lab Results   Component Value Date    INR 1.0 02/14/2017    INR 1.0 03/02/2014     @RESUFAST(WBC,HGB,HCT,MCV,PLT)  @LABRCNTIP(BNP,BNPTRIAGEBLO)@  CrCl cannot be calculated (Patient's most recent lab result is older than the maximum 7 days allowed.).     Results for orders placed during the hospital encounter of  07/19/24    Echo    Interpretation Summary    Left Ventricle: The left ventricle is normal in size. Normal wall thickness. There is concentric hypertrophy. Mild global hypokinesis present. There is reduced systolic function. Ejection fraction by visual approximation is 40%. There is indeterminate diastolic function.    Right Ventricle: Normal right ventricular cavity size. Wall thickness is normal. Systolic function is normal.    Aortic Valve: The aortic valve is a trileaflet valve. There is mild stenosis. Aortic valve area by VTI is 1.92 cm². Aortic valve peak velocity is 1.17 m/s. Mean gradient is 3 mmHg. The dimensionless index is 0.59.    Mitral Valve: There is moderate bileaflet sclerosis. There is normal leaflet mobility. There is prolapse of the anterior mitral leaflet. There is mild to moderate regurgitation.    Pulmonary Artery: The estimated pulmonary artery systolic pressure is 48 mmHg.    IVC/SVC: Normal venous pressure at 3 mmHg.     Results for orders placed during the hospital encounter of 12/28/23    Nuclear Stress - Cardiology Interpreted    Interpretation Summary    Abnormal myocardial perfusion scan.    There are two significant perfusion abnormalities.    Perfusion Abnormality #1 - There is a moderate to severe intensity, moderate sized, mostly fixed perfusion abnormality with some reversibilty in the apical, anteroseptal and septal apical wall(s).    Perfusion Abnormality #2 - There is a moderate sized, moderate intensity, mostly fixed perfusion abnormality with some reversibilty in the basal to mid inferolateral wall(s).    There are no other significant perfusion abnormalities.    The gated perfusion images showed an ejection fraction of 34% at rest. The gated perfusion images showed an ejection fraction of 23% post stress.    There is moderate global hypokinesis at rest and severe global hypokinesis at stress .    The ECG portion of the study is negative for ischemia.    The patient reported  no chest pain during the stress test.     Assessment:      1. Essential hypertension    2. Chronic combined systolic and diastolic congestive heart failure    3. Nonischemic cardiomyopathy    4. PVC (premature ventricular contraction)    5. Pre-operative cardiovascular examination        Plan:   Essential hypertension    Chronic combined systolic and diastolic congestive heart failure    Nonischemic cardiomyopathy    PVC (premature ventricular contraction)    Pre-operative cardiovascular examination    Dec metoprolol to 25mg daily  Amiodarone, metoprolol, keep follow up with EP- PVC  Metoprolol, entresto, jardiance, lasix, aldactone, low Na diet, fluid restriction, daily weights- CHF  BB, ARNI, aldactone, low Na diet, profile BP- HTN  RF modifications  Daily exercise as tolerated    Ok to hold ASA 5-7 days prior to sx, pt is moderate risk for irena and post op cardiac complications    Keep follow up    Courtney Guillot, FNP-C [facility], Cardiology

## 2024-10-28 ENCOUNTER — HOSPITAL ENCOUNTER (OUTPATIENT)
Dept: CARDIOLOGY | Facility: HOSPITAL | Age: 79
Discharge: HOME | End: 2024-10-28
Attending: INTERNAL MEDICINE
Payer: MEDICARE

## 2024-10-28 DIAGNOSIS — I50.42 CHRONIC COMBINED SYSTOLIC AND DIASTOLIC CONGESTIVE HEART FAILURE: ICD-10-CM

## 2024-10-28 DIAGNOSIS — I49.3 PVC (PREMATURE VENTRICULAR CONTRACTION): ICD-10-CM

## 2024-10-28 DIAGNOSIS — R00.2 PALPITATIONS: ICD-10-CM

## 2024-11-11 DIAGNOSIS — I50.42 CHRONIC COMBINED SYSTOLIC AND DIASTOLIC CONGESTIVE HEART FAILURE: ICD-10-CM

## 2024-11-11 RX ORDER — METOPROLOL SUCCINATE 25 MG/1
12.5 TABLET, EXTENDED RELEASE ORAL DAILY
Qty: 30 TABLET | Refills: 1 | Status: SHIPPED | OUTPATIENT
Start: 2024-11-11

## 2024-11-12 ENCOUNTER — TELEPHONE (OUTPATIENT)
Dept: CARDIOLOGY | Facility: CLINIC | Age: 79
End: 2024-11-12
Payer: MEDICARE

## 2024-11-12 ENCOUNTER — OFFICE VISIT (OUTPATIENT)
Dept: PODIATRY | Facility: CLINIC | Age: 79
End: 2024-11-12
Payer: MEDICARE

## 2024-11-12 VITALS — HEIGHT: 62 IN | BODY MASS INDEX: 29.62 KG/M2 | WEIGHT: 160.94 LBS

## 2024-11-12 DIAGNOSIS — B35.1 ONYCHOMYCOSIS DUE TO DERMATOPHYTE: Primary | ICD-10-CM

## 2024-11-12 PROCEDURE — 1126F AMNT PAIN NOTED NONE PRSNT: CPT | Mod: CPTII,S$GLB,, | Performed by: PODIATRIST

## 2024-11-12 PROCEDURE — 3288F FALL RISK ASSESSMENT DOCD: CPT | Mod: CPTII,S$GLB,, | Performed by: PODIATRIST

## 2024-11-12 PROCEDURE — 99999 PR PBB SHADOW E&M-EST. PATIENT-LVL III: CPT | Mod: PBBFAC,,, | Performed by: PODIATRIST

## 2024-11-12 PROCEDURE — 1159F MED LIST DOCD IN RCRD: CPT | Mod: CPTII,S$GLB,, | Performed by: PODIATRIST

## 2024-11-12 PROCEDURE — 1101F PT FALLS ASSESS-DOCD LE1/YR: CPT | Mod: CPTII,S$GLB,, | Performed by: PODIATRIST

## 2024-11-12 PROCEDURE — 99213 OFFICE O/P EST LOW 20 MIN: CPT | Mod: S$GLB,,, | Performed by: PODIATRIST

## 2024-11-12 PROCEDURE — 1160F RVW MEDS BY RX/DR IN RCRD: CPT | Mod: CPTII,S$GLB,, | Performed by: PODIATRIST

## 2024-11-12 RX ORDER — CICLOPIROX 80 MG/ML
SOLUTION TOPICAL
Qty: 6.6 ML | Refills: 6 | Status: SHIPPED | OUTPATIENT
Start: 2024-11-12

## 2024-11-12 NOTE — PROGRESS NOTES
Subjective:     Patient ID: Megan Sasm is a 79 y.o. female.    Chief Complaint: Nail Care (Non-diabetic pt wears casual shoes, PCP Don Mayen last seen 7/15/2024)    Megan is a 79 y.o. female who presents to the clinic complaining of thick and discolored toenails on both feet. Megan states she has been applying the medications as prescribed.     Patient Active Problem List   Diagnosis    Arthritis    CHF (congestive heart failure)    Essential hypertension    Stage 3b chronic kidney disease    Nonischemic cardiomyopathy    Intertrigo    PVC (premature ventricular contraction)    Cortical age-related cataract of both eyes    Chronic gout without tophus    Pre-operative cardiovascular examination       Medication List with Changes/Refills   Current Medications    ACETAMINOPHEN (TYLENOL) 500 MG TABLET    Take 500 mg by mouth every 6 (six) hours as needed.    ALLOPURINOL (ZYLOPRIM) 100 MG TABLET    TAKE 2 TABLETS BY MOUTH EVERY DAY    AMIODARONE (PACERONE) 200 MG TAB    Take one tablet in the am    ASPIRIN (ECOTRIN) 81 MG EC TABLET    Take 81 mg by mouth once daily.    EMPAGLIFLOZIN (JARDIANCE) 10 MG TABLET    Take 1 tablet (10 mg total) by mouth once daily.    FUROSEMIDE (LASIX) 20 MG TABLET    Take 1 tablet (20 mg total) by mouth daily as needed (leg swelling.). Do not take if BP is below 110/70    METOPROLOL SUCCINATE (TOPROL-XL) 25 MG 24 HR TABLET    Take 0.5 tablets (12.5 mg total) by mouth once daily.    MULTIVIT WITH CALCIUM,IRON,MIN (MULTIPLE VITAMIN, WOMENS ORAL)    Take by mouth once daily.    SACUBITRIL-VALSARTAN (ENTRESTO)  MG PER TABLET    Take 1 tablet by mouth 2 (two) times daily.    SPIRONOLACTONE (ALDACTONE) 50 MG TABLET    Take 1 tablet (50 mg total) by mouth once daily. Do not take if BP is below 110/70   Changed and/or Refilled Medications    Modified Medication Previous Medication    CICLOPIROX (PENLAC) 8 % SOLN ciclopirox (PENLAC) 8 % Soln       Apply to nails once daily    Apply to nails  "once daily       Review of patient's allergies indicates:   Allergen Reactions    Adhesive Blisters    Codeine Other (See Comments)     Hallucinations    Doxycycline monohydrate Nausea Only     Elevated heart rate    Gabapentin Other (See Comments)     Lowered heart rate    Lanoxin [digoxin] Other (See Comments)     Too low heart rate    Thorazine [chlorpromazine] Other (See Comments)     hallucinations    Ultracet [tramadol-acetaminophen] Other (See Comments)     Elevated BP    Penicillins Nausea Only and Rash       Past Surgical History:   Procedure Laterality Date    HYSTERECTOMY  1978    OOPHORECTOMY  1978       Family History   Problem Relation Name Age of Onset    Early death Mother      Heart disease Mother      Hypertension Mother      Kidney disease Father      Heart disease Brother         Social History     Socioeconomic History    Marital status:    Tobacco Use    Smoking status: Never    Smokeless tobacco: Never   Substance and Sexual Activity    Alcohol use: No     Alcohol/week: 0.0 standard drinks of alcohol    Drug use: No    Sexual activity: Never       Vitals:    11/12/24 1459   Weight: 73 kg (160 lb 15 oz)   Height: 5' 2" (1.575 m)   PainSc: 0-No pain       Hemoglobin A1C   Date Value Ref Range Status   07/26/2023 5.4 4.0 - 5.6 % Final     Comment:     ADA Screening Guidelines:  5.7-6.4%  Consistent with prediabetes  >or=6.5%  Consistent with diabetes    High levels of fetal hemoglobin interfere with the HbA1C  assay. Heterozygous hemoglobin variants (HbS, HgC, etc)do  not significantly interfere with this assay.   However, presence of multiple variants may affect accuracy.     03/23/2021 5.5 4.0 - 5.6 % Final     Comment:     ADA Screening Guidelines:  5.7-6.4%  Consistent with prediabetes  >or=6.5%  Consistent with diabetes    High levels of fetal hemoglobin interfere with the HbA1C  assay. Heterozygous hemoglobin variants (HbS, HgC, etc)do  not significantly interfere with this assay. "   However, presence of multiple variants may affect accuracy.     04/02/2019 5.6 4.0 - 5.6 % Final     Comment:     ADA Screening Guidelines:  5.7-6.4%  Consistent with prediabetes  >or=6.5%  Consistent with diabetes  High levels of fetal hemoglobin interfere with the HbA1C  assay. Heterozygous hemoglobin variants (HbS, HgC, etc)do  not significantly interfere with this assay.   However, presence of multiple variants may affect accuracy.         Review of Systems   Constitutional:  Negative for chills and fever.   Respiratory:  Negative for shortness of breath.    Cardiovascular:  Negative for chest pain, palpitations, orthopnea, claudication and leg swelling.   Gastrointestinal:  Negative for diarrhea, nausea and vomiting.   Musculoskeletal:  Negative for joint pain.   Skin:  Negative for rash.   Neurological:  Negative for dizziness, tingling, sensory change, focal weakness and weakness.   Psychiatric/Behavioral: Negative.           Objective:      PHYSICAL EXAM: Apperance: Alert and orient in no distress,well developed, and with good attention to grooming and body habits  LOWER EXTREMITY EXAM:  VASCULAR: Dorsalis pedis pulses 2/4 bilateral and Posterior Tibial pulses 1/4 bilateral.   DERMATOLOGICAL: No skin rashes, subcutaneous nodules, lesions, or ulcers observed bilateral. Nails 1,2,3,4,5 bilateral elongated, thickened, and discolored with subungual debris. Webspaces 1,2,3,4 bilateral clean, dry and without evidence of break in skin integrity.  NEUROLOGICAL: Light touch, sharp-dull, proprioception all present and equal bilaterally.     MUSCULOSKELETAL: Muscle strength is 5/5 for foot inverters, everters, plantarflexors, and dorsiflexors. Muscle tone is normal. Negative pain on palpation of nails bilateral.         Assessment:       ICD-10-CM ICD-9-CM   1. Onychomycosis due to dermatophyte  B35.1 110.1         Plan:   Onychomycosis due to dermatophyte  -     ciclopirox (PENLAC) 8 % Soln; Apply to nails once  daily  Dispense: 6.6 mL; Refill: 6    I counseled the patient on her conditions, regarding findings of my examination, my impressions, and usual treatment plan.   Patient instructed to spray all shoes with Lysol disinfectant spray and let dry before wearing. Patient instructed to wash all socks in hot water and bleach.  Discuss treatment options for nail fungus.  I explained that fungus lives in a warm dark moist environment and therefore patient should make every attempt to keep feet clean and dry.  We discussed drying feet thoroughly after shower particularly between the toes and then applying powder between the toes and in the shoes.    For fungal toenails I prescribed topical medication to be used daily for up to a year.  We also discussed oral Lamisil but I did not recommend it as a first line of treatment since it is an internal medicine that may potentially have side effects, including liver problems. Patient elects for oral treatment.   Prescription written for Penlac to be applied to nails once daily.   Patient to return as needed.        Deonna Arevalo DPM  [facility]

## 2024-11-12 NOTE — TELEPHONE ENCOUNTER
Pt was contacted about results:     Please contact the patient and let them know that their results of heart monitor reveal slow heart rates with pauses up to 3.3 seconds - I am decreasing  the metoprolol to 12.5 mg daily (need to take half of a 25mg tablet) - new script sent to pharmacy. If heart rate remains below 50 and feeling sluggish/tired need to stop taking metoprolol. Follow up with EP as scheduled. Thanks         Pt verbalized understanding of new medication dosage/direction/frequency with no questions or concerns.        ----- Message from Sammy Dubois MD sent at 11/11/2024  5:50 PM CST -----  Please contact the patient and let them know that their results of heart monitor reveal slow heart rates with pauses up to 3.3 seconds - I am decreasing  the metoprolol to 12.5 mg daily (need to take half of a 25mg tablet) - new script sent to pharmacy. If heart rate remains below 50 and feeling sluggish/tired need to stop taking metoprolol. Follow up with EP as scheduled. Thanks

## 2024-11-12 NOTE — TELEPHONE ENCOUNTER
----- Message from Sammy Dubois MD sent at 11/11/2024  5:50 PM CST -----  Please contact the patient and let them know that their results of heart monitor reveal slow heart rates with pauses up to 3.3 seconds - I am decreasing  the metoprolol to 12.5 mg daily (need to take half of a 25mg tablet) - new script sent to pharmacy. If heart rate remains below 50 and feeling sluggish/tired need to stop taking metoprolol. Follow up with EP as scheduled. Thanks

## 2024-11-12 NOTE — TELEPHONE ENCOUNTER
Lvm for pt to return call to clinic in regards to results:     Please contact the patient and let them know that their results of heart monitor reveal slow heart rates with pauses up to 3.3 seconds - I am decreasing  the metoprolol to 12.5 mg daily (need to take half of a 25mg tablet) - new script sent to pharmacy. If heart rate remains below 50 and feeling sluggish/tired need to stop taking metoprolol. Follow up with EP as scheduled. Thanks       ----- Message from Sammy Dubois MD sent at 11/11/2024  5:50 PM CST -----  Please contact the patient and let them know that their results of heart monitor reveal slow heart rates with pauses up to 3.3 seconds - I am decreasing  the metoprolol to 12.5 mg daily (need to take half of a 25mg tablet) - new script sent to pharmacy. If heart rate remains below 50 and feeling sluggish/tired need to stop taking metoprolol. Follow up with EP as scheduled. Thanks

## 2024-11-14 ENCOUNTER — TELEPHONE (OUTPATIENT)
Dept: CARDIOLOGY | Facility: HOSPITAL | Age: 79
End: 2024-11-14
Payer: MEDICARE

## 2024-11-14 ENCOUNTER — TELEPHONE (OUTPATIENT)
Dept: CARDIOLOGY | Facility: CLINIC | Age: 79
End: 2024-11-14
Payer: MEDICARE

## 2024-11-14 NOTE — TELEPHONE ENCOUNTER
----- Message from Ekta sent at 11/14/2024  2:26 PM CST -----  Name of Who is Calling:GENEVIEVE SNIDER [[id number]]        What is the request in detail: pt is confuses on how to take the medication and she was taken 50 mg and now they saying take half of it      metoprolol succinate (TOPROL-XL) 25 MG 24 hr tablet  please advise thank you     Can the clinic reply by [facility]:call back         What Number to Call Back if not in [facility]: Telephone Information:  Mobile          [phone]

## 2024-11-14 NOTE — TELEPHONE ENCOUNTER
Returned call. Patient requesting toprol -xl dosage due to receiving instuctions from NP to take toprol xl 25 mg daily and new instructions to take 12.5mg.    I eexplained to patient , toprol xl has been decreased due heart monitor results      Please contact the patient and let them know that their results of heart monitor reveal slow heart rates with pauses up to 3.3 seconds - I am decreasing  the metoprolol to 12.5 mg daily (need to take half of a 25mg tablet) - new script sent to pharmacy. If heart rate remains below 50 and feeling sluggish/tired need to stop taking metoprolol. Follow up with EP as scheduled. Thanks     Patient verbalized understanding.

## 2024-11-14 NOTE — TELEPHONE ENCOUNTER
Contacted PT and reviewed medications with PT, PT stated verbal understanding     ----- Message from Sharmin sent at 11/14/2024  9:54 AM CST -----  Regarding: Patient Callback  Name of Who is Calling:   GENEVIEVE SNIDER [[id number]]     What is the request in detail:   Patient asked for a call back to her home phone number because mobile phone is not currently working. Patient is getting callbacks but to the wrong phone.    Patient is wanting to speak with her provider in reference to her medication. The medication that was prescribed she was told by her provider to take a certain dosage, when at her pharmacy she was given a different dosage. The patient would like to confirm the dosage and have the pharmacy clarified as to the exact dosage she should be receiving.     Can the clinic reply by [facility]:   No     [facility]  [phone]    PLEASE CALL NUMBER LISTED ABOVE THE HOME PHONE NUMBER

## 2024-11-21 ENCOUNTER — OFFICE VISIT (OUTPATIENT)
Dept: CARDIOLOGY | Facility: CLINIC | Age: 79
End: 2024-11-21
Payer: MEDICARE

## 2024-11-21 VITALS
OXYGEN SATURATION: 94 % | DIASTOLIC BLOOD PRESSURE: 82 MMHG | BODY MASS INDEX: 29.67 KG/M2 | HEART RATE: 56 BPM | HEIGHT: 62 IN | WEIGHT: 161.25 LBS | SYSTOLIC BLOOD PRESSURE: 160 MMHG

## 2024-11-21 DIAGNOSIS — I49.3 PVC (PREMATURE VENTRICULAR CONTRACTION): ICD-10-CM

## 2024-11-21 DIAGNOSIS — L65.9 HAIR LOSS: Primary | ICD-10-CM

## 2024-11-21 DIAGNOSIS — R00.1 SINUS BRADYCARDIA: ICD-10-CM

## 2024-11-21 DIAGNOSIS — I42.8 NONISCHEMIC CARDIOMYOPATHY: ICD-10-CM

## 2024-11-21 DIAGNOSIS — I50.40 HEART FAILURE WITH REDUCED EJECTION FRACTION (HFREF, <= 40%) AND COMBINED SYSTOLIC AND DIASTOLIC DYSFUNCTION: ICD-10-CM

## 2024-11-21 DIAGNOSIS — I50.42 CHRONIC COMBINED SYSTOLIC AND DIASTOLIC CONGESTIVE HEART FAILURE: ICD-10-CM

## 2024-11-21 DIAGNOSIS — N18.32 STAGE 3B CHRONIC KIDNEY DISEASE: ICD-10-CM

## 2024-11-21 PROCEDURE — 1101F PT FALLS ASSESS-DOCD LE1/YR: CPT | Mod: CPTII,S$GLB,, | Performed by: NURSE PRACTITIONER

## 2024-11-21 PROCEDURE — 99214 OFFICE O/P EST MOD 30 MIN: CPT | Mod: S$GLB,,, | Performed by: NURSE PRACTITIONER

## 2024-11-21 PROCEDURE — 3288F FALL RISK ASSESSMENT DOCD: CPT | Mod: CPTII,S$GLB,, | Performed by: NURSE PRACTITIONER

## 2024-11-21 PROCEDURE — 1126F AMNT PAIN NOTED NONE PRSNT: CPT | Mod: CPTII,S$GLB,, | Performed by: NURSE PRACTITIONER

## 2024-11-21 PROCEDURE — 3077F SYST BP >= 140 MM HG: CPT | Mod: CPTII,S$GLB,, | Performed by: NURSE PRACTITIONER

## 2024-11-21 PROCEDURE — 99999 PR PBB SHADOW E&M-EST. PATIENT-LVL III: CPT | Mod: PBBFAC,,, | Performed by: NURSE PRACTITIONER

## 2024-11-21 PROCEDURE — 1159F MED LIST DOCD IN RCRD: CPT | Mod: CPTII,S$GLB,, | Performed by: NURSE PRACTITIONER

## 2024-11-21 PROCEDURE — 3079F DIAST BP 80-89 MM HG: CPT | Mod: CPTII,S$GLB,, | Performed by: NURSE PRACTITIONER

## 2024-11-21 NOTE — PROGRESS NOTES
Subjective:   Patient ID:  Megan Sams is a 79 y.o. female who presents for evaluation of No chief complaint on file.      HPI    Megan Sams is a 79 year old female who presents to Arrhythmia clinic for follow up.    Her current medical conditions include CHF, HFrEF of 35-40%, HTN, CKD, NICM, PVCs.     She returns today and states she is doing ok.     She does endorse some left shoulder pain intermittently.     Reports compliance with medications and dietary restrictions.     Reports 2 pillow orthopnea.     She is walking at [facility] about once a week.     Has issues with hair loss since starting cardiac meds. Will start using minoxidil topical cream to assist with hair regrowth.     Denies chest pain or anginal equivalents. No shortness of breath, PAGE or palpitations. Denies orthopnea, PND or abdominal bloating. Reports regular walking without any issues lately. NO leg swelling or claudications. No recent falls, syncope or near syncopal events. Reports compliance with medications and dietary restrictions. NO CNS complaints to suggest TIA or CVA today. No signs of abnormal bleeding on ASA daily.     Medical therapy:  Entresto 49/51 BID  Toprol XL 50 mg daily  Amiodarone 200 mg daily  Jardiance 10 mg daily  Aldactone 50 mg daily    11/21/2024 update    Megan Sams returns for follow up.     She has decreased her Toprol XL to 12.5 mg with improvement in HR to 50s.     Still suffering from hair loss which is worsening.   BP more elevated than usual today in office due to rushing to get to clinic for appt on time.       No leg swelling or claudications.   Denies any dizziness, LH, syncope or near syncopal events.     Denies chest pain or anginal equivalents. No shortness of breath, PAGE or palpitations. Denies orthopnea, PND or abdominal bloating. Reports regular walking without any issues lately. NO leg swelling or claudications. No recent falls, syncope or near syncopal events. Reports compliance with medications  and dietary restrictions. NO CNS complaints to suggest TIA or CVA today. No signs of abnormal bleeding on ASA daily.     Results for orders placed during the hospital encounter of 07/19/24    Echo    Interpretation Summary    Left Ventricle: The left ventricle is normal in size. Normal wall thickness. There is concentric hypertrophy. Mild global hypokinesis present. There is reduced systolic function. Ejection fraction by visual approximation is 40%. There is indeterminate diastolic function.    Right Ventricle: Normal right ventricular cavity size. Wall thickness is normal. Systolic function is normal.    Aortic Valve: The aortic valve is a trileaflet valve. There is mild stenosis. Aortic valve area by VTI is 1.92 cm². Aortic valve peak velocity is 1.17 m/s. Mean gradient is 3 mmHg. The dimensionless index is 0.59.    Mitral Valve: There is moderate bileaflet sclerosis. There is normal leaflet mobility. There is prolapse of the anterior mitral leaflet. There is mild to moderate regurgitation.    Pulmonary Artery: The estimated pulmonary artery systolic pressure is 48 mmHg.    IVC/SVC: Normal venous pressure at 3 mmHg.      Past Medical History:   Diagnosis Date    Arthritis     Blood transfusion     CHF (congestive heart failure)     Chronic kidney disease     Depression     Hypertension        Past Surgical History:   Procedure Laterality Date    HYSTERECTOMY  1978    OOPHORECTOMY  1978       Social History     Tobacco Use    Smoking status: Never    Smokeless tobacco: Never   Substance Use Topics    Alcohol use: No     Alcohol/week: 0.0 standard drinks of alcohol    Drug use: No       Family History   Problem Relation Name Age of Onset    Early death Mother      Heart disease Mother      Hypertension Mother      Kidney disease Father      Heart disease Brother         Wt Readings from Last 3 Encounters:   11/21/24 73.1 kg (161 lb 4.3 oz)   11/12/24 73 kg (160 lb 15 oz)   10/22/24 73 kg (160 lb 15 oz)     Temp  Readings from Last 3 Encounters:   07/15/24 97.4 °F (36.3 °C) (Tympanic)   05/15/24 98.3 °F (36.8 °C) (Oral)   05/09/24 96.7 °F (35.9 °C) (Tympanic)     BP Readings from Last 3 Encounters:   11/21/24 (!) 160/82   10/22/24 (!) 142/80   09/06/24 130/84     Pulse Readings from Last 3 Encounters:   11/21/24 (!) 56   10/22/24 (!) 46   09/06/24 (!) 44       Current Outpatient Medications on File Prior to Visit   Medication Sig Dispense Refill    acetaminophen (TYLENOL) 500 MG tablet Take 500 mg by mouth every 6 (six) hours as needed.      allopurinoL (ZYLOPRIM) 100 MG tablet TAKE 2 TABLETS BY MOUTH EVERY  tablet 4    amiodarone (PACERONE) 200 MG Tab Take one tablet in the am 90 tablet 3    aspirin (ECOTRIN) 81 MG EC tablet Take 81 mg by mouth once daily.      ciclopirox (PENLAC) 8 % Soln Apply to nails once daily 6.6 mL 6    empagliflozin (JARDIANCE) 10 mg tablet Take 1 tablet (10 mg total) by mouth once daily. 90 tablet 3    furosemide (LASIX) 20 MG tablet Take 1 tablet (20 mg total) by mouth daily as needed (leg swelling.). Do not take if BP is below 110/70 90 tablet 1    metoprolol succinate (TOPROL-XL) 25 MG 24 hr tablet Take 0.5 tablets (12.5 mg total) by mouth once daily. 30 tablet 1    MULTIVIT WITH CALCIUM,IRON,MIN (MULTIPLE VITAMIN, WOMENS ORAL) Take by mouth once daily.      sacubitriL-valsartan (ENTRESTO)  mg per tablet Take 1 tablet by mouth 2 (two) times daily. 60 tablet 3    spironolactone (ALDACTONE) 50 MG tablet Take 1 tablet (50 mg total) by mouth once daily. Do not take if BP is below 110/70 90 tablet 1     No current facility-administered medications on file prior to visit.       Cardiac Monitor - 3-15 Day Adult (Cupid Only)    Result Date: 11/11/2024  The patient was monitored for a total of 6d 20h, underlying rhythm is   Sinus.  The minimum heart rate was 26 bpm; the maximum 76 bpm; the average 47 bpm.  0 % of Atrial fibrillation/Atrial flutter with longest episode of 0 ms.  The total  burden of AV Block present was 0 % [Complete Heart Block: 0 %;   Advanced (High Grade):  0 %; 2nd Degree, Mobitz II: 0 %; 2nd Degree, Mobitz I: 0 %].  There were 651 pauses, the longest pause was 3366 ms at Day 7 / 03:23:51   pm.  Total count of Ventricular Tachycardia (VT): 1 episode(s). Longest VT: 3   beats on Day 4 / 08:39:08  pm. Fastest VT: 116 bpm on Day 4 / 08:39:08 pm.  0 supraventricular episodes were found. Longest SVT Episode 0 s, Fastest   SVT -- bpm  There were a total of 404 PVCs with 3 morphologies and 2 couplets. Overall   PVC [place] at 0.09 %  There were a total of 0 Other Beats. There were 0 total number of paced   beats.  There were a total of 2806 PSVCs with 11 couplets. Overall PSVC [place] at  0.6 %  There is a total of 1 patient events.        Holter monitor - 48 hour    Result Date: 4/10/2024    The predominant rhythm is sinus.    THERE IS A SINUS PAUSE OF 4.3 SECONDS.    THERE ARE MULTIPLE EPISODE OFS EVERE SINUS BRADYCARDIA AND EPISODES OF   WIDE COMPLEX JUNCTIONAL ESCAPE BEATS.         Results for orders placed during the hospital encounter of 04/19/24    Echo    Interpretation Summary    Left Ventricle: The left ventricle is mildly dilated. Moderately increased wall thickness. There is moderate concentric hypertrophy. Moderate global hypokinesis present. There is moderately reduced systolic function with a visually estimated ejection fraction of 35 - 40%. Grade I diastolic dysfunction.    Right Ventricle: Normal right ventricular cavity size. Wall thickness is normal. Right ventricle wall motion  is normal. Systolic function is normal.    Mitral Valve: There is mild to moderate regurgitation.    Tricuspid Valve: There is mild regurgitation.    Pulmonary Artery: The estimated pulmonary artery systolic pressure is 35 mmHg.    IVC/SVC: Normal venous pressure at 3 mmHg.    Results for orders placed during the hospital encounter of 12/28/23    Nuclear Stress - Cardiology  Interpreted    Interpretation Summary    Abnormal myocardial perfusion scan.    There are two significant perfusion abnormalities.    Perfusion Abnormality #1 - There is a moderate to severe intensity, moderate sized, mostly fixed perfusion abnormality with some reversibilty in the apical, anteroseptal and septal apical wall(s).    Perfusion Abnormality #2 - There is a moderate sized, moderate intensity, mostly fixed perfusion abnormality with some reversibilty in the basal to mid inferolateral wall(s).    There are no other significant perfusion abnormalities.    The gated perfusion images showed an ejection fraction of 34% at rest. The gated perfusion images showed an ejection fraction of 23% post stress.    There is moderate global hypokinesis at rest and severe global hypokinesis at stress .    The ECG portion of the study is negative for ischemia.    The patient reported no chest pain during the stress test.        Results for orders placed or performed during the hospital encounter of 10/22/24   SCHEDULED EKG 12-LEAD (to Muse)    Collection Time: 10/22/24 12:16 PM   Result Value Ref Range    QRS Duration 118 ms    [facility] QTC Calculation 456 ms    Narrative    Test Reason : Z01.810,I49.3,    Vent. Rate : 046 BPM     Atrial Rate : 046 BPM     P-R Int : 160 ms          QRS Dur : 118 ms      QT Int : 522 ms       P-R-T Axes : 056 018 165 degrees     QTc Int : 456 ms    Sinus bradycardia  Nonspecific intraventricular conduction delay  T wave abnormality, consider inferior ischemia  Abnormal ECG  When compared with ECG of 15-MAY-2024 14:11,  Inverted T waves have replaced nonspecific T wave abnormality in Inferior  leads  Confirmed by YEYO CARNEY MD ([id number]) on 10/22/2024 1:40:50 PM    Referred By: MAT PRICE           Confirmed By:YEYO CARNEY MD         Review of Systems   Constitutional: Positive for malaise/fatigue.   HENT:  Negative for hearing loss and hoarse voice.    Eyes:  Negative for visual  "disturbance.   Cardiovascular:  Negative for chest pain, claudication, dyspnea on exertion, irregular heartbeat, leg swelling, near-syncope, orthopnea, palpitations, paroxysmal nocturnal dyspnea and syncope.   Respiratory:  Negative for cough, hemoptysis, shortness of breath, sleep disturbances due to breathing, snoring and wheezing.    Endocrine: Negative for cold intolerance and heat intolerance.   Hematologic/Lymphatic: Does not bruise/bleed easily.   Skin:  Negative for color change, dry skin and nail changes.   Musculoskeletal:  Positive for arthritis, back pain, joint pain and myalgias.   Gastrointestinal:  Negative for bloating, abdominal pain, constipation, nausea and vomiting.   Genitourinary:  Negative for dysuria, flank pain, hematuria and hesitancy.   Neurological:  Positive for weakness. Negative for headaches, light-headedness, loss of balance, numbness and paresthesias.   Psychiatric/Behavioral:  Negative for altered mental status.    Allergic/Immunologic: Negative for environmental allergies.         Objective:BP (!) 160/82 (BP Location: Left arm, Patient Position: Sitting)   Pulse (!) 56   Ht 5' 2" (1.575 m)   Wt 73.1 kg (161 lb 4.3 oz)   SpO2 (!) 94%   BMI 29.50 kg/m²      Physical Exam  Vitals and nursing note reviewed.   Constitutional:       General: She is not in acute distress.     Appearance: Normal appearance. She is well-developed. She is not ill-appearing.   HENT:      Head: Normocephalic and atraumatic.      Nose: Nose normal.      Mouth/Throat:      Mouth: Mucous membranes are moist.   Eyes:      Pupils: Pupils are equal, round, and reactive to light.   Neck:      Thyroid: No thyromegaly.      Vascular: No JVD.      Trachea: No tracheal deviation.   Cardiovascular:      Rate and Rhythm: Regular rhythm. Bradycardia present.      Chest Wall: PMI is not displaced.      Pulses: Intact distal pulses.           Radial pulses are 2+ on the right side and 2+ on the left side.        " Dorsalis pedis pulses are 2+ on the right side and 2+ on the left side.      Heart sounds: S1 normal and S2 normal. Heart sounds not distant. No murmur heard.  Pulmonary:      Effort: Pulmonary effort is normal. No respiratory distress.      Breath sounds: Normal breath sounds. No wheezing.   Abdominal:      General: Bowel sounds are normal.      Palpations: Abdomen is soft.   Musculoskeletal:         General: No swelling. Normal range of motion.      Cervical back: Full passive range of motion without pain, normal range of motion and neck supple.      Right ankle: No swelling.      Left ankle: No swelling.   Skin:     General: Skin is warm and dry.      Capillary Refill: Capillary refill takes less than 2 seconds.      Nails: There is no clubbing.   Neurological:      General: No focal deficit present.      Mental Status: She is alert and oriented to person, place, and time.   Psychiatric:         Speech: Speech normal.         Behavior: Behavior normal.         Thought Content: Thought content normal.         Judgment: Judgment normal.         Lab Results   Component Value Date    CHOL 182 07/11/2024    CHOL 174 07/26/2023    CHOL 178 06/30/2022     Lab Results   Component Value Date    HDL 55 07/11/2024    HDL 50 07/26/2023    HDL 59 06/30/2022     Lab Results   Component Value Date    LDLCALC 105.8 07/11/2024    LDLCALC 104.0 07/26/2023    LDLCALC 100.2 06/30/2022     Lab Results   Component Value Date    TRIG 106 07/11/2024    TRIG 100 07/26/2023    TRIG 94 06/30/2022     Lab Results   Component Value Date    CHOLHDL 30.2 07/11/2024    CHOLHDL 28.7 07/26/2023    CHOLHDL 33.1 06/30/2022       Chemistry        Component Value Date/Time     07/11/2024 1350    K 4.1 07/11/2024 1350     07/11/2024 1350    CO2 25 07/11/2024 1350    BUN 23 07/11/2024 1350    CREATININE 1.4 07/11/2024 1350    GLU 92 07/11/2024 1350        Component Value Date/Time    CALCIUM 9.2 07/11/2024 1350    ALKPHOS 58 07/11/2024 1350     AST 26 07/11/2024 1350    ALT 17 07/11/2024 1350    BILITOT 0.8 07/11/2024 1350    ESTGFRAFRICA >60.0 06/30/2022 1031    EGFRNONAA 53.7 (A) 06/30/2022 1031          Lab Results   Component Value Date    TSH 5.788 (H) 07/11/2024     Lab Results   Component Value Date    INR 1.0 02/14/2017    INR 1.0 03/02/2014     Lab Results   Component Value Date    WBC 5.47 07/11/2024    HGB 13.5 07/11/2024    HCT 43.4 07/11/2024    MCV 92 07/11/2024     07/11/2024          Assessment:      1. Hair loss    2. Chronic combined systolic and diastolic congestive heart failure    3. Heart failure with reduced ejection fraction (HFrEF, <= 40%) and combined systolic and diastolic dysfunction    4. Stage 3b chronic kidney disease    5. Nonischemic cardiomyopathy    6. PVC (premature ventricular contraction)    7. Sinus bradycardia          Plan:     CHF SYNOPSIS:    GDMT:  ACE/ARB/ARNI: Entresto 49/51 BID  Beta Blocker: Toprol XL 12.5 mg daily  MRA: Aldactone 50 mg daily  SGLT2: Jardiance 10 mg daily  Diuretic: Lasix 20 mg daily PRN for leg swelling.     Dash diet 2 gm sodium restriction  Profile BP at home  RTC in 4 months    Nicole May, FNP-C [facility]

## 2024-12-06 ENCOUNTER — LAB VISIT (OUTPATIENT)
Dept: LAB | Facility: HOSPITAL | Age: 79
End: 2024-12-06
Attending: INTERNAL MEDICINE
Payer: MEDICARE

## 2024-12-06 DIAGNOSIS — I50.42 CHRONIC COMBINED SYSTOLIC AND DIASTOLIC CONGESTIVE HEART FAILURE: ICD-10-CM

## 2024-12-06 LAB
ANION GAP SERPL CALC-SCNC: 9 MMOL/L (ref 8–16)
BNP SERPL-MCNC: 434 PG/ML (ref 0–99)
BUN SERPL-MCNC: 23 MG/DL (ref 8–23)
CALCIUM SERPL-MCNC: 9.4 MG/DL (ref 8.7–10.5)
CHLORIDE SERPL-SCNC: 108 MMOL/L (ref 95–110)
CO2 SERPL-SCNC: 25 MMOL/L (ref 23–29)
CREAT SERPL-MCNC: 1.4 MG/DL (ref 0.5–1.4)
EST. GFR  (NO RACE VARIABLE): 37.8 ML/MIN/1.73 M^2
GLUCOSE SERPL-MCNC: 97 MG/DL (ref 70–110)
MAGNESIUM SERPL-MCNC: 2 MG/DL (ref 1.6–2.6)
POTASSIUM SERPL-SCNC: 4 MMOL/L (ref 3.5–5.1)
SODIUM SERPL-SCNC: 142 MMOL/L (ref 136–145)

## 2024-12-06 PROCEDURE — 83735 ASSAY OF MAGNESIUM: CPT | Performed by: INTERNAL MEDICINE

## 2024-12-06 PROCEDURE — 36415 COLL VENOUS BLD VENIPUNCTURE: CPT | Mod: PO | Performed by: INTERNAL MEDICINE

## 2024-12-06 PROCEDURE — 83880 ASSAY OF NATRIURETIC PEPTIDE: CPT | Performed by: INTERNAL MEDICINE

## 2024-12-06 PROCEDURE — 80048 BASIC METABOLIC PNL TOTAL CA: CPT | Performed by: INTERNAL MEDICINE

## 2024-12-09 ENCOUNTER — TELEPHONE (OUTPATIENT)
Dept: CARDIOLOGY | Facility: CLINIC | Age: 79
End: 2024-12-09
Payer: MEDICARE

## 2024-12-09 NOTE — TELEPHONE ENCOUNTER
Spoke with patient to advise that per Dr. Dubois: labs revealed stable kidney function, potassium and magnesium, BNP - fluid level is improving - continue current medication regimen and monitor BP and weights and low salt diet. Patient voiced understanding.    ----- Message from Sammy Dubois MD sent at 12/6/2024  5:43 PM CST -----  Please contact the patient and let them know that their results of labs reveal stable kidney function, potassium and magnesium, BNP - fluid level is improving - continue current medication regimen and monitor BP and weights and low salt diet.

## 2024-12-20 ENCOUNTER — PATIENT OUTREACH (OUTPATIENT)
Dept: ADMINISTRATIVE | Facility: HOSPITAL | Age: 79
End: 2024-12-20
Payer: MEDICARE

## 2025-01-08 ENCOUNTER — OFFICE VISIT (OUTPATIENT)
Dept: DERMATOLOGY | Facility: CLINIC | Age: 80
End: 2025-01-08
Payer: MEDICARE

## 2025-01-08 ENCOUNTER — LAB VISIT (OUTPATIENT)
Dept: LAB | Facility: HOSPITAL | Age: 80
End: 2025-01-08
Attending: PHYSICIAN ASSISTANT
Payer: MEDICARE

## 2025-01-08 DIAGNOSIS — L65.9 HAIR LOSS: ICD-10-CM

## 2025-01-08 DIAGNOSIS — L65.9 HAIR LOSS: Primary | ICD-10-CM

## 2025-01-08 LAB
T4 FREE SERPL-MCNC: 1.23 NG/DL (ref 0.71–1.51)
THYROGLOB AB SERPL IA-ACNC: <4 IU/ML (ref 0–3.9)
THYROPEROXIDASE IGG SERPL-ACNC: <6 IU/ML
TSH SERPL DL<=0.005 MIU/L-ACNC: 4.71 UIU/ML (ref 0.4–4)

## 2025-01-08 PROCEDURE — 36415 COLL VENOUS BLD VENIPUNCTURE: CPT | Mod: PO | Performed by: PHYSICIAN ASSISTANT

## 2025-01-08 PROCEDURE — G2211 COMPLEX E/M VISIT ADD ON: HCPCS | Mod: S$GLB,,, | Performed by: PHYSICIAN ASSISTANT

## 2025-01-08 PROCEDURE — 99999 PR PBB SHADOW E&M-EST. PATIENT-LVL III: CPT | Mod: PBBFAC,,, | Performed by: PHYSICIAN ASSISTANT

## 2025-01-08 PROCEDURE — 86376 MICROSOMAL ANTIBODY EACH: CPT | Performed by: PHYSICIAN ASSISTANT

## 2025-01-08 PROCEDURE — 1159F MED LIST DOCD IN RCRD: CPT | Mod: CPTII,S$GLB,, | Performed by: PHYSICIAN ASSISTANT

## 2025-01-08 PROCEDURE — 84439 ASSAY OF FREE THYROXINE: CPT | Performed by: PHYSICIAN ASSISTANT

## 2025-01-08 PROCEDURE — 99204 OFFICE O/P NEW MOD 45 MIN: CPT | Mod: S$GLB,,, | Performed by: PHYSICIAN ASSISTANT

## 2025-01-08 PROCEDURE — 84443 ASSAY THYROID STIM HORMONE: CPT | Performed by: PHYSICIAN ASSISTANT

## 2025-01-08 PROCEDURE — 1101F PT FALLS ASSESS-DOCD LE1/YR: CPT | Mod: CPTII,S$GLB,, | Performed by: PHYSICIAN ASSISTANT

## 2025-01-08 PROCEDURE — 86800 THYROGLOBULIN ANTIBODY: CPT | Performed by: PHYSICIAN ASSISTANT

## 2025-01-08 PROCEDURE — 1126F AMNT PAIN NOTED NONE PRSNT: CPT | Mod: CPTII,S$GLB,, | Performed by: PHYSICIAN ASSISTANT

## 2025-01-08 PROCEDURE — 3288F FALL RISK ASSESSMENT DOCD: CPT | Mod: CPTII,S$GLB,, | Performed by: PHYSICIAN ASSISTANT

## 2025-01-08 PROCEDURE — 1160F RVW MEDS BY RX/DR IN RCRD: CPT | Mod: CPTII,S$GLB,, | Performed by: PHYSICIAN ASSISTANT

## 2025-01-08 RX ORDER — MOMETASONE FUROATE 1 MG/G
OINTMENT TOPICAL
Qty: 45 G | Refills: 0 | Status: SHIPPED | OUTPATIENT
Start: 2025-01-08

## 2025-01-08 NOTE — PROGRESS NOTES
Subjective:      Patient ID:  Megan Sams is a 79 y.o. female who presents for No chief complaint on file.    Hx of pattern alopecia, last seen in 2021 by Dr. Thompson. Here for new c/o hair loss today. Believes to started to worsen about 7 months ago.  Recalls some adjustments in cardiac meds (h/o HTN, CHF) and was concerned could be related. Denies new meds. +FHX hair loss in mother (around her 50's).  No tension styles.  +increased shedding w/shampoos, smooth thinning of frontal / vertex. Denies dandruff, itching, fine bumps of temples or forehead.  Notes loss of hair of axilla, pubic, and legs. No current tx. Denies known anemia, recent surgeries.       Prior treatments: biotin (PVC's and left arm pain), minoxidil, blue star oint     + hx of nathaniel curl x 5 years, use of chemical relaxers for several years.  Natural x 20 years.     TSH mildly elevated on 7/11/24 (5.788) and FT4 wnl  CBC no anemia on 7/11/24        Review of Systems   Constitutional:  Negative for fever and chills.   Gastrointestinal:  Negative for nausea and vomiting.   Skin:  Positive for activity-related sunscreen use. Negative for itching, rash, dry skin, sun sensitivity, daily sunscreen use, recent sunburn and dry lips.   Hematologic/Lymphatic: Does not bruise/bleed easily.       Objective:   Physical Exam   Constitutional: She appears well-developed and well-nourished. No distress.   Neurological: She is alert and oriented to person, place, and time. She is not disoriented.   Psychiatric: She has a normal mood and affect.   Skin:   Areas Examined (abnormalities noted in diagram):   Scalp / Hair Palpated and Inspected  Head / Face Inspection Performed  Neck Inspection Performed  Chest / Axilla Inspection Performed  RUE Inspected  LUE Inspection Performed                 Diagram Legend     Erythematous scaling macule/papule c/w actinic keratosis       Vascular papule c/w angioma      Pigmented verrucoid papule/plaque c/w seborrheic keratosis       Yellow umbilicated papule c/w sebaceous hyperplasia      Irregularly shaped tan macule c/w lentigo     1-2 mm smooth white papules consistent with Milia      Movable subcutaneous cyst with punctum c/w epidermal inclusion cyst      Subcutaneous movable cyst c/w pilar cyst      Firm pink to brown papule c/w dermatofibroma      Pedunculated fleshy papule(s) c/w skin tag(s)      Evenly pigmented macule c/w junctional nevus     Mildly variegated pigmented, slightly irregular-bordered macule c/w mildly atypical nevus      Flesh colored to evenly pigmented papule c/w intradermal nevus       Pink pearly papule/plaque c/w basal cell carcinoma      Erythematous hyperkeratotic cursted plaque c/w SCC      Surgical scar with no sign of skin cancer recurrence      Open and closed comedones      Inflammatory papules and pustules      Verrucoid papule consistent consistent with wart     Erythematous eczematous patches and plaques     Dystrophic onycholytic nail with subungual debris c/w onychomycosis     Umbilicated papule    Erythematous-base heme-crusted tan verrucoid plaque consistent with inflamed seborrheic keratosis     Erythematous Silvery Scaling Plaque c/w Psoriasis     See annotation      Assessment / Plan:        Hair loss  -     TSH; Future; Expected date: 01/08/2025  -     T4, FREE; Future; Expected date: 01/08/2025  -     THYROGLOBULIN AB SCREEN; Future; Expected date: 01/08/2025  -     Thyroid Peroxidase Antibody; Future; Expected date: 01/08/2025  -     mometasone (ELOCON) 0.1 % ointment; AAA bid prn as needed for hair loss. Steroid ointment.  Dispense: 45 g; Refill: 0  Ddx: pattern vs. Acute TE vs. AA vs. Less likely scarring vs. Other  Check above labs, encouraged otc 5% rogaine qd, will also add trial of mometasone oint. Advised patient of risk for increased shedding of hair in the first 6 weeks of using Rogaine.  Rogaine may have to be continued indefinitely in cases of pattern alopecia. It will take about 3-6  months to notice any improvement.            Follow up in about 3 months (around 4/8/2025) for call for results.

## 2025-01-08 NOTE — PATIENT INSTRUCTIONS
5% Rogaine (minoxidil) foam or cream.     -avoid facial areas as it can cause unwanted hair.  -Advised patient of risk for increased shedding of hair in the first 6 weeks of using Rogaine.  Rogaine may have to be continued indefinitely in cases of pattern alopecia. It will take about 3-6 months to notice any improvement.

## 2025-01-08 NOTE — PROGRESS NOTES
History of Present Illness: The patient presents with chief complaint of hair loss.  Location: scalp  Duration: 5 month   Signs/Symptoms: none     Prior treatments: none    Subjective:      Patient ID:  Megan Sams is a 79 y.o. female who presents for No chief complaint on file.    HPI    Review of Systems   Constitutional:  Negative for fever and chills.   Gastrointestinal:  Negative for nausea and vomiting.   Skin:  Positive for activity-related sunscreen use. Negative for itching, rash, dry skin, daily sunscreen use, recent sunburn and dry lips.   Hematologic/Lymphatic: Does not bruise/bleed easily.       Objective:   Physical Exam     Diagram Legend     Erythematous scaling macule/papule c/w actinic keratosis       Vascular papule c/w angioma      Pigmented verrucoid papule/plaque c/w seborrheic keratosis      Yellow umbilicated papule c/w sebaceous hyperplasia      Irregularly shaped tan macule c/w lentigo     1-2 mm smooth white papules consistent with Milia      Movable subcutaneous cyst with punctum c/w epidermal inclusion cyst      Subcutaneous movable cyst c/w pilar cyst      Firm pink to brown papule c/w dermatofibroma      Pedunculated fleshy papule(s) c/w skin tag(s)      Evenly pigmented macule c/w junctional nevus     Mildly variegated pigmented, slightly irregular-bordered macule c/w mildly atypical nevus      Flesh colored to evenly pigmented papule c/w intradermal nevus       Pink pearly papule/plaque c/w basal cell carcinoma      Erythematous hyperkeratotic cursted plaque c/w SCC      Surgical scar with no sign of skin cancer recurrence      Open and closed comedones      Inflammatory papules and pustules      Verrucoid papule consistent consistent with wart     Erythematous eczematous patches and plaques     Dystrophic onycholytic nail with subungual debris c/w onychomycosis     Umbilicated papule    Erythematous-base heme-crusted tan verrucoid plaque consistent with inflamed seborrheic  keratosis     Erythematous Silvery Scaling Plaque c/w Psoriasis     See annotation      Assessment / Plan:        There are no diagnoses linked to this encounter.         No follow-ups on file.

## 2025-01-13 ENCOUNTER — OFFICE VISIT (OUTPATIENT)
Dept: CARDIOLOGY | Facility: CLINIC | Age: 80
End: 2025-01-13
Payer: MEDICARE

## 2025-01-13 ENCOUNTER — LAB VISIT (OUTPATIENT)
Dept: LAB | Facility: HOSPITAL | Age: 80
End: 2025-01-13
Attending: INTERNAL MEDICINE
Payer: MEDICARE

## 2025-01-13 ENCOUNTER — TELEPHONE (OUTPATIENT)
Dept: DERMATOLOGY | Facility: CLINIC | Age: 80
End: 2025-01-13
Payer: MEDICARE

## 2025-01-13 VITALS
SYSTOLIC BLOOD PRESSURE: 122 MMHG | DIASTOLIC BLOOD PRESSURE: 84 MMHG | BODY MASS INDEX: 29.07 KG/M2 | HEART RATE: 55 BPM | OXYGEN SATURATION: 97 % | WEIGHT: 158.94 LBS

## 2025-01-13 DIAGNOSIS — N18.31 STAGE 3A CHRONIC KIDNEY DISEASE: ICD-10-CM

## 2025-01-13 DIAGNOSIS — E55.9 VITAMIN D DEFICIENCY: ICD-10-CM

## 2025-01-13 DIAGNOSIS — R00.2 PALPITATIONS: ICD-10-CM

## 2025-01-13 DIAGNOSIS — I49.3 PVC (PREMATURE VENTRICULAR CONTRACTION): ICD-10-CM

## 2025-01-13 DIAGNOSIS — N18.32 STAGE 3B CHRONIC KIDNEY DISEASE: ICD-10-CM

## 2025-01-13 DIAGNOSIS — Z01.810 PRE-OPERATIVE CARDIOVASCULAR EXAMINATION: ICD-10-CM

## 2025-01-13 DIAGNOSIS — I73.9 PERIPHERAL VASCULAR DISEASE, UNSPECIFIED: ICD-10-CM

## 2025-01-13 DIAGNOSIS — I10 ESSENTIAL HYPERTENSION: ICD-10-CM

## 2025-01-13 DIAGNOSIS — E66.811 OBESITY, CLASS I, BMI 30-34.9: ICD-10-CM

## 2025-01-13 DIAGNOSIS — I50.42 CHRONIC COMBINED SYSTOLIC AND DIASTOLIC CONGESTIVE HEART FAILURE: ICD-10-CM

## 2025-01-13 DIAGNOSIS — R00.1 SINUS BRADYCARDIA: Primary | ICD-10-CM

## 2025-01-13 DIAGNOSIS — R06.09 DOE (DYSPNEA ON EXERTION): ICD-10-CM

## 2025-01-13 DIAGNOSIS — I50.42 CHRONIC COMBINED SYSTOLIC AND DIASTOLIC HEART FAILURE: ICD-10-CM

## 2025-01-13 DIAGNOSIS — I50.40 HEART FAILURE WITH REDUCED EJECTION FRACTION (HFREF, <= 40%) AND COMBINED SYSTOLIC AND DIASTOLIC DYSFUNCTION: ICD-10-CM

## 2025-01-13 DIAGNOSIS — I42.8 NONISCHEMIC CARDIOMYOPATHY: ICD-10-CM

## 2025-01-13 PROCEDURE — 82652 VIT D 1 25-DIHYDROXY: CPT | Performed by: INTERNAL MEDICINE

## 2025-01-13 PROCEDURE — 99999 PR PBB SHADOW E&M-EST. PATIENT-LVL III: CPT | Mod: PBBFAC,,, | Performed by: INTERNAL MEDICINE

## 2025-01-13 PROCEDURE — 3288F FALL RISK ASSESSMENT DOCD: CPT | Mod: CPTII,S$GLB,, | Performed by: INTERNAL MEDICINE

## 2025-01-13 PROCEDURE — 1159F MED LIST DOCD IN RCRD: CPT | Mod: CPTII,S$GLB,, | Performed by: INTERNAL MEDICINE

## 2025-01-13 PROCEDURE — 36415 COLL VENOUS BLD VENIPUNCTURE: CPT | Mod: PO | Performed by: INTERNAL MEDICINE

## 2025-01-13 PROCEDURE — 99214 OFFICE O/P EST MOD 30 MIN: CPT | Mod: S$GLB,,, | Performed by: INTERNAL MEDICINE

## 2025-01-13 PROCEDURE — 1101F PT FALLS ASSESS-DOCD LE1/YR: CPT | Mod: CPTII,S$GLB,, | Performed by: INTERNAL MEDICINE

## 2025-01-13 PROCEDURE — G2211 COMPLEX E/M VISIT ADD ON: HCPCS | Mod: S$GLB,,, | Performed by: INTERNAL MEDICINE

## 2025-01-13 PROCEDURE — 3079F DIAST BP 80-89 MM HG: CPT | Mod: CPTII,S$GLB,, | Performed by: INTERNAL MEDICINE

## 2025-01-13 PROCEDURE — 1160F RVW MEDS BY RX/DR IN RCRD: CPT | Mod: CPTII,S$GLB,, | Performed by: INTERNAL MEDICINE

## 2025-01-13 PROCEDURE — 3074F SYST BP LT 130 MM HG: CPT | Mod: CPTII,S$GLB,, | Performed by: INTERNAL MEDICINE

## 2025-01-13 RX ORDER — METOPROLOL SUCCINATE 25 MG/1
12.5 TABLET, EXTENDED RELEASE ORAL DAILY
Qty: 30 TABLET | Refills: 1 | Status: SHIPPED | OUTPATIENT
Start: 2025-01-13

## 2025-01-13 RX ORDER — ERGOCALCIFEROL 1.25 MG/1
50000 CAPSULE ORAL
COMMUNITY

## 2025-01-13 RX ORDER — SPIRONOLACTONE 50 MG/1
50 TABLET, FILM COATED ORAL DAILY
Qty: 90 TABLET | Refills: 1 | Status: SHIPPED | OUTPATIENT
Start: 2025-01-13

## 2025-01-13 RX ORDER — AMIODARONE HYDROCHLORIDE 200 MG/1
TABLET ORAL
Qty: 90 TABLET | Refills: 3 | Status: SHIPPED | OUTPATIENT
Start: 2025-01-13

## 2025-01-13 RX ORDER — SACUBITRIL AND VALSARTAN 97; 103 MG/1; MG/1
1 TABLET, FILM COATED ORAL 2 TIMES DAILY
Qty: 180 TABLET | Refills: 1 | Status: SHIPPED | OUTPATIENT
Start: 2025-01-13

## 2025-01-13 NOTE — PROGRESS NOTES
Subjective:   Patient ID:  Megan Sams is a 79 y.o. female who presents for cardiac consult of No chief complaint on file.      Referral by: Self, Aaareferral  No address on file     Reason for consult: CHF      HPI  The patient came in today for cardiac consult of No chief complaint on file.      Megan Sams is a 79 y.o. female pt with NICM EF 40%, PVCs, HTN, obesity, CKD presents for CV follow up.       9/6/24  ECHO 7/2024 with LVEF 40%, normal RV, mild AS, mild to mod MR, PASP 48mmHG.   From Dr. Fuentes Haines -  DLCO is a little bit low.  However, we can safely continue amiodarone at this time.    Repeat PFTs/DLCO in 6 months..    BP stable. HR 44. BMI 29 - 163 lbs     BNP (pg/mL)   Date Value   12/06/2024 434 (H)   07/26/2024 756 (H)   04/12/2024 507 (H)   01/26/2024 613 (H)   12/01/2023 612 (H)      She will increase Entresto to high dose, and take lasix PRN      1/13/25  Sp vital monitor 10/2024 - pauses of 3.3 sec, decreased BB to 12.5 mg.     heart monitor reveal slow heart rates with pauses up to 3.3 seconds - I am decreasing  the metoprolol to 12.5 mg daily (need to take half of a 25mg tablet) - new script sent to pharmacy. If heart rate remains below 50 and feeling sluggish/tired need to stop taking metoprolol. Follow up with EP as scheduled.     She has been checking BP at home and overall stable. She had a fall in Nov but tripped, no LOC.       Results for orders placed during the hospital encounter of 07/19/24    Echo    Interpretation Summary    Left Ventricle: The left ventricle is normal in size. Normal wall thickness. There is concentric hypertrophy. Mild global hypokinesis present. There is reduced systolic function. Ejection fraction by visual approximation is 40%. There is indeterminate diastolic function.    Right Ventricle: Normal right ventricular cavity size. Wall thickness is normal. Systolic function is normal.    Aortic Valve: The aortic valve is a trileaflet valve. There is mild stenosis.  Aortic valve area by VTI is 1.92 cm². Aortic valve peak velocity is 1.17 m/s. Mean gradient is 3 mmHg. The dimensionless index is 0.59.    Mitral Valve: There is moderate bileaflet sclerosis. There is normal leaflet mobility. There is prolapse of the anterior mitral leaflet. There is mild to moderate regurgitation.    Pulmonary Artery: The estimated pulmonary artery systolic pressure is 48 mmHg.    IVC/SVC: Normal venous pressure at 3 mmHg.      Results for orders placed during the hospital encounter of 04/19/24    Echo    Interpretation Summary    Left Ventricle: The left ventricle is mildly dilated. Moderately increased wall thickness. There is moderate concentric hypertrophy. Moderate global hypokinesis present. There is moderately reduced systolic function with a visually estimated ejection fraction of 35 - 40%. Grade I diastolic dysfunction.    Right Ventricle: Normal right ventricular cavity size. Wall thickness is normal. Right ventricle wall motion  is normal. Systolic function is normal.    Mitral Valve: There is mild to moderate regurgitation.    Tricuspid Valve: There is mild regurgitation.    Pulmonary Artery: The estimated pulmonary artery systolic pressure is 35 mmHg.    IVC/SVC: Normal venous pressure at 3 mmHg.      HOLTER  Interpretation Summary  Show Result Comparison     The predominant rhythm is sinus.    THERE IS A SINUS PAUSE OF 4.3 SECONDS.    THERE ARE MULTIPLE EPISODE OFS EVERE SINUS BRADYCARDIA AND EPISODES OF WIDE COMPLEX JUNCTIONAL ESCAPE BEATS.      Results for orders placed during the hospital encounter of 12/28/23    Nuclear Stress - Cardiology Interpreted    Interpretation Summary    Abnormal myocardial perfusion scan.    There are two significant perfusion abnormalities.    Perfusion Abnormality #1 - There is a moderate to severe intensity, moderate sized, mostly fixed perfusion abnormality with some reversibilty in the apical, anteroseptal and septal apical wall(s).    Perfusion  Abnormality #2 - There is a moderate sized, moderate intensity, mostly fixed perfusion abnormality with some reversibilty in the basal to mid inferolateral wall(s).    There are no other significant perfusion abnormalities.    The gated perfusion images showed an ejection fraction of 34% at rest. The gated perfusion images showed an ejection fraction of 23% post stress.    There is moderate global hypokinesis at rest and severe global hypokinesis at stress .    The ECG portion of the study is negative for ischemia.    The patient reported no chest pain during the stress test.        Results for orders placed during the hospital encounter of 11/03/23    Echo    Interpretation Summary    Left Ventricle: The left ventricle is mildly dilated. Normal wall thickness. Moderate global hypokinesis present. There is moderately reduced systolic function with a visually estimated ejection fraction of 30 - 35%. There is normal diastolic function.    Right Ventricle: Normal right ventricular cavity size. Wall thickness is normal. Right ventricle wall motion  is normal. Systolic function is normal.    Mitral Valve: There is moderate regurgitation with a centrally directed jet.    Tricuspid Valve: There is moderate regurgitation with a centrally directed jet.    Pulmonary Artery: The estimated pulmonary artery systolic pressure is 49 mmHg.    IVC/SVC: Normal venous pressure at 3 mmHg.      Echo: 2020: EF 40%  Cardiac cath: 2009: normal coronaries       Cardiac Monitor - 3-15 Day Adult (Cupid Only)    Result Date: 11/11/2024  The patient was monitored for a total of 6d 20h, underlying rhythm is   Sinus.  The minimum heart rate was 26 bpm; the maximum 76 bpm; the average 47 bpm.  0 % of Atrial fibrillation/Atrial flutter with longest episode of 0 ms.  The total burden of AV Block present was 0 % [Complete Heart Block: 0 %;   Advanced (High Grade):  0 %; 2nd Degree, Mobitz II: 0 %; 2nd Degree, Mobitz I: 0 %].  There were 651 pauses,  the longest pause was 3366 ms at Day 7 / 03:23:51   pm.  Total count of Ventricular Tachycardia (VT): 1 episode(s). Longest VT: 3   beats on Day 4 / 08:39:08  pm. Fastest VT: 116 bpm on Day 4 / 08:39:08 pm.  0 supraventricular episodes were found. Longest SVT Episode 0 s, Fastest   SVT -- bpm  There were a total of 404 PVCs with 3 morphologies and 2 couplets. Overall   PVC [place] at 0.09 %  There were a total of 0 Other Beats. There were 0 total number of paced   beats.  There were a total of 2806 PSVCs with 11 couplets. Overall PSVC [place] at  0.6 %  There is a total of 1 patient events.        Holter monitor - 48 hour    Result Date: 4/10/2024    The predominant rhythm is sinus.    THERE IS A SINUS PAUSE OF 4.3 SECONDS.    THERE ARE MULTIPLE EPISODE OFS EVERE SINUS BRADYCARDIA AND EPISODES OF   WIDE COMPLEX JUNCTIONAL ESCAPE BEATS.              Past Medical History:   Diagnosis Date    Arthritis     Blood transfusion     CHF (congestive heart failure)     Chronic kidney disease     Depression     Hypertension        Past Surgical History:   Procedure Laterality Date    HYSTERECTOMY  1978    OOPHORECTOMY  1978       Social History     Tobacco Use    Smoking status: Never    Smokeless tobacco: Never   Substance Use Topics    Alcohol use: No     Alcohol/week: 0.0 standard drinks of alcohol    Drug use: No       Family History   Problem Relation Name Age of Onset    Early death Mother      Heart disease Mother      Hypertension Mother      Kidney disease Father      Heart disease Brother         Patient's Medications   New Prescriptions    No medications on file   Previous Medications    ACETAMINOPHEN (TYLENOL) 500 MG TABLET    Take 500 mg by mouth every 6 (six) hours as needed.    ALLOPURINOL (ZYLOPRIM) 100 MG TABLET    TAKE 2 TABLETS BY MOUTH EVERY DAY    ASPIRIN (ECOTRIN) 81 MG EC TABLET    Take 81 mg by mouth once daily.    CICLOPIROX (PENLAC) 8 % SOLN    Apply to nails once daily    ERGOCALCIFEROL (VITAMIN D2)  50,000 UNIT CAP    Take 50,000 Units by mouth every 7 days.    FUROSEMIDE (LASIX) 20 MG TABLET    Take 1 tablet (20 mg total) by mouth daily as needed (leg swelling.). Do not take if BP is below 110/70    MOMETASONE (ELOCON) 0.1 % OINTMENT    AAA bid prn as needed for hair loss. Steroid ointment.    MULTIVIT WITH CALCIUM,IRON,MIN (MULTIPLE VITAMIN, WOMENS ORAL)    Take by mouth once daily.   Modified Medications    Modified Medication Previous Medication    AMIODARONE (PACERONE) 200 MG TAB amiodarone (PACERONE) 200 MG Tab       Take one tablet in the am    Take one tablet in the am    EMPAGLIFLOZIN (JARDIANCE) 10 MG TABLET empagliflozin (JARDIANCE) 10 mg tablet       Take 1 tablet (10 mg total) by mouth once daily.    Take 1 tablet (10 mg total) by mouth once daily.    METOPROLOL SUCCINATE (TOPROL-XL) 25 MG 24 HR TABLET metoprolol succinate (TOPROL-XL) 25 MG 24 hr tablet       Take 0.5 tablets (12.5 mg total) by mouth once daily.    Take 0.5 tablets (12.5 mg total) by mouth once daily.    SACUBITRIL-VALSARTAN (ENTRESTO)  MG PER TABLET sacubitriL-valsartan (ENTRESTO)  mg per tablet       Take 1 tablet by mouth 2 (two) times daily.    Take 1 tablet by mouth 2 (two) times daily.    SPIRONOLACTONE (ALDACTONE) 50 MG TABLET spironolactone (ALDACTONE) 50 MG tablet       Take 1 tablet (50 mg total) by mouth once daily. Do not take if BP is below 110/70    Take 1 tablet (50 mg total) by mouth once daily. Do not take if BP is below 110/70   Discontinued Medications    No medications on file       Review of Systems   Constitutional:  Positive for malaise/fatigue.   HENT: Negative.     Eyes: Negative.    Respiratory:  Positive for shortness of breath.    Cardiovascular:  Positive for chest pain and palpitations.   Gastrointestinal: Negative.    Genitourinary: Negative.    Musculoskeletal: Negative.    Skin: Negative.    Neurological: Negative.    Endo/Heme/Allergies: Negative.    Psychiatric/Behavioral: Negative.      All 12 systems otherwise negative.      Wt Readings from Last 3 Encounters:   01/13/25 72.1 kg (158 lb 15.2 oz)   11/21/24 73.1 kg (161 lb 4.3 oz)   11/12/24 73 kg (160 lb 15 oz)     Temp Readings from Last 3 Encounters:   07/15/24 97.4 °F (36.3 °C) (Tympanic)   05/15/24 98.3 °F (36.8 °C) (Oral)   05/09/24 96.7 °F (35.9 °C) (Tympanic)     BP Readings from Last 3 Encounters:   01/13/25 122/84   11/21/24 (!) 160/82   10/22/24 (!) 142/80     Pulse Readings from Last 3 Encounters:   01/13/25 (!) 55   11/21/24 (!) 56   10/22/24 (!) 46       /84 (BP Location: Right arm, Patient Position: Sitting)   Pulse (!) 55   Wt 72.1 kg (158 lb 15.2 oz)   SpO2 97%   BMI 29.07 kg/m²     Objective:   Physical Exam  Vitals and nursing note reviewed.   Constitutional:       General: She is not in acute distress.     Appearance: She is well-developed. She is obese. She is not diaphoretic.   HENT:      Head: Normocephalic and atraumatic.      Nose: Nose normal.   Eyes:      General: No scleral icterus.     Conjunctiva/sclera: Conjunctivae normal.   Neck:      Thyroid: No thyromegaly.      Vascular: No JVD.   Cardiovascular:      Rate and Rhythm: Normal rate and regular rhythm.      Heart sounds: S1 normal and S2 normal. Murmur heard.      No friction rub. No gallop. No S3 or S4 sounds.   Pulmonary:      Effort: Pulmonary effort is normal. No respiratory distress.      Breath sounds: Normal breath sounds. No stridor. No wheezing or rales.   Chest:      Chest wall: No tenderness.   Abdominal:      General: Bowel sounds are normal. There is no distension.      Palpations: Abdomen is soft. There is no mass.      Tenderness: There is no abdominal tenderness. There is no rebound.   Genitourinary:     Comments: Deferred  Musculoskeletal:         General: No tenderness or deformity. Normal range of motion.      Cervical back: Normal range of motion and neck supple.   Lymphadenopathy:      Cervical: No cervical adenopathy.   Skin:      General: Skin is warm and dry.      Coloration: Skin is not pale.      Findings: No erythema or rash.   Neurological:      Mental Status: She is alert and oriented to person, place, and time.      Motor: No abnormal muscle tone.      Coordination: Coordination normal.   Psychiatric:         Behavior: Behavior normal.         Thought Content: Thought content normal.         Judgment: Judgment normal.         Lab Results   Component Value Date     12/06/2024    K 4.0 12/06/2024     12/06/2024    CO2 25 12/06/2024    BUN 23 12/06/2024    CREATININE 1.4 12/06/2024    GLU 97 12/06/2024    HGBA1C 5.4 07/26/2023    MG 2.0 12/06/2024    AST 26 07/11/2024    ALT 17 07/11/2024    ALBUMIN 3.1 (L) 07/11/2024    PROT 6.8 07/11/2024    BILITOT 0.8 07/11/2024    WBC 5.47 07/11/2024    HGB 13.5 07/11/2024    HCT 43.4 07/11/2024    MCV 92 07/11/2024     07/11/2024    INR 1.0 02/14/2017    TSH 4.715 (H) 01/08/2025    CHOL 182 07/11/2024    HDL 55 07/11/2024    LDLCALC 105.8 07/11/2024    TRIG 106 07/11/2024     (H) 12/06/2024       BNP (pg/mL)   Date Value   12/06/2024 434 (H)   07/26/2024 756 (H)   04/12/2024 507 (H)   01/26/2024 613 (H)   12/01/2023 612 (H)     INR (no units)   Date Value   02/14/2017 1.0   03/02/2014 1.0        Assessment:      1. Sinus bradycardia    2. Chronic combined systolic and diastolic congestive heart failure    3. Stage 3b chronic kidney disease    4. Nonischemic cardiomyopathy    5. Heart failure with reduced ejection fraction (HFrEF, <= 40%) and combined systolic and diastolic dysfunction    6. PVC (premature ventricular contraction)    7. Palpitations    8. Essential hypertension    9. Pre-operative cardiovascular examination    10. Peripheral vascular disease, unspecified    11. PAGE (dyspnea on exertion)    12. Obesity, Class I, BMI 30-34.9    13. Chronic combined systolic and diastolic heart failure    14. Stage 3a chronic kidney disease    15. Vitamin D deficiency           Plan:     NICM  EF 35%; Chest pain, PAGE;  BNP - 3942 --> 579 --> 507 --> 756 --> 434   - cont Toprol and ARB - change to Entresto BID - increases dose   - nuc stress 12/2023 with mostly fixed defect anteroseptal/apical wall with EF 34%  - needs low salt diet   - ECHO 7/2024 with LVEF 40%, normal RV, mild AS, mild to mod MR, PASP 48mmHG.   -  increased Entresto to high dose, and take lasix PRN    2. HTN, PVCs - with bradycardia   - titrate meds  - frequent PVCs - referred to EP - started on Amio with Dr. Herminia Haines  - Holter 4/2024 with sinus pause 4.3 sec  -Pt saw Dr. Herminia Haines - reassess her candidacy for ICD.  I will also reduce the dose of amiodarone from 300 to 200 a day.  - rec compression stockings for edema   -vital monitor 10/2024 - pauses of 3.3 sec, decreased BB to 12.5 mg.   - may need JESSIE eval     3. Obesity - losing weight - 159 lbs -->  BMI 29 - 161 lbs -->BMI 30 - 165 lbs  -->163 lbs  --> 158 lbs   - cont weight loss    4. CKD3  - cont to monitor  - GFR 37  - cont vitamin D- order lab    Visit today included increased complexity associated with the care of the episodic problem CHF addressed and managing the longitudinal care of the patient due to the serious and/or complex managed problem(s) .    Thank you for allowing me to participate in this patient's care. Please do not hesitate to contact me with any questions or concerns. Consult note has been forwarded to the referral physician.

## 2025-01-13 NOTE — TELEPHONE ENCOUNTER
Outreach made to patient regarding lab results. No answer, left voicemail to return call.    ----- Message from Nurse Spencer sent at 1/13/2025  2:09 PM CST -----    ----- Message -----  From: Skyla Norton PA-C  Sent: 1/9/2025   2:21 PM CST  To: [facility] Staff    Please advise pt that her thyroid labs are overall stable/ ok. Her TSH is still elevated, but her function is in the normal range. The thyroid antibodies were all negative (this does not indicate any auto-immune thyroid issue at this time).  Based on these labs, it does not look like the thyroid is contributing to hair loss.     Please encourage trial of otc 5% rogaine (minoxidil) as we discussed and the steroid cream as we discussed at her visit. Keep f/u to recheck in 2-3 months.

## 2025-01-16 ENCOUNTER — TELEPHONE (OUTPATIENT)
Dept: DERMATOLOGY | Facility: CLINIC | Age: 80
End: 2025-01-16
Payer: MEDICARE

## 2025-01-16 NOTE — TELEPHONE ENCOUNTER
Attempted to return patients call. No answer. Message left to return call.   ----- Message from Med Assistant Sosa sent at 1/16/2025  2:11 PM CST -----  Contact: Megan    ----- Message -----  From: Sheila Sears MA  Sent: 1/15/2025   4:22 PM CST  To: Sheila Sears MA      ----- Message -----  From: Ruth Villa  Sent: 1/15/2025   3:43 PM CST  To: [facility] Staff    .Patient is calling to speak with the nurse regarding results . Reports waiting on a call about results . Please give patient a call back at   [phone]

## 2025-01-17 LAB — 1,25(OH)2D3 SERPL-MCNC: 41 PG/ML (ref 20–79)

## 2025-01-20 ENCOUNTER — TELEPHONE (OUTPATIENT)
Dept: INTERNAL MEDICINE | Facility: CLINIC | Age: 80
End: 2025-01-20
Payer: MEDICARE

## 2025-01-24 ENCOUNTER — TELEPHONE (OUTPATIENT)
Dept: CARDIOLOGY | Facility: CLINIC | Age: 80
End: 2025-01-24
Payer: MEDICARE

## 2025-01-24 NOTE — TELEPHONE ENCOUNTER
Please contact the patient and let them know that their results of vitamin D levels are normal - continue taking supplements

## 2025-02-17 ENCOUNTER — RESULTS FOLLOW-UP (OUTPATIENT)
Dept: INTERNAL MEDICINE | Facility: CLINIC | Age: 80
End: 2025-02-17

## 2025-02-17 ENCOUNTER — HOSPITAL ENCOUNTER (OUTPATIENT)
Dept: RADIOLOGY | Facility: HOSPITAL | Age: 80
Discharge: HOME | End: 2025-02-17
Attending: PHYSICIAN ASSISTANT
Payer: MEDICARE

## 2025-02-17 ENCOUNTER — OFFICE VISIT (OUTPATIENT)
Dept: INTERNAL MEDICINE | Facility: CLINIC | Age: 80
End: 2025-02-17
Payer: MEDICARE

## 2025-02-17 VITALS
HEIGHT: 62 IN | HEART RATE: 60 BPM | TEMPERATURE: 97 F | DIASTOLIC BLOOD PRESSURE: 80 MMHG | BODY MASS INDEX: 29.38 KG/M2 | OXYGEN SATURATION: 98 % | SYSTOLIC BLOOD PRESSURE: 124 MMHG | WEIGHT: 159.63 LBS

## 2025-02-17 DIAGNOSIS — N64.4 BREAST PAIN, RIGHT: ICD-10-CM

## 2025-02-17 DIAGNOSIS — M1A.30X0 CHRONIC GOUT DUE TO RENAL IMPAIRMENT WITHOUT TOPHUS, UNSPECIFIED SITE: ICD-10-CM

## 2025-02-17 DIAGNOSIS — M54.6 ACUTE RIGHT-SIDED THORACIC BACK PAIN: Primary | ICD-10-CM

## 2025-02-17 DIAGNOSIS — M54.6 ACUTE RIGHT-SIDED THORACIC BACK PAIN: ICD-10-CM

## 2025-02-17 DIAGNOSIS — I10 ESSENTIAL HYPERTENSION: Chronic | ICD-10-CM

## 2025-02-17 DIAGNOSIS — Z12.31 ENCOUNTER FOR SCREENING MAMMOGRAM FOR MALIGNANT NEOPLASM OF BREAST: ICD-10-CM

## 2025-02-17 DIAGNOSIS — I50.42 CHRONIC COMBINED SYSTOLIC AND DIASTOLIC CONGESTIVE HEART FAILURE: ICD-10-CM

## 2025-02-17 DIAGNOSIS — R63.4 WEIGHT LOSS, NON-INTENTIONAL: ICD-10-CM

## 2025-02-17 DIAGNOSIS — N18.32 STAGE 3B CHRONIC KIDNEY DISEASE: ICD-10-CM

## 2025-02-17 PROCEDURE — 71046 X-RAY EXAM CHEST 2 VIEWS: CPT | Mod: TC

## 2025-02-17 PROCEDURE — 71100 X-RAY EXAM RIBS UNI 2 VIEWS: CPT | Mod: TC,RT

## 2025-02-17 PROCEDURE — 71046 X-RAY EXAM CHEST 2 VIEWS: CPT | Mod: 26,,, | Performed by: STUDENT IN AN ORGANIZED HEALTH CARE EDUCATION/TRAINING PROGRAM

## 2025-02-17 RX ORDER — DICLOFENAC SODIUM 10 MG/G
2 GEL TOPICAL 2 TIMES DAILY PRN
Qty: 100 G | Refills: 1 | Status: SHIPPED | OUTPATIENT
Start: 2025-02-17 | End: 2025-02-27

## 2025-02-17 NOTE — PROGRESS NOTES
Subjective:      Patient ID: Megan Sams is a 80 y.o. female.    Chief Complaint: Follow-up    HPI  History of Present Illness    CHIEF COMPLAINT:  Ms. Sams presents today with right-sided back pain.    HISTORY OF PRESENT ILLNESS:  She reports right-sided back pain that started two weeks ago after a fall from tripping. The pain extends upward along the right side of her back and sometimes radiates through to her breast.    CARDIOVASCULAR:  She reports her heart rate occasionally drops to 49 BPM, which she notices slightly. She is currently taking half a dose of metoprolol daily.    WEIGHT MANAGEMENT:  She reports weight loss from 180 to 150 lbs, which she attributes to dietary changes.    GASTROINTESTINAL:  She takes Miralax for bowel management.    Medications were reviewed      Mammogram due in April  Right thoracic back pain  Lost 20lbs in the past 2 years.     Wt Readings from Last 3 Encounters:   02/17/25 1314 72.4 kg (159 lb 9.8 oz)   01/13/25 1500 72.1 kg (158 lb 15.2 oz)   11/21/24 1320 73.1 kg (161 lb 4.3 oz)      Problem List[1]    Current Medications[2]    Review of Systems   Constitutional:  Positive for unexpected weight change. Negative for activity change, appetite change, chills, diaphoresis, fatigue and fever.   HENT: Negative.  Negative for congestion, hearing loss, postnasal drip, rhinorrhea, sore throat, trouble swallowing and voice change.    Eyes: Negative.  Negative for visual disturbance.   Respiratory: Negative.  Negative for cough, choking, chest tightness and shortness of breath.    Cardiovascular:  Negative for chest pain, palpitations and leg swelling.   Gastrointestinal:  Negative for abdominal distention, abdominal pain, blood in stool, constipation, diarrhea, nausea and vomiting.   Endocrine: Negative for cold intolerance, heat intolerance, polydipsia and polyuria.   Genitourinary: Negative.  Negative for difficulty urinating and frequency.   Musculoskeletal:  Negative for  "arthralgias, back pain, gait problem, joint swelling and myalgias.   Skin:  Negative for color change, pallor, rash and wound.   Neurological:  Negative for dizziness, tremors, weakness, light-headedness, numbness and headaches.   Hematological:  Negative for adenopathy.   Psychiatric/Behavioral:  Negative for behavioral problems, confusion, self-injury, sleep disturbance and suicidal ideas. The patient is not nervous/anxious.      Objective:   /80 (BP Location: Left arm, Patient Position: Sitting)   Pulse 60   Temp 97 °F (36.1 °C) (Tympanic)   Ht 5' 2" (1.575 m)   Wt 72.4 kg (159 lb 9.8 oz)   SpO2 98%   BMI 29.19 kg/m²     Physical Exam  Vitals reviewed.   Constitutional:       General: She is not in acute distress.     Appearance: Normal appearance. She is well-developed. She is not ill-appearing, toxic-appearing or diaphoretic.   HENT:      Head: Normocephalic and atraumatic.      Right Ear: External ear normal.      Left Ear: External ear normal.      Nose: Nose normal.   Eyes:      Conjunctiva/sclera: Conjunctivae normal.      Pupils: Pupils are equal, round, and reactive to light.   Cardiovascular:      Rate and Rhythm: Normal rate and regular rhythm.      Heart sounds: Normal heart sounds. No murmur heard.     No friction rub. No gallop.   Pulmonary:      Effort: Pulmonary effort is normal. No respiratory distress.      Breath sounds: Normal breath sounds. No wheezing or rales.   Chest:      Chest wall: Tenderness present.       Abdominal:      General: There is no distension.      Palpations: Abdomen is soft.      Tenderness: There is no abdominal tenderness.   Musculoskeletal:         General: Normal range of motion.      Cervical back: Normal range of motion and neck supple.   Lymphadenopathy:      Cervical: No cervical adenopathy.   Skin:     General: Skin is warm and dry.      Capillary Refill: Capillary refill takes less than 2 seconds.      Findings: No rash.   Neurological:      Mental " Status: She is alert and oriented to person, place, and time.      Motor: No weakness.      Coordination: Coordination normal.      Gait: Gait normal.   Psychiatric:         Mood and Affect: Mood normal.         Behavior: Behavior normal.         Thought Content: Thought content normal.         Judgment: Judgment normal.       No visits with results within 1 Month(s) from this visit.   Latest known visit with results is:   Lab Visit on 01/13/2025   Component Date Value Ref Range Status    Vit D, 1,25-Dihydroxy 01/13/2025 41  20 - 79 pg/mL Final    Comment: Vitamin D 1, 25 dihydroxy levels should be primarily used to  assess Vitamin D status in patients with renal disease and   hypercalcemia. Vitamin D 1,25-dihydroxy levels are generally  less than 5 pg/mL in end stage renal disease patients. The  preferred initial test for assessing Vitamin D status in the  general population is Vitamin D 25-hydroxy (VITD).    Test performed at [facility],  [address] , [place]  [address]     [phone]  Kandy Dey MD, PhD - Medical Director           Assessment:     1. Acute right-sided thoracic back pain    2. Encounter for screening mammogram for malignant neoplasm of breast    3. Essential hypertension    4. Chronic combined systolic and diastolic congestive heart failure    5. Stage 3b chronic kidney disease    6. Chronic gout due to renal impairment without tophus, unspecified site    7. Weight loss, non-intentional    8. Breast pain, right      Plan:   Considered thoracic spine x-ray to evaluate for possible arthritis affecting nerves that wrap around chest  Ordered chest XR to rule out lung pathology as cause of back/chest pain  Evaluated recent thyroid labs, which were within normal limits  Considered early diagnostic mammogram and ultrasound due to right breast pain and tenderness    Acute right-sided thoracic back pain  -     X-Ray Chest PA And Lateral; Future; Expected date: 02/17/2025  -      X-Ray Ribs 2 View Right; Future; Expected date: 02/17/2025  -     diclofenac sodium (VOLTAREN) 1 % Gel; Apply 2 g topically 2 (two) times daily as needed (back pain).  Dispense: 100 g; Refill: 1  -alternate ice and heat    Encounter for screening mammogram for malignant neoplasm of breast  -     Cancel: Mammo Digital Screening Bilat w/ [facility]ilson (XPD); Future; Expected date: 04/17/2025    Essential hypertension  -     Lipid Panel; Future; Expected date: 08/17/2025  -     TSH; Future; Expected date: 08/17/2025    Chronic combined systolic and diastolic congestive heart failure  -     Lipid Panel; Future; Expected date: 08/17/2025    Stage 3b chronic kidney disease  -     CBC Auto Differential; Future; Expected date: 08/17/2025  -     Comprehensive Metabolic Panel; Future; Expected date: 08/17/2025  - Explained that age-related changes to organs can affect kidney function over time.  - Reviewed labs and noted kidney problems, with creatinine levels being normal.  -continue to monitor  -avoid nsaids    Chronic gout due to renal impairment without tophus, unspecified site  -     Uric Acid; Future; Expected date: 08/17/2025    Weight loss, non-intentional  -will continue to monitor    Breast pain, right  -     Cancel: Mammo Digital Diagnostic Bilat with [facility]ilson (XPD); Future; Expected date: 02/17/2025  -     US Breast Right Limited; Future; Expected date: 02/17/2025  -     Mammo Digital Diagnostic Right with Edenilson (XPD); Future; Expected date: 02/17/2025  -     US Breast Right Limited; Future; Expected date: 02/17/2025  - Recommend alternating between heat and ice therapy for back pain relief.  - Prescribed topical anti-inflammatory gel to be applied to painful area of back.  - Evaluated the patient's back, noting a spasm without any rash or signs of shingles.  - Suspected the pain might be due to arthritis affecting the nerves that wrap around the chest.  - Ordered x-rays of the thoracic spine and chest to rule out lung  issues.  - Recommend topical gel or lidocaine patch for the back pain.  - Ordered chest XR and rib x-ray to be done today.  - Noted patient's report of pain radiating to the chest and breast area.    -labs in 6 months      GOUT:  - Continued allopurinol for gout prevention.  - Inquired about any gout problems, patient reported no issues.    WEIGHT MANAGEMENT:  - Noted patient's report of significant weight loss from 180 to around 150 lbs.  - Reviewed weight history, noting gradual weight loss over 2 years, with a significant drop in 2004.  - Assessed weight loss as not concerning due to its gradual nature over 2 years, but planned to continue monitoring.    BREAST PAIN:  - Examined the patient's breast area, noting tenderness.  - Considered the possibility of breast cysts causing tenderness.  - Ordered a diagnostic mammogram and ultrasound for right breast pain, to be scheduled earlier than the routine mammogram due in April.  - Informed that cysts in breasts can cause tenderness and may come and go.  - Diagnostic mammogram and ultrasound of right breast ordered (pending insurance approval).    CONSTIPATION:  - Continued Miralax as needed for constipation prevention.    LABS:  - Scheduled all labs for the next 6-month follow-up.    OTHER INSTRUCTIONS:  - Advised that hair regrowth treatments like Rogaine typically take about 1 year to show results.    FOLLOW UP:  - Follow up in 6 months for comprehensive lab work.  - Contact the office if breast pain worsens or new symptoms develop before scheduled mammogram.       This note was generated with the assistance of ambient listening technology. Verbal consent was obtained by the patient and accompanying visitor(s) for the recording of patient appointment to facilitate this note. I attest to having reviewed and edited the generated note for accuracy, though some syntax or spelling errors may persist. Please contact the author of this note for any clarification.    Follow  up in about 6 months (around 8/17/2025), or if symptoms worsen or fail to improve.           [1]   Patient Active Problem List  Diagnosis    Arthritis    CHF (congestive heart failure)    Essential hypertension    Stage 3b chronic kidney disease    Nonischemic cardiomyopathy    Intertrigo    PVC (premature ventricular contraction)    Cortical age-related cataract of both eyes    Chronic gout without tophus    Pre-operative cardiovascular examination    Heart failure with reduced ejection fraction (HFrEF, <= 40%) and combined systolic and diastolic dysfunction    Sinus bradycardia   [2]   Current Outpatient Medications:     acetaminophen (TYLENOL) 500 MG tablet, Take 500 mg by mouth every 6 (six) hours as needed., Disp: , Rfl:     allopurinoL (ZYLOPRIM) 100 MG tablet, TAKE 2 TABLETS BY MOUTH EVERY DAY, Disp: 180 tablet, Rfl: 4    amiodarone (PACERONE) 200 MG Tab, Take one tablet in the am, Disp: 90 tablet, Rfl: 3    aspirin (ECOTRIN) 81 MG EC tablet, Take 81 mg by mouth once daily., Disp: , Rfl:     ciclopirox (PENLAC) 8 % Soln, Apply to nails once daily, Disp: 6.6 mL, Rfl: 6    empagliflozin (JARDIANCE) 10 mg tablet, Take 1 tablet (10 mg total) by mouth once daily., Disp: 90 tablet, Rfl: 3    ergocalciferol (VITAMIN D2) 50,000 unit Cap, Take 50,000 Units by mouth every 7 days., Disp: , Rfl:     furosemide (LASIX) 20 MG tablet, Take 1 tablet (20 mg total) by mouth daily as needed (leg swelling.). Do not take if BP is below 110/70, Disp: 90 tablet, Rfl: 1    metoprolol succinate (TOPROL-XL) 25 MG 24 hr tablet, Take 0.5 tablets (12.5 mg total) by mouth once daily., Disp: 30 tablet, Rfl: 1    mometasone (ELOCON) 0.1 % ointment, AAA bid prn as needed for hair loss. Steroid ointment., Disp: 45 g, Rfl: 0    MULTIVIT WITH CALCIUM,IRON,MIN (MULTIPLE VITAMIN, WOMENS ORAL), Take by mouth once daily., Disp: , Rfl:     sacubitriL-valsartan (ENTRESTO)  mg per tablet, Take 1 tablet by mouth 2 (two) times daily., Disp: 180  tablet, Rfl: 1    spironolactone (ALDACTONE) 50 MG tablet, Take 1 tablet (50 mg total) by mouth once daily. Do not take if BP is below 110/70, Disp: 90 tablet, Rfl: 1    diclofenac sodium (VOLTAREN) 1 % Gel, Apply 2 g topically 2 (two) times daily as needed (back pain)., Disp: 100 g, Rfl: 1

## 2025-02-21 ENCOUNTER — CLINICAL SUPPORT (OUTPATIENT)
Dept: PULMONOLOGY | Facility: CLINIC | Age: 80
End: 2025-02-21
Payer: MEDICARE

## 2025-02-21 DIAGNOSIS — I42.8 NONISCHEMIC CARDIOMYOPATHY: ICD-10-CM

## 2025-02-21 LAB
BRPFT: NORMAL
DLCO ADJ PRE: 10.14 ML/(MIN*MMHG)
DLCO SINGLE BREATH LLN: 12.44
DLCO SINGLE BREATH PRE REF: 55.8 %
DLCO SINGLE BREATH REF: 18.17
DLCOC SBVA LLN: 2.45
DLCOC SBVA PRE REF: 99.6 %
DLCOC SBVA REF: 3.97
DLCOC SINGLE BREATH LLN: 12.44
DLCOC SINGLE BREATH PRE REF: 55.8 %
DLCOC SINGLE BREATH REF: 18.17
DLCOVA LLN: 2.45
DLCOVA PRE REF: 99.6 %
DLCOVA PRE: 3.96 ML/(MIN*MMHG*L)
DLCOVA REF: 3.97
DLVAADJ PRE: 3.96 ML/(MIN*MMHG*L)
ERV LLN: -16449.55
ERV PRE REF: 53.9 %
ERV REF: 0.45
FEF 25 75 LLN: 0.7
FEF 25 75 PRE REF: 98.4 %
FEF 25 75 REF: 2.09
FEV1 FVC LLN: 63
FEV1 FVC PRE REF: 113.1 %
FEV1 FVC REF: 77
FEV1 LLN: 0.99
FEV1 PRE REF: 120.6 %
FEV1 REF: 1.51
FRCPLETH LLN: 1.78
FRCPLETH PREREF: 97.1 %
FRCPLETH REF: 2.6
FVC LLN: 1.32
FVC PRE REF: 105.4 %
FVC REF: 1.97
IVC PRE: 1.36 L
IVC SINGLE BREATH LLN: 1.32
IVC SINGLE BREATH PRE REF: 69.1 %
IVC SINGLE BREATH REF: 1.97
MVV LLN: 53
MVV PRE REF: 83.8 %
MVV REF: 62
PEF LLN: 1.5
PEF PRE REF: 152.1 %
PEF REF: 3.37
PRE DLCO: 10.14 ML/(MIN*MMHG)
PRE ERV: 0.24 L
PRE FEF 25 75: 2.06 L/S
PRE FET 100: 3.72 SEC
PRE FEV1 FVC: 87.69 %
PRE FEV1: 1.82 L
PRE FRC PL: 2.52 L
PRE FVC: 2.07 L
PRE MVV: 52 L/MIN
PRE PEF: 5.12 L/S
PRE RV: 2.24 L
PRE TLC: 4.31 L
RAW LLN: 3.06
RAW PRE REF: 187.1 %
RAW PRE: 5.72 CMH2O*S/L
RAW REF: 3.06
RV LLN: 1.58
RV PRE REF: 104.1 %
RV REF: 2.15
RVTLC LLN: 37
RVTLC PRE REF: 111 %
RVTLC PRE: 51.98 %
RVTLC REF: 47
TLC LLN: 3.58
TLC PRE REF: 94.3 %
TLC REF: 4.57
VA PRE: 2.56 L
VA SINGLE BREATH LLN: 4.42
VA SINGLE BREATH PRE REF: 58 %
VA SINGLE BREATH REF: 4.42
VC LLN: 1.32
VC PRE REF: 105.4 %
VC PRE: 2.07 L
VC REF: 1.97
VTGRAWPRE: 2.29 L

## 2025-02-27 ENCOUNTER — TELEPHONE (OUTPATIENT)
Dept: CARDIOLOGY | Facility: HOSPITAL | Age: 80
End: 2025-02-27
Payer: MEDICARE

## 2025-02-27 ENCOUNTER — RESULTS FOLLOW-UP (OUTPATIENT)
Dept: CARDIOLOGY | Facility: CLINIC | Age: 80
End: 2025-02-27

## 2025-02-27 NOTE — TELEPHONE ENCOUNTER
Please phone patient     PFT reviewed  Pulm function stable compared to previous PFT  Continue same dose of Amio     Keep next visit as scheduled     Thanks  Rozina    Called patient to advise per Rozina Kwon FNP  spoke to her  verbalized understanding

## 2025-03-07 ENCOUNTER — HOSPITAL ENCOUNTER (OUTPATIENT)
Dept: RADIOLOGY | Facility: HOSPITAL | Age: 80
Discharge: HOME | End: 2025-03-07
Attending: PHYSICIAN ASSISTANT
Payer: MEDICARE

## 2025-03-07 DIAGNOSIS — N64.4 BREAST PAIN, RIGHT: ICD-10-CM

## 2025-03-07 PROCEDURE — 77062 BREAST TOMOSYNTHESIS BI: CPT | Mod: TC,PO

## 2025-03-07 PROCEDURE — 77062 BREAST TOMOSYNTHESIS BI: CPT | Mod: 26,,, | Performed by: RADIOLOGY

## 2025-03-07 PROCEDURE — 77066 DX MAMMO INCL CAD BI: CPT | Mod: 26,,, | Performed by: RADIOLOGY

## 2025-03-21 ENCOUNTER — OFFICE VISIT (OUTPATIENT)
Dept: CARDIOLOGY | Facility: CLINIC | Age: 80
End: 2025-03-21
Payer: MEDICARE

## 2025-03-21 VITALS
SYSTOLIC BLOOD PRESSURE: 154 MMHG | DIASTOLIC BLOOD PRESSURE: 80 MMHG | WEIGHT: 162.5 LBS | BODY MASS INDEX: 29.9 KG/M2 | HEIGHT: 62 IN | OXYGEN SATURATION: 99 % | HEART RATE: 50 BPM

## 2025-03-21 DIAGNOSIS — I42.8 NONISCHEMIC CARDIOMYOPATHY: ICD-10-CM

## 2025-03-21 DIAGNOSIS — R00.1 SINUS BRADYCARDIA: ICD-10-CM

## 2025-03-21 DIAGNOSIS — I50.40 HEART FAILURE WITH REDUCED EJECTION FRACTION (HFREF, <= 40%) AND COMBINED SYSTOLIC AND DIASTOLIC DYSFUNCTION: ICD-10-CM

## 2025-03-21 DIAGNOSIS — I49.3 PVC (PREMATURE VENTRICULAR CONTRACTION): ICD-10-CM

## 2025-03-21 DIAGNOSIS — I50.42 CHRONIC COMBINED SYSTOLIC AND DIASTOLIC CONGESTIVE HEART FAILURE: Primary | ICD-10-CM

## 2025-03-21 PROCEDURE — 99999 PR PBB SHADOW E&M-EST. PATIENT-LVL III: CPT | Mod: PBBFAC,,, | Performed by: NURSE PRACTITIONER

## 2025-03-21 NOTE — PROGRESS NOTES
Subjective:   Patient ID:  Megan Sams is a 80 y.o. female who presents for evaluation of Follow-up      HPI    Megan Sams is a 79 year old female who presents to Arrhythmia clinic for follow up.    Her current medical conditions include CHF, HFrEF of 35-40%, HTN, CKD, NICM, PVCs.     She returns today and states she is doing ok.     She does endorse some left shoulder pain intermittently.     Reports compliance with medications and dietary restrictions.     Reports 2 pillow orthopnea.     She is walking at [facility] about once a week.     Has issues with hair loss since starting cardiac meds. Will start using minoxidil topical cream to assist with hair regrowth.     Denies chest pain or anginal equivalents. No shortness of breath, PAGE or palpitations. Denies orthopnea, PND or abdominal bloating. Reports regular walking without any issues lately. NO leg swelling or claudications. No recent falls, syncope or near syncopal events. Reports compliance with medications and dietary restrictions. NO CNS complaints to suggest TIA or CVA today. No signs of abnormal bleeding on ASA daily.     Medical therapy:  Entresto 49/51 BID  Toprol XL 50 mg daily  Amiodarone 200 mg daily  Jardiance 10 mg daily  Aldactone 50 mg daily    11/21/2024 update    Megan Sams returns for follow up.     She has decreased her Toprol XL to 12.5 mg with improvement in HR to 50s.     Still suffering from hair loss which is worsening.   BP more elevated than usual today in office due to rushing to get to clinic for appt on time.       No leg swelling or claudications.   Denies any dizziness, LH, syncope or near syncopal events.     Denies chest pain or anginal equivalents. No shortness of breath, PAGE or palpitations. Denies orthopnea, PND or abdominal bloating. Reports regular walking without any issues lately. NO leg swelling or claudications. No recent falls, syncope or near syncopal events. Reports compliance with medications and dietary  restrictions. NO CNS complaints to suggest TIA or CVA today. No signs of abnormal bleeding on ASA daily.     Results for orders placed during the hospital encounter of 07/19/24    Echo    Interpretation Summary    Left Ventricle: The left ventricle is normal in size. Normal wall thickness. There is concentric hypertrophy. Mild global hypokinesis present. There is reduced systolic function. Ejection fraction by visual approximation is 40%. There is indeterminate diastolic function.    Right Ventricle: Normal right ventricular cavity size. Wall thickness is normal. Systolic function is normal.    Aortic Valve: The aortic valve is a trileaflet valve. There is mild stenosis. Aortic valve area by VTI is 1.92 cm². Aortic valve peak velocity is 1.17 m/s. Mean gradient is 3 mmHg. The dimensionless index is 0.59.    Mitral Valve: There is moderate bileaflet sclerosis. There is normal leaflet mobility. There is prolapse of the anterior mitral leaflet. There is mild to moderate regurgitation.    Pulmonary Artery: The estimated pulmonary artery systolic pressure is 48 mmHg.    IVC/SVC: Normal venous pressure at 3 mmHg.      3/21/2025update    Megan T Latrell returns for follow up.    Reviewed PFT test with patient in office.    BP slightly elevated today in office.   Had some frustrations with transportation today and likely caused elevated BP readings today.     Denies chest pain or anginal equivalents. No shortness of breath, PAGE or palpitations. Denies orthopnea, PND or abdominal bloating. Reports regular walking without any issues lately. NO leg swelling or claudications. No recent falls, syncope or near syncopal events. Reports compliance with medications and dietary restrictions. NO CNS complaints to suggest TIA or CVA today. No signs of abnormal bleeding on ASA daily.           Past Medical History:   Diagnosis Date    Arthritis     Blood transfusion     CHF (congestive heart failure)     Chronic kidney disease      Depression     Hypertension        Past Surgical History:   Procedure Laterality Date    HYSTERECTOMY  1978    OOPHORECTOMY  1978       Social History     Tobacco Use    Smoking status: Never    Smokeless tobacco: Never   Substance Use Topics    Alcohol use: No     Alcohol/week: 0.0 standard drinks of alcohol    Drug use: No       Family History   Problem Relation Name Age of Onset    Early death Mother      Heart disease Mother      Hypertension Mother      Kidney disease Father      Heart disease Brother         Wt Readings from Last 3 Encounters:   03/21/25 73.7 kg (162 lb 7.7 oz)   02/17/25 72.4 kg (159 lb 9.8 oz)   01/13/25 72.1 kg (158 lb 15.2 oz)     Temp Readings from Last 3 Encounters:   02/17/25 97 °F (36.1 °C) (Tympanic)   07/15/24 97.4 °F (36.3 °C) (Tympanic)   05/15/24 98.3 °F (36.8 °C) (Oral)     BP Readings from Last 3 Encounters:   03/21/25 (!) 154/80   02/17/25 124/80   01/13/25 122/84     Pulse Readings from Last 3 Encounters:   03/21/25 (!) 50   02/17/25 60   01/13/25 (!) 55       Current Outpatient Medications on File Prior to Visit   Medication Sig Dispense Refill    acetaminophen (TYLENOL) 500 MG tablet Take 500 mg by mouth every 6 (six) hours as needed.      allopurinoL (ZYLOPRIM) 100 MG tablet TAKE 2 TABLETS BY MOUTH EVERY  tablet 4    amiodarone (PACERONE) 200 MG Tab Take one tablet in the am 90 tablet 3    aspirin (ECOTRIN) 81 MG EC tablet Take 81 mg by mouth once daily.      ciclopirox (PENLAC) 8 % Soln Apply to nails once daily 6.6 mL 6    empagliflozin (JARDIANCE) 10 mg tablet Take 1 tablet (10 mg total) by mouth once daily. 90 tablet 3    ergocalciferol (VITAMIN D2) 50,000 unit Cap Take 50,000 Units by mouth every 7 days.      furosemide (LASIX) 20 MG tablet Take 1 tablet (20 mg total) by mouth daily as needed (leg swelling.). Do not take if BP is below 110/70 90 tablet 1    metoprolol succinate (TOPROL-XL) 25 MG 24 hr tablet Take 0.5 tablets (12.5 mg total) by mouth once daily.  30 tablet 1    mometasone (ELOCON) 0.1 % ointment AAA bid prn as needed for hair loss. Steroid ointment. 45 g 0    MULTIVIT WITH CALCIUM,IRON,MIN (MULTIPLE VITAMIN, WOMENS ORAL) Take by mouth once daily.      sacubitriL-valsartan (ENTRESTO)  mg per tablet Take 1 tablet by mouth 2 (two) times daily. 180 tablet 1    spironolactone (ALDACTONE) 50 MG tablet Take 1 tablet (50 mg total) by mouth once daily. Do not take if BP is below 110/70 90 tablet 1    diclofenac sodium (VOLTAREN) 1 % Gel Apply 2 g topically 2 (two) times daily as needed (back pain). 100 g 1     No current facility-administered medications on file prior to visit.       Cardiac Monitor - 3-15 Day Adult (Cupid Only)  Result Date: 11/11/2024  The patient was monitored for a total of 6d 20h, underlying rhythm is   Sinus.  The minimum heart rate was 26 bpm; the maximum 76 bpm; the average 47 bpm.  0 % of Atrial fibrillation/Atrial flutter with longest episode of 0 ms.  The total burden of AV Block present was 0 % [Complete Heart Block: 0 %;   Advanced (High Grade):  0 %; 2nd Degree, Mobitz II: 0 %; 2nd Degree, Mobitz I: 0 %].  There were 651 pauses, the longest pause was 3366 ms at Day 7 / 03:23:51   pm.  Total count of Ventricular Tachycardia (VT): 1 episode(s). Longest VT: 3   beats on Day 4 / 08:39:08  pm. Fastest VT: 116 bpm on Day 4 / 08:39:08 pm.  0 supraventricular episodes were found. Longest SVT Episode 0 s, Fastest   SVT -- bpm  There were a total of 404 PVCs with 3 morphologies and 2 couplets. Overall   PVC Warner at 0.09 %  There were a total of 0 Other Beats. There were 0 total number of paced   beats.  There were a total of 2806 PSVCs with 11 couplets. Overall PSVC Warner at  0.6 %  There is a total of 1 patient events.        Holter monitor - 48 hour  Result Date: 4/10/2024    The predominant rhythm is sinus.    THERE IS A SINUS PAUSE OF 4.3 SECONDS.    THERE ARE MULTIPLE EPISODE OFS EVERE SINUS BRADYCARDIA AND EPISODES OF   WIDE  COMPLEX JUNCTIONAL ESCAPE BEATS.          Results for orders placed during the hospital encounter of 04/19/24    Echo    Interpretation Summary    Left Ventricle: The left ventricle is mildly dilated. Moderately increased wall thickness. There is moderate concentric hypertrophy. Moderate global hypokinesis present. There is moderately reduced systolic function with a visually estimated ejection fraction of 35 - 40%. Grade I diastolic dysfunction.    Right Ventricle: Normal right ventricular cavity size. Wall thickness is normal. Right ventricle wall motion  is normal. Systolic function is normal.    Mitral Valve: There is mild to moderate regurgitation.    Tricuspid Valve: There is mild regurgitation.    Pulmonary Artery: The estimated pulmonary artery systolic pressure is 35 mmHg.    IVC/SVC: Normal venous pressure at 3 mmHg.    Results for orders placed during the hospital encounter of 12/28/23    Nuclear Stress - Cardiology Interpreted    Interpretation Summary    Abnormal myocardial perfusion scan.    There are two significant perfusion abnormalities.    Perfusion Abnormality #1 - There is a moderate to severe intensity, moderate sized, mostly fixed perfusion abnormality with some reversibilty in the apical, anteroseptal and septal apical wall(s).    Perfusion Abnormality #2 - There is a moderate sized, moderate intensity, mostly fixed perfusion abnormality with some reversibilty in the basal to mid inferolateral wall(s).    There are no other significant perfusion abnormalities.    The gated perfusion images showed an ejection fraction of 34% at rest. The gated perfusion images showed an ejection fraction of 23% post stress.    There is moderate global hypokinesis at rest and severe global hypokinesis at stress .    The ECG portion of the study is negative for ischemia.    The patient reported no chest pain during the stress test.        Results for orders placed or performed during the hospital encounter of  10/22/24   SCHEDULED EKG 12-LEAD (to Muse)    Collection Time: 10/22/24 12:16 PM   Result Value Ref Range    QRS Duration 118 ms    [facility] QTC Calculation 456 ms    Narrative    Test Reason : Z01.810,I49.3,    Vent. Rate : 046 BPM     Atrial Rate : 046 BPM     P-R Int : 160 ms          QRS Dur : 118 ms      QT Int : 522 ms       P-R-T Axes : 056 018 165 degrees     QTc Int : 456 ms    Sinus bradycardia  Nonspecific intraventricular conduction delay  T wave abnormality, consider inferior ischemia  Abnormal ECG  When compared with ECG of 15-MAY-2024 14:11,  Inverted T waves have replaced nonspecific T wave abnormality in Inferior  leads  Confirmed by YEYO CARNEY MD ([id number]) on 10/22/2024 1:40:50 PM    Referred By: MAT PRICE           Confirmed By:YEYO CARNEY MD         Review of Systems   Constitutional: Positive for malaise/fatigue.   HENT:  Negative for hearing loss and hoarse voice.    Eyes:  Negative for visual disturbance.   Cardiovascular:  Negative for chest pain, claudication, dyspnea on exertion, irregular heartbeat, leg swelling, near-syncope, orthopnea, palpitations, paroxysmal nocturnal dyspnea and syncope.   Respiratory:  Negative for cough, hemoptysis, shortness of breath, sleep disturbances due to breathing, snoring and wheezing.    Endocrine: Negative for cold intolerance and heat intolerance.   Hematologic/Lymphatic: Does not bruise/bleed easily.   Skin:  Negative for color change, dry skin and nail changes.   Musculoskeletal:  Positive for arthritis, back pain, joint pain and myalgias.   Gastrointestinal:  Negative for bloating, abdominal pain, constipation, nausea and vomiting.   Genitourinary:  Negative for dysuria, flank pain, hematuria and hesitancy.   Neurological:  Positive for weakness. Negative for headaches, light-headedness, loss of balance, numbness and paresthesias.   Psychiatric/Behavioral:  Negative for altered mental status.    Allergic/Immunologic: Negative for environmental  "allergies.         Objective:BP (!) 154/80 (BP Location: Left arm, Patient Position: Sitting)   Pulse (!) 50   Ht 5' 2" (1.575 m)   Wt 73.7 kg (162 lb 7.7 oz)   SpO2 99%   BMI 29.72 kg/m²      Physical Exam  Vitals and nursing note reviewed.   Constitutional:       General: She is not in acute distress.     Appearance: Normal appearance. She is well-developed. She is not ill-appearing.   HENT:      Head: Normocephalic and atraumatic.      Nose: Nose normal.      Mouth/Throat:      Mouth: Mucous membranes are moist.   Eyes:      Pupils: Pupils are equal, round, and reactive to light.   Neck:      Thyroid: No thyromegaly.      Vascular: No JVD.      Trachea: No tracheal deviation.   Cardiovascular:      Rate and Rhythm: Regular rhythm. Bradycardia present.      Chest Wall: PMI is not displaced.      Pulses: Intact distal pulses.           Radial pulses are 2+ on the right side and 2+ on the left side.        Dorsalis pedis pulses are 2+ on the right side and 2+ on the left side.      Heart sounds: S1 normal and S2 normal. Heart sounds not distant. No murmur heard.  Pulmonary:      Effort: Pulmonary effort is normal. No respiratory distress.      Breath sounds: Normal breath sounds. No wheezing.   Abdominal:      General: Bowel sounds are normal.      Palpations: Abdomen is soft.   Musculoskeletal:         General: No swelling. Normal range of motion.      Cervical back: Full passive range of motion without pain, normal range of motion and neck supple.      Right ankle: No swelling.      Left ankle: No swelling.   Skin:     General: Skin is warm and dry.      Capillary Refill: Capillary refill takes less than 2 seconds.      Nails: There is no clubbing.   Neurological:      General: No focal deficit present.      Mental Status: She is alert and oriented to person, place, and time.   Psychiatric:         Speech: Speech normal.         Behavior: Behavior normal.         Thought Content: Thought content normal.       "   Judgment: Judgment normal.         Lab Results   Component Value Date    CHOL 182 07/11/2024    CHOL 174 07/26/2023    CHOL 178 06/30/2022     Lab Results   Component Value Date    HDL 55 07/11/2024    HDL 50 07/26/2023    HDL 59 06/30/2022     Lab Results   Component Value Date    LDLCALC 105.8 07/11/2024    LDLCALC 104.0 07/26/2023    LDLCALC 100.2 06/30/2022     Lab Results   Component Value Date    TRIG 106 07/11/2024    TRIG 100 07/26/2023    TRIG 94 06/30/2022     Lab Results   Component Value Date    CHOLHDL 30.2 07/11/2024    CHOLHDL 28.7 07/26/2023    CHOLHDL 33.1 06/30/2022       Chemistry        Component Value Date/Time     12/06/2024 0942    K 4.0 12/06/2024 0942     12/06/2024 0942    CO2 25 12/06/2024 0942    BUN 23 12/06/2024 0942    CREATININE 1.4 12/06/2024 0942    GLU 97 12/06/2024 0942        Component Value Date/Time    CALCIUM 9.4 12/06/2024 0942    ALKPHOS 58 07/11/2024 1350    AST 26 07/11/2024 1350    ALT 17 07/11/2024 1350    BILITOT 0.8 07/11/2024 1350    ESTGFRAFRICA >60.0 06/30/2022 1031    EGFRNONAA 53.7 (A) 06/30/2022 1031          Lab Results   Component Value Date    TSH 4.715 (H) 01/08/2025     Lab Results   Component Value Date    INR 1.0 02/14/2017    INR 1.0 03/02/2014     Lab Results   Component Value Date    WBC 5.47 07/11/2024    HGB 13.5 07/11/2024    HCT 43.4 07/11/2024    MCV 92 07/11/2024     07/11/2024          Assessment:      1. Chronic combined systolic and diastolic congestive heart failure    2. PVC (premature ventricular contraction)    3. Heart failure with reduced ejection fraction (HFrEF, <= 40%) and combined systolic and diastolic dysfunction    4. Sinus bradycardia    5. Nonischemic cardiomyopathy            Plan:     CHF SYNOPSIS:    GDMT:  ACE/ARB/ARNI: Entresto 97/103 BID  Beta Blocker: Toprol XL 12.5 mg daily  MRA: Aldactone 50 mg daily  SGLT2: Jardiance 10 mg daily  Diuretic: Lasix 20 mg daily PRN for leg swelling.     Dash diet 2 gm  sodium restriction  Profile BP at home    RTC in 6 months    Nicole May, FNP-C [facility]

## 2025-04-03 ENCOUNTER — TELEPHONE (OUTPATIENT)
Dept: CARDIOLOGY | Facility: CLINIC | Age: 80
End: 2025-04-03
Payer: MEDICARE

## 2025-04-03 NOTE — TELEPHONE ENCOUNTER
Pt confirmed New Appt Date/Time/Location//        ----- Message from Daria sent at 4/3/2025 10:27 AM CDT -----  Contact: Megan Guerrero is calling in regards to getting her appt on 04/14 rescheduled to the 2th or 3rd week in May.please call pt back at .[phone] [facility]

## 2025-04-11 DIAGNOSIS — I50.42 CHRONIC COMBINED SYSTOLIC AND DIASTOLIC HEART FAILURE: Primary | ICD-10-CM

## 2025-04-14 ENCOUNTER — HOSPITAL ENCOUNTER (OUTPATIENT)
Dept: CARDIOLOGY | Facility: HOSPITAL | Age: 80
Discharge: HOME | End: 2025-04-14
Attending: INTERNAL MEDICINE
Payer: MEDICARE

## 2025-04-14 ENCOUNTER — OFFICE VISIT (OUTPATIENT)
Dept: CARDIOLOGY | Facility: CLINIC | Age: 80
End: 2025-04-14
Payer: MEDICARE

## 2025-04-14 VITALS
HEIGHT: 62 IN | HEART RATE: 61 BPM | BODY MASS INDEX: 29.38 KG/M2 | DIASTOLIC BLOOD PRESSURE: 78 MMHG | SYSTOLIC BLOOD PRESSURE: 148 MMHG | WEIGHT: 159.63 LBS

## 2025-04-14 DIAGNOSIS — I50.42 CHRONIC COMBINED SYSTOLIC AND DIASTOLIC HEART FAILURE: ICD-10-CM

## 2025-04-14 DIAGNOSIS — R06.09 OTHER FORM OF DYSPNEA: ICD-10-CM

## 2025-04-14 DIAGNOSIS — I73.9 PERIPHERAL VASCULAR DISEASE, UNSPECIFIED: ICD-10-CM

## 2025-04-14 DIAGNOSIS — I49.3 PVC (PREMATURE VENTRICULAR CONTRACTION): ICD-10-CM

## 2025-04-14 DIAGNOSIS — E55.9 VITAMIN D DEFICIENCY: ICD-10-CM

## 2025-04-14 DIAGNOSIS — M54.9 BACK PAIN, UNSPECIFIED BACK LOCATION, UNSPECIFIED BACK PAIN LATERALITY, UNSPECIFIED CHRONICITY: ICD-10-CM

## 2025-04-14 DIAGNOSIS — N18.32 STAGE 3B CHRONIC KIDNEY DISEASE: ICD-10-CM

## 2025-04-14 DIAGNOSIS — I50.42 CHRONIC COMBINED SYSTOLIC AND DIASTOLIC CONGESTIVE HEART FAILURE: ICD-10-CM

## 2025-04-14 DIAGNOSIS — R00.1 SINUS BRADYCARDIA: ICD-10-CM

## 2025-04-14 DIAGNOSIS — I50.40 HEART FAILURE WITH REDUCED EJECTION FRACTION (HFREF, <= 40%) AND COMBINED SYSTOLIC AND DIASTOLIC DYSFUNCTION: ICD-10-CM

## 2025-04-14 DIAGNOSIS — R00.2 PALPITATIONS: ICD-10-CM

## 2025-04-14 DIAGNOSIS — N18.31 STAGE 3A CHRONIC KIDNEY DISEASE: ICD-10-CM

## 2025-04-14 DIAGNOSIS — R06.09 DOE (DYSPNEA ON EXERTION): ICD-10-CM

## 2025-04-14 DIAGNOSIS — E66.811 OBESITY, CLASS I, BMI 30-34.9: ICD-10-CM

## 2025-04-14 DIAGNOSIS — I42.8 NONISCHEMIC CARDIOMYOPATHY: ICD-10-CM

## 2025-04-14 DIAGNOSIS — I10 ESSENTIAL HYPERTENSION: ICD-10-CM

## 2025-04-14 DIAGNOSIS — R07.9 CHEST PAIN, UNSPECIFIED TYPE: Primary | ICD-10-CM

## 2025-04-14 LAB
OHS QRS DURATION: 126 MS
OHS QTC CALCULATION: 473 MS

## 2025-04-14 PROCEDURE — 1101F PT FALLS ASSESS-DOCD LE1/YR: CPT | Mod: CPTII,S$GLB,, | Performed by: INTERNAL MEDICINE

## 2025-04-14 PROCEDURE — 99215 OFFICE O/P EST HI 40 MIN: CPT | Mod: S$GLB,,, | Performed by: INTERNAL MEDICINE

## 2025-04-14 PROCEDURE — 93010 ELECTROCARDIOGRAM REPORT: CPT | Mod: ,,, | Performed by: INTERNAL MEDICINE

## 2025-04-14 PROCEDURE — 99999 PR PBB SHADOW E&M-EST. PATIENT-LVL III: CPT | Mod: PBBFAC,,, | Performed by: INTERNAL MEDICINE

## 2025-04-14 PROCEDURE — G2211 COMPLEX E/M VISIT ADD ON: HCPCS | Mod: S$GLB,,, | Performed by: INTERNAL MEDICINE

## 2025-04-14 PROCEDURE — 3077F SYST BP >= 140 MM HG: CPT | Mod: CPTII,S$GLB,, | Performed by: INTERNAL MEDICINE

## 2025-04-14 PROCEDURE — 1125F AMNT PAIN NOTED PAIN PRSNT: CPT | Mod: CPTII,S$GLB,, | Performed by: INTERNAL MEDICINE

## 2025-04-14 PROCEDURE — 3288F FALL RISK ASSESSMENT DOCD: CPT | Mod: CPTII,S$GLB,, | Performed by: INTERNAL MEDICINE

## 2025-04-14 PROCEDURE — 93005 ELECTROCARDIOGRAM TRACING: CPT | Mod: PO

## 2025-04-14 PROCEDURE — 3078F DIAST BP <80 MM HG: CPT | Mod: CPTII,S$GLB,, | Performed by: INTERNAL MEDICINE

## 2025-04-14 PROCEDURE — 1160F RVW MEDS BY RX/DR IN RCRD: CPT | Mod: CPTII,S$GLB,, | Performed by: INTERNAL MEDICINE

## 2025-04-14 PROCEDURE — 1159F MED LIST DOCD IN RCRD: CPT | Mod: CPTII,S$GLB,, | Performed by: INTERNAL MEDICINE

## 2025-04-14 RX ORDER — ASPIRIN 81 MG/1
81 TABLET ORAL DAILY
Qty: 90 TABLET | Refills: 1 | Status: SHIPPED | OUTPATIENT
Start: 2025-04-14

## 2025-04-14 RX ORDER — SPIRONOLACTONE 50 MG/1
50 TABLET, FILM COATED ORAL DAILY
Qty: 90 TABLET | Refills: 1 | Status: SHIPPED | OUTPATIENT
Start: 2025-04-14

## 2025-04-14 RX ORDER — FUROSEMIDE 20 MG/1
20 TABLET ORAL DAILY PRN
Qty: 90 TABLET | Refills: 1 | Status: SHIPPED | OUTPATIENT
Start: 2025-04-14 | End: 2026-04-14

## 2025-04-14 RX ORDER — METOPROLOL SUCCINATE 25 MG/1
12.5 TABLET, EXTENDED RELEASE ORAL DAILY
Qty: 45 TABLET | Refills: 1 | Status: SHIPPED | OUTPATIENT
Start: 2025-04-14

## 2025-04-14 RX ORDER — AMIODARONE HYDROCHLORIDE 200 MG/1
TABLET ORAL
Qty: 90 TABLET | Refills: 3 | Status: SHIPPED | OUTPATIENT
Start: 2025-04-14

## 2025-04-14 RX ORDER — SACUBITRIL AND VALSARTAN 97; 103 MG/1; MG/1
1 TABLET, FILM COATED ORAL 2 TIMES DAILY
Qty: 180 TABLET | Refills: 1 | Status: SHIPPED | OUTPATIENT
Start: 2025-04-14

## 2025-04-14 NOTE — PROGRESS NOTES
Subjective:   Patient ID:  Megan Sams is a 80 y.o. female who presents for cardiac consult of No chief complaint on file.      Referral by: Self, Aaareferral  No address on file     Reason for consult: CHF      HPI  The patient came in today for cardiac consult of No chief complaint on file.      Megan Sams is a 80 y.o. female pt with NICM EF 40%, PVCs, HTN, obesity, CKD presents for CV follow up.       1/13/25  Sp vital monitor 10/2024 - pauses of 3.3 sec, decreased BB to 12.5 mg.   heart monitor reveal slow heart rates with pauses up to 3.3 seconds - I am decreasing  the metoprolol to 12.5 mg daily (need to take half of a 25mg tablet) - new script sent to pharmacy. If heart rate remains below 50 and feeling sluggish/tired need to stop taking metoprolol. Follow up with EP as scheduled.   She has been checking BP at home and overall stable. She had a fall in Nov but tripped, no LOC.     4/14/25  Pt saw [facility] last month, overall stable.   BP mildly today.   She had drank milk yest and now has more gas/abd pain - likely lactose intolerant.   Has more back pain at times.     ECG - NSR, LAE, LVH      Results for orders placed during the hospital encounter of 07/19/24    Echo    Interpretation Summary    Left Ventricle: The left ventricle is normal in size. Normal wall thickness. There is concentric hypertrophy. Mild global hypokinesis present. There is reduced systolic function. Ejection fraction by visual approximation is 40%. There is indeterminate diastolic function.    Right Ventricle: Normal right ventricular cavity size. Wall thickness is normal. Systolic function is normal.    Aortic Valve: The aortic valve is a trileaflet valve. There is mild stenosis. Aortic valve area by VTI is 1.92 cm². Aortic valve peak velocity is 1.17 m/s. Mean gradient is 3 mmHg. The dimensionless index is 0.59.    Mitral Valve: There is moderate bileaflet sclerosis. There is normal leaflet mobility. There is prolapse of the anterior  mitral leaflet. There is mild to moderate regurgitation.    Pulmonary Artery: The estimated pulmonary artery systolic pressure is 48 mmHg.    IVC/SVC: Normal venous pressure at 3 mmHg.      Results for orders placed during the hospital encounter of 04/19/24    Echo    Interpretation Summary    Left Ventricle: The left ventricle is mildly dilated. Moderately increased wall thickness. There is moderate concentric hypertrophy. Moderate global hypokinesis present. There is moderately reduced systolic function with a visually estimated ejection fraction of 35 - 40%. Grade I diastolic dysfunction.    Right Ventricle: Normal right ventricular cavity size. Wall thickness is normal. Right ventricle wall motion  is normal. Systolic function is normal.    Mitral Valve: There is mild to moderate regurgitation.    Tricuspid Valve: There is mild regurgitation.    Pulmonary Artery: The estimated pulmonary artery systolic pressure is 35 mmHg.    IVC/SVC: Normal venous pressure at 3 mmHg.      HOLTER  Interpretation Summary  Show Result Comparison     The predominant rhythm is sinus.    THERE IS A SINUS PAUSE OF 4.3 SECONDS.    THERE ARE MULTIPLE EPISODE OFS EVERE SINUS BRADYCARDIA AND EPISODES OF WIDE COMPLEX JUNCTIONAL ESCAPE BEATS.      Results for orders placed during the hospital encounter of 12/28/23    Nuclear Stress - Cardiology Interpreted    Interpretation Summary    Abnormal myocardial perfusion scan.    There are two significant perfusion abnormalities.    Perfusion Abnormality #1 - There is a moderate to severe intensity, moderate sized, mostly fixed perfusion abnormality with some reversibilty in the apical, anteroseptal and septal apical wall(s).    Perfusion Abnormality #2 - There is a moderate sized, moderate intensity, mostly fixed perfusion abnormality with some reversibilty in the basal to mid inferolateral wall(s).    There are no other significant perfusion abnormalities.    The gated perfusion images showed an  ejection fraction of 34% at rest. The gated perfusion images showed an ejection fraction of 23% post stress.    There is moderate global hypokinesis at rest and severe global hypokinesis at stress .    The ECG portion of the study is negative for ischemia.    The patient reported no chest pain during the stress test.        Results for orders placed during the hospital encounter of 11/03/23    Echo    Interpretation Summary    Left Ventricle: The left ventricle is mildly dilated. Normal wall thickness. Moderate global hypokinesis present. There is moderately reduced systolic function with a visually estimated ejection fraction of 30 - 35%. There is normal diastolic function.    Right Ventricle: Normal right ventricular cavity size. Wall thickness is normal. Right ventricle wall motion  is normal. Systolic function is normal.    Mitral Valve: There is moderate regurgitation with a centrally directed jet.    Tricuspid Valve: There is moderate regurgitation with a centrally directed jet.    Pulmonary Artery: The estimated pulmonary artery systolic pressure is 49 mmHg.    IVC/SVC: Normal venous pressure at 3 mmHg.      Echo: 2020: EF 40%  Cardiac cath: 2009: normal coronaries       Cardiac Monitor - 3-15 Day Adult (Cupid Only)  Result Date: 11/11/2024  The patient was monitored for a total of 6d 20h, underlying rhythm is   Sinus.  The minimum heart rate was 26 bpm; the maximum 76 bpm; the average 47 bpm.  0 % of Atrial fibrillation/Atrial flutter with longest episode of 0 ms.  The total burden of AV Block present was 0 % [Complete Heart Block: 0 %;   Advanced (High Grade):  0 %; 2nd Degree, Mobitz II: 0 %; 2nd Degree, Mobitz I: 0 %].  There were 651 pauses, the longest pause was 3366 ms at Day 7 / 03:23:51   pm.  Total count of Ventricular Tachycardia (VT): 1 episode(s). Longest VT: 3   beats on Day 4 / 08:39:08  pm. Fastest VT: 116 bpm on Day 4 / 08:39:08 pm.  0 supraventricular episodes were found. Longest SVT Episode  0 s, Fastest   SVT -- bpm  There were a total of 404 PVCs with 3 morphologies and 2 couplets. Overall   PVC [place] at 0.09 %  There were a total of 0 Other Beats. There were 0 total number of paced   beats.  There were a total of 2806 PSVCs with 11 couplets. Overall PSVC [place] at  0.6 %  There is a total of 1 patient events.               Past Medical History:   Diagnosis Date    Arthritis     Blood transfusion     CHF (congestive heart failure)     Chronic kidney disease     Depression     Hypertension        Past Surgical History:   Procedure Laterality Date    HYSTERECTOMY  1978    OOPHORECTOMY  1978       Social History     Tobacco Use    Smoking status: Never    Smokeless tobacco: Never   Substance Use Topics    Alcohol use: No     Alcohol/week: 0.0 standard drinks of alcohol    Drug use: No       Family History   Problem Relation Name Age of Onset    Early death Mother      Heart disease Mother      Hypertension Mother      Kidney disease Father      Heart disease Brother         Patient's Medications   New Prescriptions    No medications on file   Previous Medications    ACETAMINOPHEN (TYLENOL) 500 MG TABLET    Take 500 mg by mouth every 6 (six) hours as needed.    ALLOPURINOL (ZYLOPRIM) 100 MG TABLET    TAKE 2 TABLETS BY MOUTH EVERY DAY    CICLOPIROX (PENLAC) 8 % SOLN    Apply to nails once daily    DICLOFENAC SODIUM (VOLTAREN) 1 % GEL    Apply 2 g topically 2 (two) times daily as needed (back pain).    ERGOCALCIFEROL (VITAMIN D2) 50,000 UNIT CAP    Take 50,000 Units by mouth every 7 days.    MOMETASONE (ELOCON) 0.1 % OINTMENT    AAA bid prn as needed for hair loss. Steroid ointment.    MULTIVIT WITH CALCIUM,IRON,MIN (MULTIPLE VITAMIN, WOMENS ORAL)    Take by mouth once daily.   Modified Medications    Modified Medication Previous Medication    AMIODARONE (PACERONE) 200 MG TAB amiodarone (PACERONE) 200 MG Tab       Take one tablet in the am    Take one tablet in the am    ASPIRIN (ECOTRIN) 81 MG EC TABLET  aspirin (ECOTRIN) 81 MG EC tablet       Take 1 tablet (81 mg total) by mouth once daily.    Take 81 mg by mouth once daily.    EMPAGLIFLOZIN (JARDIANCE) 10 MG TABLET empagliflozin (JARDIANCE) 10 mg tablet       Take 1 tablet (10 mg total) by mouth once daily.    Take 1 tablet (10 mg total) by mouth once daily.    FUROSEMIDE (LASIX) 20 MG TABLET furosemide (LASIX) 20 MG tablet       Take 1 tablet (20 mg total) by mouth daily as needed (leg swelling.). Do not take if BP is below 110/70    Take 1 tablet (20 mg total) by mouth daily as needed (leg swelling.). Do not take if BP is below 110/70    METOPROLOL SUCCINATE (TOPROL-XL) 25 MG 24 HR TABLET metoprolol succinate (TOPROL-XL) 25 MG 24 hr tablet       Take 0.5 tablets (12.5 mg total) by mouth once daily.    Take 0.5 tablets (12.5 mg total) by mouth once daily.    SACUBITRIL-VALSARTAN (ENTRESTO)  MG PER TABLET sacubitriL-valsartan (ENTRESTO)  mg per tablet       Take 1 tablet by mouth 2 (two) times daily.    Take 1 tablet by mouth 2 (two) times daily.    SPIRONOLACTONE (ALDACTONE) 50 MG TABLET spironolactone (ALDACTONE) 50 MG tablet       Take 1 tablet (50 mg total) by mouth once daily. Do not take if BP is below 110/70    Take 1 tablet (50 mg total) by mouth once daily. Do not take if BP is below 110/70   Discontinued Medications    No medications on file       Review of Systems   Constitutional:  Positive for malaise/fatigue.   HENT: Negative.     Eyes: Negative.    Respiratory:  Positive for shortness of breath.    Cardiovascular:  Positive for chest pain and palpitations.   Gastrointestinal: Negative.    Genitourinary: Negative.    Musculoskeletal:  Positive for back pain and joint pain.   Skin: Negative.    Neurological:  Positive for weakness.   Endo/Heme/Allergies: Negative.    Psychiatric/Behavioral: Negative.     All 12 systems otherwise negative.      Wt Readings from Last 3 Encounters:   04/14/25 72.4 kg (159 lb 9.8 oz)   03/21/25 73.7 kg (162 lb  "7.7 oz)   02/17/25 72.4 kg (159 lb 9.8 oz)     Temp Readings from Last 3 Encounters:   02/17/25 97 °F (36.1 °C) (Tympanic)   07/15/24 97.4 °F (36.3 °C) (Tympanic)   05/15/24 98.3 °F (36.8 °C) (Oral)     BP Readings from Last 3 Encounters:   04/14/25 (!) 148/78   03/21/25 (!) 154/80   02/17/25 124/80     Pulse Readings from Last 3 Encounters:   04/14/25 61   03/21/25 (!) 50   02/17/25 60       BP (!) 148/78 (BP Location: Left arm, Patient Position: Sitting)   Pulse 61   Ht 5' 2" (1.575 m)   Wt 72.4 kg (159 lb 9.8 oz)   BMI 29.19 kg/m²     Objective:   Physical Exam  Vitals and nursing note reviewed.   Constitutional:       General: She is not in acute distress.     Appearance: She is well-developed. She is obese. She is not diaphoretic.   HENT:      Head: Normocephalic and atraumatic.      Nose: Nose normal.   Eyes:      General: No scleral icterus.     Conjunctiva/sclera: Conjunctivae normal.   Neck:      Thyroid: No thyromegaly.      Vascular: No JVD.   Cardiovascular:      Rate and Rhythm: Normal rate and regular rhythm.      Heart sounds: S1 normal and S2 normal. Murmur heard.      No friction rub. No gallop. No S3 or S4 sounds.   Pulmonary:      Effort: Pulmonary effort is normal. No respiratory distress.      Breath sounds: Normal breath sounds. No stridor. No wheezing or rales.   Chest:      Chest wall: No tenderness.   Abdominal:      General: Bowel sounds are normal. There is no distension.      Palpations: Abdomen is soft. There is no mass.      Tenderness: There is no abdominal tenderness. There is no rebound.   Genitourinary:     Comments: Deferred  Musculoskeletal:         General: No tenderness or deformity. Normal range of motion.      Cervical back: Normal range of motion and neck supple.   Lymphadenopathy:      Cervical: No cervical adenopathy.   Skin:     General: Skin is warm and dry.      Coloration: Skin is not pale.      Findings: No erythema or rash.   Neurological:      Mental Status: She " is alert and oriented to person, place, and time.      Motor: No abnormal muscle tone.      Coordination: Coordination normal.   Psychiatric:         Behavior: Behavior normal.         Thought Content: Thought content normal.         Judgment: Judgment normal.         Lab Results   Component Value Date     12/06/2024    K 4.0 12/06/2024     12/06/2024    CO2 25 12/06/2024    BUN 23 12/06/2024    CREATININE 1.4 12/06/2024    GLU 97 12/06/2024    HGBA1C 5.4 07/26/2023    MG 2.0 12/06/2024    AST 26 07/11/2024    ALT 17 07/11/2024    ALBUMIN 3.1 (L) 07/11/2024    PROT 6.8 07/11/2024    BILITOT 0.8 07/11/2024    WBC 5.47 07/11/2024    HGB 13.5 07/11/2024    HCT 43.4 07/11/2024    MCV 92 07/11/2024     07/11/2024    INR 1.0 02/14/2017    TSH 4.715 (H) 01/08/2025    CHOL 182 07/11/2024    HDL 55 07/11/2024    LDLCALC 105.8 07/11/2024    TRIG 106 07/11/2024     (H) 12/06/2024       BNP (pg/mL)   Date Value   12/06/2024 434 (H)   07/26/2024 756 (H)   04/12/2024 507 (H)   01/26/2024 613 (H)   12/01/2023 612 (H)     INR (no units)   Date Value   02/14/2017 1.0   03/02/2014 1.0        Assessment:      1. Chest pain, unspecified type    2. Chronic combined systolic and diastolic heart failure    3. Heart failure with reduced ejection fraction (HFrEF, <= 40%) and combined systolic and diastolic dysfunction    4. Sinus bradycardia    5. Palpitations    6. Stage 3b chronic kidney disease    7. Peripheral vascular disease, unspecified    8. Essential hypertension    9. Nonischemic cardiomyopathy    10. PVC (premature ventricular contraction)    11. Chronic combined systolic and diastolic congestive heart failure    12. PAGE (dyspnea on exertion)    13. Stage 3a chronic kidney disease    14. Obesity, Class I, BMI 30-34.9    15. Vitamin D deficiency    16. Back pain, unspecified back location, unspecified back pain laterality, unspecified chronicity    17. Other form of dyspnea            Plan:     ISAI  EF  35%; Chest pain, PAGE;  BNP - 3942 --> 579 --> 507 --> 756 --> 434   - cont Toprol and ARB - change to Entresto BID - increases dose   - nuc stress 12/2023 with mostly fixed defect anteroseptal/apical wall with EF 34%  - needs low salt diet   - ECHO 7/2024 with LVEF 40%, normal RV, mild AS, mild to mod MR, PASP 48mmHG.   -  increased Entresto to high dose, and take lasix PRN  - PAGE with back pain - order pharm nuclear stress test, pt cannot walk on treadmill  - order ECHO     2. HTN, PVCs - with bradycardia   - titrate meds  - frequent PVCs - referred to EP - started on Amio with Dr. Herminia Haines  - Holter 4/2024 with sinus pause 4.3 sec  -Pt saw Dr. Herminia Haines in past - reassess her candidacy for ICD.  - I will also reduce the dose of amiodarone from 300 to 200 a day.  - rec compression stockings for edema   -vital monitor 10/2024 - pauses of 3.3 sec, decreased BB to 12.5 mg.   - may need JESSIE eval     3. Obesity - losing weight - 159 lbs -->  BMI 29 - 161 lbs -->BMI 30 - 165 lbs  -->163 lbs  --> 158 lbs  --> 159  - cont weight loss    4. CKD3  - cont to monitor  - GFR 37  - cont vitamin D- stable     5. Back pain  - refer to pain management     Visit today included increased complexity associated with the care of the episodic problem CHF addressed and managing the longitudinal care of the patient due to the serious and/or complex managed problem(s) .    Thank you for allowing me to participate in this patient's care. Please do not hesitate to contact me with any questions or concerns. Consult note has been forwarded to the referral physician.

## 2025-04-26 ENCOUNTER — HOSPITAL ENCOUNTER (INPATIENT)
Facility: HOSPITAL | Age: 80
LOS: 2 days | Discharge: HOME HEALTH | DRG: 291 | End: 2025-04-28
Attending: EMERGENCY MEDICINE | Admitting: INTERNAL MEDICINE
Payer: MEDICARE

## 2025-04-26 DIAGNOSIS — I50.9 CHF (CONGESTIVE HEART FAILURE): ICD-10-CM

## 2025-04-26 DIAGNOSIS — I50.9 CONGESTIVE HEART FAILURE, UNSPECIFIED HF CHRONICITY, UNSPECIFIED HEART FAILURE TYPE: Primary | ICD-10-CM

## 2025-04-26 DIAGNOSIS — R79.89 TROPONIN LEVEL ELEVATED: ICD-10-CM

## 2025-04-26 DIAGNOSIS — R06.02 SHORTNESS OF BREATH: ICD-10-CM

## 2025-04-26 DIAGNOSIS — R06.00 DYSPNEA: ICD-10-CM

## 2025-04-26 LAB
ABSOLUTE EOSINOPHIL (OHS): 0.07 K/UL
ABSOLUTE MONOCYTE (OHS): 0.75 K/UL (ref 0.3–1)
ABSOLUTE NEUTROPHIL COUNT (OHS): 2.54 K/UL (ref 1.8–7.7)
ALBUMIN SERPL BCP-MCNC: 3.1 G/DL (ref 3.5–5.2)
ALP SERPL-CCNC: 66 UNIT/L (ref 40–150)
ALT SERPL W/O P-5'-P-CCNC: 25 UNIT/L (ref 10–44)
ANION GAP (OHS): 14 MMOL/L (ref 8–16)
AST SERPL-CCNC: 32 UNIT/L (ref 11–45)
BASOPHILS # BLD AUTO: 0.04 K/UL
BASOPHILS NFR BLD AUTO: 0.9 %
BILIRUB SERPL-MCNC: 1.1 MG/DL (ref 0.1–1)
BNP SERPL-MCNC: 4599 PG/ML (ref 0–99)
BUN SERPL-MCNC: 18 MG/DL (ref 8–23)
CALCIUM SERPL-MCNC: 8.8 MG/DL (ref 8.7–10.5)
CHLORIDE SERPL-SCNC: 110 MMOL/L (ref 95–110)
CO2 SERPL-SCNC: 18 MMOL/L (ref 23–29)
CREAT SERPL-MCNC: 1.4 MG/DL (ref 0.5–1.4)
ERYTHROCYTE [DISTWIDTH] IN BLOOD BY AUTOMATED COUNT: 14.8 % (ref 11.5–14.5)
GFR SERPLBLD CREATININE-BSD FMLA CKD-EPI: 38 ML/MIN/1.73/M2
GLUCOSE SERPL-MCNC: 105 MG/DL (ref 70–110)
HCT VFR BLD AUTO: 36.3 % (ref 37–48.5)
HCV AB SERPL QL IA: POSITIVE
HGB BLD-MCNC: 12.2 GM/DL (ref 12–16)
HIV 1+2 AB+HIV1 P24 AG SERPL QL IA: NEGATIVE
IMM GRANULOCYTES # BLD AUTO: 0.01 K/UL (ref 0–0.04)
IMM GRANULOCYTES NFR BLD AUTO: 0.2 % (ref 0–0.5)
INFLUENZA A MOLECULAR (OHS): NEGATIVE
INFLUENZA B MOLECULAR (OHS): NEGATIVE
LYMPHOCYTES # BLD AUTO: 1.08 K/UL (ref 1–4.8)
MAGNESIUM SERPL-MCNC: 1.9 MG/DL (ref 1.6–2.6)
MCH RBC QN AUTO: 28.8 PG (ref 27–31)
MCHC RBC AUTO-ENTMCNC: 33.6 G/DL (ref 32–36)
MCV RBC AUTO: 86 FL (ref 82–98)
NUCLEATED RBC (/100WBC) (OHS): 0 /100 WBC
PHOSPHATE SERPL-MCNC: 3.4 MG/DL (ref 2.7–4.5)
PLATELET # BLD AUTO: 237 K/UL (ref 150–450)
PMV BLD AUTO: 10.4 FL (ref 9.2–12.9)
POTASSIUM SERPL-SCNC: 3.5 MMOL/L (ref 3.5–5.1)
PROT SERPL-MCNC: 7 GM/DL (ref 6–8.4)
RBC # BLD AUTO: 4.24 M/UL (ref 4–5.4)
RELATIVE EOSINOPHIL (OHS): 1.6 %
RELATIVE LYMPHOCYTE (OHS): 24.1 % (ref 18–48)
RELATIVE MONOCYTE (OHS): 16.7 % (ref 4–15)
RELATIVE NEUTROPHIL (OHS): 56.5 % (ref 38–73)
SARS-COV-2 RDRP RESP QL NAA+PROBE: NEGATIVE
SODIUM SERPL-SCNC: 142 MMOL/L (ref 136–145)
T4 FREE SERPL-MCNC: 1.64 NG/DL (ref 0.71–1.51)
TROPONIN I SERPL DL<=0.01 NG/ML-MCNC: 0.04 NG/ML
TSH SERPL-ACNC: 2.74 UIU/ML (ref 0.4–4)
WBC # BLD AUTO: 4.49 K/UL (ref 3.9–12.7)

## 2025-04-26 PROCEDURE — 85025 COMPLETE CBC W/AUTO DIFF WBC: CPT | Performed by: EMERGENCY MEDICINE

## 2025-04-26 PROCEDURE — 86803 HEPATITIS C AB TEST: CPT | Performed by: EMERGENCY MEDICINE

## 2025-04-26 PROCEDURE — 25000003 PHARM REV CODE 250: Performed by: INTERNAL MEDICINE

## 2025-04-26 PROCEDURE — 93010 ELECTROCARDIOGRAM REPORT: CPT | Mod: ,,, | Performed by: INTERNAL MEDICINE

## 2025-04-26 PROCEDURE — 87522 HEPATITIS C REVRS TRNSCRPJ: CPT | Performed by: EMERGENCY MEDICINE

## 2025-04-26 PROCEDURE — 84439 ASSAY OF FREE THYROXINE: CPT | Performed by: NURSE PRACTITIONER

## 2025-04-26 PROCEDURE — 87502 INFLUENZA DNA AMP PROBE: CPT | Performed by: EMERGENCY MEDICINE

## 2025-04-26 PROCEDURE — U0002 COVID-19 LAB TEST NON-CDC: HCPCS | Performed by: EMERGENCY MEDICINE

## 2025-04-26 PROCEDURE — 83880 ASSAY OF NATRIURETIC PEPTIDE: CPT | Performed by: EMERGENCY MEDICINE

## 2025-04-26 PROCEDURE — 87389 HIV-1 AG W/HIV-1&-2 AB AG IA: CPT | Performed by: EMERGENCY MEDICINE

## 2025-04-26 PROCEDURE — 84443 ASSAY THYROID STIM HORMONE: CPT | Performed by: NURSE PRACTITIONER

## 2025-04-26 PROCEDURE — 84100 ASSAY OF PHOSPHORUS: CPT | Performed by: NURSE PRACTITIONER

## 2025-04-26 PROCEDURE — 80053 COMPREHEN METABOLIC PANEL: CPT | Performed by: EMERGENCY MEDICINE

## 2025-04-26 PROCEDURE — 84484 ASSAY OF TROPONIN QUANT: CPT | Performed by: EMERGENCY MEDICINE

## 2025-04-26 PROCEDURE — 63600175 PHARM REV CODE 636 W HCPCS: Performed by: EMERGENCY MEDICINE

## 2025-04-26 PROCEDURE — 63600175 PHARM REV CODE 636 W HCPCS: Performed by: NURSE PRACTITIONER

## 2025-04-26 PROCEDURE — 93005 ELECTROCARDIOGRAM TRACING: CPT

## 2025-04-26 PROCEDURE — 21400001 HC TELEMETRY ROOM

## 2025-04-26 PROCEDURE — 94761 N-INVAS EAR/PLS OXIMETRY MLT: CPT

## 2025-04-26 PROCEDURE — 83735 ASSAY OF MAGNESIUM: CPT | Performed by: NURSE PRACTITIONER

## 2025-04-26 PROCEDURE — 94640 AIRWAY INHALATION TREATMENT: CPT

## 2025-04-26 PROCEDURE — 25000003 PHARM REV CODE 250: Performed by: NURSE PRACTITIONER

## 2025-04-26 PROCEDURE — 99285 EMERGENCY DEPT VISIT HI MDM: CPT | Mod: 25

## 2025-04-26 PROCEDURE — 96374 THER/PROPH/DIAG INJ IV PUSH: CPT

## 2025-04-26 PROCEDURE — 25000242 PHARM REV CODE 250 ALT 637 W/ HCPCS: Performed by: EMERGENCY MEDICINE

## 2025-04-26 RX ORDER — AMIODARONE HYDROCHLORIDE 200 MG/1
200 TABLET ORAL DAILY
Status: DISCONTINUED | OUTPATIENT
Start: 2025-04-27 | End: 2025-04-28 | Stop reason: HOSPADM

## 2025-04-26 RX ORDER — METHOCARBAMOL 500 MG/1
500 TABLET, FILM COATED ORAL 3 TIMES DAILY PRN
Status: DISCONTINUED | OUTPATIENT
Start: 2025-04-26 | End: 2025-04-28 | Stop reason: HOSPADM

## 2025-04-26 RX ORDER — OXYCODONE AND ACETAMINOPHEN 5; 325 MG/1; MG/1
1 TABLET ORAL EVERY 6 HOURS PRN
Refills: 0 | Status: DISCONTINUED | OUTPATIENT
Start: 2025-04-26 | End: 2025-04-28 | Stop reason: HOSPADM

## 2025-04-26 RX ORDER — POLYETHYLENE GLYCOL 3350 17 G/17G
17 POWDER, FOR SOLUTION ORAL 2 TIMES DAILY PRN
Status: DISCONTINUED | OUTPATIENT
Start: 2025-04-26 | End: 2025-04-28 | Stop reason: HOSPADM

## 2025-04-26 RX ORDER — TALC
6 POWDER (GRAM) TOPICAL NIGHTLY PRN
Status: DISCONTINUED | OUTPATIENT
Start: 2025-04-26 | End: 2025-04-28 | Stop reason: HOSPADM

## 2025-04-26 RX ORDER — ENOXAPARIN SODIUM 100 MG/ML
30 INJECTION SUBCUTANEOUS EVERY 24 HOURS
Status: DISCONTINUED | OUTPATIENT
Start: 2025-04-26 | End: 2025-04-28 | Stop reason: HOSPADM

## 2025-04-26 RX ORDER — ACETAMINOPHEN 325 MG/1
650 TABLET ORAL EVERY 8 HOURS PRN
Status: DISCONTINUED | OUTPATIENT
Start: 2025-04-27 | End: 2025-04-28 | Stop reason: HOSPADM

## 2025-04-26 RX ORDER — ONDANSETRON HYDROCHLORIDE 2 MG/ML
4 INJECTION, SOLUTION INTRAVENOUS EVERY 8 HOURS PRN
Status: DISCONTINUED | OUTPATIENT
Start: 2025-04-26 | End: 2025-04-28 | Stop reason: HOSPADM

## 2025-04-26 RX ORDER — IPRATROPIUM BROMIDE AND ALBUTEROL SULFATE 2.5; .5 MG/3ML; MG/3ML
3 SOLUTION RESPIRATORY (INHALATION)
Status: COMPLETED | OUTPATIENT
Start: 2025-04-26 | End: 2025-04-26

## 2025-04-26 RX ORDER — SODIUM CHLORIDE 0.9 % (FLUSH) 0.9 %
10 SYRINGE (ML) INJECTION
Status: DISCONTINUED | OUTPATIENT
Start: 2025-04-26 | End: 2025-04-28 | Stop reason: HOSPADM

## 2025-04-26 RX ORDER — ALLOPURINOL 100 MG/1
200 TABLET ORAL DAILY
Status: DISCONTINUED | OUTPATIENT
Start: 2025-04-27 | End: 2025-04-28 | Stop reason: HOSPADM

## 2025-04-26 RX ORDER — ASPIRIN 81 MG/1
81 TABLET ORAL DAILY
Status: DISCONTINUED | OUTPATIENT
Start: 2025-04-27 | End: 2025-04-28 | Stop reason: HOSPADM

## 2025-04-26 RX ORDER — FUROSEMIDE 10 MG/ML
40 INJECTION INTRAMUSCULAR; INTRAVENOUS EVERY 12 HOURS
Status: DISCONTINUED | OUTPATIENT
Start: 2025-04-27 | End: 2025-04-28

## 2025-04-26 RX ORDER — ACETAMINOPHEN 500 MG
1000 TABLET ORAL ONCE
Status: COMPLETED | OUTPATIENT
Start: 2025-04-26 | End: 2025-04-26

## 2025-04-26 RX ORDER — FUROSEMIDE 10 MG/ML
60 INJECTION INTRAMUSCULAR; INTRAVENOUS
Status: COMPLETED | OUTPATIENT
Start: 2025-04-26 | End: 2025-04-26

## 2025-04-26 RX ORDER — CALCIUM CARBONATE 200(500)MG
1000 TABLET,CHEWABLE ORAL 3 TIMES DAILY PRN
Status: DISCONTINUED | OUTPATIENT
Start: 2025-04-26 | End: 2025-04-28 | Stop reason: HOSPADM

## 2025-04-26 RX ADMIN — ENOXAPARIN SODIUM 30 MG: 30 INJECTION SUBCUTANEOUS at 09:04

## 2025-04-26 RX ADMIN — ACETAMINOPHEN 1000 MG: 500 TABLET ORAL at 09:04

## 2025-04-26 RX ADMIN — IPRATROPIUM BROMIDE AND ALBUTEROL SULFATE 3 ML: 2.5; .5 SOLUTION RESPIRATORY (INHALATION) at 05:04

## 2025-04-26 RX ADMIN — POTASSIUM BICARBONATE 25 MEQ: 978 TABLET, EFFERVESCENT ORAL at 09:04

## 2025-04-26 RX ADMIN — CALCIUM CARBONATE (ANTACID) CHEW TAB 500 MG 1000 MG: 500 CHEW TAB at 10:04

## 2025-04-26 RX ADMIN — OXYCODONE HYDROCHLORIDE AND ACETAMINOPHEN 1 TABLET: 5; 325 TABLET ORAL at 10:04

## 2025-04-26 RX ADMIN — FUROSEMIDE 60 MG: 10 INJECTION, SOLUTION INTRAMUSCULAR; INTRAVENOUS at 06:04

## 2025-04-26 RX ADMIN — SACUBITRIL AND VALSARTAN 1 TABLET: 49; 51 TABLET, FILM COATED ORAL at 09:04

## 2025-04-26 NOTE — ED PROVIDER NOTES
SCRIBE #1 NOTE: I, Yesy Rueda, am scribing for, and in the presence of, Stephen Wilhelm Jr., MD. I have scribed the entire note.       History     Chief Complaint   Patient presents with    Shortness of Breath     Shortness of breath started last night, worse when walking or lying down. Hx of CHF, denies chest pain. Takes fluid pills, but not helping.      Review of patient's allergies indicates:   Allergen Reactions    Adhesive Blisters    Codeine Other (See Comments)     Hallucinations    Doxycycline monohydrate Nausea Only     Elevated heart rate    Gabapentin Other (See Comments)     Lowered heart rate    Lanoxin [digoxin] Other (See Comments)     Too low heart rate    Thorazine [chlorpromazine] Other (See Comments)     hallucinations    Ultracet [tramadol-acetaminophen] Other (See Comments)     Elevated BP    Penicillins Nausea Only and Rash         History of Present Illness     HPI    4/26/2025, 4:47 PM  History obtained from the patient and medical records      History of Present Illness: Megan Sams is a 80 y.o. female patient with a PMHx of CHF, HTN, stage 3b CKD, and premature ventricular contraction who presents to the Emergency Department for evaluation of SOB which began last night. Symptoms are constant and moderate in severity. Exacerbating factors include lying down and exertion. No mitigating factors reported. Associated sxs include wheezing,  bilateral feet swelling, and back pain. Pt denies a history of asthma or COPD. Patient denies any CP, cough, congestion, fever, or abdominal pain. Prior Tx includes dieretics without relief.  No further complaints or concerns at this time.       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 POSITIVE    Hypertension     Nonischemic cardiomyopathy     PVC's (premature ventricular contractions)     Sinus pause        Past Surgical History:  Past Surgical History:   Procedure Laterality Date    HYSTERECTOMY  1978    OOPHORECTOMY  1978         Family History:  Family History   Problem Relation Name Age of Onset    Early death Mother      Heart disease Mother      Hypertension Mother      Kidney disease Father      Heart disease Brother         Social History:  Social History     Tobacco Use    Smoking status: Never    Smokeless tobacco: Never   Substance and Sexual Activity    Alcohol use: No     Alcohol/week: 0.0 standard drinks of alcohol    Drug use: No    Sexual activity: Not Currently        Review of Systems     Review of Systems   Constitutional:  Negative for fever.   HENT:  Negative for congestion and sore throat.    Respiratory:  Positive for shortness of breath and wheezing. Negative for cough.    Cardiovascular:  Negative for chest pain.   Gastrointestinal:  Negative for abdominal pain, nausea and vomiting.   Genitourinary:  Negative for dysuria.   Musculoskeletal:  Positive for back pain. Negative for neck pain.        (+) swelling (bilateral feet)   Skin:  Negative for rash.   Neurological:  Negative for weakness.   Hematological:  Does not bruise/bleed easily.   All other systems reviewed and are negative.     Physical Exam     Initial Vitals [04/26/25 1613]   BP Pulse Resp Temp SpO2   (!) 157/79 70 20 97.7 °F (36.5 °C) 96 %      MAP       --          Physical Exam  Nursing Notes and Vital Signs Reviewed.  Constitutional: Patient is in no acute distress. Well-developed and well-nourished.  Head: Atraumatic. Normocephalic.  Eyes:  EOM intact.  No scleral icterus.  ENT: Mucous membranes are moist.  Nares clear   Neck:  Full ROM. No JVD.  Cardiovascular: Regular rate. Regular rhythm No murmurs, rubs, or gallops. Distal pulses  are 2+ and symmetric  Pulmonary/Chest: No respiratory distress. Clear to auscultation bilaterally. No wheezing or rales.  Equal chest wall rise bilaterally  Abdominal: Soft and non-distended.  There is no tenderness.  No rebound, guarding, or rigidity. Good bowel sounds.  Genitourinary: No CVA tenderness.  No suprapubic tenderness  Musculoskeletal: Moves all extremities. No obvious deformities.  5 x 5 strength in all extremities   Skin: Warm and dry.  Neurological:  Alert, awake, and appropriate.  Normal speech.  No acute focal neurological deficits are appreciated.  Two through 12 intact bilaterally.  Psychiatric: Normal affect. Good eye contact. Appropriate in content.     ED Course   Critical Care    Date/Time: 5/9/2025 12:42 PM    Performed by: Stephen Wilhelm Jr., MD  Authorized by: Stephen Wilhelm Jr., MD  Direct patient critical care time: 12 minutes  Additional history critical care time: 6 minutes  Ordering / reviewing critical care time: 8 minutes  Documentation critical care time: 7 minutes  Consulting other physicians critical care time: 5 minutes  Total critical care time (exclusive of procedural time) : 38 minutes  Critical care time was exclusive of separately billable procedures and treating other patients and teaching time.  Critical care was necessary to treat or prevent imminent or life-threatening deterioration of the following conditions: cardiac failure, circulatory failure and respiratory failure.  Critical care was time spent personally by me on the following activities: development of treatment plan with patient or surrogate, discussions with consultants, interpretation of cardiac output measurements, evaluation of patient's response to treatment, examination of patient, obtaining history from patient or surrogate, ordering and performing treatments and interventions, ordering and review of laboratory studies, ordering and review of radiographic studies, pulse oximetry, re-evaluation of  patient's condition and review of old charts.      ED Vital Signs:  Vitals:    04/28/25 0100 04/28/25 0359 04/28/25 0442 04/28/25 0458   BP:   123/63    Pulse: (!) 58 (!) 50 (!) 52 (!) 52   Resp:   18    Temp:   97.6 °F (36.4 °C)    TempSrc:   Oral    SpO2:   (!) 94%    Weight:       Height:        04/28/25 0459 04/28/25 0726 04/28/25 0730 04/28/25 0803   BP:   128/62 128/62   Pulse: (!) 52 (!) 55 (!) 55    Resp:   18    Temp:   97.8 °F (36.6 °C)    TempSrc:   Oral    SpO2:   95%    Weight:       Height:        04/28/25 0804 04/28/25 0825 04/28/25 0900 04/28/25 0928   BP: 120/69      Pulse:   (!) 53 (!) 55   Resp:       Temp:       TempSrc:       SpO2:       Weight:  69 kg (152 lb 3.6 oz)     Height:        04/28/25 1129 04/28/25 1136 04/28/25 1554   BP:  138/72 (!) 127/59   Pulse: (!) 58 60 (!) 53   Resp:  18 16   Temp:  97.7 °F (36.5 °C) 98.1 °F (36.7 °C)   TempSrc:  Oral Oral   SpO2:  97% 96%   Weight:      Height:          Abnormal Lab Results:  Labs Reviewed   HEPATITIS C ANTIBODY - Abnormal       Result Value    Hep C Ab Interp Positive (*)    COMPREHENSIVE METABOLIC PANEL - Abnormal    Sodium 142      Potassium 3.5      Chloride 110      CO2 18 (*)     Glucose 105      BUN 18      Creatinine 1.4      Calcium 8.8      Protein Total 7.0      Albumin 3.1 (*)     Bilirubin Total 1.1 (*)     ALP 66      AST 32      ALT 25      Anion Gap 14      eGFR 38 (*)    TROPONIN I - Abnormal    Troponin-I 0.044 (*)    B-TYPE NATRIURETIC PEPTIDE - Abnormal    BNP 4,599 (*)    CBC WITH DIFFERENTIAL - Abnormal    WBC 4.49      RBC 4.24      HGB 12.2      HCT 36.3 (*)     MCV 86      MCH 28.8      MCHC 33.6      RDW 14.8 (*)     Platelet Count 237      MPV 10.4      Nucleated RBC 0      Neut % 56.5      Lymph % 24.1      Mono % 16.7 (*)     Eos % 1.6      Basophil % 0.9      Imm Grans % 0.2      Neut # 2.54      Lymph # 1.08      Mono # 0.75      Eos # 0.07      Baso # 0.04      Imm Grans # 0.01     INFLUENZA A & B BY MOLECULAR  - Normal    INFLUENZA A MOLECULAR Negative      INFLUENZA B MOLECULAR  Negative     HIV 1 / 2 ANTIBODY - Normal    HIV 1/2 Ag/Ab Negative     SARS-COV-2 RNA AMPLIFICATION, QUAL - Normal    SARS COV-2 Molecular Negative     CBC W/ AUTO DIFFERENTIAL    Narrative:     The following orders were created for panel order CBC auto differential.  Procedure                               Abnormality         Status                     ---------                               -----------         ------                     CBC with Differential[[id number]]       Abnormal            Final result                 Please view results for these tests on the individual orders.   HEP C VIRUS HOLD SPECIMEN        All Lab Results:  Results for orders placed or performed during the hospital encounter of 04/26/25   EKG 12-lead    Collection Time: 04/26/25  4:15 PM   Result Value Ref Range    QRS Duration 130 ms    [facility] QTC Calculation 438 ms   Influenza A & B by Molecular    Collection Time: 04/26/25  5:30 PM    Specimen: Nasal Swab   Result Value Ref Range    INFLUENZA A MOLECULAR Negative Negative    INFLUENZA B MOLECULAR  Negative Negative   COVID-19 Rapid Screening    Collection Time: 04/26/25  5:30 PM   Result Value Ref Range    SARS COV-2 Molecular Negative Negative   Hepatitis C Antibody    Collection Time: 04/26/25  5:37 PM   Result Value Ref Range    Hep C Ab Interp Positive (A) Negative   HIV 1/2 Ag/Ab (4th Gen)    Collection Time: 04/26/25  5:37 PM   Result Value Ref Range    HIV 1/2 Ag/Ab Negative Negative   Comprehensive metabolic panel    Collection Time: 04/26/25  5:37 PM   Result Value Ref Range    Sodium 142 136 - 145 mmol/L    Potassium 3.5 3.5 - 5.1 mmol/L    Chloride 110 95 - 110 mmol/L    CO2 18 (L) 23 - 29 mmol/L    Glucose 105 70 - 110 mg/dL    BUN 18 8 - 23 mg/dL    Creatinine 1.4 0.5 - 1.4 mg/dL    Calcium 8.8 8.7 - 10.5 mg/dL    Protein Total 7.0 6.0 - 8.4 gm/dL    Albumin 3.1 (L) 3.5 - 5.2 g/dL    Bilirubin Total  1.1 (H) 0.1 - 1.0 mg/dL    ALP 66 40 - 150 unit/L    AST 32 11 - 45 unit/L    ALT 25 10 - 44 unit/L    Anion Gap 14 8 - 16 mmol/L    eGFR 38 (L) >60 mL/min/1.73/m2   Troponin I    Collection Time: 04/26/25  5:37 PM   Result Value Ref Range    Troponin-I 0.044 (H) <=0.026 ng/mL   Brain natriuretic peptide    Collection Time: 04/26/25  5:37 PM   Result Value Ref Range    BNP 4,599 (H) 0 - 99 pg/mL   CBC with Differential    Collection Time: 04/26/25  5:37 PM   Result Value Ref Range    WBC 4.49 3.90 - 12.70 K/uL    RBC 4.24 4.00 - 5.40 M/uL    HGB 12.2 12.0 - 16.0 gm/dL    HCT 36.3 (L) 37.0 - 48.5 %    MCV 86 82 - 98 fL    MCH 28.8 27.0 - 31.0 pg    MCHC 33.6 32.0 - 36.0 g/dL    RDW 14.8 (H) 11.5 - 14.5 %    Platelet Count 237 150 - 450 K/uL    MPV 10.4 9.2 - 12.9 fL    Nucleated RBC 0 <=0 /100 WBC    Neut % 56.5 38 - 73 %    Lymph % 24.1 18 - 48 %    Mono % 16.7 (H) 4 - 15 %    Eos % 1.6 <=8 %    Basophil % 0.9 <=1.9 %    Imm Grans % 0.2 0.0 - 0.5 %    Neut # 2.54 1.8 - 7.7 K/uL    Lymph # 1.08 1 - 4.8 K/uL    Mono # 0.75 0.3 - 1 K/uL    Eos # 0.07 <=0.5 K/uL    Baso # 0.04 <=0.2 K/uL    Imm Grans # 0.01 0.00 - 0.04 K/uL   Magnesium    Collection Time: 04/26/25  5:37 PM   Result Value Ref Range    Magnesium  1.9 1.6 - 2.6 mg/dL   TSH    Collection Time: 04/26/25  5:37 PM   Result Value Ref Range    TSH 2.739 0.400 - 4.000 uIU/mL   T4, free    Collection Time: 04/26/25  5:37 PM   Result Value Ref Range    Free T4 1.64 (H) 0.71 - 1.51 ng/dL   Phosphorus    Collection Time: 04/26/25  5:37 PM   Result Value Ref Range    Phosphorus Level 3.4 2.7 - 4.5 mg/dL   Hepatitis C RNA, Quantitative, PCR    Collection Time: 04/26/25  8:01 PM   Result Value Ref Range    Hepatitis C RNA, Qualitative Detected (A) Not Detected, Invalid    HCV Log IU/mL 6.38 log IU/mL    HCV Quantitative Result 2,399,437 IU/mL   Hemoglobin A1c    Collection Time: 04/27/25 12:12 AM   Result Value Ref Range    Hemoglobin A1c 5.1 4.0 - 5.6 %    Estimated  Average Glucose 100 68 - 131 mg/dL   Troponin I    Collection Time: 04/27/25 12:12 AM   Result Value Ref Range    Troponin-I 0.084 (H) <=0.026 ng/mL   CBC with Differential    Collection Time: 04/27/25  4:48 AM   Result Value Ref Range    WBC 5.09 3.90 - 12.70 K/uL    RBC 3.89 (L) 4.00 - 5.40 M/uL    HGB 11.2 (L) 12.0 - 16.0 gm/dL    HCT 32.9 (L) 37.0 - 48.5 %    MCV 85 82 - 98 fL    MCH 28.8 27.0 - 31.0 pg    MCHC 34.0 32.0 - 36.0 g/dL    RDW 14.2 11.5 - 14.5 %    Platelet Count 237 150 - 450 K/uL    MPV 10.9 9.2 - 12.9 fL    Nucleated RBC 0 <=0 /100 WBC    Neut % 62.1 38 - 73 %    Lymph % 20.4 18 - 48 %    Mono % 16.3 (H) 4 - 15 %    Eos % 0.4 <=8 %    Basophil % 0.4 <=1.9 %    Imm Grans % 0.4 0.0 - 0.5 %    Neut # 3.16 1.8 - 7.7 K/uL    Lymph # 1.04 1 - 4.8 K/uL    Mono # 0.83 0.3 - 1 K/uL    Eos # 0.02 <=0.5 K/uL    Baso # 0.02 <=0.2 K/uL    Imm Grans # 0.02 0.00 - 0.04 K/uL   Magnesium    Collection Time: 04/27/25  4:49 AM   Result Value Ref Range    Magnesium  1.8 1.6 - 2.6 mg/dL   Basic metabolic panel    Collection Time: 04/27/25  4:49 AM   Result Value Ref Range    Sodium 143 136 - 145 mmol/L    Potassium 3.3 (L) 3.5 - 5.1 mmol/L    Chloride 108 95 - 110 mmol/L    CO2 23 23 - 29 mmol/L    Glucose 118 (H) 70 - 110 mg/dL    BUN 18 8 - 23 mg/dL    Creatinine 1.4 0.5 - 1.4 mg/dL    Calcium 8.7 8.7 - 10.5 mg/dL    Anion Gap 12 8 - 16 mmol/L    eGFR 38 (L) >60 mL/min/1.73/m2   Lipid panel    Collection Time: 04/27/25  4:49 AM   Result Value Ref Range    Cholesterol Total 154 120 - 199 mg/dL    Triglyceride 88 30 - 150 mg/dL    HDL Cholesterol 40 40 - 75 mg/dL    LDL Cholesterol 96.4 63.0 - 159.0 mg/dL    HDL/Cholesterol Ratio 26.0 20.0 - 50.0 %    Cholesterol/HDL Ratio 3.9 2.0 - 5.0    Non HDL Cholesterol 114 mg/dL   Troponin I    Collection Time: 04/27/25  4:49 AM   Result Value Ref Range    Troponin-I 0.086 (H) <=0.026 ng/mL   Echo    Collection Time: 04/27/25 10:32 AM   Result Value Ref Range    BSA 1.78 m2     LA WIDTH 6.1 cm    RA Width 3.9 cm    LVOT stroke volume 40.5 cm3    LVIDd 5.7 3.5 - 6.0 cm    LV Systolic Volume 111 mL    LV Systolic Volume Index 63.8 mL/m2    LVIDs 4.9 (A) 2.1 - 4.0 cm    LV ESV A2C 102.16 mL    LV Diastolic Volume 159 mL    LV ESV A4C 97.57 mL    LV Diastolic Volume Index 91.38 mL/m2    Left Ventricular End Systolic Volume by Teichholz Method 110.95 mL    Left Ventricular End Diastolic Volume by Teichholz Method 158.70 mL    IVS 0.8 0.6 - 1.1 cm    LVOT diameter 2.0 cm    LVOT area 3.1 cm2    FS 14.0 (A) 28 - 44 %    Left Ventricle Relative Wall Thickness 0.32 cm    PW 0.9 0.6 - 1.1 cm    LV mass 183.7 g    LV Mass Index 105.6 g/m2    MV Peak E Gilbert 0.66 m/s    TDI LATERAL 0.06 m/s    TDI SEPTAL 0.05 m/s    E/E' ratio 12 m/s    MV Peak A Glibert 0.51 m/s    TR Max Gilbert 3.4 m/s    E/A ratio 1.29     IVRT 117 msec    E wave deceleration time 266 msec    LV SEPTAL E/E' RATIO 13.2 m/s    RORO 66 mL/m2    LV LATERAL E/E' RATIO 11.0 m/s    LA Vol 115 cm3    PV Peak S Gilbert 0.36 m/s    PV Peak D Gilbert 0.53 m/s    Pulm vein S/D ratio 0.68     LVOT peak gilbert 0.7 m/s    Left Ventricular Outflow Tract Mean Velocity 0.47 cm/s    Left Ventricular Outflow Tract Mean Gradient 1.02 mmHg    RV- garcia basal diam 3.3 cm    RVOT peak VTI 11.9 cm    TAPSE 1.5 cm    RV/LV Ratio 0.58 cm    LA size 3.5 cm    Left Atrium Minor Axis 6.2 cm    Left Atrium Major Axis 6.5 cm    LA Vol (MOD) 104 mL    RORO (MOD) 60 mL/m2    RA Major Axis 4.99 cm    AV mean gradient 3 mmHg    AV peak gradient 7 mmHg    Ao peak gilbert 1.3 m/s    Ao VTI 21.8 cm    LVOT peak VTI 12.9 cm    AV valve area 1.9 cm²    AV Velocity Ratio 0.54     AV index (prosthetic) 0.59     ANGELES by Velocity Ratio 1.7 cm²    Triscuspid Valve Regurgitation Peak Gradient 46 mmHg    PV mean gradient 0 mmHg    PV PEAK VELOCITY 1.03 m/s    PV peak gradient 4 mmHg    Pulmonary Valve Mean Velocity 0.63 m/s    RVOT peak gilbert 0.43 m/s    Ao root annulus 2.6 cm    STJ 1.6 cm    Ascending  aorta 2.3 cm    Mean e' 0.06 m/s    ZLVIDS 3.93     ZLVIDD 1.68     LA area A4C 28.09 cm2    LA area A2C 29.25 cm2    RVDD 3.27 cm    TV resting pulmonary artery pressure 61 mmHg    RV TB RVSP 18 mmHg    Est. RA pres 15 mmHg   Magnesium    Collection Time: 04/28/25  5:09 AM   Result Value Ref Range    Magnesium  2.3 1.6 - 2.6 mg/dL   Basic metabolic panel    Collection Time: 04/28/25  5:09 AM   Result Value Ref Range    Sodium 139 136 - 145 mmol/L    Potassium 3.8 3.5 - 5.1 mmol/L    Chloride 103 95 - 110 mmol/L    CO2 26 23 - 29 mmol/L    Glucose 118 (H) 70 - 110 mg/dL    BUN 18 8 - 23 mg/dL    Creatinine 1.6 (H) 0.5 - 1.4 mg/dL    Calcium 8.5 (L) 8.7 - 10.5 mg/dL    Anion Gap 10 8 - 16 mmol/L    eGFR 32 (L) >60 mL/min/1.73/m2   CBC with Differential    Collection Time: 04/28/25  9:30 AM   Result Value Ref Range    WBC 5.84 3.90 - 12.70 K/uL    RBC 4.32 4.00 - 5.40 M/uL    HGB 12.4 12.0 - 16.0 gm/dL    HCT 37.6 37.0 - 48.5 %    MCV 87 82 - 98 fL    MCH 28.7 27.0 - 31.0 pg    MCHC 33.0 32.0 - 36.0 g/dL    RDW 14.6 (H) 11.5 - 14.5 %    Platelet Count 256 150 - 450 K/uL    MPV 10.7 9.2 - 12.9 fL    Nucleated RBC 0 <=0 /100 WBC    Neut % 65.0 38 - 73 %    Lymph % 23.1 18 - 48 %    Mono % 9.9 4 - 15 %    Eos % 1.2 <=8 %    Basophil % 0.5 <=1.9 %    Imm Grans % 0.3 0.0 - 0.5 %    Neut # 3.79 1.8 - 7.7 K/uL    Lymph # 1.35 1 - 4.8 K/uL    Mono # 0.58 0.3 - 1 K/uL    Eos # 0.07 <=0.5 K/uL    Baso # 0.03 <=0.2 K/uL    Imm Grans # 0.02 0.00 - 0.04 K/uL       Imaging Results:  Imaging Results              X-Ray Chest AP Portable (Final result)  Result time 04/26/25 17:33:34      Final result by Don Jett MD (04/26/25 17:33:34)                   Impression:      1.  Mild vascular congestion.  Cardiac silhouette size enlargement.  Findings are most consistent with mild CHF.  2.  Stable findings as noted above.    Finalized on: 4/26/2025 5:33 PM By:  Don Jett MD  [facility]# [id number]      2025-04-26 17:35:40.348     [facility]                Narrative:    EXAM:  XR CHEST AP PORTABLE    CLINICAL INDICATION:  Shortness of breath.  Dyspnea    FINDINGS:  Frontal views of the chest were obtained.    Comparisons are made to multiple studies dating back to 02/14/2017.  EKG leads overlie the chest.     The lungs are free of alveolar opacities. The cardiac silhouette size is enlarged. The trachea is midline and the mediastinal width is normal. Negative for focal infiltrate, effusion or pneumothorax. Pulmonary vasculature is congested. Negative for osseous abnormalities.  Mild convex left curvature the midthoracic spine.  There are atherosclerotic calcifications of the aortic knob and tortuosity of the aorta.  There are degenerative changes of the spine and both shoulder girdles.                                         The EKG was ordered, reviewed, and independently interpreted by the ED provider.  Interpretation time: 16:15  Rate: 66 BPM  Rhythm: normal sinus rhythm  Interpretation: Possible left atrial enlargement. LVH with QRS widening ([place] product). Cannot rule out septal infarct, age undetermined. No STEMI.           The Emergency Provider reviewed the vital signs and test results, which are outlined above.     ED Discussion     6:35 PM: Discussed case with Ekta Howe NP ([facility]). Dr. Jessica Lagunas agrees with current care and management of pt and accepts admission.   Admitting Service: Hospital Medicine  Admitting Physician: Dr. Lagunas  Admit to: Inpatient: Med/Tele    6:35 PM: Re-evaluated pt. I have discussed test results, shared treatment plan, and the need for admission with patient/family/caretaker at bedside. Pt and/or family/caretaker express understanding at this time and agree with all information. All questions answered. Pt/caretaker/family member(s) have no further questions or concerns at this time. Pt is ready for admit.           ED Course as of 05/09/25 1243   Sat Apr 26, 2025   1816 Cardiac monitor  interpretation  Independent interpretation  Indication: Shortness breath  Normal sinus rhythm.  Rate 79.  No STEMI [RT]      ED Course User Index  [RT] Stephen Wilhelm Jr., MD     Medical Decision Making  Differential diagnosis:  Pneumonia, COPD, asthma, congestive heart failure    The patient is evaluated history physical examination.  Patient has markedly elevated BNP with a mild elevation in his troponin from baseline with shortness a breath.  No STEMI on her EKG.  The patient is being admitted to Hospital Medicine further evaluation and treatment    Amount and/or Complexity of Data Reviewed  External Data Reviewed: labs, ECG and notes.  Labs: ordered. Decision-making details documented in ED Course.     Details: BNP markedly elevated 4599.  Troponin elevated 0.044.  Both of these are higher than her baseline.  That has CO2 of 18 with a otherwise benign CMP.  Normal white count.  Normal H&H for.  Flu and COVID are negative  Radiology: ordered. Decision-making details documented in ED Course.     Details: Mild vascular congestion on chest x-ray  ECG/medicine tests: ordered and independent interpretation performed. Decision-making details documented in ED Course.     Details: No STEMI  Discussion of management or test interpretation with external provider(s): Discussed the case with the hospital medicine graciously accepts for admission    Risk  OTC drugs.  Prescription drug management.  Drug therapy requiring intensive monitoring for toxicity.  Decision regarding hospitalization.    Critical Care  Total time providing critical care: 38 minutes              ED Medication(s):  Medications   albuterol-ipratropium 2.5 mg-0.5 mg/3 mL nebulizer solution 3 mL (3 mLs Nebulization Given 4/26/25 1728)   furosemide injection 60 mg (60 mg Intravenous Given 4/26/25 1830)   acetaminophen tablet 1,000 mg (1,000 mg Oral Given 4/26/25 2100)   potassium bicarbonate disintegrating tablet 25 mEq (25 mEq Oral Given 4/26/25 2101)    potassium chloride SA CR tablet 40 mEq (40 mEq Oral Given 4/27/25 0828)   magnesium sulfate 2g in water 50mL IVPB (premix) (0 g Intravenous Stopped 4/27/25 1037)       Discharge Medication List as of 4/28/2025  1:02 PM           Contact information for follow-up providers       Don Mayen MD Follow up in 1 week(s).    Specialty: Family Medicine  Contact information:  [address]  [place] [address]  [phone]                       Contact information for after-discharge care       Home Medical Care       [facility] .    Service: Home Health Services  Contact information:  [address]  [facility] [id number] [phone]                                       Scribe Attestation:   Scribe #1: I performed the above scribed service and the documentation accurately describes the services I performed. I attest to the accuracy of the note.     Attending:   Physician Attestation Statement for Scribe #1: I, Stephen Wilhelm Jr., MD, personally performed the services described in this documentation, as scribed by Yesy Rueda, in my presence, and it is both accurate and complete.           Clinical Impression       ICD-10-CM ICD-9-CM   1. Congestive heart failure, unspecified HF chronicity, unspecified heart failure type  I50.9 428.0   2. Shortness of breath  R06.02 786.05   3. Dyspnea  R06.00 786.09   4. Troponin level elevated  R79.89 790.6   5. CHF (congestive heart failure)  I50.9 428.0       Disposition:   Disposition: Admitted  Condition: Stephen Encarnacion Jr., MD  04/26/25 1854       Stephen Wilhelm Jr., MD  05/09/25 1243

## 2025-04-27 LAB
ABSOLUTE EOSINOPHIL (OHS): 0.02 K/UL
ABSOLUTE MONOCYTE (OHS): 0.83 K/UL (ref 0.3–1)
ABSOLUTE NEUTROPHIL COUNT (OHS): 3.16 K/UL (ref 1.8–7.7)
ANION GAP (OHS): 12 MMOL/L (ref 8–16)
AORTIC ROOT ANNULUS: 2.6 CM
ASCENDING AORTA: 2.3 CM
AV INDEX (PROSTH): 0.59
AV MEAN GRADIENT: 3 MMHG
AV PEAK GRADIENT: 7 MMHG
AV VALVE AREA BY VELOCITY RATIO: 1.7 CM²
AV VALVE AREA: 1.9 CM²
AV VELOCITY RATIO: 0.54
BASOPHILS # BLD AUTO: 0.02 K/UL
BASOPHILS NFR BLD AUTO: 0.4 %
BSA FOR ECHO PROCEDURE: 1.78 M2
BUN SERPL-MCNC: 18 MG/DL (ref 8–23)
CALCIUM SERPL-MCNC: 8.7 MG/DL (ref 8.7–10.5)
CHLORIDE SERPL-SCNC: 108 MMOL/L (ref 95–110)
CHOLEST SERPL-MCNC: 154 MG/DL (ref 120–199)
CHOLEST/HDLC SERPL: 3.9 {RATIO} (ref 2–5)
CO2 SERPL-SCNC: 23 MMOL/L (ref 23–29)
CREAT SERPL-MCNC: 1.4 MG/DL (ref 0.5–1.4)
CV ECHO LV RWT: 0.32 CM
DOP CALC AO PEAK VEL: 1.3 M/S
DOP CALC AO VTI: 21.8 CM
DOP CALC LVOT AREA: 3.1 CM2
DOP CALC LVOT DIAMETER: 2 CM
DOP CALC LVOT PEAK VEL: 0.7 M/S
DOP CALC LVOT STROKE VOLUME: 40.5 CM3
DOP CALC RVOT PEAK VEL: 0.43 M/S
DOP CALC RVOT VTI: 11.9 CM
DOP CALCLVOT PEAK VEL VTI: 12.9 CM
E WAVE DECELERATION TIME: 266 MSEC
E/A RATIO: 1.29
E/E' RATIO: 12 M/S
EAG (OHS): 100 MG/DL (ref 68–131)
ECHO LV POSTERIOR WALL: 0.9 CM (ref 0.6–1.1)
ERYTHROCYTE [DISTWIDTH] IN BLOOD BY AUTOMATED COUNT: 14.2 % (ref 11.5–14.5)
FRACTIONAL SHORTENING: 14 % (ref 28–44)
GFR SERPLBLD CREATININE-BSD FMLA CKD-EPI: 38 ML/MIN/1.73/M2
GLUCOSE SERPL-MCNC: 118 MG/DL (ref 70–110)
HBA1C MFR BLD: 5.1 % (ref 4–5.6)
HCT VFR BLD AUTO: 32.9 % (ref 37–48.5)
HDLC SERPL-MCNC: 40 MG/DL (ref 40–75)
HDLC SERPL: 26 % (ref 20–50)
HGB BLD-MCNC: 11.2 GM/DL (ref 12–16)
IMM GRANULOCYTES # BLD AUTO: 0.02 K/UL (ref 0–0.04)
IMM GRANULOCYTES NFR BLD AUTO: 0.4 % (ref 0–0.5)
INTERVENTRICULAR SEPTUM: 0.8 CM (ref 0.6–1.1)
IVRT: 117 MSEC
LA MAJOR: 6.5 CM
LA MINOR: 6.2 CM
LA WIDTH: 6.1 CM
LDLC SERPL CALC-MCNC: 96.4 MG/DL (ref 63–159)
LEFT ATRIUM AREA SYSTOLIC (APICAL 2 CHAMBER): 29.25 CM2
LEFT ATRIUM AREA SYSTOLIC (APICAL 4 CHAMBER): 28.09 CM2
LEFT ATRIUM SIZE: 3.5 CM
LEFT ATRIUM VOLUME INDEX MOD: 60 ML/M2
LEFT ATRIUM VOLUME INDEX: 66 ML/M2
LEFT ATRIUM VOLUME MOD: 104 ML
LEFT ATRIUM VOLUME: 115 CM3
LEFT INTERNAL DIMENSION IN SYSTOLE: 4.9 CM (ref 2.1–4)
LEFT VENTRICLE DIASTOLIC VOLUME INDEX: 91.38 ML/M2
LEFT VENTRICLE DIASTOLIC VOLUME: 159 ML
LEFT VENTRICLE END SYSTOLIC VOLUME APICAL 2 CHAMBER: 102.16 ML
LEFT VENTRICLE END SYSTOLIC VOLUME APICAL 4 CHAMBER: 97.57 ML
LEFT VENTRICLE MASS INDEX: 105.6 G/M2
LEFT VENTRICLE SYSTOLIC VOLUME INDEX: 63.8 ML/M2
LEFT VENTRICLE SYSTOLIC VOLUME: 111 ML
LEFT VENTRICULAR INTERNAL DIMENSION IN DIASTOLE: 5.7 CM (ref 3.5–6)
LEFT VENTRICULAR MASS: 183.7 G
LV LATERAL E/E' RATIO: 11 M/S
LV SEPTAL E/E' RATIO: 13.2 M/S
LVED V (TEICH): 158.7 ML
LVES V (TEICH): 110.95 ML
LVOT MG: 1.02 MMHG
LVOT MV: 0.47 CM/S
LYMPHOCYTES # BLD AUTO: 1.04 K/UL (ref 1–4.8)
MAGNESIUM SERPL-MCNC: 1.8 MG/DL (ref 1.6–2.6)
MCH RBC QN AUTO: 28.8 PG (ref 27–31)
MCHC RBC AUTO-ENTMCNC: 34 G/DL (ref 32–36)
MCV RBC AUTO: 85 FL (ref 82–98)
MV PEAK A VEL: 0.51 M/S
MV PEAK E VEL: 0.66 M/S
NONHDLC SERPL-MCNC: 114 MG/DL
NUCLEATED RBC (/100WBC) (OHS): 0 /100 WBC
OHS CV RV/LV RATIO: 0.58 CM
OHS QRS DURATION: 130 MS
OHS QTC CALCULATION: 438 MS
PISA TR MAX VEL: 3.4 M/S
PLATELET # BLD AUTO: 237 K/UL (ref 150–450)
PMV BLD AUTO: 10.9 FL (ref 9.2–12.9)
POTASSIUM SERPL-SCNC: 3.3 MMOL/L (ref 3.5–5.1)
PULM VEIN S/D RATIO: 0.68
PV MEAN GRADIENT: 0 MMHG
PV MV: 0.63 M/S
PV PEAK D VEL: 0.53 M/S
PV PEAK GRADIENT: 4 MMHG
PV PEAK S VEL: 0.36 M/S
PV PEAK VELOCITY: 1.03 M/S
RA MAJOR: 4.99 CM
RA PRESSURE ESTIMATED: 15 MMHG
RA WIDTH: 3.9 CM
RBC # BLD AUTO: 3.89 M/UL (ref 4–5.4)
RELATIVE EOSINOPHIL (OHS): 0.4 %
RELATIVE LYMPHOCYTE (OHS): 20.4 % (ref 18–48)
RELATIVE MONOCYTE (OHS): 16.3 % (ref 4–15)
RELATIVE NEUTROPHIL (OHS): 62.1 % (ref 38–73)
RIGHT VENTRICLE DIASTOLIC BASEL DIMENSION: 3.3 CM
RIGHT VENTRICULAR END-DIASTOLIC DIMENSION: 3.27 CM
RV TB RVSP: 18 MMHG
SODIUM SERPL-SCNC: 143 MMOL/L (ref 136–145)
STJ: 1.6 CM
TDI LATERAL: 0.06 M/S
TDI SEPTAL: 0.05 M/S
TDI: 0.06 M/S
TR MAX PG: 46 MMHG
TRICUSPID ANNULAR PLANE SYSTOLIC EXCURSION: 1.5 CM
TRIGL SERPL-MCNC: 88 MG/DL (ref 30–150)
TROPONIN I SERPL DL<=0.01 NG/ML-MCNC: 0.08 NG/ML
TROPONIN I SERPL DL<=0.01 NG/ML-MCNC: 0.09 NG/ML
TV REST PULMONARY ARTERY PRESSURE: 61 MMHG
WBC # BLD AUTO: 5.09 K/UL (ref 3.9–12.7)
Z-SCORE OF LEFT VENTRICULAR DIMENSION IN END DIASTOLE: 1.68
Z-SCORE OF LEFT VENTRICULAR DIMENSION IN END SYSTOLE: 3.93

## 2025-04-27 PROCEDURE — 84484 ASSAY OF TROPONIN QUANT: CPT | Performed by: NURSE PRACTITIONER

## 2025-04-27 PROCEDURE — 80048 BASIC METABOLIC PNL TOTAL CA: CPT | Performed by: NURSE PRACTITIONER

## 2025-04-27 PROCEDURE — 25000003 PHARM REV CODE 250: Performed by: NURSE PRACTITIONER

## 2025-04-27 PROCEDURE — 63600175 PHARM REV CODE 636 W HCPCS: Performed by: NURSE PRACTITIONER

## 2025-04-27 PROCEDURE — 25000003 PHARM REV CODE 250: Performed by: INTERNAL MEDICINE

## 2025-04-27 PROCEDURE — 83036 HEMOGLOBIN GLYCOSYLATED A1C: CPT | Performed by: NURSE PRACTITIONER

## 2025-04-27 PROCEDURE — 99223 1ST HOSP IP/OBS HIGH 75: CPT | Mod: 25,,, | Performed by: INTERNAL MEDICINE

## 2025-04-27 PROCEDURE — 63600175 PHARM REV CODE 636 W HCPCS: Performed by: INTERNAL MEDICINE

## 2025-04-27 PROCEDURE — 83735 ASSAY OF MAGNESIUM: CPT | Performed by: NURSE PRACTITIONER

## 2025-04-27 PROCEDURE — 21400001 HC TELEMETRY ROOM

## 2025-04-27 PROCEDURE — 36415 COLL VENOUS BLD VENIPUNCTURE: CPT | Performed by: NURSE PRACTITIONER

## 2025-04-27 PROCEDURE — 85025 COMPLETE CBC W/AUTO DIFF WBC: CPT | Performed by: NURSE PRACTITIONER

## 2025-04-27 PROCEDURE — 80061 LIPID PANEL: CPT | Performed by: NURSE PRACTITIONER

## 2025-04-27 RX ORDER — MAGNESIUM SULFATE HEPTAHYDRATE 40 MG/ML
2 INJECTION, SOLUTION INTRAVENOUS ONCE
Status: COMPLETED | OUTPATIENT
Start: 2025-04-27 | End: 2025-04-27

## 2025-04-27 RX ORDER — POTASSIUM CHLORIDE 20 MEQ/1
40 TABLET, EXTENDED RELEASE ORAL ONCE
Status: COMPLETED | OUTPATIENT
Start: 2025-04-27 | End: 2025-04-27

## 2025-04-27 RX ADMIN — FUROSEMIDE 40 MG: 10 INJECTION, SOLUTION INTRAMUSCULAR; INTRAVENOUS at 08:04

## 2025-04-27 RX ADMIN — MAGNESIUM SULFATE HEPTAHYDRATE 2 G: 40 INJECTION, SOLUTION INTRAVENOUS at 08:04

## 2025-04-27 RX ADMIN — AMIODARONE HYDROCHLORIDE 200 MG: 200 TABLET ORAL at 08:04

## 2025-04-27 RX ADMIN — METHOCARBAMOL 500 MG: 500 TABLET ORAL at 07:04

## 2025-04-27 RX ADMIN — ASPIRIN 81 MG: 81 TABLET, COATED ORAL at 08:04

## 2025-04-27 RX ADMIN — Medication 6 MG: at 08:04

## 2025-04-27 RX ADMIN — ACETAMINOPHEN 650 MG: 325 TABLET ORAL at 08:04

## 2025-04-27 RX ADMIN — METOPROLOL SUCCINATE 12.5 MG: 25 TABLET, EXTENDED RELEASE ORAL at 08:04

## 2025-04-27 RX ADMIN — SACUBITRIL AND VALSARTAN 1 TABLET: 49; 51 TABLET, FILM COATED ORAL at 08:04

## 2025-04-27 RX ADMIN — ENOXAPARIN SODIUM 30 MG: 30 INJECTION SUBCUTANEOUS at 05:04

## 2025-04-27 RX ADMIN — POTASSIUM CHLORIDE 40 MEQ: 1500 TABLET, EXTENDED RELEASE ORAL at 08:04

## 2025-04-27 RX ADMIN — OXYCODONE HYDROCHLORIDE AND ACETAMINOPHEN 1 TABLET: 5; 325 TABLET ORAL at 01:04

## 2025-04-27 RX ADMIN — ALLOPURINOL 200 MG: 100 TABLET ORAL at 08:04

## 2025-04-27 NOTE — ASSESSMENT & PLAN NOTE
Patient's blood pressure range in the last 24 hours was: BP  Min: 141/71  Max: 168/81.The patient's inpatient anti-hypertensive regimen is listed below:  Current Antihypertensives  metoprolol succinate (TOPROL-XL) 24 hr split tablet 12.5 mg, Daily, Oral  furosemide injection 40 mg, Every 12 hours, Intravenous    Plan  - BP is controlled, no changes needed to their regimen

## 2025-04-27 NOTE — ASSESSMENT & PLAN NOTE
Patient has Combined Systolic and Diastolic heart failure that is Acute on chronic. On presentation their CHF was decompensated. Evidence of decompensated CHF on presentation includes: edema, elevated JVD, orthopnea, and shortness of breath. The etiology of their decompensation is likely dietary indiscretion. Most recent BNP and echo results are listed below.  Recent Labs     04/26/25  1737   BNP 4,599*     Latest ECHO  Results for orders placed during the hospital encounter of 07/19/24    Echo    Interpretation Summary    Left Ventricle: The left ventricle is normal in size. Normal wall thickness. There is concentric hypertrophy. Mild global hypokinesis present. There is reduced systolic function. Ejection fraction by visual approximation is 40%. There is indeterminate diastolic function.    Right Ventricle: Normal right ventricular cavity size. Wall thickness is normal. Systolic function is normal.    Aortic Valve: The aortic valve is a trileaflet valve. There is mild stenosis. Aortic valve area by VTI is 1.92 cm². Aortic valve peak velocity is 1.17 m/s. Mean gradient is 3 mmHg. The dimensionless index is 0.59.    Mitral Valve: There is moderate bileaflet sclerosis. There is normal leaflet mobility. There is prolapse of the anterior mitral leaflet. There is mild to moderate regurgitation.    Pulmonary Artery: The estimated pulmonary artery systolic pressure is 48 mmHg.    IVC/SVC: Normal venous pressure at 3 mmHg.    Current Heart Failure Medications  metoprolol succinate (TOPROL-XL) 24 hr split tablet 12.5 mg, Daily, Oral  sacubitriL-valsartan 49-51 mg per tablet 1 tablet, 2 times daily, Oral  furosemide injection 40 mg, Every 12 hours, Intravenous    Plan  - Monitor strict I&Os and daily weights.    - Place on telemetry  - Low sodium diet  - Place on fluid restriction of 1.5 L.   - Cardiology has been consulted  - The patient's volume status is improving but not at their baseline as indicated by edema, elevated  JVD, orthopnea, and shortness of breath  - continue with IV diuresis

## 2025-04-27 NOTE — HPI
80-year-old female with past medical history significant for arthritis, hypertension, CKD stage IIIB, nonischemic cardiomyopathy, heart failure with reduced EF (Echo 7/19/2024 EF 35-40%), bradycardia, PVCs, history of pauses, gout, and back pain who presented to ED with complaint of shortness of breath, which started last night and has gotten worse. She also reports wheezing, bilateral feet swelling and back pain. Denies history of asthma/COPD. She states that she has been taking her diuretics (lasix 20 mg and spironolactone 50 mg) without improvement. She denies headache, dizziness, lightheadedness, syncope, falls, weakness, chest pain, productive cough, fever, chills, congestion, abdominal pain, nausea, vomiting, diarrhea, dysuria. on arrival to ED, temp 97.7°, heart rate 70, respirations 20, blood pressure 157/79, 96% SpO2 on room air. Lab workup significant for BNP 45 99, troponin 0.044, CO2 18, potassium 3.5. There is no leukocytosis. Chest x-ray consistent with vascular congestion, cardiac silhouette size enlargement noted. EKG showedPatient is 80-year-old female with past medical history significant for arthritis, hypertension, CKD stage IIIB, nonischemic cardiomyopathy, heart failure with reduced EF (Echo 7/19/2024 EF 35-40%), bradycardia, PVCs, history of pauses, gout, and back pain who presented to ED with complaint of shortness of breath, which started last night and has gotten worse. She also reports wheezing, bilateral feet swelling and back pain. Denies history of asthma/COPD. She states that she has been taking her diuretics (lasix 20 mg and spironolactone 50 mg) without improvement. She denies headache, dizziness, lightheadedness, syncope, falls, weakness, chest pain, productive cough, fever, chills, congestion, abdominal pain, nausea, vomiting, diarrhea, dysuria. on arrival to ED, temp 97.7°, heart rate 70, respirations 20, blood pressure 157/79, 96% SpO2 on room air. Lab workup significant for BNP  45 99, troponin 0.044, CO2 18, potassium 3.5. There is no leukocytosis. Chest x-ray consistent with vascular congestion, cardiac silhouette size enlargement noted. EKG showed normal sinus rhythm, heart rate 66, QRS widening noted LVH, LA enlargement, no STEMI. While in ED, patient was given DuoNeb x1 and Lasix 60 mg IV. Hospital Medicine was consulted for admission due to CHF exacerbation. While in ED, patient was given DuoNeb x1 and Lasix 60 mg IV. Hospital Medicine was consulted for admission due to CHF exacerbation.    04/27/2025.    Continues to be fluid overloaded.    Reports response to IV diuretics

## 2025-04-27 NOTE — PROGRESS NOTES
Pharmacist Renal Dose Adjustment Note    Megan Sams is a 80 y.o. female being treated with the medication enoxaparin    Patient Data:    Vital Signs (Most Recent):  Temp: 98.5 °F (36.9 °C) (04/26/25 2000)  Pulse: 71 (04/26/25 1900)  Resp: 20 (04/26/25 2000)  BP: (!) 163/79 (04/26/25 2000)  SpO2: 100 % (04/26/25 2000) Vital Signs (72h Range):  Temp:  [97.7 °F (36.5 °C)-98.5 °F (36.9 °C)]   Pulse:  [63-73]   Resp:  [18-35]   BP: (151-168)/(72-81)   SpO2:  [96 %-100 %]      Recent Labs   Lab 04/26/25  1737   CREATININE 1.4     Serum creatinine: 1.4 mg/dL 04/26/25 1737  Estimated creatinine clearance: 28.9 mL/min    Enoxaparin 40 mg q24h will be changed to enoxaparin 30 mg q24h for CrCl <30 mL/min.     Pharmacist's Name: Ellie Doran  Pharmacist's Extension: [phone]

## 2025-04-27 NOTE — CONSULTS
RD providing remote coverage.   Consulted for fluid and sodium restrictions diet education   - attached clinical reference materials handouts to discharge documents.    On site RD to provide in-person education as warranted,   education materials to be provided with discharge instructions.      Please re-consult as needed.  Thank you.   Dianne Skinner, RDN, LDN

## 2025-04-27 NOTE — HPI
Patient is 80-year-old female with past medical history significant for arthritis, hypertension, CKD stage IIIB, nonischemic cardiomyopathy, heart failure with reduced EF (Echo 7/19/2024 EF 35-40%),  bradycardia, PVCs, history of pauses, gout, and back pain who presented to ED with complaint of shortness of breath, which started last night and has gotten worse. She also reports wheezing, bilateral feet swelling and back pain. Denies history of asthma/COPD. She states that she has been taking her diuretics (lasix 20 mg and spironolactone 50 mg) without improvement. She denies headache, dizziness, lightheadedness, syncope, falls, weakness, chest pain, productive cough, fever, chills, congestion, abdominal pain, nausea, vomiting, diarrhea, dysuria.  on arrival to ED, temp 97.7°, heart rate 70, respirations 20, blood pressure 157/79, 96% SpO2 on room air.  Lab workup significant for BNP 45 99, troponin 0.044, CO2 18, potassium 3.5.  There is no leukocytosis.    Chest x-ray consistent with vascular congestion, cardiac silhouette size enlargement noted.   EKG showedPatient is 80-year-old female with past medical history significant for arthritis, hypertension, CKD stage IIIB, nonischemic cardiomyopathy, heart failure with reduced EF (Echo 7/19/2024 EF 35-40%),  bradycardia, PVCs, history of pauses, gout, and back pain who presented to ED with complaint of shortness of breath, which started last night and has gotten worse. She also reports wheezing, bilateral feet swelling and back pain. Denies history of asthma/COPD. She states that she has been taking her diuretics (lasix 20 mg and spironolactone 50 mg) without improvement. She denies headache, dizziness, lightheadedness, syncope, falls, weakness, chest pain, productive cough, fever, chills, congestion, abdominal pain, nausea, vomiting, diarrhea, dysuria.  on arrival to ED, temp 97.7°, heart rate 70, respirations 20, blood pressure 157/79, 96% SpO2 on room air.  Lab  workup significant for BNP 45 99, troponin 0.044, CO2 18, potassium 3.5.  There is no leukocytosis.    Chest x-ray consistent with vascular congestion, cardiac silhouette size enlargement noted.   EKG showed normal sinus rhythm, heart rate 66, QRS widening noted LVH, LA enlargement, no STEMI.  While in ED, patient was given DuoNeb x1 and Lasix 60 mg IV.  Hospital Medicine was consulted for admission due to CHF exacerbation.  While in ED, patient was given DuoNeb x1 and Lasix 60 mg IV.  Hospital Medicine was consulted for admission due to CHF exacerbation.

## 2025-04-27 NOTE — SUBJECTIVE & OBJECTIVE
Past Medical History:   Diagnosis Date    Arthritis     Back pain     Blood transfusion     CHF (congestive heart failure)     Chronic kidney disease     CKD-3b    Depression     Gout, unspecified     Hypertension     Nonischemic cardiomyopathy     PVC's (premature ventricular contractions)     Sinus pause        Past Surgical History:   Procedure Laterality Date    HYSTERECTOMY  1978    OOPHORECTOMY  1978       Review of patient's allergies indicates:   Allergen Reactions    Adhesive Blisters    Codeine Other (See Comments)     Hallucinations    Doxycycline monohydrate Nausea Only     Elevated heart rate    Gabapentin Other (See Comments)     Lowered heart rate    Lanoxin [digoxin] Other (See Comments)     Too low heart rate    Thorazine [chlorpromazine] Other (See Comments)     hallucinations    Ultracet [tramadol-acetaminophen] Other (See Comments)     Elevated BP    Penicillins Nausea Only and Rash       No current facility-administered medications on file prior to encounter.     Current Outpatient Medications on File Prior to Encounter   Medication Sig    acetaminophen (TYLENOL) 500 MG tablet Take 500 mg by mouth every 6 (six) hours as needed.    allopurinoL (ZYLOPRIM) 100 MG tablet TAKE 2 TABLETS BY MOUTH EVERY DAY    amiodarone (PACERONE) 200 MG Tab Take one tablet in the am    aspirin (ECOTRIN) 81 MG EC tablet Take 1 tablet (81 mg total) by mouth once daily.    ciclopirox (PENLAC) 8 % Soln Apply to nails once daily    diclofenac sodium (VOLTAREN) 1 % Gel Apply 2 g topically 2 (two) times daily as needed (back pain).    empagliflozin (JARDIANCE) 10 mg tablet Take 1 tablet (10 mg total) by mouth once daily.    ergocalciferol (VITAMIN D2) 50,000 unit Cap Take 50,000 Units by mouth every 7 days.    furosemide (LASIX) 20 MG tablet Take 1 tablet (20 mg total) by mouth daily as needed (leg swelling.). Do not take if BP is below 110/70    metoprolol succinate (TOPROL-XL) 25 MG 24 hr tablet Take 0.5 tablets (12.5 mg  total) by mouth once daily.    mometasone (ELOCON) 0.1 % ointment AAA bid prn as needed for hair loss. Steroid ointment.    MULTIVIT WITH CALCIUM,IRON,MIN (MULTIPLE VITAMIN, WOMENS ORAL) Take by mouth once daily.    sacubitriL-valsartan (ENTRESTO)  mg per tablet Take 1 tablet by mouth 2 (two) times daily.    spironolactone (ALDACTONE) 50 MG tablet Take 1 tablet (50 mg total) by mouth once daily. Do not take if BP is below 110/70     Family History       Problem Relation (Age of Onset)    Early death Mother    Heart disease Mother, Brother    Hypertension Mother    Kidney disease Father          Tobacco Use    Smoking status: Never    Smokeless tobacco: Never   Substance and Sexual Activity    Alcohol use: No     Alcohol/week: 0.0 standard drinks of alcohol    Drug use: No    Sexual activity: Not Currently     Review of Systems   Cardiovascular:  Positive for dyspnea on exertion, leg swelling and orthopnea.   Respiratory:  Positive for shortness of breath.      Objective:     Vital Signs (Most Recent):  Temp: 97.9 °F (36.6 °C) (04/27/25 0843)  Pulse: 66 (04/27/25 0843)  Resp: 16 (04/27/25 0843)  BP: (!) 166/89 (04/27/25 0843)  SpO2: 97 % (04/27/25 0843) Vital Signs (24h Range):  Temp:  [97.4 °F (36.3 °C)-98.5 °F (36.9 °C)] 97.9 °F (36.6 °C)  Pulse:  [54-73] 66  Resp:  [16-35] 16  SpO2:  [86 %-100 %] 97 %  BP: (141-168)/(71-89) 166/89     Weight: 72.3 kg (159 lb 6.3 oz)  Body mass index is 29.15 kg/m².    SpO2: 97 %       No intake or output data in the 24 hours ending 04/27/25 1100    Lines/Drains/Airways       Peripheral Intravenous Line  Duration                  Peripheral IV - Single Lumen 04/26/25 Right Antecubital 1 day                     Physical Exam  Vitals reviewed.   Constitutional:       Appearance: She is well-developed.   Neck:      Vascular: No carotid bruit.      Comments: Positive JVD  Cardiovascular:      Rate and Rhythm: Normal rate and regular rhythm.      Pulses: Intact distal pulses.       "Heart sounds: Normal heart sounds. No murmur heard.  Pulmonary:      Breath sounds: Normal breath sounds.   Musculoskeletal:         General: Swelling present.   Neurological:      Mental Status: She is oriented to person, place, and time.          Significant Labs: Troponin No results for input(s): "TROPONINIHS" in the last 48 hours., All pertinent lab results from the last 24 hours have been reviewed., and   Recent Lab Results  (Last 5 results in the past 24 hours)        04/27/25  0449   04/27/25  0448   04/27/25  0012   04/26/25  1737   04/26/25  1730        Influenza A, Molecular         Negative       Influenza B, Molecular         Negative       Albumin       3.1         ALP       66         ALT       25         Anion Gap 12       14         AST       32         Baso #   0.02     0.04         Basophil %   0.4     0.9         BILIRUBIN TOTAL       1.1  Comment: For infants and newborns, interpretation of results should be based   on gestational age, weight and in agreement with clinical   observations.    Premature Infant recommended reference ranges:   0-24 hours:  <8.0 mg/dL   24-48 hours: <12.0 mg/dL   3-5 days:    <15.0 mg/dL   6-29 days:   <15.0 mg/dL         BNP       4,599  Comment: Values of less than 100 pg/ml are consistent with non-CHF populations.          BUN 18       18         Calcium 8.7       8.8         Chloride 108       110         CO2 23       18         SARS COV-2 MOLECULAR         Negative  Comment: This test utilizes isothermal nucleic acid amplification technology to detect the SARS-CoV-2 RdRp nucleic acid segment. The analytical sensitivity (limit of detection) is 500 copies/swab.     A POSITIVE result is indicative of the presence of SARS-CoV-2 RNA; clinical correlation with patient history and other diagnostic information is necessary to determine patient infection status.    A NEGATIVE result means that SARS-CoV-2 nucleic acids are not present above the limit of detection. A " NEGATIVE result should be treated as presumptive. It does not rule out the possibility of COVID-19 and should not be the sole basis for treatment decisions.    This test is Food and Drug Administration (FDA) approved.  Performance characteristics of this test has been independently verified by [facility] Department of Pathology and Laboratory Medicine.       Creatinine 1.4       1.4         eGFR 38  Comment: Estimated GFR calculated using the CKD-EPI creatinine (2021) equation.       38  Comment: Estimated GFR calculated using the CKD-EPI creatinine (2021) equation.         Eos #   0.02     0.07         Eos %   0.4     1.6         Free T4       1.64  Comment: If not completed within last 6 months         Glucose 118       105         Gran # (ANC)   3.16     2.54         Hematocrit   32.9     36.3         Hemoglobin   11.2     12.2         Hepatitis C Ab       Positive         HIV 1/2 Ag/Ab       Negative         Immature Grans (Abs)   0.02  Comment: Mild elevation in immature granulocytes is non specific and can be seen in a variety of conditions including stress response, acute inflammation, trauma and pregnancy. Correlation with other laboratory and clinical findings is essential.     0.01  Comment: Mild elevation in immature granulocytes is non specific and can be seen in a variety of conditions including stress response, acute inflammation, trauma and pregnancy. Correlation with other laboratory and clinical findings is essential.         Immature Granulocytes   0.4     0.2         Lymph #   1.04     1.08         Lymph %   20.4     24.1         Magnesium  1.8       1.9  Comment: If not completed within last 6 months         MCH   28.8     28.8         MCHC   34.0     33.6         MCV   85     86         Mono #   0.83     0.75         Mono %   16.3     16.7         MPV   10.9     10.4         Neut %   62.1     56.5         nRBC   0     0         Phosphorus Level       3.4  Comment: If not completed  within last 6 months         Platelet Count   237     237         Potassium 3.3       3.5         PROTEIN TOTAL       7.0         RBC   3.89     4.24         RDW   14.2     14.8         Sodium 143       142         Troponin I 0.086  Comment: The reference interval for Troponin I represents the 99th percentile cutoff for our facility and is consistent with 3rd generation assay performance.     0.084  Comment: The reference interval for Troponin I represents the 99th percentile cutoff for our facility and is consistent with 3rd generation assay performance.   0.044  Comment: The reference interval for Troponin I represents the 99th percentile cutoff for our facility and is consistent with 3rd generation assay performance.         TSH       2.739  Comment: If not completed within last 6 months         WBC   5.09     4.49                                Significant Imaging: Echocardiogram: Transthoracic echo (TTE) complete (Cupid Only):   Results for orders placed or performed during the hospital encounter of 04/26/25   Echo   Result Value Ref Range    BSA 1.78 m2    LA WIDTH 6.1 cm    RA Width 3.9 cm    LVOT stroke volume 40.5 cm3    LVIDd 5.7 3.5 - 6.0 cm    LV Systolic Volume 111 mL    LV Systolic Volume Index 63.8 mL/m2    LVIDs 4.9 (A) 2.1 - 4.0 cm    LV ESV A2C 102.16 mL    LV Diastolic Volume 159 mL    LV ESV A4C 97.57 mL    LV Diastolic Volume Index 91.38 mL/m2    Left Ventricular End Systolic Volume by Teichholz Method 110.95 mL    Left Ventricular End Diastolic Volume by Teichholz Method 158.70 mL    IVS 0.8 0.6 - 1.1 cm    LVOT diameter 2.0 cm    LVOT area 3.1 cm2    FS 14.0 (A) 28 - 44 %    Left Ventricle Relative Wall Thickness 0.32 cm    PW 0.9 0.6 - 1.1 cm    LV mass 183.7 g    LV Mass Index 105.6 g/m2    MV Peak E Gilbert 0.66 m/s    TDI LATERAL 0.06 m/s    TDI SEPTAL 0.05 m/s    E/E' ratio 12 m/s    MV Peak A Gilbert 0.51 m/s    TR Max Gilbert 3.4 m/s    E/A ratio 1.29     IVRT 117 msec    E wave deceleration time  266 msec    LV SEPTAL E/E' RATIO 13.2 m/s    RORO 66 mL/m2    LV LATERAL E/E' RATIO 11.0 m/s    LA Vol 115 cm3    PV Peak S Kali 0.36 m/s    PV Peak D Kali 0.53 m/s    Pulm vein S/D ratio 0.68     LVOT peak kali 0.7 m/s    Left Ventricular Outflow Tract Mean Velocity 0.47 cm/s    Left Ventricular Outflow Tract Mean Gradient 1.02 mmHg    RV- garcia basal diam 3.3 cm    RVOT peak VTI 11.9 cm    TAPSE 1.5 cm    RV/LV Ratio 0.58 cm    LA size 3.5 cm    Left Atrium Minor Axis 6.2 cm    Left Atrium Major Axis 6.5 cm    LA Vol (MOD) 104 mL    RORO (MOD) 60 mL/m2    RA Major Axis 4.99 cm    AV mean gradient 3 mmHg    AV peak gradient 7 mmHg    Ao peak kali 1.3 m/s    Ao VTI 21.8 cm    LVOT peak VTI 12.9 cm    AV valve area 1.9 cm²    AV Velocity Ratio 0.54     AV index (prosthetic) 0.59     ANGELES by Velocity Ratio 1.7 cm²    Triscuspid Valve Regurgitation Peak Gradient 46 mmHg    PV mean gradient 0 mmHg    PV PEAK VELOCITY 1.03 m/s    PV peak gradient 1 mmHg    Pulmonary Valve Mean Velocity 0.63 m/s    RVOT peak kali 0.43 m/s    Ao root annulus 2.6 cm    STJ 1.6 cm    Ascending aorta 2.3 cm    Mean e' 0.06 m/s    ZLVIDS 3.93     ZLVIDD 1.68     LA area A4C 28.09 cm2    LA area A2C 29.25 cm2    RVDD 3.27 cm

## 2025-04-27 NOTE — PLAN OF CARE
A223/A223 ALPESHAnderson Sams is a 80 y.o.female admitted on 4/26/2025 for Acute systolic congestive heart failure   Code Status: Full Code MRN: [id number]   Review of patient's allergies indicates:   Allergen Reactions    Adhesive Blisters    Codeine Other (See Comments)     Hallucinations    Doxycycline monohydrate Nausea Only     Elevated heart rate    Gabapentin Other (See Comments)     Lowered heart rate    Lanoxin [digoxin] Other (See Comments)     Too low heart rate    Thorazine [chlorpromazine] Other (See Comments)     hallucinations    Ultracet [tramadol-acetaminophen] Other (See Comments)     Elevated BP    Penicillins Nausea Only and Rash     Past Medical History:   Diagnosis Date    Arthritis     Back pain     Blood transfusion     CHF (congestive heart failure)     Chronic kidney disease     CKD-3b    Depression     Gout, unspecified     Hypertension     Nonischemic cardiomyopathy     PVC's (premature ventricular contractions)     Sinus pause       PRN meds    acetaminophen, 650 mg, Q8H PRN  calcium carbonate, 1,000 mg, TID PRN  melatonin, 6 mg, Nightly PRN  methocarbamoL, 500 mg, TID PRN  ondansetron, 4 mg, Q8H PRN  oxyCODONE-acetaminophen, 1 tablet, Q6H PRN  polyethylene glycol, 17 g, BID PRN  sodium chloride 0.9%, 10 mL, PRN           Pt oriented x4.  VSS.  Pt remained afebrile throughout this shift.   All meds administered per order.   Pt remained free of falls this shift.   Plan of care reviewed. Patient verbalizes understanding.   Pt moving/turning independently.   Bed low, side rails up x 2, wheels locked, call light in reach.   Patient instructed to call for assistance.  Patient education provided    IV lasix.                  Ariel Coma Scale Score: 15     Lead Monitored: Lead II Rhythm: sinus bradycardia Frequency/Ectopy: PACs  Cardiac/Telemetry Box Number: [id number]  VTE Core Measure: Pharmacological prophylaxis initiated/maintained Last Bowel Movement: 04/26/25  Diet Low Sodium, 2gm Fluid -  1500mL  Voiding Characteristics: frequency  Faheem Score: 21  Fall Risk Score: 11  Accucheck []   Freq?      Lines/Drains/Airways       Peripheral Intravenous Line  Duration                  Peripheral IV - Single Lumen 04/26/25 Right Antecubital 1 day

## 2025-04-27 NOTE — ASSESSMENT & PLAN NOTE
Patient's blood pressure range in the last 24 hours was: BP  Min: 151/72  Max: 168/81.The patient's inpatient anti-hypertensive regimen is listed below:  Current Antihypertensives  metoprolol succinate (TOPROL-XL) 24 hr split tablet 12.5 mg, Daily, Oral  furosemide injection 40 mg, Every 12 hours, Intravenous    Plan  - BP is controlled, no changes needed to their regimen

## 2025-04-27 NOTE — ASSESSMENT & PLAN NOTE
Creatine stable for now. BMP reviewed- noted Estimated Creatinine Clearance: 28.9 mL/min (based on SCr of 1.4 mg/dL). according to latest data. Based on current GFR, CKD stage is stage 3 - GFR 30-59.  Monitor UOP and serial BMP and adjust therapy as needed. Renally dose meds. Avoid nephrotoxic medications and procedures.

## 2025-04-27 NOTE — ASSESSMENT & PLAN NOTE
Patient has Systolic (HFrEF) heart failure that is Acute on chronic. On presentation their CHF was  Most recent BNP and echo results are listed below. Reports that she has taken her PRN Lasix for past few days and has been taking Aldactone as well, but symptoms worsening.  Recent Labs     04/26/25  1737   BNP 4,599*     Latest ECHO  Results for orders placed during the hospital encounter of 07/19/24    Echo    Interpretation Summary    Left Ventricle: The left ventricle is normal in size. Normal wall thickness. There is concentric hypertrophy. Mild global hypokinesis present. There is reduced systolic function. Ejection fraction by visual approximation is 40%. There is indeterminate diastolic function.    Right Ventricle: Normal right ventricular cavity size. Wall thickness is normal. Systolic function is normal.    Aortic Valve: The aortic valve is a trileaflet valve. There is mild stenosis. Aortic valve area by VTI is 1.92 cm². Aortic valve peak velocity is 1.17 m/s. Mean gradient is 3 mmHg. The dimensionless index is 0.59.    Mitral Valve: There is moderate bileaflet sclerosis. There is normal leaflet mobility. There is prolapse of the anterior mitral leaflet. There is mild to moderate regurgitation.    Pulmonary Artery: The estimated pulmonary artery systolic pressure is 48 mmHg.    IVC/SVC: Normal venous pressure at 3 mmHg.    Current Heart Failure Medications  metoprolol succinate (TOPROL-XL) 24 hr split tablet 12.5 mg, Daily, Oral  sacubitriL-valsartan 49-51 mg per tablet 1 tablet, 2 times daily, Oral  furosemide injection 40 mg, Every 12 hours, Intravenous    Plan  - Monitor strict I&Os and daily weights.    - Place on telemetry  - Low sodium diet  - Place on fluid restriction of 1.5 L.   - Cardiology has been consulted  - Was given Lasix 60 mg IV per ED  - Echo ordered  - Monitor labs

## 2025-04-27 NOTE — ASSESSMENT & PLAN NOTE
Patient has Systolic (HFrEF) heart failure that is Acute on chronic. On presentation their CHF was  Most recent BNP and echo results are listed below. Reports that she has taken her PRN Lasix for past few days and has been taking Aldactone as well, but symptoms worsening.  Recent Labs     04/26/25  1737   BNP 4,599*     Latest ECHO  Results for orders placed during the hospital encounter of 07/19/24    Echo    Interpretation Summary    Left Ventricle: The left ventricle is normal in size. Normal wall thickness. There is concentric hypertrophy. Mild global hypokinesis present. There is reduced systolic function. Ejection fraction by visual approximation is 40%. There is indeterminate diastolic function.    Right Ventricle: Normal right ventricular cavity size. Wall thickness is normal. Systolic function is normal.    Aortic Valve: The aortic valve is a trileaflet valve. There is mild stenosis. Aortic valve area by VTI is 1.92 cm². Aortic valve peak velocity is 1.17 m/s. Mean gradient is 3 mmHg. The dimensionless index is 0.59.    Mitral Valve: There is moderate bileaflet sclerosis. There is normal leaflet mobility. There is prolapse of the anterior mitral leaflet. There is mild to moderate regurgitation.    Pulmonary Artery: The estimated pulmonary artery systolic pressure is 48 mmHg.    IVC/SVC: Normal venous pressure at 3 mmHg.    Current Heart Failure Medications  metoprolol succinate (TOPROL-XL) 24 hr split tablet 12.5 mg, Daily, Oral  sacubitriL-valsartan 49-51 mg per tablet 1 tablet, 2 times daily, Oral  furosemide injection 40 mg, Every 12 hours, Intravenous    Plan  - Monitor strict I&Os and daily weights.    - Place on telemetry  - Low sodium diet  - Place on fluid restriction of 1.5 L.   - Cardiology has been consulted  - Was given Lasix 60 mg IV per ED, continue IV diuresis  - Echo pending  - Monitor labs

## 2025-04-27 NOTE — SUBJECTIVE & OBJECTIVE
Past Medical History:   Diagnosis Date    Arthritis     Back pain     Blood transfusion     CHF (congestive heart failure)     Chronic kidney disease     CKD-3b    Depression     Gout, unspecified     Hypertension     Nonischemic cardiomyopathy     PVC's (premature ventricular contractions)     Sinus pause        Past Surgical History:   Procedure Laterality Date    HYSTERECTOMY  1978    OOPHORECTOMY  1978       Review of patient's allergies indicates:   Allergen Reactions    Adhesive Blisters    Codeine Other (See Comments)     Hallucinations    Doxycycline monohydrate Nausea Only     Elevated heart rate    Gabapentin Other (See Comments)     Lowered heart rate    Lanoxin [digoxin] Other (See Comments)     Too low heart rate    Thorazine [chlorpromazine] Other (See Comments)     hallucinations    Ultracet [tramadol-acetaminophen] Other (See Comments)     Elevated BP    Penicillins Nausea Only and Rash       No current facility-administered medications on file prior to encounter.     Current Outpatient Medications on File Prior to Encounter   Medication Sig    acetaminophen (TYLENOL) 500 MG tablet Take 500 mg by mouth every 6 (six) hours as needed.    allopurinoL (ZYLOPRIM) 100 MG tablet TAKE 2 TABLETS BY MOUTH EVERY DAY    amiodarone (PACERONE) 200 MG Tab Take one tablet in the am    aspirin (ECOTRIN) 81 MG EC tablet Take 1 tablet (81 mg total) by mouth once daily.    ciclopirox (PENLAC) 8 % Soln Apply to nails once daily    diclofenac sodium (VOLTAREN) 1 % Gel Apply 2 g topically 2 (two) times daily as needed (back pain).    empagliflozin (JARDIANCE) 10 mg tablet Take 1 tablet (10 mg total) by mouth once daily.    ergocalciferol (VITAMIN D2) 50,000 unit Cap Take 50,000 Units by mouth every 7 days.    furosemide (LASIX) 20 MG tablet Take 1 tablet (20 mg total) by mouth daily as needed (leg swelling.). Do not take if BP is below 110/70    metoprolol succinate (TOPROL-XL) 25 MG 24 hr tablet Take 0.5 tablets (12.5 mg  total) by mouth once daily.    mometasone (ELOCON) 0.1 % ointment AAA bid prn as needed for hair loss. Steroid ointment.    MULTIVIT WITH CALCIUM,IRON,MIN (MULTIPLE VITAMIN, WOMENS ORAL) Take by mouth once daily.    sacubitriL-valsartan (ENTRESTO)  mg per tablet Take 1 tablet by mouth 2 (two) times daily.    spironolactone (ALDACTONE) 50 MG tablet Take 1 tablet (50 mg total) by mouth once daily. Do not take if BP is below 110/70     Family History       Problem Relation (Age of Onset)    Early death Mother    Heart disease Mother, Brother    Hypertension Mother    Kidney disease Father          Tobacco Use    Smoking status: Never    Smokeless tobacco: Never   Substance and Sexual Activity    Alcohol use: No     Alcohol/week: 0.0 standard drinks of alcohol    Drug use: No    Sexual activity: Not Currently     Review of Systems   Constitutional: Negative.    HENT: Negative.     Eyes: Negative.    Respiratory:  Positive for shortness of breath and wheezing. Negative for cough and chest tightness.    Cardiovascular:  Positive for leg swelling. Negative for chest pain and palpitations.        Feet/ankles swelling   Gastrointestinal: Negative.  Negative for abdominal distention, abdominal pain, blood in stool, diarrhea, nausea and vomiting.   Endocrine: Negative.    Genitourinary: Negative.    Musculoskeletal:  Positive for arthralgias and back pain.   Skin: Negative.    Allergic/Immunologic: Negative.    Neurological: Negative.  Negative for dizziness, seizures, syncope, speech difficulty, weakness, light-headedness and headaches.   Hematological: Negative.    Psychiatric/Behavioral: Negative.       Objective:     Vital Signs (Most Recent):  Temp: 98.5 °F (36.9 °C) (04/26/25 2000)  Pulse: 71 (04/26/25 1900)  Resp: 20 (04/26/25 2000)  BP: (!) 163/79 (04/26/25 2000)  SpO2: 100 % (04/26/25 2000) Vital Signs (24h Range):  Temp:  [97.7 °F (36.5 °C)-98.5 °F (36.9 °C)] 98.5 °F (36.9 °C)  Pulse:  [63-73] 71  Resp:   [18-35] 20  SpO2:  [96 %-100 %] 100 %  BP: (151-168)/(72-81) 163/79     Weight: 72.3 kg (159 lb 6.3 oz)  Body mass index is 29.15 kg/m².     Physical Exam  Vitals reviewed.   Constitutional:       General: She is not in acute distress.     Appearance: She is not toxic-appearing or diaphoretic.   HENT:      Head: Normocephalic and atraumatic.      Nose: Nose normal.      Mouth/Throat:      Mouth: Mucous membranes are moist.      Pharynx: Oropharynx is clear.   Eyes:      Extraocular Movements: Extraocular movements intact.      Pupils: Pupils are equal, round, and reactive to light.   Neck:      Comments: +JVD  Cardiovascular:      Rate and Rhythm: Normal rate and regular rhythm.      Heart sounds: Murmur heard.   Pulmonary:      Effort: Pulmonary effort is normal. No respiratory distress.      Breath sounds: Normal breath sounds.      Comments: No wheezing at this time  Lungs diminished at bases bilaterally, otherwise clear  Abdominal:      General: Bowel sounds are normal. There is no distension.      Palpations: Abdomen is soft.      Tenderness: There is no abdominal tenderness. There is no right CVA tenderness, left CVA tenderness, guarding or rebound.   Musculoskeletal:         General: Normal range of motion.      Cervical back: Neck supple.      Right lower leg: Edema present.      Left lower leg: Edema present.      Comments: 1+ edema to lower extremities   Skin:     General: Skin is warm and dry.      Capillary Refill: Capillary refill takes less than 2 seconds.   Neurological:      General: No focal deficit present.      Mental Status: She is alert and oriented to person, place, and time.      Motor: No weakness.   Psychiatric:         Mood and Affect: Mood normal.         Behavior: Behavior normal.         Thought Content: Thought content normal.              CRANIAL NERVES     CN III, IV, VI   Pupils are equal, round, and reactive to light.       Significant Labs: All pertinent labs within the past 24  hours have been reviewed.  Recent Lab Results         04/26/25  1737   04/26/25  1730        Influenza A, Molecular   Negative       Influenza B, Molecular   Negative       Albumin 3.1         ALP 66         ALT 25         Anion Gap 14         AST 32         Baso # 0.04         Basophil % 0.9         BILIRUBIN TOTAL 1.1  Comment: For infants and newborns, interpretation of results should be based   on gestational age, weight and in agreement with clinical   observations.    Premature Infant recommended reference ranges:   0-24 hours:  <8.0 mg/dL   24-48 hours: <12.0 mg/dL   3-5 days:    <15.0 mg/dL   6-29 days:   <15.0 mg/dL         BNP 4,599  Comment: Values of less than 100 pg/ml are consistent with non-CHF populations.          BUN 18         Calcium 8.8         Chloride 110         CO2 18         SARS COV-2 MOLECULAR   Negative  Comment: This test utilizes isothermal nucleic acid amplification technology to detect the SARS-CoV-2 RdRp nucleic acid segment. The analytical sensitivity (limit of detection) is 500 copies/swab.     A POSITIVE result is indicative of the presence of SARS-CoV-2 RNA; clinical correlation with patient history and other diagnostic information is necessary to determine patient infection status.    A NEGATIVE result means that SARS-CoV-2 nucleic acids are not present above the limit of detection. A NEGATIVE result should be treated as presumptive. It does not rule out the possibility of COVID-19 and should not be the sole basis for treatment decisions.    This test is Food and Drug Administration (FDA) approved.  Performance characteristics of this test has been independently verified by [facility] Department of Pathology and Laboratory Medicine.       Creatinine 1.4         eGFR 38  Comment: Estimated GFR calculated using the CKD-EPI creatinine (2021) equation.         Eos # 0.07         Eos % 1.6         Glucose 105         Gran # (ANC) 2.54         Hematocrit 36.3          Hemoglobin 12.2         Hepatitis C Ab Positive         HIV 1/2 Ag/Ab Negative         Immature Grans (Abs) 0.01  Comment: Mild elevation in immature granulocytes is non specific and can be seen in a variety of conditions including stress response, acute inflammation, trauma and pregnancy. Correlation with other laboratory and clinical findings is essential.         Immature Granulocytes 0.2         Lymph # 1.08         Lymph % 24.1         MCH 28.8         MCHC 33.6         MCV 86         Mono # 0.75         Mono % 16.7         MPV 10.4         Neut % 56.5         nRBC 0         Platelet Count 237         Potassium 3.5         PROTEIN TOTAL 7.0         RBC 4.24         RDW 14.8         Sodium 142         Troponin I 0.044  Comment: The reference interval for Troponin I represents the 99th percentile cutoff for our facility and is consistent with 3rd generation assay performance.         WBC 4.49               EKG-normal sinus rhythm, heart rate 66, QRS widening noted LVH, LA enlargement, no STEMI.    Significant Imaging: I have reviewed all pertinent imaging results/findings within the past 24 hours.    CXR reviewed, vascular congestion consistent with CHF (per my interpretation)

## 2025-04-27 NOTE — PLAN OF CARE
Problem: Adult Inpatient Plan of Care  Goal: Plan of Care Review  Outcome: Progressing  Goal: Patient-Specific Goal (Individualized)  Outcome: Progressing  Goal: Absence of Hospital-Acquired Illness or Injury  Outcome: Progressing  Goal: Optimal Comfort and Wellbeing  Outcome: Progressing  Goal: Readiness for Transition of Care  Outcome: Progressing     Problem: Wound  Goal: Optimal Coping  Outcome: Progressing  Goal: Optimal Functional Ability  Outcome: Progressing  Goal: Absence of Infection Signs and Symptoms  Outcome: Progressing  Goal: Improved Oral Intake  Outcome: Progressing  Goal: Optimal Pain Control and Function  Outcome: Progressing  Goal: Skin Health and Integrity  Outcome: Progressing  Goal: Optimal Wound Healing  Outcome: Progressing      normal...

## 2025-04-27 NOTE — CONSULTS
[facility] [facility] ([facility]  Cardiology  Consult Note    Patient Name: Megan Sams  MRN: [id number]  Admission Date: 4/26/2025  Hospital Length of Stay: 1 days  Code Status: Full Code   Attending Provider: Galo Martin MD   Consulting Provider: Melisa Damico MD  Primary Care Physician: Don Mayen MD  Principal Problem:Acute systolic congestive heart failure    Patient information was obtained from patient, past medical records, and ER records.     Inpatient consult to Cardiology  Consult performed by: Melisa Damico MD  Consult ordered by: Nataly Roach NP  Reason for consult: CHF        Subjective:     Chief Complaint:  CHF     HPI:   80-year-old female with past medical history significant for arthritis, hypertension, CKD stage IIIB, nonischemic cardiomyopathy, heart failure with reduced EF (Echo 7/19/2024 EF 35-40%), bradycardia, PVCs, history of pauses, gout, and back pain who presented to ED with complaint of shortness of breath, which started last night and has gotten worse. She also reports wheezing, bilateral feet swelling and back pain. Denies history of asthma/COPD. She states that she has been taking her diuretics (lasix 20 mg and spironolactone 50 mg) without improvement. She denies headache, dizziness, lightheadedness, syncope, falls, weakness, chest pain, productive cough, fever, chills, congestion, abdominal pain, nausea, vomiting, diarrhea, dysuria. on arrival to ED, temp 97.7°, heart rate 70, respirations 20, blood pressure 157/79, 96% SpO2 on room air. Lab workup significant for BNP 45 99, troponin 0.044, CO2 18, potassium 3.5. There is no leukocytosis. Chest x-ray consistent with vascular congestion, cardiac silhouette size enlargement noted. EKG showedPatient is 80-year-old female with past medical history significant for arthritis, hypertension, CKD stage IIIB, nonischemic cardiomyopathy, heart failure with reduced EF (Echo 7/19/2024 EF 35-40%), bradycardia, PVCs, history of  pauses, gout, and back pain who presented to ED with complaint of shortness of breath, which started last night and has gotten worse. She also reports wheezing, bilateral feet swelling and back pain. Denies history of asthma/COPD. She states that she has been taking her diuretics (lasix 20 mg and spironolactone 50 mg) without improvement. She denies headache, dizziness, lightheadedness, syncope, falls, weakness, chest pain, productive cough, fever, chills, congestion, abdominal pain, nausea, vomiting, diarrhea, dysuria. on arrival to ED, temp 97.7°, heart rate 70, respirations 20, blood pressure 157/79, 96% SpO2 on room air. Lab workup significant for BNP 45 99, troponin 0.044, CO2 18, potassium 3.5. There is no leukocytosis. Chest x-ray consistent with vascular congestion, cardiac silhouette size enlargement noted. EKG showed normal sinus rhythm, heart rate 66, QRS widening noted LVH, LA enlargement, no STEMI. While in ED, patient was given DuoNeb x1 and Lasix 60 mg IV. Hospital Medicine was consulted for admission due to CHF exacerbation. While in ED, patient was given DuoNeb x1 and Lasix 60 mg IV. Hospital Medicine was consulted for admission due to CHF exacerbation.    04/27/2025.    Continues to be fluid overloaded.    Reports response to IV diuretics    Past Medical History:   Diagnosis Date    Arthritis     Back pain     Blood transfusion     CHF (congestive heart failure)     Chronic kidney disease     CKD-3b    Depression     Gout, unspecified     Hypertension     Nonischemic cardiomyopathy     PVC's (premature ventricular contractions)     Sinus pause        Past Surgical History:   Procedure Laterality Date    HYSTERECTOMY  1978    OOPHORECTOMY  1978       Review of patient's allergies indicates:   Allergen Reactions    Adhesive Blisters    Codeine Other (See Comments)     Hallucinations    Doxycycline monohydrate Nausea Only     Elevated heart rate    Gabapentin Other (See Comments)     Lowered heart  rate    Lanoxin [digoxin] Other (See Comments)     Too low heart rate    Thorazine [chlorpromazine] Other (See Comments)     hallucinations    Ultracet [tramadol-acetaminophen] Other (See Comments)     Elevated BP    Penicillins Nausea Only and Rash       No current facility-administered medications on file prior to encounter.     Current Outpatient Medications on File Prior to Encounter   Medication Sig    acetaminophen (TYLENOL) 500 MG tablet Take 500 mg by mouth every 6 (six) hours as needed.    allopurinoL (ZYLOPRIM) 100 MG tablet TAKE 2 TABLETS BY MOUTH EVERY DAY    amiodarone (PACERONE) 200 MG Tab Take one tablet in the am    aspirin (ECOTRIN) 81 MG EC tablet Take 1 tablet (81 mg total) by mouth once daily.    ciclopirox (PENLAC) 8 % Soln Apply to nails once daily    diclofenac sodium (VOLTAREN) 1 % Gel Apply 2 g topically 2 (two) times daily as needed (back pain).    empagliflozin (JARDIANCE) 10 mg tablet Take 1 tablet (10 mg total) by mouth once daily.    ergocalciferol (VITAMIN D2) 50,000 unit Cap Take 50,000 Units by mouth every 7 days.    furosemide (LASIX) 20 MG tablet Take 1 tablet (20 mg total) by mouth daily as needed (leg swelling.). Do not take if BP is below 110/70    metoprolol succinate (TOPROL-XL) 25 MG 24 hr tablet Take 0.5 tablets (12.5 mg total) by mouth once daily.    mometasone (ELOCON) 0.1 % ointment AAA bid prn as needed for hair loss. Steroid ointment.    MULTIVIT WITH CALCIUM,IRON,MIN (MULTIPLE VITAMIN, WOMENS ORAL) Take by mouth once daily.    sacubitriL-valsartan (ENTRESTO)  mg per tablet Take 1 tablet by mouth 2 (two) times daily.    spironolactone (ALDACTONE) 50 MG tablet Take 1 tablet (50 mg total) by mouth once daily. Do not take if BP is below 110/70     Family History       Problem Relation (Age of Onset)    Early death Mother    Heart disease Mother, Brother    Hypertension Mother    Kidney disease Father          Tobacco Use    Smoking status: Never    Smokeless tobacco:  "Never   Substance and Sexual Activity    Alcohol use: No     Alcohol/week: 0.0 standard drinks of alcohol    Drug use: No    Sexual activity: Not Currently     Review of Systems   Cardiovascular:  Positive for dyspnea on exertion, leg swelling and orthopnea.   Respiratory:  Positive for shortness of breath.      Objective:     Vital Signs (Most Recent):  Temp: 97.9 °F (36.6 °C) (04/27/25 0843)  Pulse: 66 (04/27/25 0843)  Resp: 16 (04/27/25 0843)  BP: (!) 166/89 (04/27/25 0843)  SpO2: 97 % (04/27/25 0843) Vital Signs (24h Range):  Temp:  [97.4 °F (36.3 °C)-98.5 °F (36.9 °C)] 97.9 °F (36.6 °C)  Pulse:  [54-73] 66  Resp:  [16-35] 16  SpO2:  [86 %-100 %] 97 %  BP: (141-168)/(71-89) 166/89     Weight: 72.3 kg (159 lb 6.3 oz)  Body mass index is 29.15 kg/m².    SpO2: 97 %       No intake or output data in the 24 hours ending 04/27/25 1100    Lines/Drains/Airways       Peripheral Intravenous Line  Duration                  Peripheral IV - Single Lumen 04/26/25 Right Antecubital 1 day                     Physical Exam  Vitals reviewed.   Constitutional:       Appearance: She is well-developed.   Neck:      Vascular: No carotid bruit.      Comments: Positive JVD  Cardiovascular:      Rate and Rhythm: Normal rate and regular rhythm.      Pulses: Intact distal pulses.      Heart sounds: Normal heart sounds. No murmur heard.  Pulmonary:      Breath sounds: Normal breath sounds.   Musculoskeletal:         General: Swelling present.   Neurological:      Mental Status: She is oriented to person, place, and time.          Significant Labs: Troponin No results for input(s): "TROPONINIHS" in the last 48 hours., All pertinent lab results from the last 24 hours have been reviewed., and   Recent Lab Results  (Last 5 results in the past 24 hours)        04/27/25  0449   04/27/25  0448   04/27/25  0012   04/26/25  1737   04/26/25  1730        Influenza A, Molecular         Negative       Influenza B, Molecular         Negative       " Albumin       3.1         ALP       66         ALT       25         Anion Gap 12       14         AST       32         Baso #   0.02     0.04         Basophil %   0.4     0.9         BILIRUBIN TOTAL       1.1  Comment: For infants and newborns, interpretation of results should be based   on gestational age, weight and in agreement with clinical   observations.    Premature Infant recommended reference ranges:   0-24 hours:  <8.0 mg/dL   24-48 hours: <12.0 mg/dL   3-5 days:    <15.0 mg/dL   6-29 days:   <15.0 mg/dL         BNP       4,599  Comment: Values of less than 100 pg/ml are consistent with non-CHF populations.          BUN 18       18         Calcium 8.7       8.8         Chloride 108       110         CO2 23       18         SARS COV-2 MOLECULAR         Negative  Comment: This test utilizes isothermal nucleic acid amplification technology to detect the SARS-CoV-2 RdRp nucleic acid segment. The analytical sensitivity (limit of detection) is 500 copies/swab.     A POSITIVE result is indicative of the presence of SARS-CoV-2 RNA; clinical correlation with patient history and other diagnostic information is necessary to determine patient infection status.    A NEGATIVE result means that SARS-CoV-2 nucleic acids are not present above the limit of detection. A NEGATIVE result should be treated as presumptive. It does not rule out the possibility of COVID-19 and should not be the sole basis for treatment decisions.    This test is Food and Drug Administration (FDA) approved.  Performance characteristics of this test has been independently verified by [facility] Department of Pathology and Laboratory Medicine.       Creatinine 1.4       1.4         eGFR 38  Comment: Estimated GFR calculated using the CKD-EPI creatinine (2021) equation.       38  Comment: Estimated GFR calculated using the CKD-EPI creatinine (2021) equation.         Eos #   0.02     0.07         Eos %   0.4     1.6         Free T4        1.64  Comment: If not completed within last 6 months         Glucose 118       105         Gran # (ANC)   3.16     2.54         Hematocrit   32.9     36.3         Hemoglobin   11.2     12.2         Hepatitis C Ab       Positive         HIV 1/2 Ag/Ab       Negative         Immature Grans (Abs)   0.02  Comment: Mild elevation in immature granulocytes is non specific and can be seen in a variety of conditions including stress response, acute inflammation, trauma and pregnancy. Correlation with other laboratory and clinical findings is essential.     0.01  Comment: Mild elevation in immature granulocytes is non specific and can be seen in a variety of conditions including stress response, acute inflammation, trauma and pregnancy. Correlation with other laboratory and clinical findings is essential.         Immature Granulocytes   0.4     0.2         Lymph #   1.04     1.08         Lymph %   20.4     24.1         Magnesium  1.8       1.9  Comment: If not completed within last 6 months         MCH   28.8     28.8         MCHC   34.0     33.6         MCV   85     86         Mono #   0.83     0.75         Mono %   16.3     16.7         MPV   10.9     10.4         Neut %   62.1     56.5         nRBC   0     0         Phosphorus Level       3.4  Comment: If not completed within last 6 months         Platelet Count   237     237         Potassium 3.3       3.5         PROTEIN TOTAL       7.0         RBC   3.89     4.24         RDW   14.2     14.8         Sodium 143       142         Troponin I 0.086  Comment: The reference interval for Troponin I represents the 99th percentile cutoff for our facility and is consistent with 3rd generation assay performance.     0.084  Comment: The reference interval for Troponin I represents the 99th percentile cutoff for our facility and is consistent with 3rd generation assay performance.   0.044  Comment: The reference interval for Troponin I represents the 99th percentile cutoff for our  facility and is consistent with 3rd generation assay performance.         TSH       2.739  Comment: If not completed within last 6 months         WBC   5.09     4.49                                Significant Imaging: Echocardiogram: Transthoracic echo (TTE) complete (Cupid Only):   Results for orders placed or performed during the hospital encounter of 04/26/25   Echo   Result Value Ref Range    BSA 1.78 m2    LA WIDTH 6.1 cm    RA Width 3.9 cm    LVOT stroke volume 40.5 cm3    LVIDd 5.7 3.5 - 6.0 cm    LV Systolic Volume 111 mL    LV Systolic Volume Index 63.8 mL/m2    LVIDs 4.9 (A) 2.1 - 4.0 cm    LV ESV A2C 102.16 mL    LV Diastolic Volume 159 mL    LV ESV A4C 97.57 mL    LV Diastolic Volume Index 91.38 mL/m2    Left Ventricular End Systolic Volume by Teichholz Method 110.95 mL    Left Ventricular End Diastolic Volume by Teichholz Method 158.70 mL    IVS 0.8 0.6 - 1.1 cm    LVOT diameter 2.0 cm    LVOT area 3.1 cm2    FS 14.0 (A) 28 - 44 %    Left Ventricle Relative Wall Thickness 0.32 cm    PW 0.9 0.6 - 1.1 cm    LV mass 183.7 g    LV Mass Index 105.6 g/m2    MV Peak E Gilbert 0.66 m/s    TDI LATERAL 0.06 m/s    TDI SEPTAL 0.05 m/s    E/E' ratio 12 m/s    MV Peak A Gilbert 0.51 m/s    TR Max Gilbert 3.4 m/s    E/A ratio 1.29     IVRT 117 msec    E wave deceleration time 266 msec    LV SEPTAL E/E' RATIO 13.2 m/s    RORO 66 mL/m2    LV LATERAL E/E' RATIO 11.0 m/s    LA Vol 115 cm3    PV Peak S Gilbert 0.36 m/s    PV Peak D Gilbert 0.53 m/s    Pulm vein S/D ratio 0.68     LVOT peak gilbert 0.7 m/s    Left Ventricular Outflow Tract Mean Velocity 0.47 cm/s    Left Ventricular Outflow Tract Mean Gradient 1.02 mmHg    RV- garcia basal diam 3.3 cm    RVOT peak VTI 11.9 cm    TAPSE 1.5 cm    RV/LV Ratio 0.58 cm    LA size 3.5 cm    Left Atrium Minor Axis 6.2 cm    Left Atrium Major Axis 6.5 cm    LA Vol (MOD) 104 mL    RORO (MOD) 60 mL/m2    RA Major Axis 4.99 cm    AV mean gradient 3 mmHg    AV peak gradient 7 mmHg    Ao peak gilbert 1.3 m/s    Ao  VTI 21.8 cm    LVOT peak VTI 12.9 cm    AV valve area 1.9 cm²    AV Velocity Ratio 0.54     AV index (prosthetic) 0.59     ANGELES by Velocity Ratio 1.7 cm²    Triscuspid Valve Regurgitation Peak Gradient 46 mmHg    PV mean gradient 0 mmHg    PV PEAK VELOCITY 1.03 m/s    PV peak gradient 1 mmHg    Pulmonary Valve Mean Velocity 0.63 m/s    RVOT peak kali 0.43 m/s    Ao root annulus 2.6 cm    STJ 1.6 cm    Ascending aorta 2.3 cm    Mean e' 0.06 m/s    ZLVIDS 3.93     ZLVIDD 1.68     LA area A4C 28.09 cm2    LA area A2C 29.25 cm2    RVDD 3.27 cm     Assessment and Plan:     * Acute systolic congestive heart failure  Patient has Combined Systolic and Diastolic heart failure that is Acute on chronic. On presentation their CHF was decompensated. Evidence of decompensated CHF on presentation includes: edema, elevated JVD, orthopnea, and shortness of breath. The etiology of their decompensation is likely dietary indiscretion. Most recent BNP and echo results are listed below.  Recent Labs     04/26/25  1737   BNP 4,599*     Latest ECHO  Results for orders placed during the hospital encounter of 07/19/24    Echo    Interpretation Summary    Left Ventricle: The left ventricle is normal in size. Normal wall thickness. There is concentric hypertrophy. Mild global hypokinesis present. There is reduced systolic function. Ejection fraction by visual approximation is 40%. There is indeterminate diastolic function.    Right Ventricle: Normal right ventricular cavity size. Wall thickness is normal. Systolic function is normal.    Aortic Valve: The aortic valve is a trileaflet valve. There is mild stenosis. Aortic valve area by VTI is 1.92 cm². Aortic valve peak velocity is 1.17 m/s. Mean gradient is 3 mmHg. The dimensionless index is 0.59.    Mitral Valve: There is moderate bileaflet sclerosis. There is normal leaflet mobility. There is prolapse of the anterior mitral leaflet. There is mild to moderate regurgitation.    Pulmonary Artery:  The estimated pulmonary artery systolic pressure is 48 mmHg.    IVC/SVC: Normal venous pressure at 3 mmHg.    Current Heart Failure Medications  metoprolol succinate (TOPROL-XL) 24 hr split tablet 12.5 mg, Daily, Oral  sacubitriL-valsartan 49-51 mg per tablet 1 tablet, 2 times daily, Oral  furosemide injection 40 mg, Every 12 hours, Intravenous    Plan  - Monitor strict I&Os and daily weights.    - Place on telemetry  - Low sodium diet  - Place on fluid restriction of 1.5 L.   - Cardiology has been consulted  - The patient's volume status is improving but not at their baseline as indicated by edema, elevated JVD, orthopnea, and shortness of breath  - continue with IV diuresis        Nonischemic cardiomyopathy  See plan under CHF        VTE Risk Mitigation (From admission, onward)           Ordered     enoxaparin injection 30 mg  Daily         04/26/25 2025     IP VTE HIGH RISK PATIENT  Once         04/26/25 2025     Place sequential compression device  Until discontinued         04/26/25 2025                    Thank you for your consult. I will follow-up with patient. Please contact us if you have any additional questions.    Melisa Damico MD  Cardiology   [facility] - Telemetry ([facility])

## 2025-04-27 NOTE — H&P
[facility] Medicine  History & Physical    Patient Name: Megan Sams  MRN: [id number]  Patient Class: IP- Inpatient  Admission Date: 4/26/2025  Attending Physician: Jessica Lagunas MD   Primary Care Provider: Don Mayen MD         Patient information was obtained from patient, past medical records, and ER records.     Subjective:     Principal Problem:Acute systolic congestive heart failure    Chief Complaint:   Chief Complaint   Patient presents with    Shortness of Breath     Shortness of breath started last night, worse when walking or lying down. Hx of CHF, denies chest pain. Takes fluid pills, but not helping.         HPI: Patient is 80-year-old female with past medical history significant for arthritis, hypertension, CKD stage IIIB, nonischemic cardiomyopathy, heart failure with reduced EF (Echo 7/19/2024 EF 35-40%),  bradycardia, PVCs, history of pauses, gout, and back pain who presented to ED with complaint of shortness of breath, which started last night and has gotten worse. She also reports wheezing, bilateral feet swelling and back pain. Denies history of asthma/COPD. She states that she has been taking her diuretics (lasix 20 mg and spironolactone 50 mg) without improvement. She denies headache, dizziness, lightheadedness, syncope, falls, weakness, chest pain, productive cough, fever, chills, congestion, abdominal pain, nausea, vomiting, diarrhea, dysuria.  on arrival to ED, temp 97.7°, heart rate 70, respirations 20, blood pressure 157/79, 96% SpO2 on room air.  Lab workup significant for BNP 45 99, troponin 0.044, CO2 18, potassium 3.5.  There is no leukocytosis.    Chest x-ray consistent with vascular congestion, cardiac silhouette size enlargement noted.   EKG showedPatient is 80-year-old female with past medical history significant for arthritis, hypertension, CKD stage IIIB, nonischemic cardiomyopathy, heart failure with reduced EF (Echo 7/19/2024 EF 35-40%),   bradycardia, PVCs, history of pauses, gout, and back pain who presented to ED with complaint of shortness of breath, which started last night and has gotten worse. She also reports wheezing, bilateral feet swelling and back pain. Denies history of asthma/COPD. She states that she has been taking her diuretics (lasix 20 mg and spironolactone 50 mg) without improvement. She denies headache, dizziness, lightheadedness, syncope, falls, weakness, chest pain, productive cough, fever, chills, congestion, abdominal pain, nausea, vomiting, diarrhea, dysuria.  on arrival to ED, temp 97.7°, heart rate 70, respirations 20, blood pressure 157/79, 96% SpO2 on room air.  Lab workup significant for BNP 45 99, troponin 0.044, CO2 18, potassium 3.5.  There is no leukocytosis.    Chest x-ray consistent with vascular congestion, cardiac silhouette size enlargement noted.   EKG showed normal sinus rhythm, heart rate 66, QRS widening noted LVH, LA enlargement, no STEMI.  While in ED, patient was given DuoNeb x1 and Lasix 60 mg IV.  Hospital Medicine was consulted for admission due to CHF exacerbation.  While in ED, patient was given DuoNeb x1 and Lasix 60 mg IV.  Hospital Medicine was consulted for admission due to CHF exacerbation.    Past Medical History:   Diagnosis Date    Arthritis     Back pain     Blood transfusion     CHF (congestive heart failure)     Chronic kidney disease     CKD-3b    Depression     Gout, unspecified     Hypertension     Nonischemic cardiomyopathy     PVC's (premature ventricular contractions)     Sinus pause        Past Surgical History:   Procedure Laterality Date    HYSTERECTOMY  1978    OOPHORECTOMY  1978       Review of patient's allergies indicates:   Allergen Reactions    Adhesive Blisters    Codeine Other (See Comments)     Hallucinations    Doxycycline monohydrate Nausea Only     Elevated heart rate    Gabapentin Other (See Comments)     Lowered heart rate    Lanoxin [digoxin] Other (See Comments)      Too low heart rate    Thorazine [chlorpromazine] Other (See Comments)     hallucinations    Ultracet [tramadol-acetaminophen] Other (See Comments)     Elevated BP    Penicillins Nausea Only and Rash       No current facility-administered medications on file prior to encounter.     Current Outpatient Medications on File Prior to Encounter   Medication Sig    acetaminophen (TYLENOL) 500 MG tablet Take 500 mg by mouth every 6 (six) hours as needed.    allopurinoL (ZYLOPRIM) 100 MG tablet TAKE 2 TABLETS BY MOUTH EVERY DAY    amiodarone (PACERONE) 200 MG Tab Take one tablet in the am    aspirin (ECOTRIN) 81 MG EC tablet Take 1 tablet (81 mg total) by mouth once daily.    ciclopirox (PENLAC) 8 % Soln Apply to nails once daily    diclofenac sodium (VOLTAREN) 1 % Gel Apply 2 g topically 2 (two) times daily as needed (back pain).    empagliflozin (JARDIANCE) 10 mg tablet Take 1 tablet (10 mg total) by mouth once daily.    ergocalciferol (VITAMIN D2) 50,000 unit Cap Take 50,000 Units by mouth every 7 days.    furosemide (LASIX) 20 MG tablet Take 1 tablet (20 mg total) by mouth daily as needed (leg swelling.). Do not take if BP is below 110/70    metoprolol succinate (TOPROL-XL) 25 MG 24 hr tablet Take 0.5 tablets (12.5 mg total) by mouth once daily.    mometasone (ELOCON) 0.1 % ointment AAA bid prn as needed for hair loss. Steroid ointment.    MULTIVIT WITH CALCIUM,IRON,MIN (MULTIPLE VITAMIN, WOMENS ORAL) Take by mouth once daily.    sacubitriL-valsartan (ENTRESTO)  mg per tablet Take 1 tablet by mouth 2 (two) times daily.    spironolactone (ALDACTONE) 50 MG tablet Take 1 tablet (50 mg total) by mouth once daily. Do not take if BP is below 110/70     Family History       Problem Relation (Age of Onset)    Early death Mother    Heart disease Mother, Brother    Hypertension Mother    Kidney disease Father          Tobacco Use    Smoking status: Never    Smokeless tobacco: Never   Substance and Sexual Activity    Alcohol  use: No     Alcohol/week: 0.0 standard drinks of alcohol    Drug use: No    Sexual activity: Not Currently     Review of Systems   Constitutional: Negative.    HENT: Negative.     Eyes: Negative.    Respiratory:  Positive for shortness of breath and wheezing. Negative for cough and chest tightness.    Cardiovascular:  Positive for leg swelling. Negative for chest pain and palpitations.        Feet/ankles swelling   Gastrointestinal: Negative.  Negative for abdominal distention, abdominal pain, blood in stool, diarrhea, nausea and vomiting.   Endocrine: Negative.    Genitourinary: Negative.    Musculoskeletal:  Positive for arthralgias and back pain.   Skin: Negative.    Allergic/Immunologic: Negative.    Neurological: Negative.  Negative for dizziness, seizures, syncope, speech difficulty, weakness, light-headedness and headaches.   Hematological: Negative.    Psychiatric/Behavioral: Negative.       Objective:     Vital Signs (Most Recent):  Temp: 98.5 °F (36.9 °C) (04/26/25 2000)  Pulse: 71 (04/26/25 1900)  Resp: 20 (04/26/25 2000)  BP: (!) 163/79 (04/26/25 2000)  SpO2: 100 % (04/26/25 2000) Vital Signs (24h Range):  Temp:  [97.7 °F (36.5 °C)-98.5 °F (36.9 °C)] 98.5 °F (36.9 °C)  Pulse:  [63-73] 71  Resp:  [18-35] 20  SpO2:  [96 %-100 %] 100 %  BP: (151-168)/(72-81) 163/79     Weight: 72.3 kg (159 lb 6.3 oz)  Body mass index is 29.15 kg/m².     Physical Exam  Vitals reviewed.   Constitutional:       General: She is not in acute distress.     Appearance: She is not toxic-appearing or diaphoretic.   HENT:      Head: Normocephalic and atraumatic.      Nose: Nose normal.      Mouth/Throat:      Mouth: Mucous membranes are moist.      Pharynx: Oropharynx is clear.   Eyes:      Extraocular Movements: Extraocular movements intact.      Pupils: Pupils are equal, round, and reactive to light.   Neck:      Comments: +JVD  Cardiovascular:      Rate and Rhythm: Normal rate and regular rhythm.      Heart sounds: Murmur heard.    Pulmonary:      Effort: Pulmonary effort is normal. No respiratory distress.      Breath sounds: Normal breath sounds.      Comments: No wheezing at this time  Lungs diminished at bases bilaterally, otherwise clear  Abdominal:      General: Bowel sounds are normal. There is no distension.      Palpations: Abdomen is soft.      Tenderness: There is no abdominal tenderness. There is no right CVA tenderness, left CVA tenderness, guarding or rebound.   Musculoskeletal:         General: Normal range of motion.      Cervical back: Neck supple.      Right lower leg: Edema present.      Left lower leg: Edema present.      Comments: 1+ edema to lower extremities   Skin:     General: Skin is warm and dry.      Capillary Refill: Capillary refill takes less than 2 seconds.   Neurological:      General: No focal deficit present.      Mental Status: She is alert and oriented to person, place, and time.      Motor: No weakness.   Psychiatric:         Mood and Affect: Mood normal.         Behavior: Behavior normal.         Thought Content: Thought content normal.              CRANIAL NERVES     CN III, IV, VI   Pupils are equal, round, and reactive to light.       Significant Labs: All pertinent labs within the past 24 hours have been reviewed.  Recent Lab Results         04/26/25  1737   04/26/25  1730        Influenza A, Molecular   Negative       Influenza B, Molecular   Negative       Albumin 3.1         ALP 66         ALT 25         Anion Gap 14         AST 32         Baso # 0.04         Basophil % 0.9         BILIRUBIN TOTAL 1.1  Comment: For infants and newborns, interpretation of results should be based   on gestational age, weight and in agreement with clinical   observations.    Premature Infant recommended reference ranges:   0-24 hours:  <8.0 mg/dL   24-48 hours: <12.0 mg/dL   3-5 days:    <15.0 mg/dL   6-29 days:   <15.0 mg/dL         BNP 4,599  Comment: Values of less than 100 pg/ml are consistent with non-CHF  populations.          BUN 18         Calcium 8.8         Chloride 110         CO2 18         SARS COV-2 MOLECULAR   Negative  Comment: This test utilizes isothermal nucleic acid amplification technology to detect the SARS-CoV-2 RdRp nucleic acid segment. The analytical sensitivity (limit of detection) is 500 copies/swab.     A POSITIVE result is indicative of the presence of SARS-CoV-2 RNA; clinical correlation with patient history and other diagnostic information is necessary to determine patient infection status.    A NEGATIVE result means that SARS-CoV-2 nucleic acids are not present above the limit of detection. A NEGATIVE result should be treated as presumptive. It does not rule out the possibility of COVID-19 and should not be the sole basis for treatment decisions.    This test is Food and Drug Administration (FDA) approved.  Performance characteristics of this test has been independently verified by [facility] Department of Pathology and Laboratory Medicine.       Creatinine 1.4         eGFR 38  Comment: Estimated GFR calculated using the CKD-EPI creatinine (2021) equation.         Eos # 0.07         Eos % 1.6         Glucose 105         Gran # (ANC) 2.54         Hematocrit 36.3         Hemoglobin 12.2         Hepatitis C Ab Positive         HIV 1/2 Ag/Ab Negative         Immature Grans (Abs) 0.01  Comment: Mild elevation in immature granulocytes is non specific and can be seen in a variety of conditions including stress response, acute inflammation, trauma and pregnancy. Correlation with other laboratory and clinical findings is essential.         Immature Granulocytes 0.2         Lymph # 1.08         Lymph % 24.1         MCH 28.8         MCHC 33.6         MCV 86         Mono # 0.75         Mono % 16.7         MPV 10.4         Neut % 56.5         nRBC 0         Platelet Count 237         Potassium 3.5         PROTEIN TOTAL 7.0         RBC 4.24         RDW 14.8         Sodium 142         Troponin  I 0.044  Comment: The reference interval for Troponin I represents the 99th percentile cutoff for our facility and is consistent with 3rd generation assay performance.         WBC 4.49               EKG-normal sinus rhythm, heart rate 66, QRS widening noted LVH, LA enlargement, no STEMI.    Significant Imaging: I have reviewed all pertinent imaging results/findings within the past 24 hours.    CXR reviewed, vascular congestion consistent with CHF (per my interpretation)    Assessment/Plan:     Assessment & Plan  Acute systolic congestive heart failure  Patient has Systolic (HFrEF) heart failure that is Acute on chronic. On presentation their CHF was  Most recent BNP and echo results are listed below. Reports that she has taken her PRN Lasix for past few days and has been taking Aldactone as well, but symptoms worsening.  Recent Labs     04/26/25  1737   BNP 4,599*     Latest ECHO  Results for orders placed during the hospital encounter of 07/19/24    Echo    Interpretation Summary    Left Ventricle: The left ventricle is normal in size. Normal wall thickness. There is concentric hypertrophy. Mild global hypokinesis present. There is reduced systolic function. Ejection fraction by visual approximation is 40%. There is indeterminate diastolic function.    Right Ventricle: Normal right ventricular cavity size. Wall thickness is normal. Systolic function is normal.    Aortic Valve: The aortic valve is a trileaflet valve. There is mild stenosis. Aortic valve area by VTI is 1.92 cm². Aortic valve peak velocity is 1.17 m/s. Mean gradient is 3 mmHg. The dimensionless index is 0.59.    Mitral Valve: There is moderate bileaflet sclerosis. There is normal leaflet mobility. There is prolapse of the anterior mitral leaflet. There is mild to moderate regurgitation.    Pulmonary Artery: The estimated pulmonary artery systolic pressure is 48 mmHg.    IVC/SVC: Normal venous pressure at 3 mmHg.    Current Heart Failure  Medications  metoprolol succinate (TOPROL-XL) 24 hr split tablet 12.5 mg, Daily, Oral  sacubitriL-valsartan 49-51 mg per tablet 1 tablet, 2 times daily, Oral  furosemide injection 40 mg, Every 12 hours, Intravenous    Plan  - Monitor strict I&Os and daily weights.    - Place on telemetry  - Low sodium diet  - Place on fluid restriction of 1.5 L.   - Cardiology has been consulted  - Was given Lasix 60 mg IV per ED  - Echo ordered  - Monitor labs      Arthritis  Patient requests Tylenol PRN, which is ordered    Essential hypertension  Patient's blood pressure range in the last 24 hours was: BP  Min: 151/72  Max: 168/81.The patient's inpatient anti-hypertensive regimen is listed below:  Current Antihypertensives  metoprolol succinate (TOPROL-XL) 24 hr split tablet 12.5 mg, Daily, Oral  furosemide injection 40 mg, Every 12 hours, Intravenous    Plan  - BP is controlled, no changes needed to their regimen  Stage 3b chronic kidney disease  Creatine stable for now. BMP reviewed- noted Estimated Creatinine Clearance: 28.9 mL/min (based on SCr of 1.4 mg/dL). according to latest data. Based on current GFR, CKD stage is stage 3 - GFR 30-59.  Monitor UOP and serial BMP and adjust therapy as needed. Renally dose meds. Avoid nephrotoxic medications and procedures.  Nonischemic cardiomyopathy  Continue beta blocker    Chronic gout without tophus  Continue allopurinol    VTE Risk Mitigation (From admission, onward)           Ordered     enoxaparin injection 30 mg  Daily         04/26/25 2025     IP VTE HIGH RISK PATIENT  Once         04/26/25 2025     Place sequential compression device  Until discontinued         04/26/25 2025                    This patient's case has been reviewed by my supervising physician, Dr. Jessica Lagunas.  Supervising physician will provide additional orders and recommendations at his or her discretion.    Nataly Roach NP  Department of Hospital Medicine  [facility] Telemetry ([facility])

## 2025-04-27 NOTE — SUBJECTIVE & OBJECTIVE
Interval History:  Follow up for CHF exacerbation.  Patient states that she is feeling better but continues to have shortness a breath with ambulation.  She was admitted overnight for IV diuresis.  Patient had seen Cardiology this morning and recommended continued IV diuresis at this time.    Review of Systems  Objective:     Vital Signs (Most Recent):  Temp: 97.9 °F (36.6 °C) (04/27/25 0843)  Pulse: 60 (04/27/25 1140)  Resp: 16 (04/27/25 0843)  BP: (!) 166/89 (04/27/25 0843)  SpO2: 97 % (04/27/25 0843) Vital Signs (24h Range):  Temp:  [97.4 °F (36.3 °C)-98.5 °F (36.9 °C)] 97.9 °F (36.6 °C)  Pulse:  [54-73] 60  Resp:  [16-35] 16  SpO2:  [86 %-100 %] 97 %  BP: (141-168)/(71-89) 166/89     Weight: 72.3 kg (159 lb 6.3 oz)  Body mass index is 29.15 kg/m².  No intake or output data in the 24 hours ending 04/27/25 1225      Physical Exam  Vitals and nursing note reviewed.   Constitutional:       Appearance: Normal appearance.   HENT:      Head: Normocephalic and atraumatic.      Nose: Nose normal.      Mouth/Throat:      Mouth: Mucous membranes are moist.   Eyes:      Extraocular Movements: Extraocular movements intact.      Conjunctiva/sclera: Conjunctivae normal.   Cardiovascular:      Rate and Rhythm: Normal rate and regular rhythm.      Pulses: Normal pulses.      Heart sounds: Normal heart sounds.   Pulmonary:      Effort: Pulmonary effort is normal.      Breath sounds: Normal breath sounds.   Abdominal:      General: Abdomen is flat. Bowel sounds are normal.      Palpations: Abdomen is soft.   Musculoskeletal:         General: Normal range of motion.      Cervical back: Normal range of motion and neck supple.      Comments: Trace edema noted in the lower extremities bilaterally   Skin:     General: Skin is warm.      Capillary Refill: Capillary refill takes less than 2 seconds.   Neurological:      Mental Status: She is alert and oriented to person, place, and time. Mental status is at baseline.   Psychiatric:          Mood and Affect: Mood normal.         Behavior: Behavior normal.               Significant Labs: All pertinent labs within the past 24 hours have been reviewed.  Recent Lab Results  (Last 5 results in the past 24 hours)        04/27/25  1032   04/27/25  0449   04/27/25  0448   04/27/25  0012   04/26/25  1737        Albumin         3.1       ALP         66       ALT         25       Anion Gap   12       14       Ao root annulus 2.6               Ascending aorta 2.3               Ao peak kali 1.3               Ao VTI 21.8               AST         32       AV valve area 1.9               ANGELES by Velocity Ratio 1.7               AV mean gradient 3               AV index (prosthetic) 0.59               AV peak gradient 7               AV Velocity Ratio 0.54               Baso #     0.02     0.04       Basophil %     0.4     0.9       BILIRUBIN TOTAL         1.1  Comment: For infants and newborns, interpretation of results should be based   on gestational age, weight and in agreement with clinical   observations.    Premature Infant recommended reference ranges:   0-24 hours:  <8.0 mg/dL   24-48 hours: <12.0 mg/dL   3-5 days:    <15.0 mg/dL   6-29 days:   <15.0 mg/dL       BNP         4,599  Comment: Values of less than 100 pg/ml are consistent with non-CHF populations.        BSA 1.78               BUN   18       18       Calcium   8.7       8.8       Chloride   108       110       CO2   23       18       Creatinine   1.4       1.4       Left Ventricle Relative Wall Thickness 0.32               E/A ratio 1.29               E/E' ratio 12               eGFR   38  Comment: Estimated GFR calculated using the CKD-EPI creatinine (2021) equation.       38  Comment: Estimated GFR calculated using the CKD-EPI creatinine (2021) equation.       Eos #     0.02     0.07       Eos %     0.4     1.6       E wave deceleration time 266               Free T4         1.64  Comment: If not completed within last 6 months       FS 14.0                Glucose   118       105       Gran # (ANC)     3.16     2.54       Hematocrit     32.9     36.3       Hemoglobin     11.2     12.2       Hepatitis C Ab         Positive       HIV 1/2 Ag/Ab         Negative       Immature Grans (Abs)     0.02  Comment: Mild elevation in immature granulocytes is non specific and can be seen in a variety of conditions including stress response, acute inflammation, trauma and pregnancy. Correlation with other laboratory and clinical findings is essential.     0.01  Comment: Mild elevation in immature granulocytes is non specific and can be seen in a variety of conditions including stress response, acute inflammation, trauma and pregnancy. Correlation with other laboratory and clinical findings is essential.       Immature Granulocytes     0.4     0.2       IVRT 117               IVSd 0.8               LA WIDTH 6.1               LA area A2C 29.25               LA area A4C 28.09               Left Atrium Major Axis 6.5               Left Atrium Minor Axis 6.2               LA size 3.5               LA Vol 115                104               LA vol index 66               RORO (MOD) 60               LVOT area 3.1               LV LATERAL E/E' RATIO 11.0               LV SEPTAL E/E' RATIO 13.2               LV EDV                LV Diastolic Volume Index 91.38               Left Ventricular End Diastolic Volume by Teichholz Method 158.70               Left Ventricular End Systolic Volume by Teichholz Method 110.95               LV ESV A2C 102.16               LV ESV A4C 97.57               LVIDd 5.7               LVIDs 4.9               LV mass 183.7               LV Mass Index 105.6               Left Ventricular Outflow Tract Mean Gradient 1.02               Left Ventricular Outflow Tract Mean Velocity 0.47               LVOT diameter 2.0               LVOT peak kali 0.7               LVOT stroke volume 40.5               LVOT peak VTI 12.9               LV ESV                 LV Systolic Volume Index 63.8               Lymph #     1.04     1.08       Lymph %     20.4     24.1       Magnesium    1.8       1.9  Comment: If not completed within last 6 months       MCH     28.8     28.8       MCHC     34.0     33.6       MCV     85     86       Mean e' 0.06               Mono #     0.83     0.75       Mono %     16.3     16.7       MPV     10.9     10.4       MV Peak A Gilbert 0.51               MV Peak E Gilbert 0.66               Neut %     62.1     56.5       nRBC     0     0       Phosphorus Level         3.4  Comment: If not completed within last 6 months       Platelet Count     237     237       Potassium   3.3       3.5       PROTEIN TOTAL         7.0       Pulmonary Valve Mean Velocity 0.63               PV mean gradient 0               PV peak gradient 4               PV Peak D Gilbert 0.53               PV Peak S Gilbert 0.36               PV PEAK VELOCITY 1.03               Pulm vein S/D ratio 0.68               PW 0.9               RA Major Axis 4.99               Est. RA pres 15               RA Width 3.9               RBC     3.89     4.24       RDW     14.2     14.8       RV TB RVSP 18               RV/LV Ratio 0.58               RVDD 3.27               RV- garcia basal diam 3.3               RVOT peak gilbert 0.43               RVOT peak VTI 11.9               Sodium   143       142       STJ 1.6               TAPSE 1.5               TDI SEPTAL 0.05               TDI LATERAL 0.06               Triscuspid Valve Regurgitation Peak Gradient 46               TR Max Gilbert 3.4               Troponin I   0.086  Comment: The reference interval for Troponin I represents the 99th percentile cutoff for our facility and is consistent with 3rd generation assay performance.     0.084  Comment: The reference interval for Troponin I represents the 99th percentile cutoff for our facility and is consistent with 3rd generation assay performance.   0.044  Comment: The reference interval for Troponin I represents  the 99th percentile cutoff for our facility and is consistent with 3rd generation assay performance.       TSH         2.739  Comment: If not completed within last 6 months       TV resting pulmonary artery pressure 61               WBC     5.09     4.49       ZLVIDD 1.68               ZLVIDS 3.93                                      Significant Imaging: I have reviewed all pertinent imaging results/findings within the past 24 hours.

## 2025-04-27 NOTE — PROGRESS NOTES
[facility] Medicine  Progress Note    Patient Name: Megan Sams  MRN: [id number]  Patient Class: IP- Inpatient   Admission Date: 4/26/2025  Length of Stay: 1 days  Attending Physician: Galo Martin MD  Primary Care Provider: Don Mayen MD        Subjective     Principal Problem:Acute systolic congestive heart failure        HPI:  Patient is 80-year-old female with past medical history significant for arthritis, hypertension, CKD stage IIIB, nonischemic cardiomyopathy, heart failure with reduced EF (Echo 7/19/2024 EF 35-40%),  bradycardia, PVCs, history of pauses, gout, and back pain who presented to ED with complaint of shortness of breath, which started last night and has gotten worse. She also reports wheezing, bilateral feet swelling and back pain. Denies history of asthma/COPD. She states that she has been taking her diuretics (lasix 20 mg and spironolactone 50 mg) without improvement. She denies headache, dizziness, lightheadedness, syncope, falls, weakness, chest pain, productive cough, fever, chills, congestion, abdominal pain, nausea, vomiting, diarrhea, dysuria.  on arrival to ED, temp 97.7°, heart rate 70, respirations 20, blood pressure 157/79, 96% SpO2 on room air.  Lab workup significant for BNP 45 99, troponin 0.044, CO2 18, potassium 3.5.  There is no leukocytosis.    Chest x-ray consistent with vascular congestion, cardiac silhouette size enlargement noted.   EKG showedPatient is 80-year-old female with past medical history significant for arthritis, hypertension, CKD stage IIIB, nonischemic cardiomyopathy, heart failure with reduced EF (Echo 7/19/2024 EF 35-40%),  bradycardia, PVCs, history of pauses, gout, and back pain who presented to ED with complaint of shortness of breath, which started last night and has gotten worse. She also reports wheezing, bilateral feet swelling and back pain. Denies history of asthma/COPD. She states that she has been taking her  diuretics (lasix 20 mg and spironolactone 50 mg) without improvement. She denies headache, dizziness, lightheadedness, syncope, falls, weakness, chest pain, productive cough, fever, chills, congestion, abdominal pain, nausea, vomiting, diarrhea, dysuria.  on arrival to ED, temp 97.7°, heart rate 70, respirations 20, blood pressure 157/79, 96% SpO2 on room air.  Lab workup significant for BNP 45 99, troponin 0.044, CO2 18, potassium 3.5.  There is no leukocytosis.    Chest x-ray consistent with vascular congestion, cardiac silhouette size enlargement noted.   EKG showed normal sinus rhythm, heart rate 66, QRS widening noted LVH, LA enlargement, no STEMI.  While in ED, patient was given DuoNeb x1 and Lasix 60 mg IV.  Hospital Medicine was consulted for admission due to CHF exacerbation.  While in ED, patient was given DuoNeb x1 and Lasix 60 mg IV.  Hospital Medicine was consulted for admission due to CHF exacerbation.    Overview/Hospital Course:  No notes on file    Interval History:  Follow up for CHF exacerbation.  Patient states that she is feeling better but continues to have shortness a breath with ambulation.  She was admitted overnight for IV diuresis.  Patient had seen Cardiology this morning and recommended continued IV diuresis at this time.    Review of Systems  Objective:     Vital Signs (Most Recent):  Temp: 97.9 °F (36.6 °C) (04/27/25 0843)  Pulse: 60 (04/27/25 1140)  Resp: 16 (04/27/25 0843)  BP: (!) 166/89 (04/27/25 0843)  SpO2: 97 % (04/27/25 0843) Vital Signs (24h Range):  Temp:  [97.4 °F (36.3 °C)-98.5 °F (36.9 °C)] 97.9 °F (36.6 °C)  Pulse:  [54-73] 60  Resp:  [16-35] 16  SpO2:  [86 %-100 %] 97 %  BP: (141-168)/(71-89) 166/89     Weight: 72.3 kg (159 lb 6.3 oz)  Body mass index is 29.15 kg/m².  No intake or output data in the 24 hours ending 04/27/25 1225      Physical Exam  Vitals and nursing note reviewed.   Constitutional:       Appearance: Normal appearance.   HENT:      Head: Normocephalic  and atraumatic.      Nose: Nose normal.      Mouth/Throat:      Mouth: Mucous membranes are moist.   Eyes:      Extraocular Movements: Extraocular movements intact.      Conjunctiva/sclera: Conjunctivae normal.   Cardiovascular:      Rate and Rhythm: Normal rate and regular rhythm.      Pulses: Normal pulses.      Heart sounds: Normal heart sounds.   Pulmonary:      Effort: Pulmonary effort is normal.      Breath sounds: Normal breath sounds.   Abdominal:      General: Abdomen is flat. Bowel sounds are normal.      Palpations: Abdomen is soft.   Musculoskeletal:         General: Normal range of motion.      Cervical back: Normal range of motion and neck supple.      Comments: Trace edema noted in the lower extremities bilaterally   Skin:     General: Skin is warm.      Capillary Refill: Capillary refill takes less than 2 seconds.   Neurological:      Mental Status: She is alert and oriented to person, place, and time. Mental status is at baseline.   Psychiatric:         Mood and Affect: Mood normal.         Behavior: Behavior normal.               Significant Labs: All pertinent labs within the past 24 hours have been reviewed.  Recent Lab Results  (Last 5 results in the past 24 hours)        04/27/25  1032   04/27/25  0449   04/27/25  0448   04/27/25  0012   04/26/25  1737        Albumin         3.1       ALP         66       ALT         25       Anion Gap   12       14       Ao root annulus 2.6               Ascending aorta 2.3               Ao peak kali 1.3               Ao VTI 21.8               AST         32       AV valve area 1.9               ANGELES by Velocity Ratio 1.7               AV mean gradient 3               AV index (prosthetic) 0.59               AV peak gradient 7               AV Velocity Ratio 0.54               Baso #     0.02     0.04       Basophil %     0.4     0.9       BILIRUBIN TOTAL         1.1  Comment: For infants and newborns, interpretation of results should be based   on gestational  age, weight and in agreement with clinical   observations.    Premature Infant recommended reference ranges:   0-24 hours:  <8.0 mg/dL   24-48 hours: <12.0 mg/dL   3-5 days:    <15.0 mg/dL   6-29 days:   <15.0 mg/dL       BNP         4,599  Comment: Values of less than 100 pg/ml are consistent with non-CHF populations.        BSA 1.78               BUN   18       18       Calcium   8.7       8.8       Chloride   108       110       CO2   23       18       Creatinine   1.4       1.4       Left Ventricle Relative Wall Thickness 0.32               E/A ratio 1.29               E/E' ratio 12               eGFR   38  Comment: Estimated GFR calculated using the CKD-EPI creatinine (2021) equation.       38  Comment: Estimated GFR calculated using the CKD-EPI creatinine (2021) equation.       Eos #     0.02     0.07       Eos %     0.4     1.6       E wave deceleration time 266               Free T4         1.64  Comment: If not completed within last 6 months       FS 14.0               Glucose   118       105       Gran # (ANC)     3.16     2.54       Hematocrit     32.9     36.3       Hemoglobin     11.2     12.2       Hepatitis C Ab         Positive       HIV 1/2 Ag/Ab         Negative       Immature Grans (Abs)     0.02  Comment: Mild elevation in immature granulocytes is non specific and can be seen in a variety of conditions including stress response, acute inflammation, trauma and pregnancy. Correlation with other laboratory and clinical findings is essential.     0.01  Comment: Mild elevation in immature granulocytes is non specific and can be seen in a variety of conditions including stress response, acute inflammation, trauma and pregnancy. Correlation with other laboratory and clinical findings is essential.       Immature Granulocytes     0.4     0.2       IVRT 117               IVSd 0.8               LA WIDTH 6.1               LA area A2C 29.25               LA area A4C 28.09               Left Atrium Major Axis  6.5               Left Atrium Minor Axis 6.2               LA size 3.5               LA Vol 115                104               LA vol index 66               RORO (MOD) 60               LVOT area 3.1               LV LATERAL E/E' RATIO 11.0               LV SEPTAL E/E' RATIO 13.2               LV EDV                LV Diastolic Volume Index 91.38               Left Ventricular End Diastolic Volume by Teichholz Method 158.70               Left Ventricular End Systolic Volume by Teichholz Method 110.95               LV ESV A2C 102.16               LV ESV A4C 97.57               LVIDd 5.7               LVIDs 4.9               LV mass 183.7               LV Mass Index 105.6               Left Ventricular Outflow Tract Mean Gradient 1.02               Left Ventricular Outflow Tract Mean Velocity 0.47               LVOT diameter 2.0               LVOT peak kali 0.7               LVOT stroke volume 40.5               LVOT peak VTI 12.9               LV ESV                LV Systolic Volume Index 63.8               Lymph #     1.04     1.08       Lymph %     20.4     24.1       Magnesium    1.8       1.9  Comment: If not completed within last 6 months       MCH     28.8     28.8       MCHC     34.0     33.6       MCV     85     86       Mean e' 0.06               Mono #     0.83     0.75       Mono %     16.3     16.7       MPV     10.9     10.4       MV Peak A Kali 0.51               MV Peak E Kali 0.66               Neut %     62.1     56.5       nRBC     0     0       Phosphorus Level         3.4  Comment: If not completed within last 6 months       Platelet Count     237     237       Potassium   3.3       3.5       PROTEIN TOTAL         7.0       Pulmonary Valve Mean Velocity 0.63               PV mean gradient 0               PV peak gradient 4               PV Peak D Kali 0.53               PV Peak S Kali 0.36               PV PEAK VELOCITY 1.03               Pulm vein S/D ratio 0.68               PW 0.9                RA Major Axis 4.99               Est. RA pres 15               RA Width 3.9               RBC     3.89     4.24       RDW     14.2     14.8       RV TB RVSP 18               RV/LV Ratio 0.58               RVDD 3.27               RV- garcia basal diam 3.3               RVOT peak gilbert 0.43               RVOT peak VTI 11.9               Sodium   143       142       STJ 1.6               TAPSE 1.5               TDI SEPTAL 0.05               TDI LATERAL 0.06               Triscuspid Valve Regurgitation Peak Gradient 46               TR Max Gilbert 3.4               Troponin I   0.086  Comment: The reference interval for Troponin I represents the 99th percentile cutoff for our facility and is consistent with 3rd generation assay performance.     0.084  Comment: The reference interval for Troponin I represents the 99th percentile cutoff for our facility and is consistent with 3rd generation assay performance.   0.044  Comment: The reference interval for Troponin I represents the 99th percentile cutoff for our facility and is consistent with 3rd generation assay performance.       TSH         2.739  Comment: If not completed within last 6 months       TV resting pulmonary artery pressure 61               WBC     5.09     4.49       ZLVIDD 1.68               ZLVIDS 3.93                                      Significant Imaging: I have reviewed all pertinent imaging results/findings within the past 24 hours.      Assessment & Plan  Acute systolic congestive heart failure  Patient has Systolic (HFrEF) heart failure that is Acute on chronic. On presentation their CHF was  Most recent BNP and echo results are listed below. Reports that she has taken her PRN Lasix for past few days and has been taking Aldactone as well, but symptoms worsening.  Recent Labs     04/26/25  1737   BNP 4,599*     Latest ECHO  Results for orders placed during the hospital encounter of 07/19/24    Echo    Interpretation Summary    Left Ventricle: The  left ventricle is normal in size. Normal wall thickness. There is concentric hypertrophy. Mild global hypokinesis present. There is reduced systolic function. Ejection fraction by visual approximation is 40%. There is indeterminate diastolic function.    Right Ventricle: Normal right ventricular cavity size. Wall thickness is normal. Systolic function is normal.    Aortic Valve: The aortic valve is a trileaflet valve. There is mild stenosis. Aortic valve area by VTI is 1.92 cm². Aortic valve peak velocity is 1.17 m/s. Mean gradient is 3 mmHg. The dimensionless index is 0.59.    Mitral Valve: There is moderate bileaflet sclerosis. There is normal leaflet mobility. There is prolapse of the anterior mitral leaflet. There is mild to moderate regurgitation.    Pulmonary Artery: The estimated pulmonary artery systolic pressure is 48 mmHg.    IVC/SVC: Normal venous pressure at 3 mmHg.    Current Heart Failure Medications  metoprolol succinate (TOPROL-XL) 24 hr split tablet 12.5 mg, Daily, Oral  sacubitriL-valsartan 49-51 mg per tablet 1 tablet, 2 times daily, Oral  furosemide injection 40 mg, Every 12 hours, Intravenous    Plan  - Monitor strict I&Os and daily weights.    - Place on telemetry  - Low sodium diet  - Place on fluid restriction of 1.5 L.   - Cardiology has been consulted  - Was given Lasix 60 mg IV per ED, continue IV diuresis  - Echo pending  - Monitor labs      Arthritis  Patient requests Tylenol PRN, which is ordered    Essential hypertension  Patient's blood pressure range in the last 24 hours was: BP  Min: 141/71  Max: 168/81.The patient's inpatient anti-hypertensive regimen is listed below:  Current Antihypertensives  metoprolol succinate (TOPROL-XL) 24 hr split tablet 12.5 mg, Daily, Oral  furosemide injection 40 mg, Every 12 hours, Intravenous    Plan  - BP is controlled, no changes needed to their regimen  Stage 3b chronic kidney disease  Creatine stable for now. BMP reviewed- noted Estimated  Creatinine Clearance: 28.9 mL/min (based on SCr of 1.4 mg/dL). according to latest data. Based on current GFR, CKD stage is stage 3 - GFR 30-59.  Monitor UOP and serial BMP and adjust therapy as needed. Renally dose meds. Avoid nephrotoxic medications and procedures.  Nonischemic cardiomyopathy  Continue beta blocker    Chronic gout without tophus  Continue allopurinol    VTE Risk Mitigation (From admission, onward)           Ordered     enoxaparin injection 30 mg  Daily         04/26/25 2025     IP VTE HIGH RISK PATIENT  Once         04/26/25 2025     Place sequential compression device  Until discontinued         04/26/25 2025                    Discharge Planning   DONALD:      Code Status: Full Code   Medical Readiness for Discharge Date:                            Galo Martin MD  Department of Hospital Medicine   [facility] [facility] ([facility])

## 2025-04-28 ENCOUNTER — TELEPHONE (OUTPATIENT)
Dept: CARDIOLOGY | Facility: CLINIC | Age: 80
End: 2025-04-28
Payer: MEDICARE

## 2025-04-28 ENCOUNTER — DOCUMENTATION ONLY (OUTPATIENT)
Dept: CARDIOLOGY | Facility: CLINIC | Age: 80
End: 2025-04-28
Payer: MEDICARE

## 2025-04-28 VITALS
BODY MASS INDEX: 28.02 KG/M2 | TEMPERATURE: 98 F | OXYGEN SATURATION: 96 % | RESPIRATION RATE: 16 BRPM | HEIGHT: 62 IN | HEART RATE: 53 BPM | SYSTOLIC BLOOD PRESSURE: 127 MMHG | DIASTOLIC BLOOD PRESSURE: 59 MMHG | WEIGHT: 152.25 LBS

## 2025-04-28 LAB
ABSOLUTE EOSINOPHIL (OHS): 0.07 K/UL
ABSOLUTE MONOCYTE (OHS): 0.58 K/UL (ref 0.3–1)
ABSOLUTE NEUTROPHIL COUNT (OHS): 3.79 K/UL (ref 1.8–7.7)
ANION GAP (OHS): 10 MMOL/L (ref 8–16)
BASOPHILS # BLD AUTO: 0.03 K/UL
BASOPHILS NFR BLD AUTO: 0.5 %
BUN SERPL-MCNC: 18 MG/DL (ref 8–23)
CALCIUM SERPL-MCNC: 8.5 MG/DL (ref 8.7–10.5)
CHLORIDE SERPL-SCNC: 103 MMOL/L (ref 95–110)
CO2 SERPL-SCNC: 26 MMOL/L (ref 23–29)
CREAT SERPL-MCNC: 1.6 MG/DL (ref 0.5–1.4)
ERYTHROCYTE [DISTWIDTH] IN BLOOD BY AUTOMATED COUNT: 14.6 % (ref 11.5–14.5)
GFR SERPLBLD CREATININE-BSD FMLA CKD-EPI: 32 ML/MIN/1.73/M2
GLUCOSE SERPL-MCNC: 118 MG/DL (ref 70–110)
HCT VFR BLD AUTO: 37.6 % (ref 37–48.5)
HCV RNA SERPL NAA+PROBE-ACNC: ABNORMAL IU/ML
HCV RNA SERPL NAA+PROBE-LOG IU: 6.38 LOG IU/ML
HCV RNA SERPL NAA+PROBE-LOG IU: DETECTED {LOG_IU}/ML
HGB BLD-MCNC: 12.4 GM/DL (ref 12–16)
IMM GRANULOCYTES # BLD AUTO: 0.02 K/UL (ref 0–0.04)
IMM GRANULOCYTES NFR BLD AUTO: 0.3 % (ref 0–0.5)
LYMPHOCYTES # BLD AUTO: 1.35 K/UL (ref 1–4.8)
MAGNESIUM SERPL-MCNC: 2.3 MG/DL (ref 1.6–2.6)
MCH RBC QN AUTO: 28.7 PG (ref 27–31)
MCHC RBC AUTO-ENTMCNC: 33 G/DL (ref 32–36)
MCV RBC AUTO: 87 FL (ref 82–98)
NUCLEATED RBC (/100WBC) (OHS): 0 /100 WBC
PLATELET # BLD AUTO: 256 K/UL (ref 150–450)
PMV BLD AUTO: 10.7 FL (ref 9.2–12.9)
POTASSIUM SERPL-SCNC: 3.8 MMOL/L (ref 3.5–5.1)
RBC # BLD AUTO: 4.32 M/UL (ref 4–5.4)
RELATIVE EOSINOPHIL (OHS): 1.2 %
RELATIVE LYMPHOCYTE (OHS): 23.1 % (ref 18–48)
RELATIVE MONOCYTE (OHS): 9.9 % (ref 4–15)
RELATIVE NEUTROPHIL (OHS): 65 % (ref 38–73)
SODIUM SERPL-SCNC: 139 MMOL/L (ref 136–145)
WBC # BLD AUTO: 5.84 K/UL (ref 3.9–12.7)

## 2025-04-28 PROCEDURE — 25000003 PHARM REV CODE 250: Performed by: NURSE PRACTITIONER

## 2025-04-28 PROCEDURE — 82310 ASSAY OF CALCIUM: CPT | Performed by: NURSE PRACTITIONER

## 2025-04-28 PROCEDURE — 85025 COMPLETE CBC W/AUTO DIFF WBC: CPT | Performed by: NURSE PRACTITIONER

## 2025-04-28 PROCEDURE — 83735 ASSAY OF MAGNESIUM: CPT | Performed by: NURSE PRACTITIONER

## 2025-04-28 PROCEDURE — 36415 COLL VENOUS BLD VENIPUNCTURE: CPT | Performed by: NURSE PRACTITIONER

## 2025-04-28 PROCEDURE — 63600175 PHARM REV CODE 636 W HCPCS: Performed by: NURSE PRACTITIONER

## 2025-04-28 PROCEDURE — 99233 SBSQ HOSP IP/OBS HIGH 50: CPT | Mod: ,,, | Performed by: INTERNAL MEDICINE

## 2025-04-28 RX ORDER — HYDROXYZINE HYDROCHLORIDE 25 MG/1
25 TABLET, FILM COATED ORAL ONCE AS NEEDED
Status: DISCONTINUED | OUTPATIENT
Start: 2025-04-28 | End: 2025-04-28 | Stop reason: HOSPADM

## 2025-04-28 RX ADMIN — ASPIRIN 81 MG: 81 TABLET, COATED ORAL at 08:04

## 2025-04-28 RX ADMIN — ALLOPURINOL 200 MG: 100 TABLET ORAL at 08:04

## 2025-04-28 RX ADMIN — FUROSEMIDE 40 MG: 10 INJECTION, SOLUTION INTRAMUSCULAR; INTRAVENOUS at 08:04

## 2025-04-28 RX ADMIN — SACUBITRIL AND VALSARTAN 1 TABLET: 49; 51 TABLET, FILM COATED ORAL at 08:04

## 2025-04-28 NOTE — DISCHARGE INSTRUCTIONS
Our goal at [facility] is to always give you outstanding care and exceptional service. You may receive a survey from [employer] by mail, text or e-mail in the next 24-48 hours asking about the care you received with us. The survey should only take 5-10 minutes to complete and is very important to us.     Your feedback provides us with a way to recognize our staff who work tirelessly to provide the best care! Also, your responses help us learn how to improve when your experience was below our aspiration of excellence. We are always looking for ways to improve your stay. We WILL use your feedback to continue making improvements to help us provide the highest quality care. We keep your personal information and feedback confidential. We appreciate your time completing this survey and can't wait to hear from you!!!    We look forward to your continued care with us! Thanks so much for choosing [facility] for your healthcare needs!

## 2025-04-28 NOTE — PROGRESS NOTES
[facility] ([facility])  Cardiology  Progress Note    Patient Name: Megan Sams  MRN: [id number]  Admission Date: 4/26/2025  Hospital Length of Stay: 2 days  Code Status: Full Code   Attending Physician: Hugh Rubio MD   Primary Care Physician: Don Mayen MD  Expected Discharge Date: 4/28/2025  Principal Problem:Acute systolic congestive heart failure    Subjective:     Hospital Course:   80-year-old female with past medical history significant for arthritis, hypertension, CKD stage IIIB, nonischemic cardiomyopathy, heart failure with reduced EF (Echo 7/19/2024 EF 35-40%), bradycardia, PVCs, history of pauses, gout, and back pain who presented to ED with complaint of shortness of breath, which started last night and has gotten worse. She also reports wheezing, bilateral feet swelling and back pain. Denies history of asthma/COPD. She states that she has been taking her diuretics (lasix 20 mg and spironolactone 50 mg) without improvement. She denies headache, dizziness, lightheadedness, syncope, falls, weakness, chest pain, productive cough, fever, chills, congestion, abdominal pain, nausea, vomiting, diarrhea, dysuria. on arrival to ED, temp 97.7°, heart rate 70, respirations 20, blood pressure 157/79, 96% SpO2 on room air. Lab workup significant for BNP 45 99, troponin 0.044, CO2 18, potassium 3.5. There is no leukocytosis. Chest x-ray consistent with vascular congestion, cardiac silhouette size enlargement noted. EKG showedPatient is 80-year-old female with past medical history significant for arthritis, hypertension, CKD stage IIIB, nonischemic cardiomyopathy, heart failure with reduced EF (Echo 7/19/2024 EF 35-40%), bradycardia, PVCs, history of pauses, gout, and back pain who presented to ED with complaint of shortness of breath, which started last night and has gotten worse. She also reports wheezing, bilateral feet swelling and back pain. Denies history of asthma/COPD. She states that she has  been taking her diuretics (lasix 20 mg and spironolactone 50 mg) without improvement. She denies headache, dizziness, lightheadedness, syncope, falls, weakness, chest pain, productive cough, fever, chills, congestion, abdominal pain, nausea, vomiting, diarrhea, dysuria. on arrival to ED, temp 97.7°, heart rate 70, respirations 20, blood pressure 157/79, 96% SpO2 on room air. Lab workup significant for BNP 45 99, troponin 0.044, CO2 18, potassium 3.5. There is no leukocytosis. Chest x-ray consistent with vascular congestion, cardiac silhouette size enlargement noted. EKG showed normal sinus rhythm, heart rate 66, QRS widening noted LVH, LA enlargement, no STEMI. While in ED, patient was given DuoNeb x1 and Lasix 60 mg IV. Hospital Medicine was consulted for admission due to CHF exacerbation. While in ED, patient was given DuoNeb x1 and Lasix 60 mg IV. Hospital Medicine was consulted for admission due to CHF exacerbation.    04/27/2025.    Continues to be fluid overloaded.    Reports response to IV diuretics    April 28, 2025.    IV Lasix held this morning due to mild jump in the creatinine.    She looks euvolemic.            Review of Systems   Cardiovascular:  Negative for chest pain, dyspnea on exertion, palpitations and syncope.   Neurological:  Negative for focal weakness.     Objective:     Vital Signs (Most Recent):  Temp: 97.7 °F (36.5 °C) (04/28/25 1136)  Pulse: 60 (04/28/25 1136)  Resp: 18 (04/28/25 1136)  BP: 138/72 (04/28/25 1136)  SpO2: 97 % (04/28/25 1136) Vital Signs (24h Range):  Temp:  [97.1 °F (36.2 °C)-97.8 °F (36.6 °C)] 97.7 °F (36.5 °C)  Pulse:  [48-61] 60  Resp:  [16-18] 18  SpO2:  [92 %-97 %] 97 %  BP: (120-150)/(62-94) 138/72     Weight: 69 kg (152 lb 3.6 oz)  Body mass index is 27.84 kg/m².     SpO2: 97 %         Intake/Output Summary (Last 24 hours) at 4/28/2025 1227  Last data filed at 4/28/2025 0600  Gross per 24 hour   Intake 120 ml   Output 650 ml   Net -530 ml       Lines/Drains/Airways        None                      Physical Exam  Vitals reviewed.   Constitutional:       Appearance: She is well-developed.   Neck:      Vascular: No carotid bruit.   Cardiovascular:      Rate and Rhythm: Normal rate and regular rhythm.      Pulses: Intact distal pulses.      Heart sounds: Normal heart sounds. No murmur heard.  Pulmonary:      Breath sounds: Normal breath sounds.   Neurological:      Mental Status: She is oriented to person, place, and time.            Significant Labs: All pertinent lab results from the last 24 hours have been reviewed. and   Recent Lab Results         04/28/25  0930   04/28/25  0509        Anion Gap   10       Baso # 0.03         Basophil % 0.5         BUN   18       Calcium   8.5       Chloride   103       CO2   26       Creatinine   1.6       eGFR   32  Comment: Estimated GFR calculated using the CKD-EPI creatinine (2021) equation.       Eos # 0.07         Eos % 1.2         Glucose   118       Gran # (ANC) 3.79         Hematocrit 37.6         Hemoglobin 12.4         Immature Grans (Abs) 0.02  Comment: Mild elevation in immature granulocytes is non specific and can be seen in a variety of conditions including stress response, acute inflammation, trauma and pregnancy. Correlation with other laboratory and clinical findings is essential.         Immature Granulocytes 0.3         Lymph # 1.35         Lymph % 23.1         Magnesium    2.3       MCH 28.7         MCHC 33.0         MCV 87         Mono # 0.58         Mono % 9.9         MPV 10.7         Neut % 65.0         nRBC 0         Platelet Count 256         Potassium   3.8       RBC 4.32         RDW 14.6         Sodium   139       WBC 5.84                 Significant Imaging: Echocardiogram: Transthoracic echo (TTE) complete (Cupid Only):   Results for orders placed or performed during the hospital encounter of 04/26/25   Echo   Result Value Ref Range    BSA 1.78 m2    LA WIDTH 6.1 cm    RA Width 3.9 cm    LVOT stroke volume 40.5 cm3     LVIDd 5.7 3.5 - 6.0 cm    LV Systolic Volume 111 mL    LV Systolic Volume Index 63.8 mL/m2    LVIDs 4.9 (A) 2.1 - 4.0 cm    LV ESV A2C 102.16 mL    LV Diastolic Volume 159 mL    LV ESV A4C 97.57 mL    LV Diastolic Volume Index 91.38 mL/m2    Left Ventricular End Systolic Volume by Teichholz Method 110.95 mL    Left Ventricular End Diastolic Volume by Teichholz Method 158.70 mL    IVS 0.8 0.6 - 1.1 cm    LVOT diameter 2.0 cm    LVOT area 3.1 cm2    FS 14.0 (A) 28 - 44 %    Left Ventricle Relative Wall Thickness 0.32 cm    PW 0.9 0.6 - 1.1 cm    LV mass 183.7 g    LV Mass Index 105.6 g/m2    MV Peak E Gilbert 0.66 m/s    TDI LATERAL 0.06 m/s    TDI SEPTAL 0.05 m/s    E/E' ratio 12 m/s    MV Peak A Gilbert 0.51 m/s    TR Max Gilbert 3.4 m/s    E/A ratio 1.29     IVRT 117 msec    E wave deceleration time 266 msec    LV SEPTAL E/E' RATIO 13.2 m/s    RORO 66 mL/m2    LV LATERAL E/E' RATIO 11.0 m/s    LA Vol 115 cm3    PV Peak S Gilbert 0.36 m/s    PV Peak D Gilbert 0.53 m/s    Pulm vein S/D ratio 0.68     LVOT peak gilbert 0.7 m/s    Left Ventricular Outflow Tract Mean Velocity 0.47 cm/s    Left Ventricular Outflow Tract Mean Gradient 1.02 mmHg    RV- garcia basal diam 3.3 cm    RVOT peak VTI 11.9 cm    TAPSE 1.5 cm    RV/LV Ratio 0.58 cm    LA size 3.5 cm    Left Atrium Minor Axis 6.2 cm    Left Atrium Major Axis 6.5 cm    LA Vol (MOD) 104 mL    RORO (MOD) 60 mL/m2    RA Major Axis 4.99 cm    AV mean gradient 3 mmHg    AV peak gradient 7 mmHg    Ao peak gilbert 1.3 m/s    Ao VTI 21.8 cm    LVOT peak VTI 12.9 cm    AV valve area 1.9 cm²    AV Velocity Ratio 0.54     AV index (prosthetic) 0.59     ANGELES by Velocity Ratio 1.7 cm²    Triscuspid Valve Regurgitation Peak Gradient 46 mmHg    PV mean gradient 0 mmHg    PV PEAK VELOCITY 1.03 m/s    PV peak gradient 4 mmHg    Pulmonary Valve Mean Velocity 0.63 m/s    RVOT peak gilbert 0.43 m/s    Ao root annulus 2.6 cm    STJ 1.6 cm    Ascending aorta 2.3 cm    Mean e' 0.06 m/s    ZLVIDS 3.93     ZLVIDD 1.68      LA area A4C 28.09 cm2    LA area A2C 29.25 cm2    RVDD 3.27 cm    TV resting pulmonary artery pressure 61 mmHg    RV TB RVSP 18 mmHg    Est. RA pres 15 mmHg    Narrative      Left Ventricle: The left ventricle is severely dilated. Normal wall   thickness. Moderate global hypokinesis present. There is moderately   reduced systolic function with a visually estimated ejection fraction of   35 - 40%. Grade II diastolic dysfunction.    Right Ventricle: Systolic function is mildly reduced.    Left Atrium: Severely dilated    Mitral Valve: There is bileaflet sclerosis. There is moderate   regurgitation with a eccentrically posterolateral directed jet.    Tricuspid Valve: There is moderate regurgitation.    Pulmonary Artery: The estimated pulmonary artery systolic pressure is   61 mmHg.    IVC/SVC: Elevated venous pressure at 15 mmHg.       Assessment and Plan:     Brief HPI:     * Acute systolic congestive heart failure  Patient has Combined Systolic and Diastolic heart failure that is Acute on chronic. On presentation their CHF was decompensated. Evidence of decompensated CHF on presentation includes: edema, elevated JVD, orthopnea, and shortness of breath. The etiology of their decompensation is likely dietary indiscretion. Most recent BNP and echo results are listed below.  Recent Labs     04/26/25  1737   BNP 4,599*       Latest ECHO  Results for orders placed during the hospital encounter of 07/19/24    Echo    Interpretation Summary    Left Ventricle: The left ventricle is normal in size. Normal wall thickness. There is concentric hypertrophy. Mild global hypokinesis present. There is reduced systolic function. Ejection fraction by visual approximation is 40%. There is indeterminate diastolic function.    Right Ventricle: Normal right ventricular cavity size. Wall thickness is normal. Systolic function is normal.    Aortic Valve: The aortic valve is a trileaflet valve. There is mild stenosis. Aortic valve area by VTI  is 1.92 cm². Aortic valve peak velocity is 1.17 m/s. Mean gradient is 3 mmHg. The dimensionless index is 0.59.    Mitral Valve: There is moderate bileaflet sclerosis. There is normal leaflet mobility. There is prolapse of the anterior mitral leaflet. There is mild to moderate regurgitation.    Pulmonary Artery: The estimated pulmonary artery systolic pressure is 48 mmHg.    IVC/SVC: Normal venous pressure at 3 mmHg.    Current Heart Failure Medications  metoprolol succinate (TOPROL-XL) 24 hr split tablet 12.5 mg, Daily, Oral  sacubitriL-valsartan 49-51 mg per tablet 1 tablet, 2 times daily, Oral    Plan  - Monitor strict I&Os and daily weights.    - Place on telemetry  - Low sodium diet  - Place on fluid restriction of 1.5 L.   - Cardiology has been consulted  - The patient's volume status is improving but not at their baseline as indicated by edema, elevated JVD, orthopnea, and shortness of breath  - continue with IV diuresis    April 28, 2025.    Switch to p.o. Lasix.    Creatinine jumped today.    Diuresed  Looks euvolemic  .  Laying in bed on room air.    Check for home O2.    Nonischemic cardiomyopathy  See plan under CHF        VTE Risk Mitigation (From admission, onward)           Ordered     enoxaparin injection 30 mg  Daily         04/26/25 2025     IP VTE HIGH RISK PATIENT  Once         04/26/25 2025     Place sequential compression device  Until discontinued         04/26/25 2025                    Melisa Damico MD  Cardiology  [facility] ([facility])

## 2025-04-28 NOTE — ASSESSMENT & PLAN NOTE
Patient's blood pressure range in the last 24 hours was: BP  Min: 120/69  Max: 150/73.The patient's inpatient anti-hypertensive regimen is listed below:  Current Antihypertensives  metoprolol succinate (TOPROL-XL) 24 hr split tablet 12.5 mg, Daily, Oral    Plan  - Resume home regimen

## 2025-04-28 NOTE — HOSPITAL COURSE
80-year-old female with past medical history significant for arthritis, hypertension, CKD stage IIIB, nonischemic cardiomyopathy, heart failure with reduced EF (Echo 7/19/2024 EF 35-40%), bradycardia, PVCs, history of pauses, gout, and back pain who presented to ED with complaint of shortness of breath, which started last night and has gotten worse. She also reports wheezing, bilateral feet swelling and back pain. Denies history of asthma/COPD. She states that she has been taking her diuretics (lasix 20 mg and spironolactone 50 mg) without improvement. She denies headache, dizziness, lightheadedness, syncope, falls, weakness, chest pain, productive cough, fever, chills, congestion, abdominal pain, nausea, vomiting, diarrhea, dysuria. on arrival to ED, temp 97.7°, heart rate 70, respirations 20, blood pressure 157/79, 96% SpO2 on room air. Lab workup significant for BNP 45 99, troponin 0.044, CO2 18, potassium 3.5. There is no leukocytosis. Chest x-ray consistent with vascular congestion, cardiac silhouette size enlargement noted. EKG showedPatient is 80-year-old female with past medical history significant for arthritis, hypertension, CKD stage IIIB, nonischemic cardiomyopathy, heart failure with reduced EF (Echo 7/19/2024 EF 35-40%), bradycardia, PVCs, history of pauses, gout, and back pain who presented to ED with complaint of shortness of breath, which started last night and has gotten worse. She also reports wheezing, bilateral feet swelling and back pain. Denies history of asthma/COPD. She states that she has been taking her diuretics (lasix 20 mg and spironolactone 50 mg) without improvement. She denies headache, dizziness, lightheadedness, syncope, falls, weakness, chest pain, productive cough, fever, chills, congestion, abdominal pain, nausea, vomiting, diarrhea, dysuria. on arrival to ED, temp 97.7°, heart rate 70, respirations 20, blood pressure 157/79, 96% SpO2 on room air. Lab workup significant for BNP  45 99, troponin 0.044, CO2 18, potassium 3.5. There is no leukocytosis. Chest x-ray consistent with vascular congestion, cardiac silhouette size enlargement noted. EKG showed normal sinus rhythm, heart rate 66, QRS widening noted LVH, LA enlargement, no STEMI. While in ED, patient was given DuoNeb x1 and Lasix 60 mg IV. Hospital Medicine was consulted for admission due to CHF exacerbation. While in ED, patient was given DuoNeb x1 and Lasix 60 mg IV. Hospital Medicine was consulted for admission due to CHF exacerbation.    04/27/2025.    Continues to be fluid overloaded.    Reports response to IV diuretics    April 28, 2025.    IV Lasix held this morning due to mild jump in the creatinine.    She looks euvolemic.

## 2025-04-28 NOTE — TELEPHONE ENCOUNTER
LM for pt to call us back      ----- Message from [facility] sent at 4/28/2025 12:17 PM CDT -----  Contact: Pallative Care  ..Type:  Sooner Apoointment RequestCaller is requesting a sooner appointment.  Caller declined first available appointment listed below.  Caller will not accept being placed on the waitlist and is requesting a message be sent to doctor.Name of Caller:Marion When is the first available appointment? 04/30Symptoms:Would the patient rather a call back or a response via [facility]?  Call back Best Call Back Number:.[phone] (home) Additional Information: pt is asking for an return call in reference to rescheduling apt

## 2025-04-28 NOTE — PLAN OF CARE
Problem: Adult Inpatient Plan of Care  Goal: Plan of Care Review  4/28/2025 0213 by Brandy Barahona RN  Outcome: Progressing  4/28/2025 0212 by Brandy Barahona RN  Outcome: Progressing  Goal: Patient-Specific Goal (Individualized)  4/28/2025 0213 by Brandy Barahona RN  Outcome: Progressing  4/28/2025 0212 by Brandy Barahona RN  Outcome: Progressing  Goal: Absence of Hospital-Acquired Illness or Injury  4/28/2025 0213 by Brandy Barahona RN  Outcome: Progressing  4/28/2025 0212 by Brandy Barahona RN  Outcome: Progressing  Goal: Optimal Comfort and Wellbeing  4/28/2025 0213 by Brandy Barahona RN  Outcome: Progressing  4/28/2025 0212 by Brandy Barahona RN  Outcome: Progressing  Goal: Readiness for Transition of Care  4/28/2025 0213 by Brandy Barahona RN  Outcome: Progressing  4/28/2025 0212 by Brandy Barahona RN  Outcome: Progressing     Problem: Fall Injury Risk  Goal: Absence of Fall and Fall-Related Injury  4/28/2025 0213 by Brandy Barahona RN  Outcome: Progressing  4/28/2025 0212 by Brandy Barahona RN  Outcome: Progressing     Problem: Fatigue  Goal: Improved Activity Tolerance  Outcome: Progressing     Problem: Pain Acute  Goal: Optimal Pain Control and Function  Outcome: Progressing     Problem: Heart Failure  Goal: Optimal Coping  Outcome: Progressing  Goal: Optimal Cardiac Output  Outcome: Progressing  Goal: Stable Heart Rate and Rhythm  Outcome: Progressing  Goal: Optimal Functional Ability  Outcome: Progressing  Goal: Fluid and Electrolyte Balance  Outcome: Progressing  Goal: Improved Oral Intake  Outcome: Progressing  Goal: Effective Oxygenation and Ventilation  Outcome: Progressing  Goal: Effective Breathing Pattern During Sleep  Outcome: Progressing

## 2025-04-28 NOTE — ASSESSMENT & PLAN NOTE
Creatine stable for now. BMP reviewed- noted Estimated Creatinine Clearance: 24.7 mL/min (A) (based on SCr of 1.6 mg/dL (H)). according to latest data. Based on current GFR, CKD stage is stage 3 - GFR 30-59.  Monitor UOP and serial BMP and adjust therapy as needed. Renally dose meds. Avoid nephrotoxic medications and procedures.

## 2025-04-28 NOTE — PLAN OF CARE
[facility] (Hospital)  Initial Discharge Assessment       Primary Care Provider: Don Mayen MD    Admission Diagnosis: Shortness of breath [R06.02]  Dyspnea [R06.00]  Troponin level elevated [R79.89]  Congestive heart failure, unspecified HF chronicity, unspecified heart failure type [I50.9]    Admission Date: 4/26/2025  Expected Discharge Date: 4/28/2025    Transition of Care Barriers: None    Payor: [employer] / Plan: [employer] / Product Type: Medicare Advantage /     Extended Emergency Contact Information  Primary Emergency Contact: SamsJavad   [facility]  Home Phone: [phone]  Relation: Son    Discharge Plan A: Home with family         [id number] [place] - [address] [place]  [address] [place] [address]  Phone: [phone] Fax: [phone]      Initial Assessment (most recent)       Adult Discharge Assessment - 04/28/25 1118          Discharge Assessment    Assessment Type Discharge Planning Assessment     Confirmed/corrected address, phone number and insurance Yes     Confirmed Demographics Correct on Facesheet     Source of Information patient     When was your last doctors appointment? 04/14/25   Sammy Dubois MD - Cardiology    Communicated DONALD with patient/caregiver Date not available/Unable to determine     Reason For Admission Acute systolic congestive heart failure     People in Home child(sushila), adult     Facility Arrived From: home     Do you expect to return to your current living situation? Yes     Do you have help at home or someone to help you manage your care at home? Yes     Who are your caregiver(s) and their phone number(s)? Javad Sams - son     Prior to hospitilization cognitive status: Alert/Oriented     Current cognitive status: Alert/Oriented     Walking or Climbing Stairs Difficulty no     Dressing/Bathing Difficulty no     Home Accessibility wheelchair accessible     Equipment  Currently Used at Home none     Readmission within 30 days? No     Patient currently being followed by outpatient case management? No     Do you currently have service(s) that help you manage your care at home? No     Do you take prescription medications? Yes     Do you have prescription coverage? Yes     Coverage [employer]     Do you have any problems affording any of your prescribed medications? No     Is the patient taking medications as prescribed? yes     Who is going to help you get home at discharge? Javad Sams - son     How do you get to doctors appointments? family or friend will provide     Are you on dialysis? No     Do you take coumadin? No     Discharge Plan A Home with family     DME Needed Upon Discharge  none     Discharge Plan discussed with: Patient     Transition of Care Barriers None        Transportation Needs    In the past 12 months, has lack of transportation kept you from medical appointments or from getting medications? No     In the past 12 months, has lack of transportation kept you from meetings, work, or from getting things needed for daily living? No                   Anticipated DC dispo: home with family  Prior Level of Function: independent with ADLs  People in home: Javad Sams - 62 year old son    Comments:  SW met with patient at bedside to introduce role and discuss discharge planning. Patient's son, Javad, will be help at home and can provide transport at time of discharge. Confirmed demographics, insurance, and emergency contacts. SW updated Patient's whiteboard with contact information and anticipated DC plan. SW discharge needs depends on hospital progress. SW will continue following to assist with other needs.

## 2025-04-28 NOTE — DISCHARGE SUMMARY
[facility]  Discharge Summary      Patient Name: Megan Sams  MRN: [id number]  LOCO: [id number]  Patient Class: IP- Inpatient  Admission Date: 4/26/2025  Hospital Length of Stay: 2 days  Discharge Date and Time: No discharge date for patient encounter.  Attending Physician: Hugh Rubio MD   Discharging Provider: Glenys Johnson NP  Primary Care Provider: Don Mayen MD    Primary Care Team: Networked reference to record PCT     HPI:   Patient is 80-year-old female with past medical history significant for arthritis, hypertension, CKD stage IIIB, nonischemic cardiomyopathy, heart failure with reduced EF (Echo 7/19/2024 EF 35-40%),  bradycardia, PVCs, history of pauses, gout, and back pain who presented to ED with complaint of shortness of breath, which started last night and has gotten worse. She also reports wheezing, bilateral feet swelling and back pain. Denies history of asthma/COPD. She states that she has been taking her diuretics (lasix 20 mg and spironolactone 50 mg) without improvement. She denies headache, dizziness, lightheadedness, syncope, falls, weakness, chest pain, productive cough, fever, chills, congestion, abdominal pain, nausea, vomiting, diarrhea, dysuria.  on arrival to ED, temp 97.7°, heart rate 70, respirations 20, blood pressure 157/79, 96% SpO2 on room air.  Lab workup significant for BNP 45 99, troponin 0.044, CO2 18, potassium 3.5.  There is no leukocytosis.    Chest x-ray consistent with vascular congestion, cardiac silhouette size enlargement noted.   EKG showedPatient is 80-year-old female with past medical history significant for arthritis, hypertension, CKD stage IIIB, nonischemic cardiomyopathy, heart failure with reduced EF (Echo 7/19/2024 EF 35-40%),  bradycardia, PVCs, history of pauses, gout, and back pain who presented to ED with complaint of shortness of breath, which started last night and has gotten worse. She also reports  wheezing, bilateral feet swelling and back pain. Denies history of asthma/COPD. She states that she has been taking her diuretics (lasix 20 mg and spironolactone 50 mg) without improvement. She denies headache, dizziness, lightheadedness, syncope, falls, weakness, chest pain, productive cough, fever, chills, congestion, abdominal pain, nausea, vomiting, diarrhea, dysuria.  on arrival to ED, temp 97.7°, heart rate 70, respirations 20, blood pressure 157/79, 96% SpO2 on room air.  Lab workup significant for BNP 45 99, troponin 0.044, CO2 18, potassium 3.5.  There is no leukocytosis.    Chest x-ray consistent with vascular congestion, cardiac silhouette size enlargement noted.   EKG showed normal sinus rhythm, heart rate 66, QRS widening noted LVH, LA enlargement, no STEMI.  While in ED, patient was given DuoNeb x1 and Lasix 60 mg IV.  Hospital Medicine was consulted for admission due to CHF exacerbation.  While in ED, patient was given DuoNeb x1 and Lasix 60 mg IV.  Hospital Medicine was consulted for admission due to CHF exacerbation.    * No surgery found *      Hospital Course:   80 year old female, under the care of hospital Medicine for management of Acute on Chronic Heart Failure. Patient treated with IV diuretic with good UOP. Weight improved from 72kg to 69kg, weaned off O2 to room air. Pedal edema improved. Labs stable, vitals stable. Discussed case with Cardiology. Patient scheduled for a stress test on 4/30, advised patient to complete the stress test as recommended. Continue home medications as prescribed. Referral for HH placed. Recommend to f/u with PCP and Cardiology as OP. Patient seen and examined on day of discharge and determined stable for discharge.      Goals of Care Treatment Preferences:  Code Status: Full Code         Consults:   Consults (From admission, onward)          Status Ordering Provider     Inpatient consult to Cardiology  Once        Provider:  Melisa Damico MD    Completed  MAEGAN LEWIS     Inpatient consult to Heart Failure Nurse  Once        Provider:  (Not yet assigned)    Acknowledged MAEGAN LEWIS     Inpatient consult to Social Work/Case Management  Once        Provider:  (Not yet assigned)    Completed MAEGAN LEWIS     Inpatient consult to Registered Dietitian/Nutritionist  Once        Provider:  (Not yet assigned)    Completed MAEGAN LEWIS            Assessment & Plan  Acute systolic congestive heart failure  Patient has Systolic (HFrEF) heart failure that is Acute on chronic. On presentation their CHF was  Most recent BNP and echo results are listed below. Reports that she has taken her PRN Lasix for past few days and has been taking Aldactone as well, but symptoms worsening.  Recent Labs     04/26/25  1737   BNP 4,599*     Latest ECHO  Results for orders placed during the hospital encounter of 07/19/24    Echo    Interpretation Summary    Left Ventricle: The left ventricle is normal in size. Normal wall thickness. There is concentric hypertrophy. Mild global hypokinesis present. There is reduced systolic function. Ejection fraction by visual approximation is 40%. There is indeterminate diastolic function.    Right Ventricle: Normal right ventricular cavity size. Wall thickness is normal. Systolic function is normal.    Aortic Valve: The aortic valve is a trileaflet valve. There is mild stenosis. Aortic valve area by VTI is 1.92 cm². Aortic valve peak velocity is 1.17 m/s. Mean gradient is 3 mmHg. The dimensionless index is 0.59.    Mitral Valve: There is moderate bileaflet sclerosis. There is normal leaflet mobility. There is prolapse of the anterior mitral leaflet. There is mild to moderate regurgitation.    Pulmonary Artery: The estimated pulmonary artery systolic pressure is 48 mmHg.    IVC/SVC: Normal venous pressure at 3 mmHg.    Current Heart Failure Medications  metoprolol succinate (TOPROL-XL) 24 hr split tablet 12.5 mg, Daily, Oral  sacubitriL-valsartan  49-51 mg per tablet 1 tablet, 2 times daily, Oral    Plan  --resume home regimen  --Weight reduced by 3kg during admission  Arthritis  F/U with PCP    Essential hypertension  Patient's blood pressure range in the last 24 hours was: BP  Min: 120/69  Max: 150/73.The patient's inpatient anti-hypertensive regimen is listed below:  Current Antihypertensives  metoprolol succinate (TOPROL-XL) 24 hr split tablet 12.5 mg, Daily, Oral    Plan  - Resume home regimen  Stage 3b chronic kidney disease  Creatine stable for now. BMP reviewed- noted Estimated Creatinine Clearance: 24.7 mL/min (A) (based on SCr of 1.6 mg/dL (H)). according to latest data. Based on current GFR, CKD stage is stage 3 - GFR 30-59.  Monitor UOP and serial BMP and adjust therapy as needed. Renally dose meds. Avoid nephrotoxic medications and procedures.  Nonischemic cardiomyopathy  Continue beta blocker    Chronic gout without tophus  Continue allopurinol    Final Active Diagnoses:    Diagnosis Date Noted POA    PRINCIPAL PROBLEM:  Acute systolic congestive heart failure [I50.21]  Yes    Chronic gout without tophus [M1A.9XX0] 04/11/2024 Yes    Nonischemic cardiomyopathy [I42.8] 12/17/2013 Yes    Essential hypertension [I10]  Yes     Chronic    Stage 3b chronic kidney disease [N18.32]  Yes    Arthritis [M19.90]  Yes      Problems Resolved During this Admission:       Discharged Condition: stable    Disposition: Home-Health Care [facility]    Follow Up:   Contact information for follow-up providers       Don Mayen MD Follow up in 1 week(s).    Specialty: Family Medicine  Contact information:  [address]  [place] [address] [phone]                       Contact information for after-discharge care       Home Medical Care       [facility] [place] .    Service: Home Health Services  Contact information:  [address] [facility] [address] [phone]                                 Patient  Instructions:      ACCEPT - Ambulatory referral/consult to Heart Failure Transitional Care Clinic   Standing Status: Future   Referral Priority: Routine Referral Type: Consultation   Referral Reason: Specialty Services Required   Requested Specialty: Cardiology   Number of Visits Requested: 1     Ambulatory referral/consult to Home Health   Standing Status: Future   Referral Priority: Routine Referral Type: Home Health Care   Referral Reason: Specialty Services Required   Requested Specialty: Home Health Services   Number of Visits Requested: 1     Ambulatory referral/consult to [facility]   Standing Status: Future   Referral Priority: Routine Referral Type: Consultation   Referral Reason: Specialty Services Required   Number of Visits Requested: 1     Ambulatory referral/consult to Congestive Heart Failure Clinic   Standing Status: Future   Referral Priority: Routine Referral Type: Consultation   Referral Reason: Specialty Services Required   Requested Specialty: Cardiology   Number of Visits Requested: 1     Diet Cardiac     Notify your health care provider if you experience any of the following:  temperature >100.4     Notify your health care provider if you experience any of the following:  persistent nausea and vomiting or diarrhea     Notify your health care provider if you experience any of the following:  severe uncontrolled pain     Notify your health care provider if you experience any of the following:  difficulty breathing or increased cough     No dressing needed     Activity as tolerated       Significant Diagnostic Studies: Labs: CMP   Recent Labs   Lab 04/26/25  1737 04/27/25  0449 04/28/25  0509    143 139   K 3.5 3.3* 3.8    108 103   CO2 18* 23 26   BUN 18 18 18   CREATININE 1.4 1.4 1.6*   CALCIUM 8.8 8.7 8.5*   ALBUMIN 3.1*  --   --    BILITOT 1.1*  --   --    ALKPHOS 66  --   --    AST 32  --   --    ALT 25  --   --    ANIONGAP 14 12 10   , CBC   Recent Labs   Lab  04/26/25 1737 04/27/25 0448 04/28/25  0930   WBC 4.49 5.09 5.84   HGB 12.2 11.2* 12.4   HCT 36.3* 32.9* 37.6    237 256   , and Troponin   Recent Labs   Lab 04/26/25 1737 04/27/25  0012 04/27/25  0449   TROPONINI 0.044* 0.084* 0.086*       Pending Diagnostic Studies:       Procedure Component Value Units Date/Time    HCV Virus Hold Specimen [[id number]] Collected: 04/26/25 1737    Order Status: Sent Lab Status: In process Updated: 04/26/25 1741    Specimen: Blood            Medications:  Reconciled Home Medications:      Medication List        CONTINUE taking these medications      acetaminophen 500 MG tablet  Commonly known as: TYLENOL  Take 500 mg by mouth every 6 (six) hours as needed.     allopurinoL 100 MG tablet  Commonly known as: ZYLOPRIM  TAKE 2 TABLETS BY MOUTH EVERY DAY     amiodarone 200 MG Tab  Commonly known as: PACERONE  Take one tablet in the am     aspirin 81 MG EC tablet  Commonly known as: ECOTRIN  Take 1 tablet (81 mg total) by mouth once daily.     ciclopirox 8 % Soln  Commonly known as: PENLAC  Apply to nails once daily     diclofenac sodium 1 % Gel  Commonly known as: VOLTAREN  Apply 2 g topically 2 (two) times daily as needed (back pain).     empagliflozin 10 mg tablet  Commonly known as: JARDIANCE  Take 1 tablet (10 mg total) by mouth once daily.     ENTRESTO  mg per tablet  Generic drug: sacubitriL-valsartan  Take 1 tablet by mouth 2 (two) times daily.     furosemide 20 MG tablet  Commonly known as: LASIX  Take 1 tablet (20 mg total) by mouth daily as needed (leg swelling.). Do not take if BP is below 110/70     metoprolol succinate 25 MG 24 hr tablet  Commonly known as: TOPROL-XL  Take 0.5 tablets (12.5 mg total) by mouth once daily.     mometasone 0.1 % ointment  Commonly known as: ELOCON  AAA bid prn as needed for hair loss. Steroid ointment.     MULTIPLE VITAMIN, WOMENS ORAL  Take by mouth once daily.     spironolactone 50 MG tablet  Commonly known as: ALDACTONE  Take 1  tablet (50 mg total) by mouth once daily. Do not take if BP is below 110/70     VITAMIN D2 50,000 unit Cap  Generic drug: ergocalciferol  Take 50,000 Units by mouth every 7 days.              Indwelling Lines/Drains at time of discharge:   Lines/Drains/Airways       None                   Time spent on the discharge of patient: 45 minutes         April RIDDHI Johnson NP  Department of Hospital Medicine  '[place] - Telemetry ([facility])

## 2025-04-28 NOTE — PLAN OF CARE
Patient stated she had an appointment on 4/30/25 and did not think she would be strong enough to go. Patient requested SW cancel the appointment and stated she wished for them to call and reschedule the appointment.     SW contacted [facility], who will send a message to Nuclear Medicine to reschedule Patient's appointments.    SW will continue to follow and assist as needed.

## 2025-04-28 NOTE — ASSESSMENT & PLAN NOTE
Patient has Systolic (HFrEF) heart failure that is Acute on chronic. On presentation their CHF was  Most recent BNP and echo results are listed below. Reports that she has taken her PRN Lasix for past few days and has been taking Aldactone as well, but symptoms worsening.  Recent Labs     04/26/25  1737   BNP 4,599*     Latest ECHO  Results for orders placed during the hospital encounter of 07/19/24    Echo    Interpretation Summary    Left Ventricle: The left ventricle is normal in size. Normal wall thickness. There is concentric hypertrophy. Mild global hypokinesis present. There is reduced systolic function. Ejection fraction by visual approximation is 40%. There is indeterminate diastolic function.    Right Ventricle: Normal right ventricular cavity size. Wall thickness is normal. Systolic function is normal.    Aortic Valve: The aortic valve is a trileaflet valve. There is mild stenosis. Aortic valve area by VTI is 1.92 cm². Aortic valve peak velocity is 1.17 m/s. Mean gradient is 3 mmHg. The dimensionless index is 0.59.    Mitral Valve: There is moderate bileaflet sclerosis. There is normal leaflet mobility. There is prolapse of the anterior mitral leaflet. There is mild to moderate regurgitation.    Pulmonary Artery: The estimated pulmonary artery systolic pressure is 48 mmHg.    IVC/SVC: Normal venous pressure at 3 mmHg.    Current Heart Failure Medications  metoprolol succinate (TOPROL-XL) 24 hr split tablet 12.5 mg, Daily, Oral  sacubitriL-valsartan 49-51 mg per tablet 1 tablet, 2 times daily, Oral    Plan  --resume home regimen  --Weight reduced by 3kg during admission

## 2025-04-28 NOTE — PROGRESS NOTES
"Heart Failure Transitional Care Clinic(HFTCC) nurse navigator notified of HFTCC candidate in need of education and introduction to 4-6 week program.      PT aao x 3 while lying in bed. Introduced self to pt as HFTCC nurse navigator.     Patient given "Home Care Guide for Heart Failure Patients" , "Heart Failure Transitional Care Clinic" flyer and "Daily weight and symptom tracker".  Encouraged pt to review information.      Reviewed the following key points of HFTCC program with pt and family:   1.) Take your medications as directed.    2.) Weight yourself daily   3.) Follow low salt and limited fluid diet.    4.) Stop smoking and start exercising   5.) Go to your appointments and call your team.      Pt reminded to follow Symptom tracker and to call at the onset of symptoms according to tracker.     Reviewed plan for follow up once discharged to include phone calls, in person and virtual visits to assist pt optimizing their heart failure medication regimen and encouraging healthy lifestyle modifications.  Reminded pt that program will assist them over the next 4-6 weeks and then patient will be transferred to long term care provider .  Reminded pt how to contact HFTCC navigator via phone and or via NAVX.     Pt given appointment or instructed appointment will be printed on hospital discharge paperwork.     Pt also reminded HF nurse will call 48-72 hours after discharge to check on them.     PT verbalize read back of information given.  Encouraged pt and family to read over information often and contact team with any questions or concerns.      "

## 2025-04-28 NOTE — PLAN OF CARE
[facility] - Telemetry (Hospital)  Discharge Final Note    Primary Care Provider: Dno Mayen MD    Expected Discharge Date: 4/28/2025    Final Discharge Note (most recent)       Final Note - 04/28/25 1232          Final Note    Assessment Type Final Discharge Note     Anticipated Discharge Disposition Home-Health Care Svc        Post-Acute Status    Post-Acute Authorization Home Health     Home Health Status Set-up Complete/Auth obtained     Coverage [facility] Managed Medicare     Discharge Delays None known at this time                     Important Message from Medicare              Follow-up providers       Don Mayen MD   Specialty: Family Medicine   Relationship: PCP - General    [address]  [place] [address]   Phone: [phone]       Next Steps: Follow up in 1 week(s)              After-discharge care                Home Medical Care       *[facility]   Service: Home Health Services    [address]  [facility] [address]   Phone: [phone]                             DC Disposition: [facility]   Family Notified: Patient at bedside  Transportation: personal transportation    Patient needed Home Health services set up upon discharge. Patient has [facility] set up and information has been added to Patient's AVS.    Patient has PCP hospital follow up with FELIBERTO Fletcher, on 5/5/25 at 1:40 pm.

## 2025-04-28 NOTE — ASSESSMENT & PLAN NOTE
Patient has Combined Systolic and Diastolic heart failure that is Acute on chronic. On presentation their CHF was decompensated. Evidence of decompensated CHF on presentation includes: edema, elevated JVD, orthopnea, and shortness of breath. The etiology of their decompensation is likely dietary indiscretion. Most recent BNP and echo results are listed below.  Recent Labs     04/26/25  1737   BNP 4,599*       Latest ECHO  Results for orders placed during the hospital encounter of 07/19/24    Echo    Interpretation Summary    Left Ventricle: The left ventricle is normal in size. Normal wall thickness. There is concentric hypertrophy. Mild global hypokinesis present. There is reduced systolic function. Ejection fraction by visual approximation is 40%. There is indeterminate diastolic function.    Right Ventricle: Normal right ventricular cavity size. Wall thickness is normal. Systolic function is normal.    Aortic Valve: The aortic valve is a trileaflet valve. There is mild stenosis. Aortic valve area by VTI is 1.92 cm². Aortic valve peak velocity is 1.17 m/s. Mean gradient is 3 mmHg. The dimensionless index is 0.59.    Mitral Valve: There is moderate bileaflet sclerosis. There is normal leaflet mobility. There is prolapse of the anterior mitral leaflet. There is mild to moderate regurgitation.    Pulmonary Artery: The estimated pulmonary artery systolic pressure is 48 mmHg.    IVC/SVC: Normal venous pressure at 3 mmHg.    Current Heart Failure Medications  metoprolol succinate (TOPROL-XL) 24 hr split tablet 12.5 mg, Daily, Oral  sacubitriL-valsartan 49-51 mg per tablet 1 tablet, 2 times daily, Oral    Plan  - Monitor strict I&Os and daily weights.    - Place on telemetry  - Low sodium diet  - Place on fluid restriction of 1.5 L.   - Cardiology has been consulted  - The patient's volume status is improving but not at their baseline as indicated by edema, elevated JVD, orthopnea, and shortness of breath  - continue  with IV diuresis    April 28, 2025.    Switch to p.o. Lasix.    Creatinine jumped today.    Diuresed  Looks euvolemic  .  Laying in bed on room air.    Check for home O2.

## 2025-04-28 NOTE — HOSPITAL COURSE
80 year old female, under the care of [facility] Medicine for management of Acute on Chronic Heart Failure. Patient treated with IV diuretic with good UOP. Weight improved from 72kg to 69kg, weaned off O2 to room air. Pedal edema improved. Labs stable, vitals stable. Discussed case with Cardiology. Patient scheduled for a stress test on 4/30, advised patient to complete the stress test as recommended. Continue home medications as prescribed. Referral for HH placed. Recommend to f/u with PCP and Cardiology as OP. Patient seen and examined on day of discharge and determined stable for discharge.

## 2025-04-28 NOTE — SUBJECTIVE & OBJECTIVE
Review of Systems   Cardiovascular:  Negative for chest pain, dyspnea on exertion, palpitations and syncope.   Neurological:  Negative for focal weakness.     Objective:     Vital Signs (Most Recent):  Temp: 97.7 °F (36.5 °C) (04/28/25 1136)  Pulse: 60 (04/28/25 1136)  Resp: 18 (04/28/25 1136)  BP: 138/72 (04/28/25 1136)  SpO2: 97 % (04/28/25 1136) Vital Signs (24h Range):  Temp:  [97.1 °F (36.2 °C)-97.8 °F (36.6 °C)] 97.7 °F (36.5 °C)  Pulse:  [48-61] 60  Resp:  [16-18] 18  SpO2:  [92 %-97 %] 97 %  BP: (120-150)/(62-94) 138/72     Weight: 69 kg (152 lb 3.6 oz)  Body mass index is 27.84 kg/m².     SpO2: 97 %         Intake/Output Summary (Last 24 hours) at 4/28/2025 1227  Last data filed at 4/28/2025 0600  Gross per 24 hour   Intake 120 ml   Output 650 ml   Net -530 ml       Lines/Drains/Airways       None                      Physical Exam  Vitals reviewed.   Constitutional:       Appearance: She is well-developed.   Neck:      Vascular: No carotid bruit.   Cardiovascular:      Rate and Rhythm: Normal rate and regular rhythm.      Pulses: Intact distal pulses.      Heart sounds: Normal heart sounds. No murmur heard.  Pulmonary:      Breath sounds: Normal breath sounds.   Neurological:      Mental Status: She is oriented to person, place, and time.            Significant Labs: All pertinent lab results from the last 24 hours have been reviewed. and   Recent Lab Results         04/28/25  0930   04/28/25  0509        Anion Gap   10       Baso # 0.03         Basophil % 0.5         BUN   18       Calcium   8.5       Chloride   103       CO2   26       Creatinine   1.6       eGFR   32  Comment: Estimated GFR calculated using the CKD-EPI creatinine (2021) equation.       Eos # 0.07         Eos % 1.2         Glucose   118       Gran # (ANC) 3.79         Hematocrit 37.6         Hemoglobin 12.4         Immature Grans (Abs) 0.02  Comment: Mild elevation in immature granulocytes is non specific and can be seen in a variety  of conditions including stress response, acute inflammation, trauma and pregnancy. Correlation with other laboratory and clinical findings is essential.         Immature Granulocytes 0.3         Lymph # 1.35         Lymph % 23.1         Magnesium    2.3       MCH 28.7         MCHC 33.0         MCV 87         Mono # 0.58         Mono % 9.9         MPV 10.7         Neut % 65.0         nRBC 0         Platelet Count 256         Potassium   3.8       RBC 4.32         RDW 14.6         Sodium   139       WBC 5.84                 Significant Imaging: Echocardiogram: Transthoracic echo (TTE) complete (Cupid Only):   Results for orders placed or performed during the hospital encounter of 04/26/25   Echo   Result Value Ref Range    BSA 1.78 m2    LA WIDTH 6.1 cm    RA Width 3.9 cm    LVOT stroke volume 40.5 cm3    LVIDd 5.7 3.5 - 6.0 cm    LV Systolic Volume 111 mL    LV Systolic Volume Index 63.8 mL/m2    LVIDs 4.9 (A) 2.1 - 4.0 cm    LV ESV A2C 102.16 mL    LV Diastolic Volume 159 mL    LV ESV A4C 97.57 mL    LV Diastolic Volume Index 91.38 mL/m2    Left Ventricular End Systolic Volume by Teichholz Method 110.95 mL    Left Ventricular End Diastolic Volume by Teichholz Method 158.70 mL    IVS 0.8 0.6 - 1.1 cm    LVOT diameter 2.0 cm    LVOT area 3.1 cm2    FS 14.0 (A) 28 - 44 %    Left Ventricle Relative Wall Thickness 0.32 cm    PW 0.9 0.6 - 1.1 cm    LV mass 183.7 g    LV Mass Index 105.6 g/m2    MV Peak E Gilbert 0.66 m/s    TDI LATERAL 0.06 m/s    TDI SEPTAL 0.05 m/s    E/E' ratio 12 m/s    MV Peak A Gilbert 0.51 m/s    TR Max Gilbert 3.4 m/s    E/A ratio 1.29     IVRT 117 msec    E wave deceleration time 266 msec    LV SEPTAL E/E' RATIO 13.2 m/s    RORO 66 mL/m2    LV LATERAL E/E' RATIO 11.0 m/s    LA Vol 115 cm3    PV Peak S Gilbert 0.36 m/s    PV Peak D Gilbert 0.53 m/s    Pulm vein S/D ratio 0.68     LVOT peak gilbert 0.7 m/s    Left Ventricular Outflow Tract Mean Velocity 0.47 cm/s    Left Ventricular Outflow Tract Mean Gradient 1.02 mmHg     RV- garcia basal diam 3.3 cm    RVOT peak VTI 11.9 cm    TAPSE 1.5 cm    RV/LV Ratio 0.58 cm    LA size 3.5 cm    Left Atrium Minor Axis 6.2 cm    Left Atrium Major Axis 6.5 cm    LA Vol (MOD) 104 mL    RORO (MOD) 60 mL/m2    RA Major Axis 4.99 cm    AV mean gradient 3 mmHg    AV peak gradient 7 mmHg    Ao peak kali 1.3 m/s    Ao VTI 21.8 cm    LVOT peak VTI 12.9 cm    AV valve area 1.9 cm²    AV Velocity Ratio 0.54     AV index (prosthetic) 0.59     ANGELES by Velocity Ratio 1.7 cm²    Triscuspid Valve Regurgitation Peak Gradient 46 mmHg    PV mean gradient 0 mmHg    PV PEAK VELOCITY 1.03 m/s    PV peak gradient 4 mmHg    Pulmonary Valve Mean Velocity 0.63 m/s    RVOT peak kali 0.43 m/s    Ao root annulus 2.6 cm    STJ 1.6 cm    Ascending aorta 2.3 cm    Mean e' 0.06 m/s    ZLVIDS 3.93     ZLVIDD 1.68     LA area A4C 28.09 cm2    LA area A2C 29.25 cm2    RVDD 3.27 cm    TV resting pulmonary artery pressure 61 mmHg    RV TB RVSP 18 mmHg    Est. RA pres 15 mmHg    Narrative      Left Ventricle: The left ventricle is severely dilated. Normal wall   thickness. Moderate global hypokinesis present. There is moderately   reduced systolic function with a visually estimated ejection fraction of   35 - 40%. Grade II diastolic dysfunction.    Right Ventricle: Systolic function is mildly reduced.    Left Atrium: Severely dilated    Mitral Valve: There is bileaflet sclerosis. There is moderate   regurgitation with a eccentrically posterolateral directed jet.    Tricuspid Valve: There is moderate regurgitation.    Pulmonary Artery: The estimated pulmonary artery systolic pressure is   61 mmHg.    IVC/SVC: Elevated venous pressure at 15 mmHg.

## 2025-04-29 ENCOUNTER — TELEPHONE (OUTPATIENT)
Dept: HOME HEALTH SERVICES | Facility: CLINIC | Age: 80
End: 2025-04-29
Payer: MEDICARE

## 2025-04-29 ENCOUNTER — PATIENT OUTREACH (OUTPATIENT)
Dept: ADMINISTRATIVE | Facility: CLINIC | Age: 80
End: 2025-04-29
Payer: MEDICARE

## 2025-04-29 NOTE — PROGRESS NOTES
C3 nurse spoke with Megan Sams  for a TCC post hospital discharge follow up call. The patient has a scheduled HOSFU appointment with Rhoda Jaramillo on 5/5/25 @ 9263.

## 2025-04-30 ENCOUNTER — TELEPHONE (OUTPATIENT)
Dept: CARDIOLOGY | Facility: CLINIC | Age: 80
End: 2025-04-30
Payer: MEDICARE

## 2025-04-30 NOTE — TELEPHONE ENCOUNTER
"[facility]) hospital discharge 48-72 hour phone follow up completed.     Most Recent Hospital Discharge Date: 4/28/25  Last admission Diagnosis/chief complaint:Acute CHF    TCC nurse Navigator spoke with pt    Current Patient reported weight: 153 lbs    Current Patient reported blood pressure and heart rate: B/P 138/81, P 54    Pt reports the following:  []  Shortness of Breath with Activity  []  Shortness of Breath at rest   []  Fatigue  []  Edema   [] Chest pain or tightness  [] Weight Increase since discharge  [x] None of the above    Medications:   Discharge medication reviewed with pt.  Pt reports having medication list available and has all medications at home for use per list.     Education:   Confirmed pt received "Home Care Guide for Heart Failure Patients" while admitted.   Reviewed key points as listed below.     Recommend 2 -3 gram sodium restriction and 1500 cc-2000 cc fluid restriction.  Encourage physical activity with graded exercise program.  Requested patient to weigh themselves daily, and to notify us if their weight increases by more than 3 lbs in 1 day or 5 lbs in 3 days.   Reminded patient to use "Daily weight and symptom tracker" to record and guide patient on when and how to call [facility]. PT may also use symptom tracker if no scale available  Pt reports being in the green(color) Zone. If in yellow/red, reminded that they should be calling [facility] today or now.     Watch for these Signs and Symptoms: If any of these occur, contact [facility] immediately:   Increase in shortness of breath with movement   Increase in swelling in your legs and ankles   Weight gain of more than 3 pounds in a day or 5 pounds in 3 days.   Difficulty breathing when you are lying down   Worsening fatigue or tiredness   Stomach bloating, a full feeling or a loss of appetite   Increased coughing--especially when you are lying down      Pt was able to verbalize back to nurse in their own words " correct diet/fluid restrictions, necessity for exercise, warning signs and symptoms, when and how to contact their TCC team.      Pt educated on follow-up plan while in [facility] program to include:   Week 1 -  F/u appt with Provider and HF nurse (Date) 5/6/25 at 1:30 p with Anahi Moser PA-C   Week 2-5 - In person/ Virtual/ phone call check ins    Week 5-7 - Pt will discharge from [facility] and transition to longterm care provider (Cardiology/PCP/ Advanced Heart Failure).      Patient active on myChart? No      Pt given the following contact information for ease of communication: [phone] (Mon-Fri, 8a-5p) & for urgent issues on the weekend call Nguyen on-call [phone] to page the Cardiology MD on call.  Pt also encouraged utilize [facility] messaging as well.      Will follow up with pt at first clinic visit and HF nurse navigator available for pt questions, issues or concerns.

## 2025-05-02 RX ORDER — ERGOCALCIFEROL 1.25 MG/1
50000 CAPSULE ORAL
Qty: 12 CAPSULE | Refills: 1 | Status: SHIPPED | OUTPATIENT
Start: 2025-05-02

## 2025-05-02 NOTE — TELEPHONE ENCOUNTER
----- Message from Juliette sent at 5/2/2025  9:32 AM CDT -----  Contact: self  .Type:  RX Refill RequestWho Called: .Megan SamsRefill or New Rx:refillRX Name and Strength: ergocalciferol (VITAMIN D2) 50,000 unit CapHow is the patient currently taking it? (ex. 1XDay):Is this a 30 day or 90 day RX:Preferred Pharmacy with phone number:.[facility]/pharmacy #[id number] - [place] - [address] AT [facility] [place] [address] [address] [place] [address]: [phone] Fax: [phone] or Mail Order:localOrdering Provider:HOLLEYould the patient rather a call back or a response via [facility]? [facility] Call Back Number:.[phone] Information: Pt states her ergocalciferol (VITAMIN D2) 50,000 unit Cap medication wasn't received by pharmacy would like office to call and fill it.

## 2025-05-05 ENCOUNTER — OFFICE VISIT (OUTPATIENT)
Dept: INTERNAL MEDICINE | Facility: CLINIC | Age: 80
End: 2025-05-05
Payer: MEDICARE

## 2025-05-05 VITALS
OXYGEN SATURATION: 99 % | DIASTOLIC BLOOD PRESSURE: 70 MMHG | WEIGHT: 154.31 LBS | HEART RATE: 60 BPM | TEMPERATURE: 97 F | HEIGHT: 62 IN | SYSTOLIC BLOOD PRESSURE: 116 MMHG | BODY MASS INDEX: 28.39 KG/M2

## 2025-05-05 DIAGNOSIS — I50.40 HEART FAILURE WITH REDUCED EJECTION FRACTION (HFREF, <= 40%) AND COMBINED SYSTOLIC AND DIASTOLIC DYSFUNCTION: ICD-10-CM

## 2025-05-05 DIAGNOSIS — N18.32 STAGE 3B CHRONIC KIDNEY DISEASE: Primary | ICD-10-CM

## 2025-05-05 DIAGNOSIS — R00.1 SINUS BRADYCARDIA: ICD-10-CM

## 2025-05-05 DIAGNOSIS — I50.43 ACUTE ON CHRONIC COMBINED SYSTOLIC AND DIASTOLIC CHF (CONGESTIVE HEART FAILURE): Primary | ICD-10-CM

## 2025-05-05 DIAGNOSIS — N18.32 STAGE 3B CHRONIC KIDNEY DISEASE: ICD-10-CM

## 2025-05-05 DIAGNOSIS — I10 ESSENTIAL HYPERTENSION: Chronic | ICD-10-CM

## 2025-05-05 PROCEDURE — 3078F DIAST BP <80 MM HG: CPT | Mod: CPTII,S$GLB,, | Performed by: PHYSICIAN ASSISTANT

## 2025-05-05 PROCEDURE — 1160F RVW MEDS BY RX/DR IN RCRD: CPT | Mod: CPTII,S$GLB,, | Performed by: PHYSICIAN ASSISTANT

## 2025-05-05 PROCEDURE — 1111F DSCHRG MED/CURRENT MED MERGE: CPT | Mod: CPTII,S$GLB,, | Performed by: PHYSICIAN ASSISTANT

## 2025-05-05 PROCEDURE — 1157F ADVNC CARE PLAN IN RCRD: CPT | Mod: CPTII,S$GLB,, | Performed by: PHYSICIAN ASSISTANT

## 2025-05-05 PROCEDURE — 99214 OFFICE O/P EST MOD 30 MIN: CPT | Mod: S$GLB,,, | Performed by: PHYSICIAN ASSISTANT

## 2025-05-05 PROCEDURE — 1159F MED LIST DOCD IN RCRD: CPT | Mod: CPTII,S$GLB,, | Performed by: PHYSICIAN ASSISTANT

## 2025-05-05 PROCEDURE — 3288F FALL RISK ASSESSMENT DOCD: CPT | Mod: CPTII,S$GLB,, | Performed by: PHYSICIAN ASSISTANT

## 2025-05-05 PROCEDURE — 3074F SYST BP LT 130 MM HG: CPT | Mod: CPTII,S$GLB,, | Performed by: PHYSICIAN ASSISTANT

## 2025-05-05 PROCEDURE — G2211 COMPLEX E/M VISIT ADD ON: HCPCS | Mod: S$GLB,,, | Performed by: PHYSICIAN ASSISTANT

## 2025-05-05 PROCEDURE — 99999 PR PBB SHADOW E&M-EST. PATIENT-LVL V: CPT | Mod: PBBFAC,,, | Performed by: PHYSICIAN ASSISTANT

## 2025-05-05 PROCEDURE — 1101F PT FALLS ASSESS-DOCD LE1/YR: CPT | Mod: CPTII,S$GLB,, | Performed by: PHYSICIAN ASSISTANT

## 2025-05-05 PROCEDURE — 1126F AMNT PAIN NOTED NONE PRSNT: CPT | Mod: CPTII,S$GLB,, | Performed by: PHYSICIAN ASSISTANT

## 2025-05-05 NOTE — PROGRESS NOTES
Subjective:      Patient ID: Megan Sams is a 80 y.o. female.    Chief Complaint: Follow-up    HPI  History of Present Illness    CHIEF COMPLAINT:  Ms. Sams presents today for follow up after recent hospitalization for heart failure    RECENT HOSPITALIZATION AND HEART FAILURE SYMPTOMS:  She reports improved breathing since discharge. She continues to experience mild swelling with fluid retention in her thighs and body, and has an occasional non-productive cough. Her weight has increased by 2 lbs since discharge, which occurred after significant weight loss from diuretic treatment during hospitalization.    VITAL SIGNS:  Blood pressure was recently measured at 100/50. Pulse rate was 64, elevated from previous readings of 51-54.    MEDICAL HISTORY:  Back pain began in February and persisted through April. Mammogram was normal with no suspicious findings.        Here today for a hospital follow up for acute on chronic heart failure.   Scheduled to meet with heart failure clinic tomorrow.     Acute on Chronic Heart Failure. Patient treated with IV diuretic with good UOP. Weight improved from 72kg to 69kg, weaned off O2 to room air. Pedal edema improved. Labs stable, vitals stable. Discussed case with Cardiology. Patient scheduled for a stress test on 4/30, advised patient to complete the stress test as recommended. Continue home medications as prescribed. Referral for HH placed. Recommend to f/u with PCP and Cardiology as OP     Echo:   Left Ventricle: The left ventricle is severely dilated. Normal wall thickness. Moderate global hypokinesis present. There is moderately reduced systolic function with a visually estimated ejection fraction of 35 - 40%. Grade II diastolic dysfunction.    Right Ventricle: Systolic function is mildly reduced.    Left Atrium: Severely dilated    Mitral Valve: There is bileaflet sclerosis. There is moderate regurgitation with a eccentrically posterolateral directed jet.    Tricuspid  "Valve: There is moderate regurgitation.    Pulmonary Artery: The estimated pulmonary artery systolic pressure is 61 mmHg.    IVC/SVC: Elevated venous pressure at 15 mmHg.     Wt Readings from Last 3 Encounters:   05/05/25 1324 70 kg (154 lb 5.2 oz)   04/28/25 0825 69 kg (152 lb 3.6 oz)   04/27/25 0843 72.3 kg (159 lb 6.3 oz)   04/26/25 2000 72.3 kg (159 lb 6.3 oz)   04/26/25 1613 72.1 kg (159 lb)   04/14/25 1337 72.4 kg (159 lb 9.8 oz)      Medications were reviewed    Problem List[1]    Current Medications[2]    Review of Systems   Constitutional:  Negative for activity change, appetite change, chills, diaphoresis, fatigue, fever and unexpected weight change.   HENT: Negative.  Negative for congestion, hearing loss, postnasal drip, rhinorrhea, sore throat, trouble swallowing and voice change.    Eyes: Negative.  Negative for visual disturbance.   Respiratory:  Positive for cough, chest tightness and shortness of breath. Negative for choking.    Cardiovascular:  Negative for chest pain, palpitations and leg swelling.   Gastrointestinal:  Negative for abdominal distention, abdominal pain, blood in stool, constipation, diarrhea, nausea and vomiting.   Endocrine: Negative for cold intolerance, heat intolerance, polydipsia and polyuria.   Genitourinary: Negative.  Negative for difficulty urinating and frequency.   Musculoskeletal:  Negative for arthralgias, back pain, gait problem, joint swelling and myalgias.   Skin:  Negative for color change, pallor, rash and wound.   Neurological:  Negative for dizziness, tremors, weakness, light-headedness, numbness and headaches.   Hematological:  Negative for adenopathy.   Psychiatric/Behavioral:  Negative for behavioral problems, confusion, self-injury, sleep disturbance and suicidal ideas. The patient is not nervous/anxious.      Objective:   /70 (BP Location: Right arm, Patient Position: Sitting)   Pulse 60   Temp 97 °F (36.1 °C) (Tympanic)   Ht 5' 2" (1.575 m)   Wt " 70 kg (154 lb 5.2 oz)   SpO2 99%   BMI 28.23 kg/m²     Physical Exam  Vitals reviewed.   Constitutional:       General: She is not in acute distress.     Appearance: Normal appearance. She is well-developed. She is not ill-appearing, toxic-appearing or diaphoretic.   HENT:      Head: Normocephalic and atraumatic.      Right Ear: External ear normal.      Left Ear: External ear normal.      Nose: Nose normal.   Eyes:      Conjunctiva/sclera: Conjunctivae normal.      Pupils: Pupils are equal, round, and reactive to light.   Cardiovascular:      Rate and Rhythm: Normal rate and regular rhythm.      Heart sounds: Normal heart sounds. No murmur heard.     No friction rub. No gallop.   Pulmonary:      Effort: Pulmonary effort is normal. No respiratory distress.      Breath sounds: Normal breath sounds. No wheezing or rales.   Chest:      Chest wall: No tenderness.   Abdominal:      General: There is no distension.      Palpations: Abdomen is soft.      Tenderness: There is no abdominal tenderness.   Musculoskeletal:         General: Normal range of motion.      Cervical back: Normal range of motion and neck supple.   Lymphadenopathy:      Cervical: No cervical adenopathy.   Skin:     General: Skin is warm and dry.      Capillary Refill: Capillary refill takes less than 2 seconds.      Findings: No rash.   Neurological:      Mental Status: She is alert and oriented to person, place, and time.      Motor: No weakness.      Coordination: Coordination normal.      Gait: Gait normal.   Psychiatric:         Mood and Affect: Mood normal.         Behavior: Behavior normal.         Thought Content: Thought content normal.         Judgment: Judgment normal.       Lab Results   Component Value Date    CREATININE 1.6 (H) 04/28/2025    BUN 18 04/28/2025     04/28/2025    K 3.8 04/28/2025     04/28/2025    CO2 26 04/28/2025       Assessment:     1. Acute on chronic combined systolic and diastolic CHF (congestive heart  failure)    2. Heart failure with reduced ejection fraction (HFrEF, <= 40%) and combined systolic and diastolic dysfunction    3. Sinus bradycardia    4. Stage 3b chronic kidney disease    5. Essential hypertension      Plan:   Acute on chronic combined systolic and diastolic CHF (congestive heart failure)  Reviewed hospital notes   Noted improvement in breathing and reduction in swelling.  Assessed recent mammogram results as normal with low risk.  Evaluated blood counts and metabolic panel.  Reviewed echo results showing grade 2 heart failure with reduced heart function.  Considered potential fluid retention in right lung.  Monitored low heart rate, potentially due to metoprolol.  Assessed stable renal function despite heart failure complications.    Heart failure with reduced ejection fraction (HFrEF, <= 40%) and combined systolic and diastolic dysfunction  - Monitored the patient's weight, noting significant weight loss due to diuretics, indicating fluid loss.  - Current weight is 160 lbs, which has increased by 2 lbs since hospital discharge.  - Ms. Sams reports occasional non-productive cough and some fluid retention in the body.  - Echocardiogram shows grade 2 heart failure, indicating reduced heart function and fluid backup.  - Explained the relationship between heart failure and fluid buildup, as well as the balance between managing heart failure and renal function.  - Advised patient to continue current management and to limit fluid intake to no more than 32 ounces daily as per hospital dietitian's recommendation.  - Medication adjustments to be discussed with cardiologist.    Sinus bradycardia  -cont to monitor  - Current pulse rate is 55, confirming bradycardia.  - Previous recordings were 64 and 60, with a history of being as low as 51 or 54.      Stage 3b chronic kidney disease  -     Ambulatory referral/consult to Nephrology; Future; Expected date: 05/12/2025  - Kidney function has remained stable  from last year to present despite heart failure  - Referred patient to nephrologist to establish care and monitor renal function.    Essential hypertension  -stable and controlled.      FOLLOW-UP:  - Scheduled follow-up with heart failure clinic for the next day for further evaluation of heart function.  - Ms. Sams to submit completed advanced directive form at the check-in desk.       This note was generated with the assistance of ambient listening technology. Verbal consent was obtained by the patient and accompanying visitor(s) for the recording of patient appointment to facilitate this note. I attest to having reviewed and edited the generated note for accuracy, though some syntax or spelling errors may persist. Please contact the author of this note for any clarification.    -pulse has been running in the 50s. One day it was 46. Bp has been in good range 110-144 systolic/70-80 diastolic.     Follow up if symptoms worsen or fail to improve.           [1]   Patient Active Problem List  Diagnosis    Arthritis    Acute systolic congestive heart failure    Essential hypertension    Stage 3b chronic kidney disease    Nonischemic cardiomyopathy    Intertrigo    PVC (premature ventricular contraction)    Cortical age-related cataract of both eyes    Chronic gout without tophus    Pre-operative cardiovascular examination    Heart failure with reduced ejection fraction (HFrEF, <= 40%) and combined systolic and diastolic dysfunction    Sinus bradycardia   [2]   Current Outpatient Medications:     acetaminophen (TYLENOL) 500 MG tablet, Take 500 mg by mouth every 6 (six) hours as needed., Disp: , Rfl:     allopurinoL (ZYLOPRIM) 100 MG tablet, TAKE 2 TABLETS BY MOUTH EVERY DAY, Disp: 180 tablet, Rfl: 4    amiodarone (PACERONE) 200 MG Tab, Take one tablet in the am, Disp: 90 tablet, Rfl: 3    aspirin (ECOTRIN) 81 MG EC tablet, Take 1 tablet (81 mg total) by mouth once daily., Disp: 90 tablet, Rfl: 1    ciclopirox (PENLAC) 8  % Soln, Apply to nails once daily, Disp: 6.6 mL, Rfl: 6    diclofenac sodium (VOLTAREN) 1 % Gel, Apply 2 g topically 2 (two) times daily as needed (back pain)., Disp: 100 g, Rfl: 1    empagliflozin (JARDIANCE) 10 mg tablet, Take 1 tablet (10 mg total) by mouth once daily., Disp: 90 tablet, Rfl: 3    ergocalciferol (VITAMIN D2) 50,000 unit Cap, Take 1 capsule (50,000 Units total) by mouth every 7 days., Disp: 12 capsule, Rfl: 1    furosemide (LASIX) 20 MG tablet, Take 1 tablet (20 mg total) by mouth daily as needed (leg swelling.). Do not take if BP is below 110/70, Disp: 90 tablet, Rfl: 1    metoprolol succinate (TOPROL-XL) 25 MG 24 hr tablet, Take 0.5 tablets (12.5 mg total) by mouth once daily., Disp: 45 tablet, Rfl: 1    mometasone (ELOCON) 0.1 % ointment, AAA bid prn as needed for hair loss. Steroid ointment., Disp: 45 g, Rfl: 0    MULTIVIT WITH CALCIUM,IRON,MIN (MULTIPLE VITAMIN, WOMENS ORAL), Take by mouth once daily., Disp: , Rfl:     sacubitriL-valsartan (ENTRESTO)  mg per tablet, Take 1 tablet by mouth 2 (two) times daily., Disp: 180 tablet, Rfl: 1    spironolactone (ALDACTONE) 50 MG tablet, Take 1 tablet (50 mg total) by mouth once daily. Do not take if BP is below 110/70, Disp: 90 tablet, Rfl: 1

## 2025-05-07 ENCOUNTER — LAB REQUISITION (OUTPATIENT)
Dept: LAB | Facility: HOSPITAL | Age: 80
End: 2025-05-07
Payer: MEDICARE

## 2025-05-07 DIAGNOSIS — I50.23 ACUTE ON CHRONIC SYSTOLIC (CONGESTIVE) HEART FAILURE: ICD-10-CM

## 2025-05-07 DIAGNOSIS — N18.32 CHRONIC KIDNEY DISEASE, STAGE 3B: ICD-10-CM

## 2025-05-07 DIAGNOSIS — I30.0 ACUTE NONSPECIFIC IDIOPATHIC PERICARDITIS: ICD-10-CM

## 2025-05-07 LAB
ABSOLUTE EOSINOPHIL (OHS): 0.04 K/UL
ABSOLUTE MONOCYTE (OHS): 0.63 K/UL (ref 0.3–1)
ABSOLUTE NEUTROPHIL COUNT (OHS): 1.26 K/UL (ref 1.8–7.7)
ALBUMIN SERPL BCP-MCNC: 3.3 G/DL (ref 3.5–5.2)
ALP SERPL-CCNC: 51 UNIT/L (ref 40–150)
ALT SERPL W/O P-5'-P-CCNC: 22 UNIT/L (ref 10–44)
ANION GAP (OHS): 11 MMOL/L (ref 8–16)
AST SERPL-CCNC: 36 UNIT/L (ref 11–45)
BASOPHILS # BLD AUTO: 0.04 K/UL
BASOPHILS NFR BLD AUTO: 1.2 %
BILIRUB SERPL-MCNC: 0.7 MG/DL (ref 0.1–1)
BUN SERPL-MCNC: 22 MG/DL (ref 8–23)
CALCIUM SERPL-MCNC: 9 MG/DL (ref 8.7–10.5)
CHLORIDE SERPL-SCNC: 109 MMOL/L (ref 95–110)
CO2 SERPL-SCNC: 21 MMOL/L (ref 23–29)
CREAT SERPL-MCNC: 1.3 MG/DL (ref 0.5–1.4)
ERYTHROCYTE [DISTWIDTH] IN BLOOD BY AUTOMATED COUNT: 15.6 % (ref 11.5–14.5)
GFR SERPLBLD CREATININE-BSD FMLA CKD-EPI: 41 ML/MIN/1.73/M2
GLUCOSE SERPL-MCNC: 72 MG/DL (ref 70–110)
HCT VFR BLD AUTO: 43.3 % (ref 37–48.5)
HGB BLD-MCNC: 14.2 GM/DL (ref 12–16)
IMM GRANULOCYTES # BLD AUTO: 0 K/UL (ref 0–0.04)
IMM GRANULOCYTES NFR BLD AUTO: 0 % (ref 0–0.5)
LYMPHOCYTES # BLD AUTO: 1.36 K/UL (ref 1–4.8)
MCH RBC QN AUTO: 28.9 PG (ref 27–31)
MCHC RBC AUTO-ENTMCNC: 32.8 G/DL (ref 32–36)
MCV RBC AUTO: 88 FL (ref 82–98)
NUCLEATED RBC (/100WBC) (OHS): 0 /100 WBC
PLATELET # BLD AUTO: 265 K/UL (ref 150–450)
PMV BLD AUTO: 11.1 FL (ref 9.2–12.9)
POTASSIUM SERPL-SCNC: 3.9 MMOL/L (ref 3.5–5.1)
PROT SERPL-MCNC: 7.7 GM/DL (ref 6–8.4)
RBC # BLD AUTO: 4.92 M/UL (ref 4–5.4)
RELATIVE EOSINOPHIL (OHS): 1.2 %
RELATIVE LYMPHOCYTE (OHS): 40.8 % (ref 18–48)
RELATIVE MONOCYTE (OHS): 18.9 % (ref 4–15)
RELATIVE NEUTROPHIL (OHS): 37.9 % (ref 38–73)
SODIUM SERPL-SCNC: 141 MMOL/L (ref 136–145)
WBC # BLD AUTO: 3.33 K/UL (ref 3.9–12.7)

## 2025-05-07 PROCEDURE — 85025 COMPLETE CBC W/AUTO DIFF WBC: CPT | Performed by: NURSE PRACTITIONER

## 2025-05-07 PROCEDURE — 80053 COMPREHEN METABOLIC PANEL: CPT | Performed by: NURSE PRACTITIONER

## 2025-05-13 ENCOUNTER — OFFICE VISIT (OUTPATIENT)
Dept: CARDIOLOGY | Facility: CLINIC | Age: 80
End: 2025-05-13
Payer: MEDICARE

## 2025-05-13 VITALS
WEIGHT: 153.56 LBS | DIASTOLIC BLOOD PRESSURE: 70 MMHG | HEIGHT: 62 IN | HEART RATE: 62 BPM | BODY MASS INDEX: 28.26 KG/M2 | SYSTOLIC BLOOD PRESSURE: 120 MMHG

## 2025-05-13 DIAGNOSIS — I50.21 ACUTE SYSTOLIC CONGESTIVE HEART FAILURE: ICD-10-CM

## 2025-05-13 DIAGNOSIS — I50.9 CONGESTIVE HEART FAILURE, UNSPECIFIED HF CHRONICITY, UNSPECIFIED HEART FAILURE TYPE: ICD-10-CM

## 2025-05-13 DIAGNOSIS — I10 ESSENTIAL HYPERTENSION: Primary | Chronic | ICD-10-CM

## 2025-05-13 PROCEDURE — 3078F DIAST BP <80 MM HG: CPT | Mod: CPTII,S$GLB,, | Performed by: PHYSICIAN ASSISTANT

## 2025-05-13 PROCEDURE — 1111F DSCHRG MED/CURRENT MED MERGE: CPT | Mod: CPTII,S$GLB,, | Performed by: PHYSICIAN ASSISTANT

## 2025-05-13 PROCEDURE — 3074F SYST BP LT 130 MM HG: CPT | Mod: CPTII,S$GLB,, | Performed by: PHYSICIAN ASSISTANT

## 2025-05-13 PROCEDURE — 99214 OFFICE O/P EST MOD 30 MIN: CPT | Mod: S$GLB,,, | Performed by: PHYSICIAN ASSISTANT

## 2025-05-13 PROCEDURE — 1126F AMNT PAIN NOTED NONE PRSNT: CPT | Mod: CPTII,S$GLB,, | Performed by: PHYSICIAN ASSISTANT

## 2025-05-13 PROCEDURE — 99999 PR PBB SHADOW E&M-EST. PATIENT-LVL II: CPT | Mod: PBBFAC,,, | Performed by: PHYSICIAN ASSISTANT

## 2025-05-13 PROCEDURE — 1157F ADVNC CARE PLAN IN RCRD: CPT | Mod: CPTII,S$GLB,, | Performed by: PHYSICIAN ASSISTANT

## 2025-05-13 NOTE — PROGRESS NOTES
[facility] Provider Note (Follow-up) Consult Note      HPI: Patient is 80-year-old female with past medical history significant for arthritis, hypertension, CKD stage IIIB, nonischemic cardiomyopathy, heart failure with reduced EF (Echo 7/19/2024 EF 35-40%),  bradycardia, PVCs, history of pauses, gout, and back pain who presented to ED with complaint of shortness of breath, which started last night and has gotten worse. She also reports wheezing, bilateral feet swelling and back pain. Denies history of asthma/COPD. She states that she has been taking her diuretics (lasix 20 mg and spironolactone 50 mg) without improvement. She denies headache, dizziness, lightheadedness, syncope, falls, weakness, chest pain, productive cough, fever, chills, congestion, abdominal pain, nausea, vomiting, diarrhea, dysuria.  on arrival to ED, temp 97.7°, heart rate 70, respirations 20, blood pressure 157/79, 96% SpO2 on room air.  Lab workup significant for BNP 45 99, troponin 0.044, CO2 18, potassium 3.5.  There is no leukocytosis.    Chest x-ray consistent with vascular congestion, cardiac silhouette size enlargement noted.   EKG showedPatient is 80-year-old female with past medical history significant for arthritis, hypertension, CKD stage IIIB, nonischemic cardiomyopathy, heart failure with reduced EF (Echo 7/19/2024 EF 35-40%),  bradycardia, PVCs, history of pauses, gout, and back pain who presented to ED with complaint of shortness of breath, which started last night and has gotten worse. She also reports wheezing, bilateral feet swelling and back pain. Denies history of asthma/COPD. She states that she has been taking her diuretics (lasix 20 mg and spironolactone 50 mg) without improvement. She denies headache, dizziness, lightheadedness, syncope, falls, weakness, chest pain, productive cough, fever, chills, congestion, abdominal pain, nausea, vomiting, diarrhea, dysuria.  on arrival to ED, temp 97.7°, heart rate 70, respirations  20, blood pressure 157/79, 96% SpO2 on room air.  Lab workup significant for BNP 45 99, troponin 0.044, CO2 18, potassium 3.5.  There is no leukocytosis.    Chest x-ray consistent with vascular congestion, cardiac silhouette size enlargement noted.   EKG showed normal sinus rhythm, heart rate 66, QRS widening noted LVH, LA enlargement, no STEMI.  While in ED, patient was given DuoNeb x1 and Lasix 60 mg IV.  Hospital Medicine was consulted for admission due to CHF exacerbation.  While in ED, patient was given DuoNeb x1 and Lasix 60 mg IV.  Hospital Medicine was consulted for admission due to CHF exacerbation.       80 year old female, under the care of hospital Medicine for management of Acute on Chronic Heart Failure. Patient treated with IV diuretic with good UOP. Weight improved from 72kg to 69kg, weaned off O2 to room air. Pedal edema improved. Labs stable, vitals stable. Discussed case with Cardiology. Patient scheduled for a stress test on 4/30, advised patient to complete the stress test as recommended. Continue home medications as prescribed. Referral for HH placed. Recommend to f/u with PCP and Cardiology as OP. Patient seen and examined on day of discharge and determined stable for discharge.      Since dc has been on lasix prn based on BP? States she was told to dose it this was inpt    Less sOB    No PND          PHYSICAL:   Vitals:    05/13/25 1255   BP: 120/70   Pulse: 62          JVD: no,    Heart rhythm: regular  Cardiac murmur: No    S3: no  S4: no  Lungs: clear  Liver span: 10 cm:   Hepatojugular reflux: no  Edema: no,       ASSESSMENT: chronic systolic HF    PLAN:      Patient Instructions:   Instruct the patient to notify this clinic if HH, a physician or an advanced care provider wants to change medication one of their HF medications   Activity and Diet restrictions:   Recommend 2-3 gram sodium restriction and 1500cc- 2000cc fluid restriction.  Encourage physical activity with graded exercise  program.  Requested patient to weigh themselves daily, and to notify us if their weight increases by more than 3 lbs in 1 day or 5 lbs in 3 days.    Assigned dry weight on home scale: 69 kg  Medication changes (include current dose and changed dose)  CHF education done  Decline BRANDY eval for now  I prefer to dose lasix prn SOB  Continue GDMT  RTC 2 months

## 2025-05-15 ENCOUNTER — TELEPHONE (OUTPATIENT)
Dept: INTERNAL MEDICINE | Facility: CLINIC | Age: 80
End: 2025-05-15
Payer: MEDICARE

## 2025-05-15 NOTE — TELEPHONE ENCOUNTER
----- Message from [employer] sent at 5/15/2025  2:12 PM CDT -----  Contact: Khadijah ([facility])  ..Type:  Patient Requesting CallWho Called:Khadijah ([facility])Would the patient rather a call back or a response via [facility]? CallBest Call Back Number:[phone] Information: Caller called to discuss patient refusing labs. Caller says that patient states that they will get labs done at facility.

## 2025-05-15 NOTE — TELEPHONE ENCOUNTER
Spoke with Khadijah concerning the patient has lab orders. Khadijah stated that the patient refused the blood draw. Khadijah was informed that Dr Mayen did not order blood work on the following patient. It seem that the order is from    Glenys Johnson NP  Hospitalist [facility]  Submitter     Khadijah stated that she will look into the matter.

## 2025-05-16 ENCOUNTER — LAB VISIT (OUTPATIENT)
Dept: LAB | Facility: HOSPITAL | Age: 80
End: 2025-05-16
Attending: INTERNAL MEDICINE
Payer: MEDICARE

## 2025-05-16 DIAGNOSIS — N18.32 STAGE 3B CHRONIC KIDNEY DISEASE: ICD-10-CM

## 2025-05-16 LAB
CREAT UR-MCNC: 52 MG/DL (ref 15–325)
PROT UR-MCNC: <7 MG/DL
PROT/CREAT UR: NORMAL MG/G{CREAT}

## 2025-05-16 PROCEDURE — 82570 ASSAY OF URINE CREATININE: CPT

## 2025-05-16 PROCEDURE — 81001 URINALYSIS AUTO W/SCOPE: CPT

## 2025-05-17 LAB
BACTERIA #/AREA URNS AUTO: ABNORMAL /HPF
BILIRUB UR QL STRIP.AUTO: NEGATIVE
CLARITY UR: CLEAR
COLOR UR AUTO: YELLOW
GLUCOSE UR QL STRIP: ABNORMAL
HGB UR QL STRIP: NEGATIVE
KETONES UR QL STRIP: NEGATIVE
LEUKOCYTE ESTERASE UR QL STRIP: NEGATIVE
MICROSCOPIC COMMENT: ABNORMAL
NITRITE UR QL STRIP: NEGATIVE
PH UR STRIP: 6 [PH]
PROT UR QL STRIP: NEGATIVE
SP GR UR STRIP: 1.01
SQUAMOUS #/AREA URNS AUTO: 1 /HPF
UROBILINOGEN UR STRIP-ACNC: ABNORMAL EU/DL
WBC #/AREA URNS AUTO: <1 /HPF (ref 0–5)

## 2025-05-22 ENCOUNTER — OFFICE VISIT (OUTPATIENT)
Dept: NEPHROLOGY | Facility: CLINIC | Age: 80
End: 2025-05-22
Payer: MEDICARE

## 2025-05-22 VITALS
BODY MASS INDEX: 27.95 KG/M2 | SYSTOLIC BLOOD PRESSURE: 128 MMHG | OXYGEN SATURATION: 100 % | WEIGHT: 151.88 LBS | HEIGHT: 62 IN | HEART RATE: 58 BPM | DIASTOLIC BLOOD PRESSURE: 70 MMHG

## 2025-05-22 DIAGNOSIS — I50.20 SYSTOLIC CONGESTIVE HEART FAILURE, UNSPECIFIED HF CHRONICITY: ICD-10-CM

## 2025-05-22 DIAGNOSIS — N18.32 STAGE 3B CHRONIC KIDNEY DISEASE: Primary | ICD-10-CM

## 2025-05-22 DIAGNOSIS — I10 PRIMARY HYPERTENSION: ICD-10-CM

## 2025-05-22 PROCEDURE — 1159F MED LIST DOCD IN RCRD: CPT | Mod: CPTII,S$GLB,, | Performed by: INTERNAL MEDICINE

## 2025-05-22 PROCEDURE — 1101F PT FALLS ASSESS-DOCD LE1/YR: CPT | Mod: CPTII,S$GLB,, | Performed by: INTERNAL MEDICINE

## 2025-05-22 PROCEDURE — 3074F SYST BP LT 130 MM HG: CPT | Mod: CPTII,S$GLB,, | Performed by: INTERNAL MEDICINE

## 2025-05-22 PROCEDURE — 1160F RVW MEDS BY RX/DR IN RCRD: CPT | Mod: CPTII,S$GLB,, | Performed by: INTERNAL MEDICINE

## 2025-05-22 PROCEDURE — 99999 PR PBB SHADOW E&M-EST. PATIENT-LVL IV: CPT | Mod: PBBFAC,,, | Performed by: INTERNAL MEDICINE

## 2025-05-22 PROCEDURE — 1157F ADVNC CARE PLAN IN RCRD: CPT | Mod: CPTII,S$GLB,, | Performed by: INTERNAL MEDICINE

## 2025-05-22 PROCEDURE — 1111F DSCHRG MED/CURRENT MED MERGE: CPT | Mod: CPTII,S$GLB,, | Performed by: INTERNAL MEDICINE

## 2025-05-22 PROCEDURE — 99204 OFFICE O/P NEW MOD 45 MIN: CPT | Mod: S$GLB,,, | Performed by: INTERNAL MEDICINE

## 2025-05-22 PROCEDURE — 3078F DIAST BP <80 MM HG: CPT | Mod: CPTII,S$GLB,, | Performed by: INTERNAL MEDICINE

## 2025-05-22 PROCEDURE — 1126F AMNT PAIN NOTED NONE PRSNT: CPT | Mod: CPTII,S$GLB,, | Performed by: INTERNAL MEDICINE

## 2025-05-22 PROCEDURE — 3288F FALL RISK ASSESSMENT DOCD: CPT | Mod: CPTII,S$GLB,, | Performed by: INTERNAL MEDICINE

## 2025-05-22 NOTE — PROGRESS NOTES
Megan Sams is a 80 y.o. female     HPI:    She was noted to have mildly increased creatinine and decreased EGFR on routine laboratory studies.  Nephrology has been consulted for evaluation.  In the clinic today she is doing well and has no specific complaints.  Her laboratory studies medications were reviewed.  All Nephrology related questions were answered to her satisfaction.  On review of her labs her creatinine has run between about 1.3 and 1.6 for the past year.  She has no evidence of proteinuria.  She has history of congestive heart failure with decreased ejection fraction.  Her most recent echo was EF of about 35-40%.  She was hospitalized in April of this year with a CHF exacerbation.  She relates that over the past several weeks to months she has been doing well.  She has no lower extremity edema, PND or orthopnea.  She is currently only taking diuretics on a PRN basis.    PAST MEDICAL HISTORY:  She  has a past medical history of Arthritis, Back pain, Blood transfusion, CHF (congestive heart failure), Chronic kidney disease, Depression, Gout, unspecified, Hepatitis C antibody positive (04/26/2025), Hypertension, Nonischemic cardiomyopathy, PVC's (premature ventricular contractions), and Sinus pause.    PAST SURGICAL HISTORY:  She  has a past surgical history that includes Hysterectomy (1978) and Oophorectomy (1978).    SOCIAL HISTORY:  She  reports that she has never smoked. She has never used smokeless tobacco. She reports that she does not drink alcohol and does not use drugs.      FAMILY MEDICAL HISTORY:  Her family history includes Early death in her mother; Heart disease in her brother and mother; Hypertension in her mother; Kidney disease in her father.    Review of patient's allergies indicates:   Allergen Reactions    Adhesive Blisters    Codeine Other (See Comments)     Hallucinations    Doxycycline monohydrate Nausea Only     Elevated heart rate    Gabapentin Other (See Comments)     Lowered  heart rate    Lanoxin [digoxin] Other (See Comments)     Too low heart rate    Thorazine [chlorpromazine] Other (See Comments)     hallucinations    Ultracet [tramadol-acetaminophen] Other (See Comments)     Elevated BP    Penicillins Nausea Only and Rash           Prior to Admission medications    Medication Sig Start Date End Date Taking? Authorizing Provider   acetaminophen (TYLENOL) 500 MG tablet Take 500 mg by mouth every 6 (six) hours as needed.   Yes Provider, Historical   allopurinoL (ZYLOPRIM) 100 MG tablet TAKE 2 TABLETS BY MOUTH EVERY DAY 5/13/24  Yes Don Mayen MD   amiodarone (PACERONE) 200 MG Tab Take one tablet in the am 4/14/25  Yes Sammy Dubois MD   aspirin (ECOTRIN) 81 MG EC tablet Take 1 tablet (81 mg total) by mouth once daily. 4/14/25  Yes Sammy Dubois MD   ciclopirox (PENLAC) 8 % Soln Apply to nails once daily 11/12/24  Yes Deonna Arevalo DPM   diclofenac sodium (VOLTAREN) 1 % Gel Apply 2 g topically 2 (two) times daily as needed (back pain). 2/17/25 5/22/25 Yes Rhoda Jaramillo PA-C   empagliflozin (JARDIANCE) 10 mg tablet Take 1 tablet (10 mg total) by mouth once daily. 4/14/25  Yes Sammy Dubois MD   ergocalciferol (VITAMIN D2) 50,000 unit Cap Take 1 capsule (50,000 Units total) by mouth every 7 days. 5/2/25  Yes Sammy Dubois MD   furosemide (LASIX) 20 MG tablet Take 1 tablet (20 mg total) by mouth daily as needed (leg swelling.). Do not take if BP is below 110/70 4/14/25 4/14/26 Yes Sammy Dubois MD   metoprolol succinate (TOPROL-XL) 25 MG 24 hr tablet Take 0.5 tablets (12.5 mg total) by mouth once daily. 4/14/25  Yes Sammy Dubois MD   mometasone (ELOCON) 0.1 % ointment AAA bid prn as needed for hair loss. Steroid ointment. 1/8/25  Yes Skyla Norton PA-C   MULTIVIT WITH CALCIUM,IRON,MIN (MULTIPLE VITAMIN, WOMENS ORAL) Take by mouth once daily.   Yes Provider, Historical   sacubitriL-valsartan (ENTRESTO)  mg per tablet Take 1 tablet by mouth 2 (two) times  daily. 4/14/25  Yes Sammy Duobis MD   spironolactone (ALDACTONE) 50 MG tablet Take 1 tablet (50 mg total) by mouth once daily. Do not take if BP is below 110/70 4/14/25  Yes Sammy Dubois MD     Sodium   Date Value Ref Range Status   05/16/2025 140 136 - 145 mmol/L Final   05/07/2025 141 136 - 145 mmol/L Final   04/28/2025 139 136 - 145 mmol/L Final   12/06/2024 142 136 - 145 mmol/L Final   07/11/2024 141 136 - 145 mmol/L Final   05/15/2024 140 136 - 145 mmol/L Final     Potassium   Date Value Ref Range Status   05/16/2025 4.6 3.5 - 5.1 mmol/L Final   05/07/2025 3.9 3.5 - 5.1 mmol/L Final   04/28/2025 3.8 3.5 - 5.1 mmol/L Final   12/06/2024 4.0 3.5 - 5.1 mmol/L Final   07/11/2024 4.1 3.5 - 5.1 mmol/L Final   05/15/2024 4.3 3.5 - 5.1 mmol/L Final     Chloride   Date Value Ref Range Status   05/16/2025 107 95 - 110 mmol/L Final   05/07/2025 109 95 - 110 mmol/L Final   04/28/2025 103 95 - 110 mmol/L Final   12/06/2024 108 95 - 110 mmol/L Final   07/11/2024 108 95 - 110 mmol/L Final   05/15/2024 108 95 - 110 mmol/L Final     CO2   Date Value Ref Range Status   05/16/2025 24 23 - 29 mmol/L Final   05/07/2025 21 (L) 23 - 29 mmol/L Final   04/28/2025 26 23 - 29 mmol/L Final   12/06/2024 25 23 - 29 mmol/L Final   07/11/2024 25 23 - 29 mmol/L Final   05/15/2024 25 23 - 29 mmol/L Final     Glucose   Date Value Ref Range Status   05/16/2025 90 70 - 110 mg/dL Final   05/07/2025 72 70 - 110 mg/dL Final   04/28/2025 118 (H) 70 - 110 mg/dL Final   12/06/2024 97 70 - 110 mg/dL Final   07/11/2024 92 70 - 110 mg/dL Final   05/15/2024 87 70 - 110 mg/dL Final     BUN   Date Value Ref Range Status   05/16/2025 22 8 - 23 mg/dL Final   05/07/2025 22 8 - 23 mg/dL Final   04/28/2025 18 8 - 23 mg/dL Final     Creatinine   Date Value Ref Range Status   05/16/2025 1.5 (H) 0.5 - 1.4 mg/dL Final   05/07/2025 1.3 0.5 - 1.4 mg/dL Final   04/28/2025 1.6 (H) 0.5 - 1.4 mg/dL Final     Calcium   Date Value Ref Range Status   05/16/2025 8.7 8.7 - 10.5  mg/dL Final   05/07/2025 9.0 8.7 - 10.5 mg/dL Final   04/28/2025 8.5 (L) 8.7 - 10.5 mg/dL Final   12/06/2024 9.4 8.7 - 10.5 mg/dL Final   07/11/2024 9.2 8.7 - 10.5 mg/dL Final   05/15/2024 9.7 8.7 - 10.5 mg/dL Final     Protein Total   Date Value Ref Range Status   05/07/2025 7.7 6.0 - 8.4 gm/dL Final   04/26/2025 7.0 6.0 - 8.4 gm/dL Final     Total Protein   Date Value Ref Range Status   07/11/2024 6.8 6.0 - 8.4 g/dL Final   05/15/2024 7.6 6.0 - 8.4 g/dL Final   10/20/2023 7.4 6.0 - 8.4 g/dL Final     Albumin   Date Value Ref Range Status   05/16/2025 3.3 (L) 3.5 - 5.2 g/dL Final   05/07/2025 3.3 (L) 3.5 - 5.2 g/dL Final   04/26/2025 3.1 (L) 3.5 - 5.2 g/dL Final   07/11/2024 3.1 (L) 3.5 - 5.2 g/dL Final   05/15/2024 3.5 3.5 - 5.2 g/dL Final   10/20/2023 3.2 (L) 3.5 - 5.2 g/dL Final     Total Bilirubin   Date Value Ref Range Status   07/11/2024 0.8 0.1 - 1.0 mg/dL Final     Comment:     For infants and newborns, interpretation of results should be based  on gestational age, weight and in agreement with clinical  observations.    Premature Infant recommended reference ranges:  Up to 24 hours.............<8.0 mg/dL  Up to 48 hours............<12.0 mg/dL  3-5 days..................<15.0 mg/dL  6-29 days.................<15.0 mg/dL     05/15/2024 0.7 0.1 - 1.0 mg/dL Final     Comment:     For infants and newborns, interpretation of results should be based  on gestational age, weight and in agreement with clinical  observations.    Premature Infant recommended reference ranges:  Up to 24 hours.............<8.0 mg/dL  Up to 48 hours............<12.0 mg/dL  3-5 days..................<15.0 mg/dL  6-29 days.................<15.0 mg/dL     10/20/2023 0.7 0.1 - 1.0 mg/dL Final     Comment:     For infants and newborns, interpretation of results should be based  on gestational age, weight and in agreement with clinical  observations.    Premature Infant recommended reference ranges:  Up to 24 hours.............<8.0 mg/dL  Up to  48 hours............<12.0 mg/dL  3-5 days..................<15.0 mg/dL  6-29 days.................<15.0 mg/dL       Bilirubin Total   Date Value Ref Range Status   05/07/2025 0.7 0.1 - 1.0 mg/dL Final     Comment:     For infants and newborns, interpretation of results should be based   on gestational age, weight and in agreement with clinical   observations.    Premature Infant recommended reference ranges:   0-24 hours:  <8.0 mg/dL   24-48 hours: <12.0 mg/dL   3-5 days:    <15.0 mg/dL   6-29 days:   <15.0 mg/dL   04/26/2025 1.1 (H) 0.1 - 1.0 mg/dL Final     Comment:     For infants and newborns, interpretation of results should be based   on gestational age, weight and in agreement with clinical   observations.    Premature Infant recommended reference ranges:   0-24 hours:  <8.0 mg/dL   24-48 hours: <12.0 mg/dL   3-5 days:    <15.0 mg/dL   6-29 days:   <15.0 mg/dL     Alkaline Phosphatase   Date Value Ref Range Status   07/11/2024 58 55 - 135 U/L Final   05/15/2024 59 55 - 135 U/L Final   10/20/2023 64 55 - 135 U/L Final     ALP   Date Value Ref Range Status   05/07/2025 51 40 - 150 unit/L Final   04/26/2025 66 40 - 150 unit/L Final     AST   Date Value Ref Range Status   05/07/2025 36 11 - 45 unit/L Final   04/26/2025 32 11 - 45 unit/L Final   07/11/2024 26 10 - 40 U/L Final   05/15/2024 36 10 - 40 U/L Final   10/20/2023 37 10 - 40 U/L Final     ALT   Date Value Ref Range Status   05/07/2025 22 10 - 44 unit/L Final   04/26/2025 25 10 - 44 unit/L Final   07/11/2024 17 10 - 44 U/L Final   05/15/2024 21 10 - 44 U/L Final   10/20/2023 20 10 - 44 U/L Final     Anion Gap   Date Value Ref Range Status   05/16/2025 9 8 - 16 mmol/L Final   05/07/2025 11 8 - 16 mmol/L Final   04/28/2025 10 8 - 16 mmol/L Final     eGFR if    Date Value Ref Range Status   06/30/2022 >60.0 >60 mL/min/1.73 m^2 Final   04/28/2022 49.7 (A) >60 mL/min/1.73 m^2 Final   10/26/2021 >60.0 >60 mL/min/1.73 m^2 Final     eGFR if non     Date Value Ref Range Status   06/30/2022 53.7 (A) >60 mL/min/1.73 m^2 Final     Comment:     Calculation used to obtain the estimated glomerular filtration  rate (eGFR) is the CKD-EPI equation.      04/28/2022 43.1 (A) >60 mL/min/1.73 m^2 Final     Comment:     Calculation used to obtain the estimated glomerular filtration  rate (eGFR) is the CKD-EPI equation.      10/26/2021 54.1 (A) >60 mL/min/1.73 m^2 Final     Comment:     Calculation used to obtain the estimated glomerular filtration  rate (eGFR) is the CKD-EPI equation.        RBC, UA   Date Value Ref Range Status   04/11/2024 0 0 - 4 /hpf Final     WBC, UA   Date Value Ref Range Status   05/16/2025 <1 0 - 5 /HPF Final   04/11/2024 1 0 - 5 /hpf Final   08/30/2014 1 0 - 5 /hpf Final     Bacteria, UA   Date Value Ref Range Status   05/16/2025 Few (A) None, Rare, Occasional /HPF Final     Bacteria   Date Value Ref Range Status   04/11/2024 Rare None-Occ /hpf Final     Microscopic Comment   Date Value Ref Range Status   05/16/2025   Final     Comment:     Other formed elements not mentioned in the report are not present in the microscopic examination.   04/11/2024 SEE COMMENT  Final     Comment:     Other formed elements not mentioned in the report are not   present in the microscopic examination.      08/30/2014 SEE COMMENT  Final     Comment:     Other formed elements not mentioned in the report are not   present in the microscopic examination.        Urine Protein   Date Value Ref Range Status   05/16/2025 <7 <=15 mg/dL Final     Urine Creatinine   Date Value Ref Range Status   05/16/2025 52.0 15.0 - 325.0 mg/dL Final     Creatinine, Urine   Date Value Ref Range Status   10/07/2013 73.0 15.0 - 325.0 mg/dL Final     Comment:     The random urine reference ranges provided were established   for 24 hour urine collections.  No reference ranges exist for  random urine specimens.  Correlate clinically.     Urine Protein/Creatinine Ratio   Date  "Value Ref Range Status   05/16/2025   Final     Comment:     UNABLE TO CALCULATE          REVIEW OF SYSTEMS:  Patient has no fever, fatigue, visual changes, chest pain, edema, cough, dyspnea, nausea, vomiting, constipation, diarrhea, arthralgias, pruritis, dizziness, weakness, depression, confusion.        PHYSICAL EXAM:   height is 5' 2" (1.575 m) and weight is 68.9 kg (151 lb 14.4 oz). Her blood pressure is 128/70 and her pulse is 58 (abnormal). Her oxygen saturation is 100%.   Gen: WDWN female in no apparent distress  Psych: Normal mood and affect  Skin: No rashes or ulcers  Eyes: Normal conjunctiva and lids, PERRLA  ENT: Normal hearing with no oropharyngeal lesions  Neck: No JVD  Ext: No cyanosis, clubbing or edema          IMPRESSION AND RECOMMENDATIONS:    1. CKD 3B:  Creatinine has fluctuated between 1.3 and 1.6.  This minor fluctuation is hemodynamic in nature related to cardiorenal syndrome.  EGFR at her baseline is essentially normal for her age.  Urinalysis is bland without evidence of proteinuria.  She is on an SGLT 2 inhibitor as well as a RAAS inhibitor in part of Entresto.    2. Hypertension: Blood pressure appears to be well controlled.    3. CHF: She appears to be euvolemic on exam.  No lower extremity edema, PND or orthopnea.    Follow-up labs:  Renal panel, PC ratio, intact PTH, vitamin-D.          "

## 2025-05-27 RX ORDER — ALLOPURINOL 100 MG/1
200 TABLET ORAL
Qty: 180 TABLET | Refills: 0 | Status: SHIPPED | OUTPATIENT
Start: 2025-05-27

## 2025-05-27 NOTE — TELEPHONE ENCOUNTER
Care Due:                  Date            Visit Type   Department     Provider  --------------------------------------------------------------------------------                                EP -                              PRIMARY      [facility] INTERNAL  Last Visit: 05-      CARE ([facility])   GABRIELLE Mayen                              EP -                              PRIMARY      [facility] INTERNAL  Next Visit: 08-      CARE ([facility])   GABRIELLE Mayen                                                            Last  Test          Frequency    Reason                     Performed    Due Date  --------------------------------------------------------------------------------    Uric Acid...  12 months..  allopurinoL..............  07- 07-    [employer] Embedded Care Due Messages. Reference number: [id number].   5/27/2025 12:04:06 AM CDT

## 2025-05-27 NOTE — TELEPHONE ENCOUNTER
Refill Decision Note   Megan Sams  is requesting a refill authorization.  Brief Assessment and Rationale for Refill:  Approve     Medication Therapy Plan: FLOS 8/11/25; Renal function reviewed, no change to therapy      Comments:     Note composed:9:17 AM 05/27/2025

## 2025-06-11 ENCOUNTER — OFFICE VISIT (OUTPATIENT)
Dept: DERMATOLOGY | Facility: CLINIC | Age: 80
End: 2025-06-11
Payer: MEDICARE

## 2025-06-11 DIAGNOSIS — L65.9 HAIR LOSS: Primary | ICD-10-CM

## 2025-06-11 PROCEDURE — 1159F MED LIST DOCD IN RCRD: CPT | Mod: CPTII,S$GLB,, | Performed by: STUDENT IN AN ORGANIZED HEALTH CARE EDUCATION/TRAINING PROGRAM

## 2025-06-11 PROCEDURE — G2211 COMPLEX E/M VISIT ADD ON: HCPCS | Mod: S$GLB,,, | Performed by: STUDENT IN AN ORGANIZED HEALTH CARE EDUCATION/TRAINING PROGRAM

## 2025-06-11 PROCEDURE — 1160F RVW MEDS BY RX/DR IN RCRD: CPT | Mod: CPTII,S$GLB,, | Performed by: STUDENT IN AN ORGANIZED HEALTH CARE EDUCATION/TRAINING PROGRAM

## 2025-06-11 PROCEDURE — 99214 OFFICE O/P EST MOD 30 MIN: CPT | Mod: S$GLB,,, | Performed by: STUDENT IN AN ORGANIZED HEALTH CARE EDUCATION/TRAINING PROGRAM

## 2025-06-11 PROCEDURE — 1157F ADVNC CARE PLAN IN RCRD: CPT | Mod: CPTII,S$GLB,, | Performed by: STUDENT IN AN ORGANIZED HEALTH CARE EDUCATION/TRAINING PROGRAM

## 2025-06-11 PROCEDURE — 99999 PR PBB SHADOW E&M-EST. PATIENT-LVL II: CPT | Mod: PBBFAC,,, | Performed by: STUDENT IN AN ORGANIZED HEALTH CARE EDUCATION/TRAINING PROGRAM

## 2025-06-11 NOTE — Clinical Note
Hi Dr. Dubois, I was seeing Ms Sams for hair loss, and I think she would be a good candidate for treatment with finasteride. However, given her cardiovascular history though, she wanted me to ask you if you would be comfortable with her taking this in addition to her other medications. I appreciate your help and input.

## 2025-06-12 NOTE — PROGRESS NOTES
Subjective:       Patient ID:  Megan Sams is a 80 y.o. female who presents for   Chief Complaint   Patient presents with    Hair Loss     History of Present Illness: The patient presents for follow up of hair loss, last seen by FELIBERTO Norton on 1/8/25. She was started on topical minoxidil and mometasone solution. She has been on treatment for several months and reports no improvement in symptoms. Continues to report having hair loss and thinning, which is very upsetting to the patient. She would like to discuss other treatment options.       Hair Loss        Review of Systems   Constitutional:  Negative for fever and chills.   Skin:  Negative for itching, rash and dry skin.        Objective:    Physical Exam   Constitutional: She appears well-developed and well-nourished. No distress.   Neurological: She is alert and oriented to person, place, and time. She is not disoriented.   Psychiatric: She has a normal mood and affect.   Skin:   Areas Examined (abnormalities noted in diagram):   Scalp / Hair Palpated and Inspected  Head / Face Inspection Performed  Neck Inspection Performed              Diagram Legend     Erythematous scaling macule/papule c/w actinic keratosis       Vascular papule c/w angioma      Pigmented verrucoid papule/plaque c/w seborrheic keratosis      Yellow umbilicated papule c/w sebaceous hyperplasia      Irregularly shaped tan macule c/w lentigo     1-2 mm smooth white papules consistent with Milia      Movable subcutaneous cyst with punctum c/w epidermal inclusion cyst      Subcutaneous movable cyst c/w pilar cyst      Firm pink to brown papule c/w dermatofibroma      Pedunculated fleshy papule(s) c/w skin tag(s)      Evenly pigmented macule c/w junctional nevus     Mildly variegated pigmented, slightly irregular-bordered macule c/w mildly atypical nevus      Flesh colored to evenly pigmented papule c/w intradermal nevus       Pink pearly papule/plaque c/w basal cell carcinoma      Erythematous  hyperkeratotic cursted plaque c/w SCC      Surgical scar with no sign of skin cancer recurrence      Open and closed comedones      Inflammatory papules and pustules      Verrucoid papule consistent consistent with wart     Erythematous eczematous patches and plaques     Dystrophic onycholytic nail with subungual debris c/w onychomycosis     Umbilicated papule    Erythematous-base heme-crusted tan verrucoid plaque consistent with inflamed seborrheic keratosis     Erythematous Silvery Scaling Plaque c/w Psoriasis     See annotation      Assessment / Plan:        Hair loss - patient with progressive hair loss. Given her cardiovascular history, would not be a good candidate for oral minoxidil, already on spironolactone. May benefit from finasteride. Patient hesitant to take without consulting with her cardiologist./ Will reach out to cardiologist for recommendations. If agreeable, will start finasteride 1mg daily.              Follow up in about 3 months (around 9/11/2025).

## 2025-06-13 RX ORDER — FINASTERIDE 1 MG/1
1 TABLET, FILM COATED ORAL DAILY
Qty: 90 TABLET | Refills: 0 | Status: SHIPPED | OUTPATIENT
Start: 2025-06-13 | End: 2025-07-13

## 2025-06-24 ENCOUNTER — OFFICE VISIT (OUTPATIENT)
Dept: PODIATRY | Facility: CLINIC | Age: 80
End: 2025-06-24
Payer: MEDICARE

## 2025-06-24 VITALS — BODY MASS INDEX: 27.95 KG/M2 | WEIGHT: 151.88 LBS | HEIGHT: 62 IN

## 2025-06-24 DIAGNOSIS — B35.1 ONYCHOMYCOSIS DUE TO DERMATOPHYTE: Primary | ICD-10-CM

## 2025-06-24 PROCEDURE — 1126F AMNT PAIN NOTED NONE PRSNT: CPT | Mod: CPTII,S$GLB,, | Performed by: PODIATRIST

## 2025-06-24 PROCEDURE — 1159F MED LIST DOCD IN RCRD: CPT | Mod: CPTII,S$GLB,, | Performed by: PODIATRIST

## 2025-06-24 PROCEDURE — 3288F FALL RISK ASSESSMENT DOCD: CPT | Mod: CPTII,S$GLB,, | Performed by: PODIATRIST

## 2025-06-24 PROCEDURE — 1101F PT FALLS ASSESS-DOCD LE1/YR: CPT | Mod: CPTII,S$GLB,, | Performed by: PODIATRIST

## 2025-06-24 PROCEDURE — 99213 OFFICE O/P EST LOW 20 MIN: CPT | Mod: S$GLB,,, | Performed by: PODIATRIST

## 2025-06-24 PROCEDURE — 1160F RVW MEDS BY RX/DR IN RCRD: CPT | Mod: CPTII,S$GLB,, | Performed by: PODIATRIST

## 2025-06-24 PROCEDURE — 1157F ADVNC CARE PLAN IN RCRD: CPT | Mod: CPTII,S$GLB,, | Performed by: PODIATRIST

## 2025-06-24 PROCEDURE — 99999 PR PBB SHADOW E&M-EST. PATIENT-LVL III: CPT | Mod: PBBFAC,,, | Performed by: PODIATRIST

## 2025-06-24 NOTE — PROGRESS NOTES
Subjective:     Patient ID: Megan Sams is a 80 y.o. female.    Chief Complaint: Nail Care (Non-diabetic pt wears slide on shoes, PCP Don Mayen last seen 5/5/2025)    Megan is a 80 y.o. female who presents to the clinic complaining of thick and discolored toenails on both feet. Megan states she has been applying the medications as prescribed.     Patient Active Problem List   Diagnosis    Arthritis    Acute systolic congestive heart failure    Essential hypertension    Stage 3b chronic kidney disease    Nonischemic cardiomyopathy    Intertrigo    PVC (premature ventricular contraction)    Cortical age-related cataract of both eyes    Chronic gout without tophus    Pre-operative cardiovascular examination    Heart failure with reduced ejection fraction (HFrEF, <= 40%) and combined systolic and diastolic dysfunction    Sinus bradycardia       Medication List with Changes/Refills   Current Medications    ACETAMINOPHEN (TYLENOL) 500 MG TABLET    Take 500 mg by mouth every 6 (six) hours as needed.    ALLOPURINOL (ZYLOPRIM) 100 MG TABLET    TAKE 2 TABLETS BY MOUTH EVERY DAY    AMIODARONE (PACERONE) 200 MG TAB    Take one tablet in the am    ASPIRIN (ECOTRIN) 81 MG EC TABLET    Take 1 tablet (81 mg total) by mouth once daily.    CICLOPIROX (PENLAC) 8 % SOLN    Apply to nails once daily    DICLOFENAC SODIUM (VOLTAREN) 1 % GEL    Apply 2 g topically 2 (two) times daily as needed (back pain).    EMPAGLIFLOZIN (JARDIANCE) 10 MG TABLET    Take 1 tablet (10 mg total) by mouth once daily.    ERGOCALCIFEROL (VITAMIN D2) 50,000 UNIT CAP    Take 1 capsule (50,000 Units total) by mouth every 7 days.    FINASTERIDE (PROPECIA) 1 MG TABLET    Take 1 tablet (1 mg total) by mouth once daily.    FUROSEMIDE (LASIX) 20 MG TABLET    Take 1 tablet (20 mg total) by mouth daily as needed (leg swelling.). Do not take if BP is below 110/70    METOPROLOL SUCCINATE (TOPROL-XL) 25 MG 24 HR TABLET    Take 0.5 tablets (12.5 mg total) by mouth once  "daily.    MOMETASONE (ELOCON) 0.1 % OINTMENT    AAA bid prn as needed for hair loss. Steroid ointment.    MULTIVIT WITH CALCIUM,IRON,MIN (MULTIPLE VITAMIN, WOMENS ORAL)    Take by mouth once daily.    SACUBITRIL-VALSARTAN (ENTRESTO)  MG PER TABLET    Take 1 tablet by mouth 2 (two) times daily.    SPIRONOLACTONE (ALDACTONE) 50 MG TABLET    Take 1 tablet (50 mg total) by mouth once daily. Do not take if BP is below 110/70       Review of patient's allergies indicates:   Allergen Reactions    Adhesive Blisters    Codeine Other (See Comments)     Hallucinations    Doxycycline monohydrate Nausea Only     Elevated heart rate    Gabapentin Other (See Comments)     Lowered heart rate    Lanoxin [digoxin] Other (See Comments)     Too low heart rate    Thorazine [chlorpromazine] Other (See Comments)     hallucinations    Ultracet [tramadol-acetaminophen] Other (See Comments)     Elevated BP    Penicillins Nausea Only and Rash       Past Surgical History:   Procedure Laterality Date    HYSTERECTOMY  1978    OOPHORECTOMY  1978       Family History   Problem Relation Name Age of Onset    Early death Mother      Heart disease Mother      Hypertension Mother      Kidney disease Father      Heart disease Brother         Social History     Socioeconomic History    Marital status:    Tobacco Use    Smoking status: Never    Smokeless tobacco: Never   Substance and Sexual Activity    Alcohol use: No     Alcohol/week: 0.0 standard drinks of alcohol    Drug use: No    Sexual activity: Not Currently     Social Drivers of Health     Transportation Needs: No Transportation Needs (4/28/2025)    PRAPARE - Transportation     Lack of Transportation (Medical): No     Lack of Transportation (Non-Medical): No       Vitals:    06/24/25 1556   Weight: 68.9 kg (151 lb 14.4 oz)   Height: 5' 2" (1.575 m)   PainSc: 0-No pain       Hemoglobin A1C   Date Value Ref Range Status   07/26/2023 5.4 4.0 - 5.6 % Final     Comment:     ADA Screening " Guidelines:  5.7-6.4%  Consistent with prediabetes  >or=6.5%  Consistent with diabetes    High levels of fetal hemoglobin interfere with the HbA1C  assay. Heterozygous hemoglobin variants (HbS, HgC, etc)do  not significantly interfere with this assay.   However, presence of multiple variants may affect accuracy.     03/23/2021 5.5 4.0 - 5.6 % Final     Comment:     ADA Screening Guidelines:  5.7-6.4%  Consistent with prediabetes  >or=6.5%  Consistent with diabetes    High levels of fetal hemoglobin interfere with the HbA1C  assay. Heterozygous hemoglobin variants (HbS, HgC, etc)do  not significantly interfere with this assay.   However, presence of multiple variants may affect accuracy.     04/02/2019 5.6 4.0 - 5.6 % Final     Comment:     ADA Screening Guidelines:  5.7-6.4%  Consistent with prediabetes  >or=6.5%  Consistent with diabetes  High levels of fetal hemoglobin interfere with the HbA1C  assay. Heterozygous hemoglobin variants (HbS, HgC, etc)do  not significantly interfere with this assay.   However, presence of multiple variants may affect accuracy.       Hemoglobin A1c   Date Value Ref Range Status   04/27/2025 5.1 4.0 - 5.6 % Final     Comment:     ADA Screening Guidelines:  5.7-6.4%  Consistent with prediabetes  >=6.5%  Consistent with diabetes    High levels of fetal hemoglobin interfere with the HbA1C  assay. Heterozygous hemoglobin variants (HbS, HgC, etc)do  not significantly interfere with this assay.   However, presence of multiple variants may affect accuracy.       Review of Systems   Constitutional:  Negative for chills and fever.   Respiratory:  Negative for shortness of breath.    Cardiovascular:  Negative for chest pain, palpitations, orthopnea, claudication and leg swelling.   Gastrointestinal:  Negative for diarrhea, nausea and vomiting.   Musculoskeletal:  Negative for joint pain.   Skin:  Negative for rash.   Neurological:  Negative for dizziness, tingling, sensory change, focal weakness  and weakness.   Psychiatric/Behavioral: Negative.           Objective:      PHYSICAL EXAM: Apperance: Alert and orient in no distress,well developed, and with good attention to grooming and body habits  LOWER EXTREMITY EXAM:  VASCULAR: Dorsalis pedis pulses 2/4 bilateral and Posterior Tibial pulses 1/4 bilateral.   DERMATOLOGICAL: No skin rashes, subcutaneous nodules, lesions, or ulcers observed bilateral. Nails 1,2,3,4,5 bilateral elongated, thickened, and discolored with subungual debris. Webspaces 1,2,3,4 bilateral clean, dry and without evidence of break in skin integrity.  NEUROLOGICAL: Light touch, sharp-dull, proprioception all present and equal bilaterally.     MUSCULOSKELETAL: Muscle strength is 5/5 for foot inverters, everters, plantarflexors, and dorsiflexors. Muscle tone is normal. Negative pain on palpation of nails bilateral.         Assessment:       ICD-10-CM ICD-9-CM   1. Onychomycosis due to dermatophyte  B35.1 110.1       Plan:   Onychomycosis due to dermatophyte      I counseled the patient on her conditions, regarding findings of my examination, my impressions, and usual treatment plan.   Patient instructed to spray all shoes with Lysol disinfectant spray and let dry before wearing. Patient instructed to wash all socks in hot water and bleach.  Discuss treatment options for nail fungus.  I explained that fungus lives in a warm dark moist environment and therefore patient should make every attempt to keep feet clean and dry.  We discussed drying feet thoroughly after shower particularly between the toes and then applying powder between the toes and in the shoes.    For fungal toenails I prescribed topical medication to be used daily for up to a year.  We also discussed oral Lamisil but I did not recommend it as a first line of treatment since it is an internal medicine that may potentially have side effects, including liver problems. Patient elects for oral treatment.   Refill written for Penlac  to be applied to nails once daily.   Patient to return as needed.        Deonna Arevalo DPM  [facility]

## 2025-07-08 ENCOUNTER — TELEPHONE (OUTPATIENT)
Dept: CARDIOLOGY | Facility: CLINIC | Age: 80
End: 2025-07-08
Payer: MEDICARE

## 2025-07-09 ENCOUNTER — PATIENT OUTREACH (OUTPATIENT)
Dept: ADMINISTRATIVE | Facility: HOSPITAL | Age: 80
End: 2025-07-09
Payer: MEDICARE

## 2025-07-09 DIAGNOSIS — Z78.0 POSTMENOPAUSAL STATE: Primary | ICD-10-CM

## 2025-07-09 NOTE — PROGRESS NOTES
[facility] OUTREACH: per chart review pt is Due for Dexa after 8.9.25, spoke to pt and scheduled Dexa scan on 8.11.25 same day as lab appt.

## 2025-07-11 ENCOUNTER — LAB VISIT (OUTPATIENT)
Dept: LAB | Facility: HOSPITAL | Age: 80
End: 2025-07-11
Attending: INTERNAL MEDICINE
Payer: MEDICARE

## 2025-07-11 ENCOUNTER — OFFICE VISIT (OUTPATIENT)
Dept: CARDIOLOGY | Facility: CLINIC | Age: 80
End: 2025-07-11
Payer: MEDICARE

## 2025-07-11 VITALS
DIASTOLIC BLOOD PRESSURE: 72 MMHG | BODY MASS INDEX: 27.38 KG/M2 | OXYGEN SATURATION: 99 % | WEIGHT: 149.69 LBS | HEART RATE: 59 BPM | SYSTOLIC BLOOD PRESSURE: 130 MMHG

## 2025-07-11 DIAGNOSIS — I50.43 ACUTE ON CHRONIC COMBINED SYSTOLIC AND DIASTOLIC CONGESTIVE HEART FAILURE: ICD-10-CM

## 2025-07-11 DIAGNOSIS — I50.21 ACUTE SYSTOLIC CONGESTIVE HEART FAILURE: ICD-10-CM

## 2025-07-11 DIAGNOSIS — R06.00 DYSPNEA, UNSPECIFIED TYPE: ICD-10-CM

## 2025-07-11 DIAGNOSIS — R06.09 DOE (DYSPNEA ON EXERTION): ICD-10-CM

## 2025-07-11 DIAGNOSIS — I50.42 CHRONIC COMBINED SYSTOLIC AND DIASTOLIC CONGESTIVE HEART FAILURE: ICD-10-CM

## 2025-07-11 DIAGNOSIS — R00.1 SINUS BRADYCARDIA: ICD-10-CM

## 2025-07-11 DIAGNOSIS — R06.02 SHORTNESS OF BREATH: Primary | ICD-10-CM

## 2025-07-11 DIAGNOSIS — I50.40 HEART FAILURE WITH REDUCED EJECTION FRACTION (HFREF, <= 40%) AND COMBINED SYSTOLIC AND DIASTOLIC DYSFUNCTION: ICD-10-CM

## 2025-07-11 DIAGNOSIS — N18.32 STAGE 3B CHRONIC KIDNEY DISEASE: ICD-10-CM

## 2025-07-11 DIAGNOSIS — I50.42 CHRONIC COMBINED SYSTOLIC AND DIASTOLIC HEART FAILURE: ICD-10-CM

## 2025-07-11 DIAGNOSIS — I42.8 NONISCHEMIC CARDIOMYOPATHY: ICD-10-CM

## 2025-07-11 DIAGNOSIS — I49.3 PVC (PREMATURE VENTRICULAR CONTRACTION): ICD-10-CM

## 2025-07-11 DIAGNOSIS — R00.2 PALPITATIONS: ICD-10-CM

## 2025-07-11 PROCEDURE — 36415 COLL VENOUS BLD VENIPUNCTURE: CPT

## 2025-07-11 PROCEDURE — 80151 DRUG ASSAY AMIODARONE: CPT

## 2025-07-11 PROCEDURE — 99999 PR PBB SHADOW E&M-EST. PATIENT-LVL III: CPT | Mod: PBBFAC,,, | Performed by: INTERNAL MEDICINE

## 2025-07-11 RX ORDER — METOPROLOL SUCCINATE 25 MG/1
12.5 TABLET, EXTENDED RELEASE ORAL DAILY
Qty: 45 TABLET | Refills: 1 | Status: SHIPPED | OUTPATIENT
Start: 2025-07-11

## 2025-07-11 RX ORDER — SPIRONOLACTONE 50 MG/1
50 TABLET, FILM COATED ORAL DAILY
Qty: 90 TABLET | Refills: 1 | Status: SHIPPED | OUTPATIENT
Start: 2025-07-11

## 2025-07-11 RX ORDER — SACUBITRIL AND VALSARTAN 97; 103 MG/1; MG/1
1 TABLET, FILM COATED ORAL 2 TIMES DAILY
Qty: 180 TABLET | Refills: 1 | Status: SHIPPED | OUTPATIENT
Start: 2025-07-11

## 2025-07-11 RX ORDER — METOPROLOL SUCCINATE 25 MG/1
12.5 TABLET, EXTENDED RELEASE ORAL DAILY
Qty: 45 TABLET | Refills: 1 | Status: SHIPPED | OUTPATIENT
Start: 2025-07-11 | End: 2025-07-11

## 2025-07-11 RX ORDER — METOPROLOL SUCCINATE 25 MG/1
12.5 TABLET, EXTENDED RELEASE ORAL DAILY
Qty: 45 TABLET | Refills: 1 | Status: SHIPPED | OUTPATIENT
Start: 2025-07-11 | End: 2025-07-11 | Stop reason: SDUPTHER

## 2025-07-11 RX ORDER — FUROSEMIDE 20 MG/1
20 TABLET ORAL DAILY
Qty: 90 TABLET | Refills: 1 | Status: SHIPPED | OUTPATIENT
Start: 2025-07-11 | End: 2026-07-11

## 2025-07-11 RX ORDER — FUROSEMIDE 20 MG/1
20 TABLET ORAL DAILY
Qty: 90 TABLET | Refills: 1 | Status: SHIPPED | OUTPATIENT
Start: 2025-07-11 | End: 2025-07-11

## 2025-07-11 NOTE — PROGRESS NOTES
Subjective:   Patient ID:  Megan Sams is a 80 y.o. female who presents for cardiac consult of No chief complaint on file.      Referral by: No referring provider defined for this encounter.     Reason for consult: CHF      HPI  The patient came in today for cardiac consult of No chief complaint on file.      Megan Sams is a 80 y.o. female pt with NICM EF 35- 40%, PVCs, HTN, obesity, CKD presents for CV follow up.       1/13/25  Sp vital monitor 10/2024 - pauses of 3.3 sec, decreased BB to 12.5 mg.   heart monitor reveal slow heart rates with pauses up to 3.3 seconds - I am decreasing  the metoprolol to 12.5 mg daily (need to take half of a 25mg tablet) - new script sent to pharmacy. If heart rate remains below 50 and feeling sluggish/tired need to stop taking metoprolol. Follow up with EP as scheduled.   She has been checking BP at home and overall stable. She had a fall in Nov but tripped, no LOC.     4/14/25  Pt saw Rozina last month, overall stable.   BP mildly today.   She had drank milk yest and now has more gas/abd pain - likely lactose intolerant.   Has more back pain at times.   ECG - NSR, LAE, LVH    April 2025 Hospital Course:   80 year old female, under the care of [facility] for management of Acute on Chronic Heart Failure. Patient treated with IV diuretic with good UOP. Weight improved from 72kg to 69kg, weaned off O2 to room air. Pedal edema improved. Labs stable, vitals stable. Discussed case with Cardiology. Patient scheduled for a stress test on 4/30, advised patient to complete the stress test as recommended. Continue home medications as prescribed. Referral for HH placed. Recommend to f/u with PCP and Cardiology as OP. Patient seen and examined on day of discharge and determined stable for discharge.      7/11/25  She was admitted in April 2025 with CHF exac.   ECHO 4/2025 with LVEF 35-40%, grade 2 DD, mild red RV function, mod MR, mod TR, PASP 61 mmHg, inc CVP.     BP and HR stable.  BMI 27 - 149 lbs  She was 159 lbs in April.     Results for orders placed during the hospital encounter of 04/26/25    Echo    Interpretation Summary    Left Ventricle: The left ventricle is severely dilated. Normal wall thickness. Moderate global hypokinesis present. There is moderately reduced systolic function with a visually estimated ejection fraction of 35 - 40%. Grade II diastolic dysfunction.    Right Ventricle: Systolic function is mildly reduced.    Left Atrium: Severely dilated    Mitral Valve: There is bileaflet sclerosis. There is moderate regurgitation with a eccentrically posterolateral directed jet.    Tricuspid Valve: There is moderate regurgitation.    Pulmonary Artery: The estimated pulmonary artery systolic pressure is 61 mmHg.    IVC/SVC: Elevated venous pressure at 15 mmHg.        Results for orders placed during the hospital encounter of 07/19/24    Echo    Interpretation Summary    Left Ventricle: The left ventricle is normal in size. Normal wall thickness. There is concentric hypertrophy. Mild global hypokinesis present. There is reduced systolic function. Ejection fraction by visual approximation is 40%. There is indeterminate diastolic function.    Right Ventricle: Normal right ventricular cavity size. Wall thickness is normal. Systolic function is normal.    Aortic Valve: The aortic valve is a trileaflet valve. There is mild stenosis. Aortic valve area by VTI is 1.92 cm². Aortic valve peak velocity is 1.17 m/s. Mean gradient is 3 mmHg. The dimensionless index is 0.59.    Mitral Valve: There is moderate bileaflet sclerosis. There is normal leaflet mobility. There is prolapse of the anterior mitral leaflet. There is mild to moderate regurgitation.    Pulmonary Artery: The estimated pulmonary artery systolic pressure is 48 mmHg.    IVC/SVC: Normal venous pressure at 3 mmHg.      Results for orders placed during the hospital encounter of 04/19/24    Echo    Interpretation Summary    Left  Ventricle: The left ventricle is mildly dilated. Moderately increased wall thickness. There is moderate concentric hypertrophy. Moderate global hypokinesis present. There is moderately reduced systolic function with a visually estimated ejection fraction of 35 - 40%. Grade I diastolic dysfunction.    Right Ventricle: Normal right ventricular cavity size. Wall thickness is normal. Right ventricle wall motion  is normal. Systolic function is normal.    Mitral Valve: There is mild to moderate regurgitation.    Tricuspid Valve: There is mild regurgitation.    Pulmonary Artery: The estimated pulmonary artery systolic pressure is 35 mmHg.    IVC/SVC: Normal venous pressure at 3 mmHg.      HOLTER  Interpretation Summary  Show Result Comparison     The predominant rhythm is sinus.    THERE IS A SINUS PAUSE OF 4.3 SECONDS.    THERE ARE MULTIPLE EPISODE OFS EVERE SINUS BRADYCARDIA AND EPISODES OF WIDE COMPLEX JUNCTIONAL ESCAPE BEATS.      Results for orders placed during the hospital encounter of 12/28/23    Nuclear Stress - Cardiology Interpreted    Interpretation Summary    Abnormal myocardial perfusion scan.    There are two significant perfusion abnormalities.    Perfusion Abnormality #1 - There is a moderate to severe intensity, moderate sized, mostly fixed perfusion abnormality with some reversibilty in the apical, anteroseptal and septal apical wall(s).    Perfusion Abnormality #2 - There is a moderate sized, moderate intensity, mostly fixed perfusion abnormality with some reversibilty in the basal to mid inferolateral wall(s).    There are no other significant perfusion abnormalities.    The gated perfusion images showed an ejection fraction of 34% at rest. The gated perfusion images showed an ejection fraction of 23% post stress.    There is moderate global hypokinesis at rest and severe global hypokinesis at stress .    The ECG portion of the study is negative for ischemia.    The patient reported no chest pain  during the stress test.        Results for orders placed during the hospital encounter of 11/03/23    Echo    Interpretation Summary    Left Ventricle: The left ventricle is mildly dilated. Normal wall thickness. Moderate global hypokinesis present. There is moderately reduced systolic function with a visually estimated ejection fraction of 30 - 35%. There is normal diastolic function.    Right Ventricle: Normal right ventricular cavity size. Wall thickness is normal. Right ventricle wall motion  is normal. Systolic function is normal.    Mitral Valve: There is moderate regurgitation with a centrally directed jet.    Tricuspid Valve: There is moderate regurgitation with a centrally directed jet.    Pulmonary Artery: The estimated pulmonary artery systolic pressure is 49 mmHg.    IVC/SVC: Normal venous pressure at 3 mmHg.      Echo: 2020: EF 40%  Cardiac cath: 2009: normal coronaries       Cardiac Monitor - 3-15 Day Adult (Cupid Only)  Result Date: 11/11/2024  The patient was monitored for a total of 6d 20h, underlying rhythm is   Sinus.  The minimum heart rate was 26 bpm; the maximum 76 bpm; the average 47 bpm.  0 % of Atrial fibrillation/Atrial flutter with longest episode of 0 ms.  The total burden of AV Block present was 0 % [Complete Heart Block: 0 %;   Advanced (High Grade):  0 %; 2nd Degree, Mobitz II: 0 %; 2nd Degree, Mobitz I: 0 %].  There were 651 pauses, the longest pause was 3366 ms at Day 7 / 03:23:51   pm.  Total count of Ventricular Tachycardia (VT): 1 episode(s). Longest VT: 3   beats on Day 4 / 08:39:08  pm. Fastest VT: 116 bpm on Day 4 / 08:39:08 pm.  0 supraventricular episodes were found. Longest SVT Episode 0 s, Fastest   SVT -- bpm  There were a total of 404 PVCs with 3 morphologies and 2 couplets. Overall   PVC [place] at 0.09 %  There were a total of 0 Other Beats. There were 0 total number of paced   beats.  There were a total of 2806 PSVCs with 11 couplets. Overall PSVC [place] at  0.6  %  There is a total of 1 patient events.               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    Hypertension     Nonischemic cardiomyopathy     PVC's (premature ventricular contractions)     Sinus pause        Past Surgical History:   Procedure Laterality Date    HYSTERECTOMY  1978    OOPHORECTOMY  1978       Social History     Tobacco Use    Smoking status: Never    Smokeless tobacco: Never   Substance Use Topics    Alcohol use: No     Alcohol/week: 0.0 standard drinks of alcohol    Drug use: No       Family History   Problem Relation Name Age of Onset    Early death Mother      Heart disease Mother      Hypertension Mother      Kidney disease Father      Heart disease Brother         Patient's Medications   New Prescriptions    No medications on file   Previous Medications    ACETAMINOPHEN (TYLENOL) 500 MG TABLET    Take 500 mg by mouth every 6 (six) hours as needed.    ALLOPURINOL (ZYLOPRIM) 100 MG TABLET    TAKE 2 TABLETS BY MOUTH EVERY DAY    AMIODARONE (PACERONE) 200 MG TAB    Take one tablet in the am    ASPIRIN (ECOTRIN) 81 MG EC TABLET    Take 1 tablet (81 mg total) by mouth once daily.    CICLOPIROX (PENLAC) 8 % SOLN    Apply to nails once daily    DICLOFENAC SODIUM (VOLTAREN) 1 % GEL    Apply 2 g topically 2 (two) times daily as needed (back pain).    EMPAGLIFLOZIN (JARDIANCE) 10 MG TABLET    Take 1 tablet (10 mg total) by mouth once daily.    ERGOCALCIFEROL (VITAMIN D2) 50,000 UNIT CAP    Take 1 capsule (50,000 Units total) by mouth every 7 days.    FINASTERIDE (PROPECIA) 1 MG TABLET    Take 1 tablet (1 mg total) by mouth once daily.    FUROSEMIDE (LASIX) 20 MG TABLET    Take 1 tablet (20 mg total) by mouth daily as needed (leg swelling.). Do not take if BP is below 110/70    METOPROLOL  SUCCINATE (TOPROL-XL) 25 MG 24 HR TABLET    Take 0.5 tablets (12.5 mg total) by mouth once daily.    MOMETASONE (ELOCON) 0.1 % OINTMENT    AAA bid prn as needed for hair loss. Steroid ointment.    MULTIVIT WITH CALCIUM,IRON,MIN (MULTIPLE VITAMIN, WOMENS ORAL)    Take by mouth once daily.    SACUBITRIL-VALSARTAN (ENTRESTO)  MG PER TABLET    Take 1 tablet by mouth 2 (two) times daily.    SPIRONOLACTONE (ALDACTONE) 50 MG TABLET    Take 1 tablet (50 mg total) by mouth once daily. Do not take if BP is below 110/70   Modified Medications    No medications on file   Discontinued Medications    No medications on file       Review of Systems   Constitutional:  Positive for malaise/fatigue.   HENT: Negative.     Eyes: Negative.    Respiratory:  Positive for shortness of breath.    Cardiovascular:  Positive for chest pain and palpitations.   Gastrointestinal: Negative.    Genitourinary: Negative.    Musculoskeletal:  Positive for back pain and joint pain.   Skin: Negative.    Neurological:  Positive for weakness.   Endo/Heme/Allergies: Negative.    Psychiatric/Behavioral: Negative.     All 12 systems otherwise negative.      Wt Readings from Last 3 Encounters:   06/24/25 68.9 kg (151 lb 14.4 oz)   05/22/25 68.9 kg (151 lb 14.4 oz)   05/13/25 69.7 kg (153 lb 8.8 oz)     Temp Readings from Last 3 Encounters:   05/05/25 97 °F (36.1 °C) (Tympanic)   04/28/25 98.1 °F (36.7 °C) (Oral)   02/17/25 97 °F (36.1 °C) (Tympanic)     BP Readings from Last 3 Encounters:   05/22/25 128/70   05/13/25 120/70   05/05/25 116/70     Pulse Readings from Last 3 Encounters:   05/22/25 (!) 58   05/13/25 62   05/05/25 60       There were no vitals taken for this visit.    Objective:   Physical Exam  Vitals and nursing note reviewed.   Constitutional:       General: She is not in acute distress.     Appearance: She is well-developed. She is obese. She is not diaphoretic.   HENT:      Head: Normocephalic and atraumatic.      Nose: Nose normal.    Eyes:      General: No scleral icterus.     Conjunctiva/sclera: Conjunctivae normal.   Neck:      Thyroid: No thyromegaly.      Vascular: No JVD.   Cardiovascular:      Rate and Rhythm: Normal rate and regular rhythm.      Heart sounds: S1 normal and S2 normal. Murmur heard.      No friction rub. No gallop. No S3 or S4 sounds.   Pulmonary:      Effort: Pulmonary effort is normal. No respiratory distress.      Breath sounds: Normal breath sounds. No stridor. No wheezing or rales.   Chest:      Chest wall: No tenderness.   Abdominal:      General: Bowel sounds are normal. There is no distension.      Palpations: Abdomen is soft. There is no mass.      Tenderness: There is no abdominal tenderness. There is no rebound.   Genitourinary:     Comments: Deferred  Musculoskeletal:         General: No tenderness or deformity. Normal range of motion.      Cervical back: Normal range of motion and neck supple.   Lymphadenopathy:      Cervical: No cervical adenopathy.   Skin:     General: Skin is warm and dry.      Coloration: Skin is not pale.      Findings: No erythema or rash.   Neurological:      Mental Status: She is alert and oriented to person, place, and time.      Motor: No abnormal muscle tone.      Coordination: Coordination normal.   Psychiatric:         Behavior: Behavior normal.         Thought Content: Thought content normal.         Judgment: Judgment normal.         Lab Results   Component Value Date     05/16/2025     12/06/2024    K 4.6 05/16/2025    K 4.0 12/06/2024     05/16/2025     12/06/2024    CO2 24 05/16/2025    CO2 25 12/06/2024    BUN 22 05/16/2025    CREATININE 1.5 (H) 05/16/2025    GLU 90 05/16/2025    GLU 97 12/06/2024    HGBA1C 5.1 04/27/2025    HGBA1C 5.4 07/26/2023    MG 2.3 04/28/2025    AST 36 05/07/2025    AST 26 07/11/2024    ALT 22 05/07/2025    ALT 17 07/11/2024    ALBUMIN 3.3 (L) 05/16/2025    ALBUMIN 3.1 (L) 07/11/2024    PROT 7.7 05/07/2025    PROT 6.8  07/11/2024    BILITOT 0.7 05/07/2025    BILITOT 0.8 07/11/2024    WBC 3.33 (L) 05/07/2025    HGB 14.2 05/07/2025    HGB 13.5 07/11/2024    HCT 43.3 05/07/2025    HCT 43.4 07/11/2024    MCV 88 05/07/2025    MCV 92 07/11/2024     05/07/2025     07/11/2024    INR 1.0 02/14/2017    TSH 2.739 04/26/2025    TSH 4.715 (H) 01/08/2025    CHOL 154 04/27/2025    CHOL 182 07/11/2024    HDL 40 04/27/2025    LDLCALC 96.4 04/27/2025    TRIG 88 04/27/2025    TRIG 106 07/11/2024    BNP 4,599 (H) 04/26/2025       BNP (pg/mL)   Date Value   04/26/2025 4,599 (H)   12/06/2024 434 (H)   07/26/2024 756 (H)   04/12/2024 507 (H)   01/26/2024 613 (H)   12/01/2023 612 (H)     INR (no units)   Date Value   02/14/2017 1.0   03/02/2014 1.0        Assessment:      1. Shortness of breath    2. Dyspnea, unspecified type    3. Acute systolic congestive heart failure    4. Acute on chronic combined systolic and diastolic congestive heart failure    5. Palpitations    6. Chronic combined systolic and diastolic heart failure    7. Heart failure with reduced ejection fraction (HFrEF, <= 40%) and combined systolic and diastolic dysfunction    8. Sinus bradycardia    9. Stage 3b chronic kidney disease    10. PVC (premature ventricular contraction)    11. Nonischemic cardiomyopathy    12. Chronic combined systolic and diastolic congestive heart failure    13. PAGE (dyspnea on exertion)              Plan:     NICM  EF 35%; Chest pain, PAGE;  BNP - 3942 -->  --> 434 --> 4599  - cont Toprol and ARB - change to Entresto BID - increases dose   - nuc stress 12/2023 with mostly fixed defect anteroseptal/apical wall with EF 34%  - copnt low salt diet   -  increased Entresto to high dose, and take lasix PRN  - PAGE with back pain - order pharm nuclear stress test, pt cannot walk on treadmill  -April 2025 with CHF exac - ECHO 4/2025 with LVEF 35-40%, grade 2 DD, mild red RV function, mod MR, mod TR, PASP 61 mmHg, inc CVP.     2. HTN, PVCs - with  bradycardia   - titrate meds  - frequent PVCs - referred to EP - started on Amio with Dr. Herminia Haines  - Holter 4/2024 with sinus pause 4.3 sec  -Pt saw Dr. Herminia Haines in past - reassess her candidacy for ICD.  - I will also reduce the dose of amiodarone from 300 to 200 a day.  - rec compression stockings for edema   -vital monitor 10/2024 - pauses of 3.3 sec, decreased BB to 12.5 mg.   - may need JESSIE eval   - repeat vital and amio level     3. H/o Obesity - losing weight - 159 lbs -->  BMI 29 - 161 lbs -->BMI 30 - 165 lbs  -->163 lbs  --> 158 lbs  --> 159 --> 149 lbs   - cont weight loss    4. CKD3  - cont to monitor  - GFR 37  - cont vitamin D- stable     5. Back pain  - referred to pain management     Visit today included increased complexity associated with the care of the episodic problem CHF addressed and managing the longitudinal care of the patient due to the serious and/or complex managed problem(s) .    Thank you for allowing me to participate in this patient's care. Please do not hesitate to contact me with any questions or concerns. Consult note has been forwarded to the referral physician.

## 2025-07-12 ENCOUNTER — EXTERNAL HOME HEALTH (OUTPATIENT)
Dept: HOME HEALTH SERVICES | Facility: HOSPITAL | Age: 80
End: 2025-07-12
Payer: MEDICARE

## 2025-07-14 ENCOUNTER — TELEPHONE (OUTPATIENT)
Dept: CARDIOLOGY | Facility: CLINIC | Age: 80
End: 2025-07-14
Payer: MEDICARE

## 2025-07-14 LAB
Lab: 1.4 UG/ML
Lab: 2 UG/ML

## 2025-07-14 NOTE — TELEPHONE ENCOUNTER
Copied from CRM #[id number]. Topic: General Inquiry - Test Results  >> Jul 14, 2025 10:05 AM Tiffany wrote:  Type:  Patient Requesting Call    Who Called:Megan Sams  Does the patient know what this is regarding?: pt is calling to speak with nurse regarding results from labs dont on 7/11  Would the patient rather a call back or a response via [facility]? call  Best Call Back Number: [phone]    Additional Information:          Spoke with patient to advise that the Amiodarone lab test is a send out and that it has not been resulted yet. Advised that once it was received Dr. Dubois will review the results and ask a staff member to give her a call and go over the results with her. Patient voiced understanding.

## 2025-07-15 ENCOUNTER — TELEPHONE (OUTPATIENT)
Dept: CARDIOLOGY | Facility: CLINIC | Age: 80
End: 2025-07-15
Payer: MEDICARE

## 2025-07-15 NOTE — TELEPHONE ENCOUNTER
Spoke with patient and advised that per Dr. Dubois: results reveal normal amiodarone level, will continue to monitor it. Patient voiced understanding. Verified f/u appt.      ----- Message from Sammy Dubois MD sent at 7/14/2025  5:48 PM CDT -----  Please contact the patient and let them know that their results reveal normal amiodarone level, will continue to monitor it .   ----- Message -----  From: Lab, Background User  Sent: 7/14/2025   3:14 PM CDT  To: Sammy Dubois MD

## 2025-07-18 ENCOUNTER — HOSPITAL ENCOUNTER (OUTPATIENT)
Dept: CARDIOLOGY | Facility: HOSPITAL | Age: 80
Discharge: HOME | End: 2025-07-18
Attending: INTERNAL MEDICINE
Payer: MEDICARE

## 2025-07-18 DIAGNOSIS — I42.8 NONISCHEMIC CARDIOMYOPATHY: ICD-10-CM

## 2025-07-18 DIAGNOSIS — I50.21 ACUTE SYSTOLIC CONGESTIVE HEART FAILURE: ICD-10-CM

## 2025-07-18 DIAGNOSIS — I49.3 PVC (PREMATURE VENTRICULAR CONTRACTION): ICD-10-CM

## 2025-07-18 DIAGNOSIS — R00.2 PALPITATIONS: ICD-10-CM

## 2025-07-18 DIAGNOSIS — R00.1 SINUS BRADYCARDIA: ICD-10-CM

## 2025-07-18 DIAGNOSIS — N18.32 STAGE 3B CHRONIC KIDNEY DISEASE: ICD-10-CM

## 2025-07-18 DIAGNOSIS — R06.00 DYSPNEA, UNSPECIFIED TYPE: ICD-10-CM

## 2025-07-18 DIAGNOSIS — R06.02 SHORTNESS OF BREATH: ICD-10-CM

## 2025-07-18 DIAGNOSIS — I50.42 CHRONIC COMBINED SYSTOLIC AND DIASTOLIC HEART FAILURE: ICD-10-CM

## 2025-08-04 ENCOUNTER — TELEPHONE (OUTPATIENT)
Dept: CARDIOLOGY | Facility: CLINIC | Age: 80
End: 2025-08-04
Payer: MEDICARE

## 2025-08-04 NOTE — TELEPHONE ENCOUNTER
Spoke with patient to advise that per Dr. Dubois: test results reveal low pulse with pauses - need to stop taking metoprolol - follow up with Rozina roland to discuss further treatment of the heart rates. Scheduled f/u with Rozina Kwon for 08- at 11:30am. Patient voiced understanding.      ----- Message from Sammy Dubois MD sent at 8/4/2025 11:12 AM CDT -----  Please contact the patient and let them know that their results reveal low pulse with pauses - need to stop taking metoprolol - follow up with Rozina roland to discuss further treatment of the   heart rates. Thanks   ----- Message -----  From: Zohaib Kaufman MD  Sent: 8/1/2025   4:29 PM CDT  To: Sammy Dubois MD

## 2025-08-08 ENCOUNTER — HOSPITAL ENCOUNTER (OUTPATIENT)
Dept: CARDIOLOGY | Facility: HOSPITAL | Age: 80
Discharge: HOME | End: 2025-08-08
Payer: MEDICARE

## 2025-08-08 ENCOUNTER — OFFICE VISIT (OUTPATIENT)
Dept: CARDIOLOGY | Facility: CLINIC | Age: 80
End: 2025-08-08
Payer: MEDICARE

## 2025-08-08 ENCOUNTER — TELEPHONE (OUTPATIENT)
Dept: CARDIOLOGY | Facility: CLINIC | Age: 80
End: 2025-08-08
Payer: MEDICARE

## 2025-08-08 VITALS
DIASTOLIC BLOOD PRESSURE: 80 MMHG | HEIGHT: 62 IN | HEART RATE: 72 BPM | OXYGEN SATURATION: 99 % | SYSTOLIC BLOOD PRESSURE: 112 MMHG | WEIGHT: 144.63 LBS | BODY MASS INDEX: 26.61 KG/M2

## 2025-08-08 DIAGNOSIS — I10 ESSENTIAL HYPERTENSION: ICD-10-CM

## 2025-08-08 DIAGNOSIS — I50.40 HEART FAILURE WITH REDUCED EJECTION FRACTION (HFREF, <= 40%) AND COMBINED SYSTOLIC AND DIASTOLIC DYSFUNCTION: ICD-10-CM

## 2025-08-08 DIAGNOSIS — I49.3 PVC (PREMATURE VENTRICULAR CONTRACTION): ICD-10-CM

## 2025-08-08 DIAGNOSIS — I42.8 NONISCHEMIC CARDIOMYOPATHY: Primary | ICD-10-CM

## 2025-08-08 DIAGNOSIS — I10 ESSENTIAL HYPERTENSION: Primary | ICD-10-CM

## 2025-08-08 LAB
OHS QRS DURATION: 136 MS
OHS QTC CALCULATION: 480 MS

## 2025-08-08 PROCEDURE — 99999 PR PBB SHADOW E&M-EST. PATIENT-LVL III: CPT | Mod: PBBFAC,,, | Performed by: NURSE PRACTITIONER

## 2025-08-08 PROCEDURE — 93005 ELECTROCARDIOGRAM TRACING: CPT

## 2025-08-08 PROCEDURE — 93010 ELECTROCARDIOGRAM REPORT: CPT | Mod: ,,, | Performed by: INTERNAL MEDICINE

## 2025-08-08 RX ORDER — FINASTERIDE 1 MG/1
1 TABLET, FILM COATED ORAL
COMMUNITY
Start: 2025-07-19

## 2025-08-08 NOTE — PROGRESS NOTES
Subjective:   Patient ID:  Megan Sams is a 80 y.o. female who presents for evaluation of Follow-up and Results      Follow-up  Associated symptoms include myalgias and weakness. Pertinent negatives include no abdominal pain, chest pain, coughing, headaches, nausea, numbness or vomiting.       Megan Sams is a 79 year old female who presents to Arrhythmia clinic for follow up.    Her current medical conditions include CHF, HFrEF of 35-40%, HTN, CKD, NICM, PVCs.     She returns today and states she is doing ok.     She does endorse some left shoulder pain intermittently.     Reports compliance with medications and dietary restrictions.     Reports 2 pillow orthopnea.     She is walking at [facility] about once a week.     Has issues with hair loss since starting cardiac meds. Will start using minoxidil topical cream to assist with hair regrowth.     Denies chest pain or anginal equivalents. No shortness of breath, PAGE or palpitations. Denies orthopnea, PND or abdominal bloating. Reports regular walking without any issues lately. NO leg swelling or claudications. No recent falls, syncope or near syncopal events. Reports compliance with medications and dietary restrictions. NO CNS complaints to suggest TIA or CVA today. No signs of abnormal bleeding on ASA daily.     Medical therapy:  Entresto 49/51 BID  Toprol XL 50 mg daily  Amiodarone 200 mg daily  Jardiance 10 mg daily  Aldactone 50 mg daily    11/21/2024 update    Megan Sams returns for follow up.     She has decreased her Toprol XL to 12.5 mg with improvement in HR to 50s.     Still suffering from hair loss which is worsening.   BP more elevated than usual today in office due to rushing to get to clinic for appt on time.       No leg swelling or claudications.   Denies any dizziness, LH, syncope or near syncopal events.     Denies chest pain or anginal equivalents. No shortness of breath, PAGE or palpitations. Denies orthopnea, PND or abdominal bloating. Reports  regular walking without any issues lately. NO leg swelling or claudications. No recent falls, syncope or near syncopal events. Reports compliance with medications and dietary restrictions. NO CNS complaints to suggest TIA or CVA today. No signs of abnormal bleeding on ASA daily.     Results for orders placed during the hospital encounter of 07/19/24    Echo    Interpretation Summary    Left Ventricle: The left ventricle is normal in size. Normal wall thickness. There is concentric hypertrophy. Mild global hypokinesis present. There is reduced systolic function. Ejection fraction by visual approximation is 40%. There is indeterminate diastolic function.    Right Ventricle: Normal right ventricular cavity size. Wall thickness is normal. Systolic function is normal.    Aortic Valve: The aortic valve is a trileaflet valve. There is mild stenosis. Aortic valve area by VTI is 1.92 cm². Aortic valve peak velocity is 1.17 m/s. Mean gradient is 3 mmHg. The dimensionless index is 0.59.    Mitral Valve: There is moderate bileaflet sclerosis. There is normal leaflet mobility. There is prolapse of the anterior mitral leaflet. There is mild to moderate regurgitation.    Pulmonary Artery: The estimated pulmonary artery systolic pressure is 48 mmHg.    IVC/SVC: Normal venous pressure at 3 mmHg.      3/21/2025update    Megan Sams returns for follow up.    Reviewed PFT test with patient in office.    BP slightly elevated today in office.   Had some frustrations with transportation today and likely caused elevated BP readings today.     Denies chest pain or anginal equivalents. No shortness of breath, PAGE or palpitations. Denies orthopnea, PND or abdominal bloating. Reports regular walking without any issues lately. NO leg swelling or claudications. No recent falls, syncope or near syncopal events. Reports compliance with medications and dietary restrictions. NO CNS complaints to suggest TIA or CVA today. No signs of abnormal  bleeding on ASA daily.       8/8/2025 update    Megan Sams returns for follow up to discuss abnormal holter monitor.   She stopped BB after receiving call with holter results.     Since that time, she has felt well without any CV issues.     EKG- NSR, LAE, LVH, HR 73 True 170 ms QTc 480  ms.     Denies any palpitations, syncope or near syncopal events.     BP well controlled today in office.     Taking medications as directed. No signs of abnormal bleeding on ASA daily.     Continues to take Amio 200 mg daily.     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    Hypertension     Nonischemic cardiomyopathy     PVC's (premature ventricular contractions)     Sinus pause        Past Surgical History:   Procedure Laterality Date    HYSTERECTOMY  1978    OOPHORECTOMY  1978       Social History     Tobacco Use    Smoking status: Never    Smokeless tobacco: Never   Substance Use Topics    Alcohol use: No     Alcohol/week: 0.0 standard drinks of alcohol    Drug use: No       Family History   Problem Relation Name Age of Onset    Early death Mother      Heart disease Mother      Hypertension Mother      Kidney disease Father      Heart disease Brother         Wt Readings from Last 3 Encounters:   08/08/25 65.6 kg (144 lb 10 oz)   07/11/25 67.9 kg (149 lb 11.1 oz)   06/24/25 68.9 kg (151 lb 14.4 oz)     Temp Readings from Last 3 Encounters:   05/05/25 97 °F (36.1 °C) (Tympanic)   04/28/25 98.1 °F (36.7 °C) (Oral)   02/17/25 97 °F (36.1 °C) (Tympanic)     BP Readings from Last 3 Encounters:   08/08/25 112/80   07/11/25 130/72   05/22/25 128/70     Pulse Readings from Last 3 Encounters:   08/08/25 72   07/11/25 (!) 59   05/22/25 (!) 58       Current Outpatient Medications on File Prior to Visit   Medication Sig Dispense  Refill    acetaminophen (TYLENOL) 500 MG tablet Take 500 mg by mouth every 6 (six) hours as needed.      allopurinoL (ZYLOPRIM) 100 MG tablet TAKE 2 TABLETS BY MOUTH EVERY  tablet 0    amiodarone (PACERONE) 200 MG Tab Take one tablet in the am 90 tablet 3    aspirin (ECOTRIN) 81 MG EC tablet Take 1 tablet (81 mg total) by mouth once daily. 90 tablet 1    ciclopirox (PENLAC) 8 % Soln Apply to nails once daily 6.6 mL 6    empagliflozin (JARDIANCE) 10 mg tablet Take 1 tablet (10 mg total) by mouth once daily. 90 tablet 3    ergocalciferol (VITAMIN D2) 50,000 unit Cap Take 1 capsule (50,000 Units total) by mouth every 7 days. 12 capsule 1    finasteride (PROPECIA) 1 mg tablet Take 1 mg by mouth.      furosemide (LASIX) 20 MG tablet Take 1 tablet (20 mg total) by mouth once daily. Do not take if BP is below 110/70 or feeling dry, can double up dose for 2-3 days if needed 90 tablet 1    MULTIVIT WITH CALCIUM,IRON,MIN (MULTIPLE VITAMIN, WOMENS ORAL) Take by mouth once daily.      sacubitriL-valsartan (ENTRESTO)  mg per tablet Take 1 tablet by mouth 2 (two) times daily. 180 tablet 1    spironolactone (ALDACTONE) 50 MG tablet Take 1 tablet (50 mg total) by mouth once daily. Do not take if BP is below 110/70 90 tablet 1    diclofenac sodium (VOLTAREN) 1 % Gel Apply 2 g topically 2 (two) times daily as needed (back pain). 100 g 1    mometasone (ELOCON) 0.1 % ointment AAA bid prn as needed for hair loss. Steroid ointment. (Patient not taking: Reported on 8/8/2025) 45 g 0     No current facility-administered medications on file prior to visit.       Cardiac Monitor - 3-15 Day Adult (Cupid Only)  Result Date: 8/1/2025    The predominant rhythm is sinus.    Predominant underlying rhythm was Sinus Rhythm.   Patient had a min HR of 25 bpm, max HR of 96 bpm, and avg HR of 52 bpm.     Bundle Branch Block/IVCD was present. Slight P wave morphology changes   were noted.     46 Pauses occurred, the longest lasting 4.9 secs  (12 bpm). Junctional   Rhythm was present.     Isolated SVEs were rare (<1.0%), SVE Couplets were rare (<1.0%), and SVE   Triplets were rare (<1.0%). Isolated VEs were rare (<1.0%, 2331), VE   Couplets were rare (<1.0%, 303), and VE Triplets were rare (<1.0%, 1).   Ventricular Bigeminy was present.     MD notification criteria for Bradycardia met - report posted prior to   notification (LIN).        Cardiac Monitor - 3-15 Day Adult (Cupid Only)  Result Date: 11/11/2024  The patient was monitored for a total of 6d 20h, underlying rhythm is   Sinus.  The minimum heart rate was 26 bpm; the maximum 76 bpm; the average 47 bpm.  0 % of Atrial fibrillation/Atrial flutter with longest episode of 0 ms.  The total burden of AV Block present was 0 % [Complete Heart Block: 0 %;   Advanced (High Grade):  0 %; 2nd Degree, Mobitz II: 0 %; 2nd Degree, Mobitz I: 0 %].  There were 651 pauses, the longest pause was 3366 ms at Day 7 / 03:23:51   pm.  Total count of Ventricular Tachycardia (VT): 1 episode(s). Longest VT: 3   beats on Day 4 / 08:39:08  pm. Fastest VT: 116 bpm on Day 4 / 08:39:08 pm.  0 supraventricular episodes were found. Longest SVT Episode 0 s, Fastest   SVT -- bpm  There were a total of 404 PVCs with 3 morphologies and 2 couplets. Overall   PVC [place] at 0.09 %  There were a total of 0 Other Beats. There were 0 total number of paced   beats.  There were a total of 2806 PSVCs with 11 couplets. Overall PSVC [place] at  0.6 %  There is a total of 1 patient events.          Results for orders placed during the hospital encounter of 04/19/24    Echo    Interpretation Summary    Left Ventricle: The left ventricle is mildly dilated. Moderately increased wall thickness. There is moderate concentric hypertrophy. Moderate global hypokinesis present. There is moderately reduced systolic function with a visually estimated ejection fraction of 35 - 40%. Grade I diastolic dysfunction.    Right Ventricle: Normal right ventricular  cavity size. Wall thickness is normal. Right ventricle wall motion  is normal. Systolic function is normal.    Mitral Valve: There is mild to moderate regurgitation.    Tricuspid Valve: There is mild regurgitation.    Pulmonary Artery: The estimated pulmonary artery systolic pressure is 35 mmHg.    IVC/SVC: Normal venous pressure at 3 mmHg.    Results for orders placed during the hospital encounter of 12/28/23    Nuclear Stress - Cardiology Interpreted    Interpretation Summary    Abnormal myocardial perfusion scan.    There are two significant perfusion abnormalities.    Perfusion Abnormality #1 - There is a moderate to severe intensity, moderate sized, mostly fixed perfusion abnormality with some reversibilty in the apical, anteroseptal and septal apical wall(s).    Perfusion Abnormality #2 - There is a moderate sized, moderate intensity, mostly fixed perfusion abnormality with some reversibilty in the basal to mid inferolateral wall(s).    There are no other significant perfusion abnormalities.    The gated perfusion images showed an ejection fraction of 34% at rest. The gated perfusion images showed an ejection fraction of 23% post stress.    There is moderate global hypokinesis at rest and severe global hypokinesis at stress .    The ECG portion of the study is negative for ischemia.    The patient reported no chest pain during the stress test.        Results for orders placed or performed during the hospital encounter of 08/08/25   SCHEDULED EKG 12-LEAD (to Muse)    Collection Time: 08/08/25 10:29 AM   Result Value Ref Range    QRS Duration 136 ms    [facility] QTC Calculation 480 ms    Narrative    Test Reason : I10,    Vent. Rate :  73 BPM     Atrial Rate :  73 BPM     P-R Int : 170 ms          QRS Dur : 136 ms      QT Int : 436 ms       P-R-T Axes :  43 -13 116 degrees    QTcB Int : 480 ms    Normal sinus rhythm  Possible Left atrial enlargement  LVH with QRS widening ( [place] product )  T wave abnormality,  "consider lateral ischemia  Abnormal ECG  When compared with ECG of 26-Apr-2025 16:15,  Minimal criteria for Septal infarct are no longer Present    Referred By: VLAD MAY           Confirmed By:          Review of Systems   Constitutional: Positive for malaise/fatigue.   HENT:  Negative for hearing loss and hoarse voice.    Eyes:  Negative for visual disturbance.   Cardiovascular:  Negative for chest pain, claudication, dyspnea on exertion, irregular heartbeat, leg swelling, near-syncope, orthopnea, palpitations, paroxysmal nocturnal dyspnea and syncope.   Respiratory:  Negative for cough, hemoptysis, shortness of breath, sleep disturbances due to breathing, snoring and wheezing.    Endocrine: Negative for cold intolerance and heat intolerance.   Hematologic/Lymphatic: Does not bruise/bleed easily.   Skin:  Negative for color change, dry skin and nail changes.   Musculoskeletal:  Positive for arthritis, back pain, joint pain and myalgias.   Gastrointestinal:  Negative for bloating, abdominal pain, constipation, nausea and vomiting.   Genitourinary:  Negative for dysuria, flank pain, hematuria and hesitancy.   Neurological:  Positive for weakness. Negative for headaches, light-headedness, loss of balance, numbness and paresthesias.   Psychiatric/Behavioral:  Negative for altered mental status.    Allergic/Immunologic: Negative for environmental allergies.         Objective:/80 (BP Location: Left arm, Patient Position: Sitting)   Pulse 72   Ht 5' 2" (1.575 m)   Wt 65.6 kg (144 lb 10 oz)   SpO2 99%   BMI 26.45 kg/m²      Physical Exam  Vitals and nursing note reviewed.   Constitutional:       General: She is not in acute distress.     Appearance: Normal appearance. She is well-developed. She is not ill-appearing.   HENT:      Head: Normocephalic and atraumatic.      Nose: Nose normal.      Mouth/Throat:      Mouth: Mucous membranes are moist.   Eyes:      Pupils: Pupils are equal, round, and reactive to " light.   Neck:      Thyroid: No thyromegaly.      Vascular: No JVD.      Trachea: No tracheal deviation.   Cardiovascular:      Rate and Rhythm: Normal rate and regular rhythm.      Chest Wall: PMI is not displaced.      Pulses: Intact distal pulses.           Radial pulses are 2+ on the right side and 2+ on the left side.        Dorsalis pedis pulses are 2+ on the right side and 2+ on the left side.      Heart sounds: S1 normal and S2 normal. Heart sounds not distant. No murmur heard.  Pulmonary:      Effort: Pulmonary effort is normal. No respiratory distress.      Breath sounds: Normal breath sounds. No wheezing.   Abdominal:      General: Bowel sounds are normal.      Palpations: Abdomen is soft.   Musculoskeletal:         General: No swelling. Normal range of motion.      Cervical back: Full passive range of motion without pain, normal range of motion and neck supple.      Right ankle: No swelling.      Left ankle: No swelling.   Skin:     General: Skin is warm and dry.      Capillary Refill: Capillary refill takes less than 2 seconds.      Nails: There is no clubbing.   Neurological:      General: No focal deficit present.      Mental Status: She is alert and oriented to person, place, and time.   Psychiatric:         Speech: Speech normal.         Behavior: Behavior normal.         Thought Content: Thought content normal.         Judgment: Judgment normal.         Lab Results   Component Value Date    CHOL 154 04/27/2025    CHOL 182 07/11/2024    CHOL 174 07/26/2023     Lab Results   Component Value Date    HDL 40 04/27/2025    HDL 55 07/11/2024    HDL 50 07/26/2023     Lab Results   Component Value Date    LDLCALC 96.4 04/27/2025    LDLCALC 105.8 07/11/2024    LDLCALC 104.0 07/26/2023     Lab Results   Component Value Date    TRIG 88 04/27/2025    TRIG 106 07/11/2024    TRIG 100 07/26/2023     Lab Results   Component Value Date    CHOLHDL 26.0 04/27/2025    CHOLHDL 30.2 07/11/2024    CHOLHDL 28.7 07/26/2023        Chemistry        Component Value Date/Time     05/16/2025 1400     12/06/2024 0942    K 4.6 05/16/2025 1400    K 4.0 12/06/2024 0942     05/16/2025 1400     12/06/2024 0942    CO2 24 05/16/2025 1400    CO2 25 12/06/2024 0942    BUN 22 05/16/2025 1400    CREATININE 1.5 (H) 05/16/2025 1400    GLU 90 05/16/2025 1400    GLU 97 12/06/2024 0942        Component Value Date/Time    CALCIUM 8.7 05/16/2025 1400    CALCIUM 9.4 12/06/2024 0942    ALKPHOS 51 05/07/2025 1608    ALKPHOS 58 07/11/2024 1350    AST 36 05/07/2025 1608    AST 26 07/11/2024 1350    ALT 22 05/07/2025 1608    ALT 17 07/11/2024 1350    BILITOT 0.7 05/07/2025 1608    BILITOT 0.8 07/11/2024 1350    ESTGFRAFRICA >60.0 06/30/2022 1031    EGFRNONAA 53.7 (A) 06/30/2022 1031          Lab Results   Component Value Date    TSH 2.739 04/26/2025     Lab Results   Component Value Date    INR 1.0 02/14/2017    INR 1.0 03/02/2014     Lab Results   Component Value Date    WBC 3.33 (L) 05/07/2025    HGB 14.2 05/07/2025    HCT 43.3 05/07/2025    MCV 88 05/07/2025     05/07/2025          Assessment:      1. Nonischemic cardiomyopathy    2. PVC (premature ventricular contraction)    3. Heart failure with reduced ejection fraction (HFrEF, <= 40%) and combined systolic and diastolic dysfunction              Plan:     CHF SYNOPSIS:    GDMT:  ACE/ARB/ARNI: Entresto 97/103 BID  Beta Blocker: on hold  MRA: Aldactone 50 mg daily  SGLT2: Jardiance 10 mg daily  Diuretic: Lasix 20 mg daily PRN for leg swelling.     Dash diet 2 gm sodium restriction  Profile BP at home    RTC in 3 months    TERENCE Hollis  [facility]

## 2025-08-11 ENCOUNTER — LAB VISIT (OUTPATIENT)
Dept: LAB | Facility: HOSPITAL | Age: 80
End: 2025-08-11
Attending: PHYSICIAN ASSISTANT
Payer: MEDICARE

## 2025-08-11 DIAGNOSIS — I50.42 CHRONIC COMBINED SYSTOLIC AND DIASTOLIC CONGESTIVE HEART FAILURE: ICD-10-CM

## 2025-08-11 DIAGNOSIS — I10 ESSENTIAL HYPERTENSION: ICD-10-CM

## 2025-08-11 DIAGNOSIS — N18.32 STAGE 3B CHRONIC KIDNEY DISEASE: ICD-10-CM

## 2025-08-11 DIAGNOSIS — M1A.30X0 CHRONIC GOUT DUE TO RENAL IMPAIRMENT WITHOUT TOPHUS, UNSPECIFIED SITE: ICD-10-CM

## 2025-08-11 LAB
ABSOLUTE EOSINOPHIL (OHS): 0.07 K/UL
ABSOLUTE MONOCYTE (OHS): 0.53 K/UL (ref 0.3–1)
ABSOLUTE NEUTROPHIL COUNT (OHS): 1.14 K/UL (ref 1.8–7.7)
ALBUMIN SERPL BCP-MCNC: 3.3 G/DL (ref 3.5–5.2)
ALP SERPL-CCNC: 41 UNIT/L (ref 40–150)
ALT SERPL W/O P-5'-P-CCNC: 23 UNIT/L (ref 0–55)
ANION GAP (OHS): 8 MMOL/L (ref 8–16)
AST SERPL-CCNC: 34 UNIT/L (ref 0–50)
BASOPHILS # BLD AUTO: 0.03 K/UL
BASOPHILS NFR BLD AUTO: 1 %
BILIRUB SERPL-MCNC: 0.8 MG/DL (ref 0.1–1)
BUN SERPL-MCNC: 21 MG/DL (ref 8–23)
CALCIUM SERPL-MCNC: 8.9 MG/DL (ref 8.7–10.5)
CHLORIDE SERPL-SCNC: 108 MMOL/L (ref 95–110)
CHOLEST SERPL-MCNC: 166 MG/DL (ref 120–199)
CHOLEST/HDLC SERPL: 3.4 {RATIO} (ref 2–5)
CO2 SERPL-SCNC: 25 MMOL/L (ref 23–29)
CREAT SERPL-MCNC: 1.7 MG/DL (ref 0.5–1.4)
ERYTHROCYTE [DISTWIDTH] IN BLOOD BY AUTOMATED COUNT: 16.8 % (ref 11.5–14.5)
GFR SERPLBLD CREATININE-BSD FMLA CKD-EPI: 30 ML/MIN/1.73/M2
GLUCOSE SERPL-MCNC: 80 MG/DL (ref 70–110)
HCT VFR BLD AUTO: 39.6 % (ref 37–48.5)
HDLC SERPL-MCNC: 49 MG/DL (ref 40–75)
HDLC SERPL: 29.5 % (ref 20–50)
HGB BLD-MCNC: 13.1 GM/DL (ref 12–16)
IMM GRANULOCYTES # BLD AUTO: 0 K/UL (ref 0–0.04)
IMM GRANULOCYTES NFR BLD AUTO: 0 % (ref 0–0.5)
LDLC SERPL CALC-MCNC: 102.8 MG/DL (ref 63–159)
LYMPHOCYTES # BLD AUTO: 1.36 K/UL (ref 1–4.8)
MCH RBC QN AUTO: 28.7 PG (ref 27–31)
MCHC RBC AUTO-ENTMCNC: 33.1 G/DL (ref 32–36)
MCV RBC AUTO: 87 FL (ref 82–98)
NONHDLC SERPL-MCNC: 117 MG/DL
NUCLEATED RBC (/100WBC) (OHS): 0 /100 WBC
PLATELET # BLD AUTO: 173 K/UL (ref 150–450)
PMV BLD AUTO: 12.4 FL (ref 9.2–12.9)
POTASSIUM SERPL-SCNC: 3.9 MMOL/L (ref 3.5–5.1)
PROT SERPL-MCNC: 7 GM/DL (ref 6–8.4)
RBC # BLD AUTO: 4.57 M/UL (ref 4–5.4)
RELATIVE EOSINOPHIL (OHS): 2.2 %
RELATIVE LYMPHOCYTE (OHS): 43.5 % (ref 18–48)
RELATIVE MONOCYTE (OHS): 16.9 % (ref 4–15)
RELATIVE NEUTROPHIL (OHS): 36.4 % (ref 38–73)
SODIUM SERPL-SCNC: 141 MMOL/L (ref 136–145)
T4 FREE SERPL-MCNC: 1.26 NG/DL (ref 0.71–1.51)
TRIGL SERPL-MCNC: 71 MG/DL (ref 30–150)
TSH SERPL-ACNC: 5.27 UIU/ML (ref 0.4–4)
URATE SERPL-MCNC: 3.4 MG/DL (ref 2.4–5.7)
WBC # BLD AUTO: 3.13 K/UL (ref 3.9–12.7)

## 2025-08-11 PROCEDURE — 85025 COMPLETE CBC W/AUTO DIFF WBC: CPT

## 2025-08-11 PROCEDURE — 36415 COLL VENOUS BLD VENIPUNCTURE: CPT

## 2025-08-11 PROCEDURE — 80053 COMPREHEN METABOLIC PANEL: CPT

## 2025-08-11 PROCEDURE — 84443 ASSAY THYROID STIM HORMONE: CPT

## 2025-08-11 PROCEDURE — 84550 ASSAY OF BLOOD/URIC ACID: CPT

## 2025-08-11 PROCEDURE — 84439 ASSAY OF FREE THYROXINE: CPT

## 2025-08-11 PROCEDURE — 80061 LIPID PANEL: CPT

## 2025-08-19 ENCOUNTER — OFFICE VISIT (OUTPATIENT)
Dept: URGENT CARE | Facility: CLINIC | Age: 80
End: 2025-08-19
Payer: MEDICARE

## 2025-08-19 VITALS
TEMPERATURE: 98 F | BODY MASS INDEX: 27.05 KG/M2 | HEIGHT: 62 IN | HEART RATE: 65 BPM | RESPIRATION RATE: 20 BRPM | OXYGEN SATURATION: 97 % | SYSTOLIC BLOOD PRESSURE: 134 MMHG | DIASTOLIC BLOOD PRESSURE: 66 MMHG | WEIGHT: 147 LBS

## 2025-08-19 DIAGNOSIS — R11.2 NAUSEA AND VOMITING, UNSPECIFIED VOMITING TYPE: ICD-10-CM

## 2025-08-19 DIAGNOSIS — K59.00 CONSTIPATION, UNSPECIFIED CONSTIPATION TYPE: Primary | ICD-10-CM

## 2025-08-19 PROCEDURE — 99214 OFFICE O/P EST MOD 30 MIN: CPT | Mod: S$GLB,,, | Performed by: PHYSICIAN ASSISTANT

## 2025-08-19 RX ORDER — ONDANSETRON 4 MG/1
4 TABLET, ORALLY DISINTEGRATING ORAL EVERY 6 HOURS PRN
Qty: 20 TABLET | Refills: 0 | Status: SHIPPED | OUTPATIENT
Start: 2025-08-19

## 2025-08-25 RX ORDER — ALLOPURINOL 100 MG/1
200 TABLET ORAL
Qty: 180 TABLET | Refills: 0 | Status: SHIPPED | OUTPATIENT
Start: 2025-08-25

## 2025-08-26 ENCOUNTER — TELEPHONE (OUTPATIENT)
Dept: CARDIOLOGY | Facility: CLINIC | Age: 80
End: 2025-08-26
Payer: MEDICARE

## 2025-08-26 ENCOUNTER — OFFICE VISIT (OUTPATIENT)
Dept: INTERNAL MEDICINE | Facility: CLINIC | Age: 80
End: 2025-08-26
Payer: MEDICARE

## 2025-08-26 VITALS
BODY MASS INDEX: 26.01 KG/M2 | OXYGEN SATURATION: 99 % | HEIGHT: 62 IN | DIASTOLIC BLOOD PRESSURE: 64 MMHG | SYSTOLIC BLOOD PRESSURE: 110 MMHG | WEIGHT: 141.31 LBS | TEMPERATURE: 98 F | HEART RATE: 63 BPM

## 2025-08-26 DIAGNOSIS — R79.89 ELEVATED TSH: Primary | ICD-10-CM

## 2025-08-26 DIAGNOSIS — R63.4 LOSS OF WEIGHT: ICD-10-CM

## 2025-08-26 DIAGNOSIS — K43.9 ABDOMINAL WALL HERNIA: ICD-10-CM

## 2025-08-26 DIAGNOSIS — R06.02 SOB (SHORTNESS OF BREATH): ICD-10-CM

## 2025-08-26 DIAGNOSIS — M81.0 OSTEOPOROSIS, UNSPECIFIED OSTEOPOROSIS TYPE, UNSPECIFIED PATHOLOGICAL FRACTURE PRESENCE: ICD-10-CM

## 2025-08-26 DIAGNOSIS — N18.32 STAGE 3B CHRONIC KIDNEY DISEASE: ICD-10-CM

## 2025-08-26 DIAGNOSIS — I10 ESSENTIAL HYPERTENSION: Chronic | ICD-10-CM

## 2025-08-26 PROCEDURE — 3288F FALL RISK ASSESSMENT DOCD: CPT | Mod: CPTII,S$GLB,, | Performed by: FAMILY MEDICINE

## 2025-08-26 PROCEDURE — 3078F DIAST BP <80 MM HG: CPT | Mod: CPTII,S$GLB,, | Performed by: FAMILY MEDICINE

## 2025-08-26 PROCEDURE — 99999 PR PBB SHADOW E&M-EST. PATIENT-LVL V: CPT | Mod: PBBFAC,,, | Performed by: FAMILY MEDICINE

## 2025-08-26 PROCEDURE — 1157F ADVNC CARE PLAN IN RCRD: CPT | Mod: CPTII,S$GLB,, | Performed by: FAMILY MEDICINE

## 2025-08-26 PROCEDURE — 3074F SYST BP LT 130 MM HG: CPT | Mod: CPTII,S$GLB,, | Performed by: FAMILY MEDICINE

## 2025-08-26 PROCEDURE — 99214 OFFICE O/P EST MOD 30 MIN: CPT | Mod: S$GLB,,, | Performed by: FAMILY MEDICINE

## 2025-08-26 PROCEDURE — G2211 COMPLEX E/M VISIT ADD ON: HCPCS | Mod: S$GLB,,, | Performed by: FAMILY MEDICINE

## 2025-08-26 PROCEDURE — 1126F AMNT PAIN NOTED NONE PRSNT: CPT | Mod: CPTII,S$GLB,, | Performed by: FAMILY MEDICINE

## 2025-08-26 PROCEDURE — 1101F PT FALLS ASSESS-DOCD LE1/YR: CPT | Mod: CPTII,S$GLB,, | Performed by: FAMILY MEDICINE

## 2025-08-26 PROCEDURE — 1159F MED LIST DOCD IN RCRD: CPT | Mod: CPTII,S$GLB,, | Performed by: FAMILY MEDICINE

## 2025-08-26 RX ORDER — ALENDRONATE SODIUM 70 MG/1
70 TABLET ORAL
Qty: 4 TABLET | Refills: 11 | Status: SHIPPED | OUTPATIENT
Start: 2025-08-26 | End: 2025-08-26

## 2025-08-26 RX ORDER — ALENDRONATE SODIUM 70 MG/1
TABLET ORAL
Qty: 4 TABLET | Refills: 11 | Status: SHIPPED | OUTPATIENT
Start: 2025-08-26

## 2025-08-27 LAB
OHS QRS DURATION: 130 MS
OHS QTC CALCULATION: 495 MS

## 2025-09-04 ENCOUNTER — OFFICE VISIT (OUTPATIENT)
Dept: CARDIOLOGY | Facility: CLINIC | Age: 80
End: 2025-09-04
Payer: MEDICARE

## 2025-09-04 VITALS
HEART RATE: 60 BPM | BODY MASS INDEX: 25.13 KG/M2 | DIASTOLIC BLOOD PRESSURE: 64 MMHG | WEIGHT: 136.56 LBS | HEIGHT: 62 IN | SYSTOLIC BLOOD PRESSURE: 120 MMHG

## 2025-09-04 DIAGNOSIS — I10 ESSENTIAL HYPERTENSION: Chronic | ICD-10-CM

## 2025-09-04 DIAGNOSIS — I50.40 HEART FAILURE WITH REDUCED EJECTION FRACTION (HFREF, <= 40%) AND COMBINED SYSTOLIC AND DIASTOLIC DYSFUNCTION: Primary | ICD-10-CM

## 2025-09-04 DIAGNOSIS — I42.8 NONISCHEMIC CARDIOMYOPATHY: ICD-10-CM

## 2025-09-04 PROCEDURE — 99999 PR PBB SHADOW E&M-EST. PATIENT-LVL III: CPT | Mod: PBBFAC,,, | Performed by: PHYSICIAN ASSISTANT

## 2025-09-04 PROCEDURE — 1160F RVW MEDS BY RX/DR IN RCRD: CPT | Mod: CPTII,S$GLB,, | Performed by: PHYSICIAN ASSISTANT

## 2025-09-04 PROCEDURE — 99214 OFFICE O/P EST MOD 30 MIN: CPT | Mod: S$GLB,,, | Performed by: PHYSICIAN ASSISTANT

## 2025-09-04 PROCEDURE — 3074F SYST BP LT 130 MM HG: CPT | Mod: CPTII,S$GLB,, | Performed by: PHYSICIAN ASSISTANT

## 2025-09-04 PROCEDURE — 3078F DIAST BP <80 MM HG: CPT | Mod: CPTII,S$GLB,, | Performed by: PHYSICIAN ASSISTANT

## 2025-09-04 PROCEDURE — 1126F AMNT PAIN NOTED NONE PRSNT: CPT | Mod: CPTII,S$GLB,, | Performed by: PHYSICIAN ASSISTANT

## 2025-09-04 PROCEDURE — 1159F MED LIST DOCD IN RCRD: CPT | Mod: CPTII,S$GLB,, | Performed by: PHYSICIAN ASSISTANT

## 2025-09-04 PROCEDURE — 1157F ADVNC CARE PLAN IN RCRD: CPT | Mod: CPTII,S$GLB,, | Performed by: PHYSICIAN ASSISTANT
